# Patient Record
Sex: FEMALE | Race: WHITE | NOT HISPANIC OR LATINO | Employment: FULL TIME | URBAN - METROPOLITAN AREA
[De-identification: names, ages, dates, MRNs, and addresses within clinical notes are randomized per-mention and may not be internally consistent; named-entity substitution may affect disease eponyms.]

---

## 2017-06-07 ENCOUNTER — CONVERSION ENCOUNTER (OUTPATIENT)
Dept: MAMMOGRAPHY | Facility: HOSPITAL | Age: 42
End: 2017-06-07

## 2018-05-15 ENCOUNTER — OFFICE VISIT CONVERTED (OUTPATIENT)
Dept: FAMILY MEDICINE CLINIC | Facility: CLINIC | Age: 43
End: 2018-05-15
Attending: NURSE PRACTITIONER

## 2018-06-04 ENCOUNTER — OFFICE VISIT CONVERTED (OUTPATIENT)
Dept: ORTHOPEDIC SURGERY | Facility: CLINIC | Age: 43
End: 2018-06-04
Attending: PHYSICIAN ASSISTANT

## 2018-07-11 ENCOUNTER — OFFICE VISIT CONVERTED (OUTPATIENT)
Dept: ORTHOPEDIC SURGERY | Facility: CLINIC | Age: 43
End: 2018-07-11
Attending: PHYSICIAN ASSISTANT

## 2018-07-11 ENCOUNTER — CONVERSION ENCOUNTER (OUTPATIENT)
Dept: ORTHOPEDIC SURGERY | Facility: CLINIC | Age: 43
End: 2018-07-11

## 2018-11-12 ENCOUNTER — OFFICE VISIT CONVERTED (OUTPATIENT)
Dept: FAMILY MEDICINE CLINIC | Facility: CLINIC | Age: 43
End: 2018-11-12
Attending: NURSE PRACTITIONER

## 2019-01-10 ENCOUNTER — HOSPITAL ENCOUNTER (OUTPATIENT)
Dept: URGENT CARE | Facility: CLINIC | Age: 44
Discharge: HOME | End: 2019-01-10

## 2019-01-10 LAB — GLUCOSE BLD-MCNC: 112 MG/DL (ref 65–99)

## 2019-01-13 LAB — BACTERIA SPEC AEROBE CULT: NORMAL

## 2019-02-22 ENCOUNTER — HOSPITAL ENCOUNTER (OUTPATIENT)
Dept: FAMILY MEDICINE CLINIC | Facility: CLINIC | Age: 44
Discharge: HOME | End: 2019-02-22
Attending: NURSE PRACTITIONER

## 2019-02-23 LAB — VZV IGG SER IA-ACNC: 1223 INDEX

## 2019-05-29 ENCOUNTER — HOSPITAL ENCOUNTER (OUTPATIENT)
Dept: FAMILY MEDICINE CLINIC | Facility: CLINIC | Age: 44
Discharge: HOME | End: 2019-05-29
Attending: NURSE PRACTITIONER

## 2019-05-29 ENCOUNTER — OFFICE VISIT CONVERTED (OUTPATIENT)
Dept: FAMILY MEDICINE CLINIC | Facility: CLINIC | Age: 44
End: 2019-05-29
Attending: NURSE PRACTITIONER

## 2019-05-29 LAB
25(OH)D3 SERPL-MCNC: 30.6 NG/ML (ref 30–100)
ALBUMIN SERPL-MCNC: 4.2 G/DL (ref 3.5–5)
ALBUMIN/GLOB SERPL: 1.4 {RATIO} (ref 1.4–2.6)
ALP SERPL-CCNC: 91 U/L (ref 42–98)
ALT SERPL-CCNC: 22 U/L (ref 10–40)
ANION GAP SERPL CALC-SCNC: 22 MMOL/L (ref 8–19)
AST SERPL-CCNC: 19 U/L (ref 15–50)
BASOPHILS # BLD AUTO: 0.04 10*3/UL (ref 0–0.2)
BASOPHILS NFR BLD AUTO: 0.5 % (ref 0–3)
BILIRUB SERPL-MCNC: 0.42 MG/DL (ref 0.2–1.3)
BUN SERPL-MCNC: 19 MG/DL (ref 5–25)
BUN/CREAT SERPL: 22 {RATIO} (ref 6–20)
CALCIUM SERPL-MCNC: 9.5 MG/DL (ref 8.7–10.4)
CHLORIDE SERPL-SCNC: 103 MMOL/L (ref 99–111)
CHOLEST SERPL-MCNC: 214 MG/DL (ref 107–200)
CHOLEST/HDLC SERPL: 5.5 {RATIO} (ref 3–6)
CONV ABS IMM GRAN: 0.02 10*3/UL (ref 0–0.2)
CONV CO2: 20 MMOL/L (ref 22–32)
CONV IMMATURE GRAN: 0.3 % (ref 0–1.8)
CONV TOTAL PROTEIN: 7.2 G/DL (ref 6.3–8.2)
CREAT UR-MCNC: 0.85 MG/DL (ref 0.5–0.9)
DEPRECATED RDW RBC AUTO: 41.8 FL (ref 36.4–46.3)
EOSINOPHIL # BLD AUTO: 0.05 10*3/UL (ref 0–0.7)
EOSINOPHIL # BLD AUTO: 0.7 % (ref 0–7)
ERYTHROCYTE [DISTWIDTH] IN BLOOD BY AUTOMATED COUNT: 13.2 % (ref 11.7–14.4)
GFR SERPLBLD BASED ON 1.73 SQ M-ARVRAT: >60 ML/MIN/{1.73_M2}
GLOBULIN UR ELPH-MCNC: 3 G/DL (ref 2–3.5)
GLUCOSE SERPL-MCNC: 117 MG/DL (ref 65–99)
HBA1C MFR BLD: 13.3 G/DL (ref 12–16)
HCT VFR BLD AUTO: 41.7 % (ref 37–47)
HDLC SERPL-MCNC: 39 MG/DL (ref 40–60)
LDLC SERPL CALC-MCNC: 133 MG/DL (ref 70–100)
LYMPHOCYTES # BLD AUTO: 2.69 10*3/UL (ref 1–5)
MCH RBC QN AUTO: 28 PG (ref 27–31)
MCHC RBC AUTO-ENTMCNC: 31.9 G/DL (ref 33–37)
MCV RBC AUTO: 87.8 FL (ref 81–99)
MONOCYTES # BLD AUTO: 0.45 10*3/UL (ref 0.2–1.2)
MONOCYTES NFR BLD AUTO: 6.1 % (ref 3–10)
NEUTROPHILS # BLD AUTO: 4.16 10*3/UL (ref 2–8)
NEUTROPHILS NFR BLD AUTO: 56.1 % (ref 30–85)
NRBC CBCN: 0 % (ref 0–0.7)
OSMOLALITY SERPL CALC.SUM OF ELEC: 295 MOSM/KG (ref 273–304)
PLATELET # BLD AUTO: 158 10*3/UL (ref 130–400)
PMV BLD AUTO: 13.2 FL (ref 9.4–12.3)
POTASSIUM SERPL-SCNC: 4.2 MMOL/L (ref 3.5–5.3)
RBC # BLD AUTO: 4.75 10*6/UL (ref 4.2–5.4)
SODIUM SERPL-SCNC: 141 MMOL/L (ref 135–147)
T4 FREE SERPL-MCNC: 1.1 NG/DL (ref 0.9–1.8)
TRIGL SERPL-MCNC: 210 MG/DL (ref 40–150)
TSH SERPL-ACNC: 4.77 M[IU]/L (ref 0.27–4.2)
VARIANT LYMPHS NFR BLD MANUAL: 36.3 % (ref 20–45)
VLDLC SERPL-MCNC: 42 MG/DL (ref 5–37)
WBC # BLD AUTO: 7.41 10*3/UL (ref 4.8–10.8)

## 2019-05-30 LAB — CONV ANTI MICROSOMAL AB: 164 IU/ML (ref 0–34)

## 2019-12-05 ENCOUNTER — HOSPITAL ENCOUNTER (OUTPATIENT)
Dept: FAMILY MEDICINE CLINIC | Facility: CLINIC | Age: 44
Discharge: HOME | End: 2019-12-05
Attending: NURSE PRACTITIONER

## 2019-12-05 ENCOUNTER — OFFICE VISIT CONVERTED (OUTPATIENT)
Dept: FAMILY MEDICINE CLINIC | Facility: CLINIC | Age: 44
End: 2019-12-05
Attending: NURSE PRACTITIONER

## 2019-12-05 ENCOUNTER — CONVERSION ENCOUNTER (OUTPATIENT)
Dept: FAMILY MEDICINE CLINIC | Facility: CLINIC | Age: 44
End: 2019-12-05

## 2019-12-05 LAB
25(OH)D3 SERPL-MCNC: 24.9 NG/ML (ref 30–100)
ALBUMIN SERPL-MCNC: 4.2 G/DL (ref 3.5–5)
ALBUMIN/GLOB SERPL: 1.4 {RATIO} (ref 1.4–2.6)
ALP SERPL-CCNC: 87 U/L (ref 42–98)
ALT SERPL-CCNC: 52 U/L (ref 10–40)
ANION GAP SERPL CALC-SCNC: 18 MMOL/L (ref 8–19)
AST SERPL-CCNC: 32 U/L (ref 15–50)
BASOPHILS # BLD AUTO: 0.03 10*3/UL (ref 0–0.2)
BASOPHILS NFR BLD AUTO: 0.5 % (ref 0–3)
BILIRUB SERPL-MCNC: 0.62 MG/DL (ref 0.2–1.3)
BUN SERPL-MCNC: 15 MG/DL (ref 5–25)
BUN/CREAT SERPL: 19 {RATIO} (ref 6–20)
CALCIUM SERPL-MCNC: 9.5 MG/DL (ref 8.7–10.4)
CHLORIDE SERPL-SCNC: 103 MMOL/L (ref 99–111)
CHOLEST SERPL-MCNC: 238 MG/DL (ref 107–200)
CHOLEST/HDLC SERPL: 6.1 {RATIO} (ref 3–6)
CONV ABS IMM GRAN: 0.01 10*3/UL (ref 0–0.2)
CONV CO2: 22 MMOL/L (ref 22–32)
CONV IMMATURE GRAN: 0.2 % (ref 0–1.8)
CONV TOTAL PROTEIN: 7.2 G/DL (ref 6.3–8.2)
CREAT UR-MCNC: 0.81 MG/DL (ref 0.5–0.9)
DEPRECATED RDW RBC AUTO: 41 FL (ref 36.4–46.3)
EOSINOPHIL # BLD AUTO: 0.02 10*3/UL (ref 0–0.7)
EOSINOPHIL # BLD AUTO: 0.4 % (ref 0–7)
ERYTHROCYTE [DISTWIDTH] IN BLOOD BY AUTOMATED COUNT: 13.2 % (ref 11.7–14.4)
EST. AVERAGE GLUCOSE BLD GHB EST-MCNC: 120 MG/DL
FOLATE SERPL-MCNC: 4.4 NG/ML (ref 4.8–20)
GFR SERPLBLD BASED ON 1.73 SQ M-ARVRAT: >60 ML/MIN/{1.73_M2}
GLOBULIN UR ELPH-MCNC: 3 G/DL (ref 2–3.5)
GLUCOSE SERPL-MCNC: 103 MG/DL (ref 65–99)
HBA1C MFR BLD: 5.8 % (ref 3.5–5.7)
HCT VFR BLD AUTO: 41.1 % (ref 37–47)
HDLC SERPL-MCNC: 39 MG/DL (ref 40–60)
HGB BLD-MCNC: 13.4 G/DL (ref 12–16)
IRON SATN MFR SERPL: 28 % (ref 20–55)
IRON SERPL-MCNC: 106 UG/DL (ref 60–170)
LDLC SERPL CALC-MCNC: 160 MG/DL (ref 70–100)
LYMPHOCYTES # BLD AUTO: 2.02 10*3/UL (ref 1–5)
LYMPHOCYTES NFR BLD AUTO: 35.4 % (ref 20–45)
MCH RBC QN AUTO: 27.9 PG (ref 27–31)
MCHC RBC AUTO-ENTMCNC: 32.6 G/DL (ref 33–37)
MCV RBC AUTO: 85.4 FL (ref 81–99)
MONOCYTES # BLD AUTO: 0.27 10*3/UL (ref 0.2–1.2)
MONOCYTES NFR BLD AUTO: 4.7 % (ref 3–10)
NEUTROPHILS # BLD AUTO: 3.35 10*3/UL (ref 2–8)
NEUTROPHILS NFR BLD AUTO: 58.8 % (ref 30–85)
NRBC CBCN: 0 % (ref 0–0.7)
OSMOLALITY SERPL CALC.SUM OF ELEC: 291 MOSM/KG (ref 273–304)
PLATELET # BLD AUTO: 177 10*3/UL (ref 130–400)
PMV BLD AUTO: 12.3 FL (ref 9.4–12.3)
POTASSIUM SERPL-SCNC: 3.3 MMOL/L (ref 3.5–5.3)
RBC # BLD AUTO: 4.81 10*6/UL (ref 4.2–5.4)
SODIUM SERPL-SCNC: 140 MMOL/L (ref 135–147)
T4 FREE SERPL-MCNC: 1.2 NG/DL (ref 0.9–1.8)
TIBC SERPL-MCNC: 383 UG/DL (ref 245–450)
TRANSFERRIN SERPL-MCNC: 268 MG/DL (ref 250–380)
TRIGL SERPL-MCNC: 196 MG/DL (ref 40–150)
TSH SERPL-ACNC: 2.29 M[IU]/L (ref 0.27–4.2)
VIT B12 SERPL-MCNC: 326 PG/ML (ref 211–911)
VLDLC SERPL-MCNC: 39 MG/DL (ref 5–37)
WBC # BLD AUTO: 5.7 10*3/UL (ref 4.8–10.8)

## 2019-12-06 LAB — CONV ANTI MICROSOMAL AB: 115 IU/ML (ref 0–34)

## 2019-12-07 LAB — BACTERIA SPEC AEROBE CULT: NORMAL

## 2019-12-13 ENCOUNTER — HOSPITAL ENCOUNTER (OUTPATIENT)
Dept: MAMMOGRAPHY | Facility: HOSPITAL | Age: 44
Discharge: HOME | End: 2019-12-13
Attending: NURSE PRACTITIONER

## 2019-12-17 ENCOUNTER — HOSPITAL ENCOUNTER (OUTPATIENT)
Dept: FAMILY MEDICINE CLINIC | Facility: CLINIC | Age: 44
Discharge: HOME | End: 2019-12-17
Attending: NURSE PRACTITIONER

## 2019-12-17 LAB
ALBUMIN SERPL-MCNC: 4.1 G/DL (ref 3.5–5)
ALBUMIN/GLOB SERPL: 1.6 {RATIO} (ref 1.4–2.6)
ALP SERPL-CCNC: 90 U/L (ref 42–98)
ALT SERPL-CCNC: 29 U/L (ref 10–40)
ANION GAP SERPL CALC-SCNC: 18 MMOL/L (ref 8–19)
AST SERPL-CCNC: 20 U/L (ref 15–50)
BILIRUB SERPL-MCNC: 0.52 MG/DL (ref 0.2–1.3)
BUN SERPL-MCNC: 15 MG/DL (ref 5–25)
BUN/CREAT SERPL: 21 {RATIO} (ref 6–20)
CALCIUM SERPL-MCNC: 9.2 MG/DL (ref 8.7–10.4)
CHLORIDE SERPL-SCNC: 106 MMOL/L (ref 99–111)
CONV CO2: 20 MMOL/L (ref 22–32)
CONV TOTAL PROTEIN: 6.6 G/DL (ref 6.3–8.2)
CREAT UR-MCNC: 0.7 MG/DL (ref 0.5–0.9)
GFR SERPLBLD BASED ON 1.73 SQ M-ARVRAT: >60 ML/MIN/{1.73_M2}
GLOBULIN UR ELPH-MCNC: 2.5 G/DL (ref 2–3.5)
GLUCOSE SERPL-MCNC: 111 MG/DL (ref 65–99)
OSMOLALITY SERPL CALC.SUM OF ELEC: 292 MOSM/KG (ref 273–304)
POTASSIUM SERPL-SCNC: 4 MMOL/L (ref 3.5–5.3)
SODIUM SERPL-SCNC: 140 MMOL/L (ref 135–147)

## 2019-12-30 ENCOUNTER — OFFICE VISIT CONVERTED (OUTPATIENT)
Dept: SURGERY | Facility: CLINIC | Age: 44
End: 2019-12-30
Attending: SURGERY

## 2020-02-28 ENCOUNTER — OFFICE VISIT CONVERTED (OUTPATIENT)
Dept: PLASTIC SURGERY | Facility: CLINIC | Age: 45
End: 2020-02-28
Attending: PLASTIC SURGERY

## 2020-06-10 ENCOUNTER — TELEPHONE CONVERTED (OUTPATIENT)
Dept: FAMILY MEDICINE CLINIC | Facility: CLINIC | Age: 45
End: 2020-06-10
Attending: NURSE PRACTITIONER

## 2020-06-11 ENCOUNTER — HOSPITAL ENCOUNTER (OUTPATIENT)
Dept: FAMILY MEDICINE CLINIC | Facility: CLINIC | Age: 45
Discharge: HOME | End: 2020-06-11
Attending: NURSE PRACTITIONER

## 2020-06-11 LAB
25(OH)D3 SERPL-MCNC: 18 NG/ML (ref 30–100)
ALBUMIN SERPL-MCNC: 3.8 G/DL (ref 3.5–5)
ALBUMIN/GLOB SERPL: 1.3 {RATIO} (ref 1.4–2.6)
ALP SERPL-CCNC: 96 U/L (ref 42–98)
ALT SERPL-CCNC: 20 U/L (ref 10–40)
ANION GAP SERPL CALC-SCNC: 15 MMOL/L (ref 8–19)
AST SERPL-CCNC: 15 U/L (ref 15–50)
BASOPHILS # BLD AUTO: 0.03 10*3/UL (ref 0–0.2)
BASOPHILS NFR BLD AUTO: 0.4 % (ref 0–3)
BILIRUB SERPL-MCNC: 0.3 MG/DL (ref 0.2–1.3)
BUN SERPL-MCNC: 16 MG/DL (ref 5–25)
BUN/CREAT SERPL: 24 {RATIO} (ref 6–20)
CALCIUM SERPL-MCNC: 9.2 MG/DL (ref 8.7–10.4)
CHLORIDE SERPL-SCNC: 107 MMOL/L (ref 99–111)
CHOLEST SERPL-MCNC: 193 MG/DL (ref 107–200)
CHOLEST/HDLC SERPL: 5.1 {RATIO} (ref 3–6)
CONV ABS IMM GRAN: 0.03 10*3/UL (ref 0–0.2)
CONV CO2: 19 MMOL/L (ref 22–32)
CONV IMMATURE GRAN: 0.4 % (ref 0–1.8)
CONV TOTAL PROTEIN: 6.7 G/DL (ref 6.3–8.2)
CREAT UR-MCNC: 0.67 MG/DL (ref 0.5–0.9)
DEPRECATED RDW RBC AUTO: 40.6 FL (ref 36.4–46.3)
EOSINOPHIL # BLD AUTO: 0.05 10*3/UL (ref 0–0.7)
EOSINOPHIL # BLD AUTO: 0.6 % (ref 0–7)
ERYTHROCYTE [DISTWIDTH] IN BLOOD BY AUTOMATED COUNT: 13.1 % (ref 11.7–14.4)
FOLATE SERPL-MCNC: 13.2 NG/ML (ref 4.8–20)
GFR SERPLBLD BASED ON 1.73 SQ M-ARVRAT: >60 ML/MIN/{1.73_M2}
GLOBULIN UR ELPH-MCNC: 2.9 G/DL (ref 2–3.5)
GLUCOSE SERPL-MCNC: 112 MG/DL (ref 65–99)
HCT VFR BLD AUTO: 40 % (ref 37–47)
HDLC SERPL-MCNC: 38 MG/DL (ref 40–60)
HGB BLD-MCNC: 12.6 G/DL (ref 12–16)
LDLC SERPL CALC-MCNC: 115 MG/DL (ref 70–100)
LYMPHOCYTES # BLD AUTO: 3.33 10*3/UL (ref 1–5)
LYMPHOCYTES NFR BLD AUTO: 39.4 % (ref 20–45)
MCH RBC QN AUTO: 26.8 PG (ref 27–31)
MCHC RBC AUTO-ENTMCNC: 31.5 G/DL (ref 33–37)
MCV RBC AUTO: 84.9 FL (ref 81–99)
MONOCYTES # BLD AUTO: 0.5 10*3/UL (ref 0.2–1.2)
MONOCYTES NFR BLD AUTO: 5.9 % (ref 3–10)
NEUTROPHILS # BLD AUTO: 4.51 10*3/UL (ref 2–8)
NEUTROPHILS NFR BLD AUTO: 53.3 % (ref 30–85)
NRBC CBCN: 0 % (ref 0–0.7)
OSMOLALITY SERPL CALC.SUM OF ELEC: 286 MOSM/KG (ref 273–304)
PLATELET # BLD AUTO: 174 10*3/UL (ref 130–400)
PMV BLD AUTO: 12.3 FL (ref 9.4–12.3)
POTASSIUM SERPL-SCNC: 3.9 MMOL/L (ref 3.5–5.3)
RBC # BLD AUTO: 4.71 10*6/UL (ref 4.2–5.4)
SODIUM SERPL-SCNC: 137 MMOL/L (ref 135–147)
T4 FREE SERPL-MCNC: 1.1 NG/DL (ref 0.9–1.8)
TRIGL SERPL-MCNC: 198 MG/DL (ref 40–150)
TSH SERPL-ACNC: 1.83 M[IU]/L (ref 0.27–4.2)
VIT B12 SERPL-MCNC: 281 PG/ML (ref 211–911)
VLDLC SERPL-MCNC: 40 MG/DL (ref 5–37)
WBC # BLD AUTO: 8.45 10*3/UL (ref 4.8–10.8)

## 2020-06-12 LAB
CONV ANTI MICROSOMAL AB: 261 IU/ML (ref 0–34)
EST. AVERAGE GLUCOSE BLD GHB EST-MCNC: 134 MG/DL
HBA1C MFR BLD: 6.3 % (ref 3.5–5.7)

## 2020-08-03 ENCOUNTER — TELEMEDICINE CONVERTED (OUTPATIENT)
Dept: FAMILY MEDICINE CLINIC | Facility: CLINIC | Age: 45
End: 2020-08-03
Attending: NURSE PRACTITIONER

## 2020-11-19 ENCOUNTER — HOSPITAL ENCOUNTER (OUTPATIENT)
Dept: URGENT CARE | Facility: CLINIC | Age: 45
Discharge: HOME | End: 2020-11-19

## 2020-11-25 ENCOUNTER — OFFICE VISIT CONVERTED (OUTPATIENT)
Dept: FAMILY MEDICINE CLINIC | Facility: CLINIC | Age: 45
End: 2020-11-25
Attending: NURSE PRACTITIONER

## 2020-11-25 ENCOUNTER — HOSPITAL ENCOUNTER (OUTPATIENT)
Dept: FAMILY MEDICINE CLINIC | Facility: CLINIC | Age: 45
Discharge: HOME | End: 2020-11-25
Attending: NURSE PRACTITIONER

## 2020-11-25 LAB — VIT B12 SERPL-MCNC: 995 PG/ML (ref 211–911)

## 2020-11-25 PROCEDURE — 82746 ASSAY OF FOLIC ACID SERUM: CPT

## 2020-11-26 LAB
25(OH)D3 SERPL-MCNC: 17.7 NG/ML (ref 30–100)
ALBUMIN SERPL-MCNC: 4 G/DL (ref 3.5–5)
ALBUMIN/GLOB SERPL: 1.4 {RATIO} (ref 1.4–2.6)
ALP SERPL-CCNC: 106 U/L (ref 42–98)
ALT SERPL-CCNC: 27 U/L (ref 10–40)
ANION GAP SERPL CALC-SCNC: 14 MMOL/L (ref 8–19)
AST SERPL-CCNC: 22 U/L (ref 15–50)
BASOPHILS # BLD AUTO: 0.03 10*3/UL (ref 0–0.2)
BASOPHILS NFR BLD AUTO: 0.4 % (ref 0–3)
BILIRUB SERPL-MCNC: 0.55 MG/DL (ref 0.2–1.3)
BUN SERPL-MCNC: 14 MG/DL (ref 5–25)
BUN/CREAT SERPL: 17 {RATIO} (ref 6–20)
CALCIUM SERPL-MCNC: 9.5 MG/DL (ref 8.7–10.4)
CHLORIDE SERPL-SCNC: 102 MMOL/L (ref 99–111)
CHOLEST SERPL-MCNC: 150 MG/DL (ref 107–200)
CHOLEST/HDLC SERPL: 3.2 {RATIO} (ref 3–6)
CONV ABS IMM GRAN: 0.01 10*3/UL (ref 0–0.2)
CONV CO2: 26 MMOL/L (ref 22–32)
CONV IMMATURE GRAN: 0.1 % (ref 0–1.8)
CONV TOTAL PROTEIN: 6.9 G/DL (ref 6.3–8.2)
CREAT UR-MCNC: 0.84 MG/DL (ref 0.5–0.9)
DEPRECATED RDW RBC AUTO: 40 FL (ref 36.4–46.3)
EOSINOPHIL # BLD AUTO: 0.08 10*3/UL (ref 0–0.7)
EOSINOPHIL # BLD AUTO: 1.1 % (ref 0–7)
ERYTHROCYTE [DISTWIDTH] IN BLOOD BY AUTOMATED COUNT: 13 % (ref 11.7–14.4)
GFR SERPLBLD BASED ON 1.73 SQ M-ARVRAT: >60 ML/MIN/{1.73_M2}
GLOBULIN UR ELPH-MCNC: 2.9 G/DL (ref 2–3.5)
GLUCOSE SERPL-MCNC: 120 MG/DL (ref 65–99)
HCT VFR BLD AUTO: 40.5 % (ref 37–47)
HDLC SERPL-MCNC: 47 MG/DL (ref 40–60)
HGB BLD-MCNC: 13.1 G/DL (ref 12–16)
IRON SATN MFR SERPL: 21 % (ref 20–55)
IRON SERPL-MCNC: 82 UG/DL (ref 60–170)
LDLC SERPL CALC-MCNC: 61 MG/DL (ref 70–100)
LYMPHOCYTES # BLD AUTO: 2.65 10*3/UL (ref 1–5)
LYMPHOCYTES NFR BLD AUTO: 36.2 % (ref 20–45)
MCH RBC QN AUTO: 27.6 PG (ref 27–31)
MCHC RBC AUTO-ENTMCNC: 32.3 G/DL (ref 33–37)
MCV RBC AUTO: 85.4 FL (ref 81–99)
MONOCYTES # BLD AUTO: 0.41 10*3/UL (ref 0.2–1.2)
MONOCYTES NFR BLD AUTO: 5.6 % (ref 3–10)
NEUTROPHILS # BLD AUTO: 4.15 10*3/UL (ref 2–8)
NEUTROPHILS NFR BLD AUTO: 56.6 % (ref 30–85)
NRBC CBCN: 0 % (ref 0–0.7)
OSMOLALITY SERPL CALC.SUM OF ELEC: 288 MOSM/KG (ref 273–304)
PLATELET # BLD AUTO: 191 10*3/UL (ref 130–400)
PMV BLD AUTO: 11.9 FL (ref 9.4–12.3)
POTASSIUM SERPL-SCNC: 4.3 MMOL/L (ref 3.5–5.3)
RBC # BLD AUTO: 4.74 10*6/UL (ref 4.2–5.4)
SODIUM SERPL-SCNC: 138 MMOL/L (ref 135–147)
TIBC SERPL-MCNC: 392 UG/DL (ref 245–450)
TRANSFERRIN SERPL-MCNC: 274 MG/DL (ref 250–380)
TRIGL SERPL-MCNC: 208 MG/DL (ref 40–150)
TSH SERPL-ACNC: 4.16 M[IU]/L (ref 0.27–4.2)
VLDLC SERPL-MCNC: 42 MG/DL (ref 5–37)
WBC # BLD AUTO: 7.33 10*3/UL (ref 4.8–10.8)

## 2020-11-27 ENCOUNTER — LAB REQUISITION (OUTPATIENT)
Dept: LAB | Facility: HOSPITAL | Age: 45
End: 2020-11-27

## 2020-11-27 DIAGNOSIS — Z00.00 ROUTINE GENERAL MEDICAL EXAMINATION AT A HEALTH CARE FACILITY: ICD-10-CM

## 2020-11-27 LAB — FOLATE SERPL-MCNC: 19 NG/ML (ref 4.78–24.2)

## 2020-11-28 LAB — CONV ANTI MICROSOMAL AB: 164 IU/ML (ref 0–34)

## 2020-12-02 LAB
CONV ESTROGENS, TOTAL, SERUM: 130 PG/ML
FSH SERPL-ACNC: 7.8 M[IU]/ML
LH SERPL-ACNC: 6.3 M[IU]/ML
PROGEST SERPL-MCNC: <0.1 NG/ML

## 2020-12-23 ENCOUNTER — TELEMEDICINE CONVERTED (OUTPATIENT)
Dept: FAMILY MEDICINE CLINIC | Facility: CLINIC | Age: 45
End: 2020-12-23
Attending: NURSE PRACTITIONER

## 2021-05-13 NOTE — PROGRESS NOTES
"   Progress Note      Patient Name: Estela Deras   Patient ID: [id number]   Sex: Female   YOB: 1975    Primary Care Provider: Nannette HART    Visit Date: Norma 10, 2020    Provider: HAILEY Doan   Location: [facility]   Location Address: [address] [place] [address] ALLEGRA Sarmiento  [id number]   Location Phone: [phone]          History Of Present Illness  Estela Deras is a 45 year old /White female who presents for evaluation and treatment of:   TELEHEALTH TELEPHONE VISIT  Chief Complaint: 6 mth f.u   Estela Deras is a 45 year old /White female who is presenting for evaluation via telehealth telephone visit. Verbal consent obtained before beginning visit.   Provider spent 1:30-1:46 minutes with patient during telehealth visit.   The following staff were present during this visit: pt, smileyw(self)   Past Medical History/Overview of Patient Symptoms     I reviewed her labs from Dec.    LIPIDS:  She is doing good with current regimen.  MVP:  She is doing well with current med.  no issues with the mvp. No echo since 2-3 years.  DEPRESSION/ANXIETY:  She is doing a lot better.  She is still seeing Tran Harris.  She is going to court for divorce court.  She is doing well overall.  NO si/hi.  INSOMNIA:  She is doing good with current meds  GERD:  She is not having any issues with current meds.  No n/v/d.  ALLERGIES\"  She is not any shots right now.      Valtrex for fever blisters as needed but has had 2 in the last month/.  THYROID:  She is doing good with 50 mcg.         Past Medical History  Disease Name Date Onset Notes   Abnormal Electrocardiogram 03/18/2014 --    Abnormal liver function study 04/29/2014 --    Aftercare following right shoulder arthroscopy 08/17/2017 --    Allergic rhinitis --  --    Anemia --  --    Anemia, Deficiency NOS --  --    Anxiety --  --    Arthritis --  --    Asthma --  --    Depression --  --    Dysfunctional uterine bleeding 03/25/2014 --  "   Family history of diabetes mellitus --  --    Family history of hypertension --  --    Family History Of Ischemic Heart Disease --  --    Fatty liver --  --    Foot pain 03/25/2014 --    Gastroesophageal reflux disease without esophagitis 05/29/2019 --    Generalized anxiety disorder 05/29/2019 --    GERD --  --    History of cold sores --  --    Hyperlipemia --  --    Hyperlipidemia 05/29/2019 --    Hypertriglyceridemia --  --    Hypothyroidism 05/29/2019 --    Impaired fasting glucose --  --    Insomnia, unspecified 05/29/2019 --    Low back pain --  --    Macromastia --  --    Migraine Headaches --  --    Migraine Headaches --  --    Mild episode of recurrent major depressive disorder 05/29/2019 --    Mitral valve prolapse --  --    Mitral Valve Prolapse/ Disorder --  --    MVP (mitral valve prolapse) 05/29/2019 --    NIght Sweats --  --    Obesity --  --    Reflux --  --    Sinus Trouble --  --    Sinus trouble --  --    Thyroid Problems --  --    Vitamin D deficiency --  --          Past Surgical History  Procedure Name Date Notes   Adenoidectomy --  --    Breast --  --    breast reduction 2002 --    Hysterectomy --  Laparoscopic Hysterectomy 6/30/14 Dr. Silvia Muller [facility]   Shoulder surgery 2017 --    Tonsillectomy --  --          Medication List  Name Date Started Instructions   Adderall 20 mg oral tablet  take 1 tablet (20 mg) by oral route once daily before breakfast   Calcium 600 600 mg calcium (1,500 mg) oral tablet 06/10/2020 take 1 tablet by oral route daily   Claritin 10 mg oral tablet 12/05/2019 take 1 tablet (10 mg) by oral route once daily for 30 days   Crestor 40 mg oral tablet 06/10/2020 take 1 tablet (40 mg) by oral route once daily at bedtime for 30 days   ergocalciferol (vitamin D2) 1,250 mcg (50,000 unit) oral capsule 06/10/2020 TAKE ONE CAPSULE ONCE EACH WEEK   levothyroxine 50 mcg oral tablet 06/10/2020 one tablet daily   omeprazole 20 mg oral capsule,delayed release(/EC)  06/10/2020 TAKE ONE CAPSULE EACH DAYBEFORE A MEAL   Toprol XL 50 mg oral tablet extended release 24 hr 06/10/2020 TAKE ONE TABLET EACH DAY (FOR BLOOD PRESSURE)   trazodone 100 mg oral tablet 06/10/2020 take 1 tablet by oral route once a for 30 days   Trintellix 20 mg oral tablet 06/10/2020 take 1 tablet (20 mg) by oral route once daily at the same time each day for 30 days   Zyprexa 5 mg oral tablet  take 1 tablet (5 mg) by oral route once daily         Allergy List  Allergen Name Date Reaction Notes   Codeine Sulfate --  --  --    Darvocet-N 100 --  --  --    Demerol --  --  --    Tylenol --  --  --    Zyrtec --  --  --          Family Medical History  Disease Name Relative/Age Notes   Lung Neoplasm, Malignant  --    DM Type II  --    Hyperlipidemia  --    Heart Disease  --    Cancer, Unspecified  --    Diabetes, unspecified type Grandfather (paternal)/   Grandfather (paternal)   Hypertension  --    Heart Attack (MI)  --    Diabetes mellitus, type II  --    Renal Calculus Father/   Father; Uncle (paternal)   Family history of certain chronic disabling diseases; arthritis Mother/   Mother   Osteoporosis Mother/   Mother         Social History  Finding Status Start/Stop Quantity Notes   Alcohol Never --/-- --  --    Alcohol Use Never --/-- --  does not drink   Assessed for Abuse/Neglect --  --/-- --  --    Claustophobic Unknown --/-- --  yes   Homemaker --  --/-- --  --    lives with children --  --/-- --  --    lives with spouse --  --/-- --  --    . --  --/-- --  --    [facility] Goal Statement: --  --/-- --  165   Other Substance Use Never --/-- --  --    Recreational Drug Use Never --/-- --  no   Tobacco Never --/-- --  never smoker   Working --  --/-- --  --          Immunizations  NameDate Admin Mfg Trade Name Lot Number Route Inj VIS Given VIS Publication   Hepatitis A11/05/2018 SKB HAVRIX-ADULT [id number] IM RD 11/05/2018 07/20/2016   Comments:    Hepatitis A05/15/2018 SKB HAVRIX-ADULT [id number] IM RD 05/15/2018  07/20/2016   Comments:    Gymfmzvkr69/22/2019 SKB Fluarix, quadrivalent, preservative free [id number] IM LD 02/22/2019 08/07/2015   Comments: Pt tolerated wel, left office in stable condition.   Gcuggwdtb89/06/2014 SKB Fluarix-PF > 3 Years [id number] IM LD 01/06/2014 07/02/2012   Comments:    MMR02/22/2019 MSD M-M-R II [id number] SC RD 02/22/2019 04/20/2012   Comments: Pt tolerated well, left office in stable condition.   Td11/05/2018 PMC DECAVAC 5N24G IM LD 11/05/2018 05/17/2007   Comments:    Tdap11/06/2018 SKB BOOSTRIX 75N2YG IM LD 11/06/2018 02/20/2015   Comments:          Review of Systems  · Constitutional  o Admits  o : fatigue  o Denies  o : fever  · Cardiovascular  o Denies  o : lower extremity edema, claudication, chest pressure, palpitations  · Respiratory  o Denies  o : shortness of breath, wheezing, cough, hemoptysis, dyspnea on exertion  · Gastrointestinal  o Denies  o : nausea, vomiting, diarrhea, constipation, abdominal pain  · Psychiatric  o Admits  o : anxiety, depression  o Denies  o : suicidal ideation, homicidal ideation              Assessment  · Generalized anxiety disorder     300.02/F41.1  · Gastroesophageal reflux disease without esophagitis     530.81/K21.9  · Hyperlipidemia     272.4/E78.5  · Hypothyroidism     244.9/E03.9  · Insomnia, unspecified     780.52/G47.00  · Mild episode of recurrent major depressive disorder     296.31/F33.0  · MVP (mitral valve prolapse)     424.0/I34.1  · Fever blister     054.9/B00.1      Plan  · Orders  o CBC with Auto Diff [facility] (15025) - - 06/10/2020  o CMP [facility] (77348) - - 06/10/2020  o Hgb A1c [facility] (64600) - - 06/10/2020  o Lipid Panel [facility] (43397) - - 06/10/2020  o Thyroid Profile (THYII) - - 06/10/2020  o Vitamin D (25-Hydroxy) Level (89381) - - 06/10/2020  o ACO-39: Current medications updated and reviewed () - - 06/10/2020  o ACO-14: Influenza immunization was not administered for reasons documented () - - 06/10/2020  o ACO-20: Screening Mammography  documented and reviewed (3014F) - - 06/10/2020  o TPO ab titer ser LA (24344) - - 06/10/2020  o Vitamin B-12 (50634) - - 06/10/2020  o Folate (Folic Acid) (62084) - - 06/10/2020  o Physician Telephone Evaluation, 11-20 minutes (67595) - - 06/10/2020  · Medications  o Valtrex 500 mg oral tablet   SIG: take 1 tablet (500 mg) by oral route once daily for 30 days   DISP: (30) tablets with 5 refills  Prescribed on 06/10/2020     o Calcium 600 600 mg calcium (1,500 mg) oral tablet   SIG: take 1 tablet by oral route daily   DISP: (90) tablets with 1 refills  Refilled on 06/10/2020     o Crestor 40 mg oral tablet   SIG: take 1 tablet (40 mg) by oral route once daily at bedtime for 30 days   DISP: (30) tablets with 5 refills  Refilled on 06/10/2020     o ergocalciferol (vitamin D2) 1,250 mcg (50,000 unit) oral capsule   SIG: TAKE ONE CAPSULE ONCE EACH WEEK   DISP: (4) Capsule with 5 refills  Refilled on 06/10/2020     o levothyroxine 50 mcg oral tablet   SIG: one tablet daily   DISP: (30) tablets with 5 refills  Refilled on 06/10/2020     o omeprazole 20 mg oral capsule,delayed release(DR/EC)   SIG: TAKE ONE CAPSULE EACH DAYBEFORE A MEAL   DISP: (30) Capsule with 5 refills  Refilled on 06/10/2020     o Toprol XL 50 mg oral tablet extended release 24 hr   SIG: TAKE ONE TABLET EACH DAY (FOR BLOOD PRESSURE)   DISP: (30) Tablet with 5 refills  Refilled on 06/10/2020     o trazodone 100 mg oral tablet   SIG: take 1 tablet by oral route once a for 30 days   DISP: (30) tablets with 5 refills  Refilled on 06/10/2020     o Trintellix 20 mg oral tablet   SIG: take 1 tablet (20 mg) by oral route once daily at the same time each day for 30 days   DISP: (30) tablets with 5 refills  Refilled on 06/10/2020     o Abilify 10 mg oral tablet   SIG: take 1 tablet by oral route 3 times a day for 30 days   DISP: (90) tablets with 0 refills  Discontinued on 06/10/2020     o Medications have been Reconciled  o Transition of Care or Provider  Policy  · Instructions  o Advised that cheeses and other sources of dairy fats, animal fats, fast food, and the extras (candy, pasteries, pies, doughnuts and cookies) all contain LDL raising nutrients. Advised to increase fruits, vegetables, whole grains, and to monitor portion sizes.   o Advised that cheeses and other sources of dairy fats, animal fats, fast food, and the extras (candy, pastries, pies, doughnuts and cookies) all contain LDL raising nutrients. Advised to increase fruits, vegetables, whole grains, and to monitor portion sizes.   o Patient was educated/instructed on their diagnosis, treatment and medications prior to discharge from the clinic today.  o Patient counseled to reduce calorie intake.  o Patient was instructed to exercise regularly.  o Call the office with any concerns or questions.  o We will f.u in 6 months unless her labs say different. Call with any concerns or questions. Cont to see psych.   · Disposition  o Call or Return if symptoms worsen or persist.  o Return Visit Request in/on 6 months +/- 2 days (02142).            Electronically Signed by: HAILEY Doan -Author on Norma 10, 2020 01:47:52 PM

## 2021-05-13 NOTE — PROGRESS NOTES
"   Progress Note      Patient Name: Estela Deras   Patient ID: [id number]   Sex: Female   YOB: 1975    Primary Care Provider: Nannette HART   Referring Provider: Sandrine Richards MD    Visit Date: November 25, 2020    Provider: HAILEY Doan   Location: [facility]   Location Address: [address] [place], [place] [address]ie danae  [place], [place]  [id number]   Location Phone: [phone]          Chief Complaint     Follow up and med refills       History Of Present Illness  Estela Deras is a 45 year old /White female who presents for evaluation and treatment of:      She is here for refills and fasting labs    LIPIDS:  She is doing good with med.  MVP:  She is doing well with current med.  no issues with the mvp. No echo since 2-3 years.  She has gain 2 lbs.  She is stressed but is doing good.    DEPRESSION/ANXIETY:  She is doing good overall.  She is talking with a man but doesn't have any sex drive.  She has her ovaries.  She did have some pelvic pain and she went to urgent care and they told her go to the ER but she refused to go to the ER.  The pain did get better. She has not   INSOMNIA:  She is doing so so.  She is having some issues with sleeping.  GERD:  She is not having any issues with current meds.  No n/v/d.  ALLERGIES\"  She is not any shots right now.      Valtrex for fever blisters.  THYROID:  She is doing good with dose but never did her thyroid u/s       Past Medical History  Disease Name Date Onset Notes   Abnormal Electrocardiogram 03/18/2014 --    Abnormal liver function study 04/29/2014 --    Aftercare following right shoulder arthroscopy 08/17/2017 --    Allergic rhinitis --  --    Anemia --  --    Anemia, Deficiency NOS --  --    Anxiety --  --    Arthritis --  --    Asthma --  --    Depression --  --    Dysfunctional uterine bleeding 03/25/2014 --    Family history of diabetes mellitus --  --    Family history of hypertension --  --    Family History " Of Ischemic Heart Disease --  --    Fatty liver --  --    Foot pain 03/25/2014 --    Gastroesophageal reflux disease without esophagitis 05/29/2019 --    Generalized anxiety disorder 05/29/2019 --    GERD --  --    History of cold sores --  --    Hyperlipemia --  --    Hyperlipidemia 05/29/2019 --    Hypertriglyceridemia --  --    Hypothyroidism 05/29/2019 --    Impaired fasting glucose --  --    Insomnia, unspecified 05/29/2019 --    Low back pain --  --    Macromastia --  --    Migraine Headaches --  --    Migraine Headaches --  --    Mild episode of recurrent major depressive disorder 05/29/2019 --    Mitral valve prolapse --  --    Mitral Valve Prolapse/ Disorder --  --    MVP (mitral valve prolapse) 05/29/2019 --    NIght Sweats --  --    Obesity --  --    Reflux --  --    Sinus Trouble --  --    Sinus trouble --  --    Thyroid Problems --  --    Vitamin D deficiency --  --          Past Surgical History  Procedure Name Date Notes   Adenoidectomy --  --    Breast --  --    breast reduction 2002 --    Hysterectomy --  Laparoscopic Hysterectomy 6/30/14 Dr. Silvia Muller [facility]   Shoulder surgery 2017 --    Tonsillectomy --  --          Medication List  Name Date Started Instructions   Adderall XR 25 mg oral capsule,extended release 24hr  take 1 capsule (25 mg) by oral route once daily in the morning upon awakening   buspirone 10 mg oral tablet  take 1 tablet by oral route daily   Claritin 10 mg oral tablet 12/05/2019 take 1 tablet (10 mg) by oral route once daily for 30 days   Crestor 40 mg oral tablet 11/25/2020 take 1 tablet (40 mg) by oral route once daily at bedtime for 30 days   Lamictal 100 mg oral tablet  take 1 tablet (100 mg) by oral route once daily   levothyroxine 50 mcg oral tablet 11/25/2020 one tablet daily   omeprazole 20 mg oral capsule,delayed release(/EC) 11/25/2020 TAKE ONE CAPSULE EACH DAYBEFORE A MEAL   Toprol XL 50 mg oral tablet extended release 24 hr 11/25/2020 TAKE ONE  TABLET EACH DAY (FOR BLOOD PRESSURE)   trazodone 100 mg oral tablet 11/25/2020 take 1 tablet by oral route once a for 30 days   Trintellix 20 mg oral tablet 11/25/2020 take 1 tablet (20 mg) by oral route once daily at the same time each day for 30 days   Valtrex 500 mg oral tablet 11/25/2020 take 1 tablet (500 mg) by oral route once daily for 30 days   Vitamin B-12 1,000 mcg oral tablet  take 1 tablet by oral route daily   Zetia 10 mg oral tablet 11/25/2020 take 1 tablet (10 mg) by oral route once daily   Zyprexa 5 mg oral tablet  take 1 tablet (5 mg) by oral route once daily         Allergy List  Allergen Name Date Reaction Notes   Codeine Sulfate --  --  --    Darvocet-N 100 --  --  --    Demerol --  --  --    Tylenol --  --  --    Zyrtec --  --  --        Allergies Reconciled  Family Medical History  Disease Name Relative/Age Notes   Lung Neoplasm, Malignant  --    DM Type II  --    Hyperlipidemia  --    Heart Disease  --    Cancer, Unspecified  --    Diabetes, unspecified type Grandfather (paternal)/   Grandfather (paternal)   Hypertension  --    Heart Attack (MI)  --    Diabetes Mellitus, Type II  --    Renal Calculus Father/   Father; Uncle (paternal)   Family history of certain chronic disabling diseases; arthritis Mother/   Mother   Osteoporosis Mother/   Mother         Social History  Finding Status Start/Stop Quantity Notes   Alcohol Never --/-- --  --    Alcohol Use Never --/-- --  does not drink   Assessed for Abuse/Neglect --  --/-- --  --    Claustophobic Unknown --/-- --  yes   Homemaker --  --/-- --  --    lives with children --  --/-- --  --    lives with spouse --  --/-- --  --    . --  --/-- --  --    [facility] Goal Statement: --  --/-- --  165   Other Substance Use Never --/-- --  --    Recreational Drug Use Never --/-- --  no   Tobacco Never --/-- --  never smoker   Working --  --/-- --  --          Immunizations  NameDate Admin Mfg Trade Name Lot Number Route Inj VIS Given VIS Publication  "  Hepatitis A11/05/2018 SKB HAVRIX-ADULT [id number] IM RD 11/05/2018 07/20/2016   Comments:    Hepatitis A05/15/2018 SKB HAVRIX-ADULT [id number] IM RD 05/15/2018 07/20/2016   Comments:    Yypzwjsoa11/22/2019 SKB Fluarix, quadrivalent, preservative free [id number] IM LD 02/22/2019 08/07/2015   Comments: Pt tolerated wel, left office in stable condition.   MMR02/22/2019 MSD M-M-R II [id number] SC RD 02/22/2019 04/20/2012   Comments: Pt tolerated well, left office in stable condition.   Td11/05/2018 PMC DECAVAC [id number] IM LD 11/05/2018 05/17/2007   Comments:    Tdap11/06/2018 SKB BOOSTRIX 75N2YG  LD 11/06/2018 02/20/2015   Comments:          Review of Systems  · Constitutional  o Admits  o : fatigue, weight loss  o Denies  o : fever  · Cardiovascular  o Denies  o : lower extremity edema, claudication, chest pressure, palpitations  · Respiratory  o Denies  o : shortness of breath, wheezing, cough, hemoptysis, dyspnea on exertion  · Gastrointestinal  o Denies  o : nausea, vomiting, diarrhea, constipation, abdominal pain  · Psychiatric  o Admits  o : anxiety, depression  o Denies  o : suicidal ideation, homicidal ideation      Vitals  Date Time BP Position Site L\R Cuff Size HR RR TEMP (F) WT  HT  BMI kg/m2 BSA m2 O2 Sat FR L/min FiO2        11/25/2020 10:55 /61 Sitting    67 - R 12 97.7 202lbs 7oz 5'  5\" 33.69 2.05 97 %            Physical Examination  · Constitutional  o Appearance  o : well-nourished, well developed, alert, in no acute distress  · Respiratory  o Respiratory Effort  o : breathing unlabored  o Auscultation of Lungs  o : normal breath sounds throughout  · Cardiovascular  o Heart  o :   § Auscultation of Heart  § : regular rate and rhythm, + murmurs,- gallops or rubs  § Palpation of Heart  § : normal apical impulse, no cardiac thrill present  o Peripheral Vascular System  o :   § Carotid Arteries  § : normal pulses bilaterally, no bruits present  § Pedal Pulses  § : pulses 2 bilaterally  § Extremities  § : no " cyanosis, clubbing or edema; less than 2 second refill noted  · Neurologic  o Mental Status Examination  o :   § Orientation  § : grossly oriented to person, place and time  · Psychiatric  o Mood and Affect  o : mood normal, affect appropriate, denies any SI/HI          Assessment  · Generalized anxiety disorder     300.02/F41.1  · Gastroesophageal reflux disease without esophagitis     530.81/K21.9  · Hyperlipidemia     272.4/E78.5  · Hypothyroidism     244.9/E03.9  · Insomnia, unspecified     780.52/G47.00  · Mild episode of recurrent major depressive disorder     296.31/F33.0  · MVP (mitral valve prolapse)     424.0/I34.1  · Visit for screening mammogram     V76.12/Z12.31  · Elevated glucose     790.29/R73.09  · Decreased libido     799.81/R68.82  · Abnormal thyroid function test     794.5/R94.6      Plan  · Orders  o Vitamin D (25-Hydroxy) Level (82330) - - 11/25/2020  o ACO-39: Current medications updated and reviewed () - - 11/25/2020  o ACO-14: Influenza immunization was not administered for reasons documented () - - 11/25/2020  o ACO-20: Screening Mammography documented and reviewed (3014F) - - 11/25/2020  o TPO ab titer ser LA (74323) - - 11/25/2020  o Screening Mammogram 3D Bilateral (42742, , , , , ) - - 11/25/2020  o Iron Profile (Iron 06805 TIBC 39328 and Transferrin 25653) (IRONP) - - 11/25/2020  o Vitamin B-12 (68345) - - 11/25/2020  o Folate (Folic Acid) (72028) - - 11/25/2020  o Physical, Primary Care Panel (CBC, CMP, Lipid, TSH) [facility] (92621, 46117, 02985, 66485) - - 11/25/2020  o Hormone Panel (Estrogen, FSH, LH, Progesterone) [facility] (88377, 27537, 46705, 23629) - - 11/25/2020   had hysterectomy but has ovaries   o Thyroid Ultrasound. (20970) - - 11/25/2020  · Medications  o Crestor 40 mg oral tablet   SIG: take 1 tablet (40 mg) by oral route once daily at bedtime for 30 days   DISP: (30) Tablet with 5 refills  Refilled on 11/25/2020     o levothyroxine 50 mcg oral  tablet   SIG: one tablet daily   DISP: (30) Tablet with 5 refills  Refilled on 11/25/2020     o omeprazole 20 mg oral capsule,delayed release(DR/EC)   SIG: TAKE ONE CAPSULE EACH DAYBEFORE A MEAL   DISP: (30) Capsule with 5 refills  Refilled on 11/25/2020     o Toprol XL 50 mg oral tablet extended release 24 hr   SIG: TAKE ONE TABLET EACH DAY (FOR BLOOD PRESSURE)   DISP: (30) Tablet with 5 refills  Refilled on 11/25/2020     o trazodone 100 mg oral tablet   SIG: take 1 tablet by oral route once a for 30 days   DISP: (30) Tablet with 5 refills  Refilled on 11/25/2020     o Trintellix 20 mg oral tablet   SIG: take 1 tablet (20 mg) by oral route once daily at the same time each day for 30 days   DISP: (30) Tablet with 5 refills  Refilled on 11/25/2020     o Valtrex 500 mg oral tablet   SIG: take 1 tablet (500 mg) by oral route once daily for 30 days   DISP: (30) Tablet with 5 refills  Refilled on 11/25/2020     o Zetia 10 mg oral tablet   SIG: take 1 tablet (10 mg) by oral route once daily   DISP: (90) Tablet with 1 refills  Refilled on 11/25/2020     · Instructions  o Advised that cheeses and other sources of dairy fats, animal fats, fast food, and the extras (candy, pasteries, pies, doughnuts and cookies) all contain LDL raising nutrients. Advised to increase fruits, vegetables, whole grains, and to monitor portion sizes.   o Patient was educated/instructed on their diagnosis, treatment and medications prior to discharge from the clinic today.  o Patient counseled to reduce calorie intake.  o Patient was instructed to exercise regularly.  o Call the office with any concerns or questions.  o We will do mammo. We will do labs. F.U in 3-6 months depending on the labs. Thyroid u/s. Call with any concerns or questions.  · Disposition  o Call or Return if symptoms worsen or persist.  o Follow-up in office in 3-6 months            Electronically Signed by: HAILEY Doan -Author on November 25, 2020 12:23:55 PM

## 2021-05-13 NOTE — PROGRESS NOTES
Quick Note      Patient Name: Estela Deras   Patient ID: [id number]   Sex: Female   YOB: 1975    Primary Care Provider: Nannette HART   Referring Provider: Sandrine Richards MD    Visit Date: August 3, 2020    Provider: HAILEY Mtz   Location: [facility]   Location Address: [address] [place] [address] ALLEGRA Sarmiento  [id number]   Location Phone: [phone]          History Of Present Illness  Video Conferencing Visit  Estela Deras is a 45 year old /White female who is presenting for evaluation via video conferencing via web care [employer]. Verbal consent obtained before beginning visit.   The following staff were present during this visit: AT/[facility]   Estela Deras is a 45 year old /White female who presents for evaluation and treatment of:      Rash on right forearm, gets red and blotchy buster when she is hot. Has been going on for 2 months. It feels rough to touch and dry. She hasn't used anything on it topically.       Physical Examination     the right forearm skin looks dry and flaky, some areas of redness, some scabbed areas where she has scratched to the point of bleeding. Left looks normal    well developed well nourished    breathing wihtout difficulty    gross neuro intact    affect is pleasant           Assessment  · Dermatitis     692.9/L30.9      Plan  · Orders  o ACO-39: Current medications updated and reviewed () - - 08/03/2020  o ACO-14: Influenza immunization was not administered for reasons documented () - - 08/03/2020  · Medications  o Medrol (Valdo) 4 mg oral tablets,dose pack   SIG: take by oral route as directed per package instructions   DISP: (1) 21 ct dose-pack with 0 refills  Prescribed on 08/03/2020     o Medications have been Reconciled  o Transition of Care or Provider Policy  · Instructions  o Patient was educated/instructed on their diagnosis, treatment and medications prior to discharge from the clinic today.  o she is going to  avoid harsh soaps, use aquaphor topically, avoid drying agents, try some steroids, if not imprvoing she may call for a derm consult in a fw weeks  · Disposition  o Call or Return if symptoms worsen or persist.            Electronically Signed by: HAILEY Mtz -Author on August 3, 2020 01:57:39 PM

## 2021-05-14 VITALS
RESPIRATION RATE: 12 BRPM | BODY MASS INDEX: 33.73 KG/M2 | OXYGEN SATURATION: 97 % | TEMPERATURE: 97.7 F | HEIGHT: 65 IN | SYSTOLIC BLOOD PRESSURE: 129 MMHG | HEART RATE: 67 BPM | DIASTOLIC BLOOD PRESSURE: 61 MMHG | WEIGHT: 202.44 LBS

## 2021-05-14 NOTE — PROGRESS NOTES
"   Progress Note      Patient Name: Estela Deras   Patient ID: [id number]   Sex: Female   YOB: 1975    Primary Care Provider: Nannette HART    Visit Date: December 23, 2020    Provider: HAILEY Doan   Location: [facility]   Location Address: [address]  [place]  [id number]   Location Phone: [phone]          Chief Complaint     Fever blisters       History Of Present Illness  Video Conferencing Visit  Estela Deras is a 45 year old /White female who is presenting for evaluation via video conferencing via [employer]. Verbal consent obtained before beginning visit.   The following staff were present during this visit: CMartin lpn, pt, kcw   Estela Deras is a 45 year old /White female who presents for evaluation and treatment of:      She has fever blisters all around her mouth.  She hasn't been more stressed that she is aware of.  She as been taking the Valtrex 500mg but only 2 a day for the last several days.  She does not take daily just when she gets a break out.  She is requesting a shot of \"something to help them\".  They are irritating but not oozing or bleeding.       Past Medical History  Disease Name Date Onset Notes   Abnormal Electrocardiogram 03/18/2014 --    Abnormal liver function study 04/29/2014 --    Aftercare following right shoulder arthroscopy 08/17/2017 --    Allergic rhinitis --  --    Anemia --  --    Anemia, Deficiency NOS --  --    Anxiety --  --    Arthritis --  --    Asthma --  --    Depression --  --    Dysfunctional uterine bleeding 03/25/2014 --    Family history of diabetes mellitus --  --    Family history of hypertension --  --    Family History Of Ischemic Heart Disease --  --    Fatty liver --  --    Foot pain 03/25/2014 --    Gastroesophageal reflux disease without esophagitis 05/29/2019 --    Generalized anxiety disorder 05/29/2019 --    GERD --  --    History of cold sores --  --  "   Hyperlipemia --  --    Hyperlipidemia 05/29/2019 --    Hypertriglyceridemia --  --    Hypothyroidism 05/29/2019 --    Impaired fasting glucose --  --    Insomnia, unspecified 05/29/2019 --    Low back pain --  --    Macromastia --  --    Migraine Headaches --  --    Migraine Headaches --  --    Mild episode of recurrent major depressive disorder 05/29/2019 --    Mitral valve prolapse --  --    Mitral Valve Prolapse/ Disorder --  --    MVP (mitral valve prolapse) 05/29/2019 --    NIght Sweats --  --    Obesity --  --    Reflux --  --    Sinus Trouble --  --    Sinus trouble --  --    Thyroid Problems --  --    Vitamin D deficiency --  --          Past Surgical History  Procedure Name Date Notes   Adenoidectomy --  --    Breast --  --    breast reduction 2002 --    Hysterectomy --  Laparoscopic Hysterectomy 6/30/14 Dr. Silvia Muller [facility]   Shoulder surgery 2017 --    Tonsillectomy --  --          Medication List  Name Date Started Instructions   Adderall XR 25 mg oral capsule,extended release 24hr  take 1 capsule (25 mg) by oral route once daily in the morning upon awakening   buspirone 10 mg oral tablet  take 1 tablet by oral route daily   Claritin 10 mg oral tablet 12/05/2019 take 1 tablet (10 mg) by oral route once daily for 30 days   Crestor 40 mg oral tablet 11/25/2020 take 1 tablet (40 mg) by oral route once daily at bedtime for 30 days   Lamictal 100 mg oral tablet  take 1 tablet (100 mg) by oral route once daily   levothyroxine 50 mcg oral tablet 11/25/2020 one tablet daily   omeprazole 20 mg oral capsule,delayed release(DR/EC) 11/25/2020 TAKE ONE CAPSULE EACH DAYBEFORE A MEAL   Toprol XL 50 mg oral tablet extended release 24 hr 11/25/2020 TAKE ONE TABLET EACH DAY (FOR BLOOD PRESSURE)   trazodone 100 mg oral tablet 11/25/2020 take 1 tablet by oral route once a for 30 days   Trintellix 20 mg oral tablet 11/25/2020 take 1 tablet (20 mg) by oral route once daily at the same time each day for 30  days   Valtrex 500 mg oral tablet 11/25/2020 take 1 tablet (500 mg) by oral route once daily for 30 days   Vitamin B-12 1,000 mcg oral tablet  take 1 tablet by oral route daily   Vitamin D2 1,250 mcg (50,000 unit) oral capsule 11/30/2020 take 1 capsule by oral route once weekly   Zetia 10 mg oral tablet 11/25/2020 take 1 tablet (10 mg) by oral route once daily   Zyprexa 5 mg oral tablet  take 1 tablet (5 mg) by oral route once daily         Allergy List  Allergen Name Date Reaction Notes   Codeine Sulfate --  --  --    Darvocet-N 100 --  --  --    Demerol --  --  --    Tylenol --  --  --    Zyrtec --  --  --        Allergies Reconciled  Family Medical History  Disease Name Relative/Age Notes   Lung Neoplasm, Malignant  --    DM Type II  --    Hyperlipidemia  --    Heart Disease  --    Cancer, Unspecified  --    Diabetes, unspecified type Grandfather (paternal)/   Grandfather (paternal)   Hypertension  --    Heart Attack (MI)  --    Diabetes Mellitus, Type II  --    Renal Calculus Father/   Father; Uncle (paternal)   Family history of certain chronic disabling diseases; arthritis Mother/   Mother   Osteoporosis Mother/   Mother         Social History  Finding Status Start/Stop Quantity Notes   Alcohol Never --/-- --  --    Alcohol Use Never --/-- --  does not drink   Assessed for Abuse/Neglect --  --/-- --  --    Claustophobic Unknown --/-- --  yes   Homemaker --  --/-- --  --    lives with children --  --/-- --  --    lives with spouse --  --/-- --  --    . --  --/-- --  --    [facility] Goal Statement: --  --/-- --  165   Other Substance Use Never --/-- --  --    Recreational Drug Use Never --/-- --  no   Tobacco Never --/-- --  never smoker   Working --  --/-- --  --          Immunizations  NameDate Admin Mfg Trade Name Lot Number Route Inj VIS Given VIS Publication   Hepatitis A11/05/2018 JOAQUIN HAVRIX-ADULT [id number] IM RD 11/05/2018 07/20/2016   Comments:    Hepatitis A05/15/2018 SKB HAVRIX-ADULT [id number] IM RD  05/15/2018 07/20/2016   Comments:    Uxsyxmczs67/22/2019 SKB Fluarix, quadrivalent, preservative free [id number] IM LD 02/22/2019 08/07/2015   Comments: Pt tolerated wel, left office in stable condition.   MMR02/22/2019 MSD M-M-R II [id number] SC RD 02/22/2019 04/20/2012   Comments: Pt tolerated well, left office in stable condition.   Td11/05/2018 PMC DECAVAC [id number] IM LD 11/05/2018 05/17/2007   Comments:    Tdap11/06/2018 SKB BOOSTRIX [id number] IM LD 11/06/2018 02/20/2015   Comments:          Review of Systems  · Constitutional  o Denies  o : fever, fatigue, weight loss, weight gain  · Cardiovascular  o Denies  o : lower extremity edema, claudication, chest pressure, palpitations  · Respiratory  o Denies  o : shortness of breath, wheezing, cough, hemoptysis, dyspnea on exertion  · Gastrointestinal  o Denies  o : nausea, vomiting, diarrhea, constipation, abdominal pain  · Integument  o Denies  o : rash, itching, skin dryness      Physical Examination  · Constitutional  o Appearance  o : well-nourished, well developed, alert, in no acute distress  · Neck  o Inspection/Palpation  o : normal appearance  · Respiratory  o Respiratory Effort  o : breathing unlabored  · Skin and Subcutaneous Tissue  o General Inspection  o : she has blister/scabbing like areas on her sybil lips noted. no oozing or bleeding noted  · Neurologic  o Mental Status Examination  o :   § Orientation  § : grossly oriented to person, place and time  · Psychiatric  o Mood and Affect  o : mood normal, affect appropriate, denies any SI/HI          Assessment  · Fever blister     054.9/B00.1      Plan  · Orders  o ACO-39: Current medications updated and reviewed (1159F, ) - - 12/23/2020  o ACO-14: Influenza immunization was not administered for reasons documented [facility] () - - 12/23/2020  · Medications  o Valtrex 1 gram oral tablet   SIG: take 1 tablet (1,000 mg) by oral route every 8 hours for 7 days   DISP: (21) Tablet with 0 refills  Prescribed on  12/23/2020     · Instructions  o Patient was educated/instructed on their diagnosis, treatment and medications prior to discharge from the clinic today.  o Call the office with any concerns or questions.  o We will Valtrex 1gm 3xs a day for 7 days then do the 500mg daily for 30 days then take as needed.  · Disposition  o Call or Return if symptoms worsen or persist.            Electronically Signed by: HAILEY Doan -Author on December 23, 2020 02:31:37 PM

## 2021-05-15 VITALS
SYSTOLIC BLOOD PRESSURE: 115 MMHG | WEIGHT: 217.44 LBS | BODY MASS INDEX: 36.23 KG/M2 | HEART RATE: 61 BPM | TEMPERATURE: 97 F | HEIGHT: 65 IN | DIASTOLIC BLOOD PRESSURE: 41 MMHG | OXYGEN SATURATION: 97 % | RESPIRATION RATE: 12 BRPM

## 2021-05-15 VITALS
SYSTOLIC BLOOD PRESSURE: 122 MMHG | DIASTOLIC BLOOD PRESSURE: 78 MMHG | HEART RATE: 74 BPM | BODY MASS INDEX: 35.7 KG/M2 | RESPIRATION RATE: 12 BRPM | TEMPERATURE: 97.2 F | HEIGHT: 65 IN | WEIGHT: 214.25 LBS | OXYGEN SATURATION: 98 %

## 2021-05-15 VITALS
HEART RATE: 62 BPM | OXYGEN SATURATION: 96 % | HEIGHT: 65 IN | BODY MASS INDEX: 35.67 KG/M2 | DIASTOLIC BLOOD PRESSURE: 80 MMHG | WEIGHT: 214.12 LBS | SYSTOLIC BLOOD PRESSURE: 132 MMHG

## 2021-05-15 VITALS — WEIGHT: 214 LBS | RESPIRATION RATE: 14 BRPM | HEIGHT: 65 IN | BODY MASS INDEX: 35.65 KG/M2

## 2021-05-16 VITALS — HEIGHT: 65 IN | WEIGHT: 214 LBS | BODY MASS INDEX: 35.65 KG/M2 | OXYGEN SATURATION: 98 % | HEART RATE: 71 BPM

## 2021-05-16 VITALS
DIASTOLIC BLOOD PRESSURE: 60 MMHG | OXYGEN SATURATION: 97 % | HEART RATE: 66 BPM | BODY MASS INDEX: 34.89 KG/M2 | TEMPERATURE: 97.6 F | WEIGHT: 209.44 LBS | HEIGHT: 65 IN | RESPIRATION RATE: 12 BRPM | SYSTOLIC BLOOD PRESSURE: 117 MMHG

## 2021-05-16 VITALS — HEIGHT: 65 IN | WEIGHT: 214.25 LBS | BODY MASS INDEX: 35.7 KG/M2 | OXYGEN SATURATION: 98 % | HEART RATE: 53 BPM

## 2021-05-16 VITALS
HEART RATE: 71 BPM | RESPIRATION RATE: 14 BRPM | OXYGEN SATURATION: 98 % | SYSTOLIC BLOOD PRESSURE: 109 MMHG | WEIGHT: 216.31 LBS | HEIGHT: 65 IN | TEMPERATURE: 98.1 F | BODY MASS INDEX: 36.04 KG/M2 | DIASTOLIC BLOOD PRESSURE: 39 MMHG

## 2021-09-08 PROCEDURE — U0004 COV-19 TEST NON-CDC HGH THRU: HCPCS | Performed by: NURSE PRACTITIONER

## 2021-09-13 ENCOUNTER — TELEMEDICINE (OUTPATIENT)
Dept: FAMILY MEDICINE CLINIC | Facility: CLINIC | Age: 46
End: 2021-09-13

## 2021-09-13 DIAGNOSIS — E55.9 VITAMIN D DEFICIENCY: ICD-10-CM

## 2021-09-13 DIAGNOSIS — E53.8 VITAMIN B 12 DEFICIENCY: ICD-10-CM

## 2021-09-13 DIAGNOSIS — L74.9 SWEATING ABNORMALITY: ICD-10-CM

## 2021-09-13 DIAGNOSIS — K21.9 GASTROESOPHAGEAL REFLUX DISEASE WITHOUT ESOPHAGITIS: ICD-10-CM

## 2021-09-13 DIAGNOSIS — J30.9 ALLERGIC RHINITIS, UNSPECIFIED SEASONALITY, UNSPECIFIED TRIGGER: ICD-10-CM

## 2021-09-13 DIAGNOSIS — E03.9 ACQUIRED HYPOTHYROIDISM: ICD-10-CM

## 2021-09-13 DIAGNOSIS — F33.0 MILD EPISODE OF RECURRENT MAJOR DEPRESSIVE DISORDER (HCC): ICD-10-CM

## 2021-09-13 DIAGNOSIS — E78.2 MIXED HYPERLIPIDEMIA: ICD-10-CM

## 2021-09-13 DIAGNOSIS — F51.01 PRIMARY INSOMNIA: ICD-10-CM

## 2021-09-13 DIAGNOSIS — I34.1 MITRAL VALVE PROLAPSE: Primary | ICD-10-CM

## 2021-09-13 DIAGNOSIS — F41.1 GENERALIZED ANXIETY DISORDER: ICD-10-CM

## 2021-09-13 DIAGNOSIS — R05.9 COUGH: ICD-10-CM

## 2021-09-13 DIAGNOSIS — B00.9 HSV (HERPES SIMPLEX VIRUS) INFECTION: ICD-10-CM

## 2021-09-13 PROBLEM — E78.5 HYPERLIPIDEMIA: Status: ACTIVE | Noted: 2019-05-29

## 2021-09-13 PROBLEM — K76.0 FATTY LIVER: Status: ACTIVE | Noted: 2021-09-13

## 2021-09-13 PROBLEM — G47.00 INSOMNIA, UNSPECIFIED: Status: ACTIVE | Noted: 2019-05-29

## 2021-09-13 PROBLEM — D53.9 DEFICIENCY ANEMIA: Status: ACTIVE | Noted: 2021-09-13

## 2021-09-13 PROBLEM — J45.909 ASTHMA: Status: ACTIVE | Noted: 2021-09-13

## 2021-09-13 PROBLEM — F32.A DEPRESSION: Status: ACTIVE | Noted: 2021-09-13

## 2021-09-13 PROBLEM — R61 NIGHT SWEATS: Status: ACTIVE | Noted: 2021-09-13

## 2021-09-13 PROBLEM — R73.01 IMPAIRED FASTING GLUCOSE: Status: ACTIVE | Noted: 2021-09-13

## 2021-09-13 PROBLEM — M19.90 ARTHRITIS: Status: ACTIVE | Noted: 2021-09-13

## 2021-09-13 PROBLEM — G43.909 MIGRAINE: Status: ACTIVE | Noted: 2021-09-13

## 2021-09-13 PROCEDURE — 99214 OFFICE O/P EST MOD 30 MIN: CPT | Performed by: NURSE PRACTITIONER

## 2021-09-13 RX ORDER — METOPROLOL SUCCINATE 50 MG/1
50 TABLET, EXTENDED RELEASE ORAL DAILY
Qty: 90 TABLET | Refills: 1 | Status: SHIPPED | OUTPATIENT
Start: 2021-09-13 | End: 2021-11-10

## 2021-09-13 RX ORDER — EZETIMIBE 10 MG/1
10 TABLET ORAL DAILY
Qty: 90 TABLET | Refills: 1 | Status: SHIPPED | OUTPATIENT
Start: 2021-09-13 | End: 2022-03-03 | Stop reason: SDUPTHER

## 2021-09-13 RX ORDER — CYPROHEPTADINE HYDROCHLORIDE 4 MG/1
8 TABLET ORAL NIGHTLY
Qty: 180 TABLET | Refills: 1 | Status: SHIPPED | OUTPATIENT
Start: 2021-09-13 | End: 2022-02-22

## 2021-09-13 RX ORDER — VALACYCLOVIR HYDROCHLORIDE 500 MG/1
500 TABLET, FILM COATED ORAL DAILY
Qty: 90 TABLET | Refills: 1 | Status: SHIPPED | OUTPATIENT
Start: 2021-09-13 | End: 2022-03-03 | Stop reason: SDUPTHER

## 2021-09-13 RX ORDER — OMEPRAZOLE 20 MG/1
20 CAPSULE, DELAYED RELEASE ORAL DAILY
Qty: 90 CAPSULE | Refills: 1 | Status: SHIPPED | OUTPATIENT
Start: 2021-09-13 | End: 2022-03-03 | Stop reason: SDUPTHER

## 2021-09-13 RX ORDER — TRAZODONE HYDROCHLORIDE 100 MG/1
100 TABLET ORAL NIGHTLY
COMMUNITY
End: 2021-09-13 | Stop reason: SDUPTHER

## 2021-09-13 RX ORDER — LEVOTHYROXINE SODIUM 0.03 MG/1
25 TABLET ORAL DAILY
Qty: 90 TABLET | Refills: 1 | Status: SHIPPED | OUTPATIENT
Start: 2021-09-13 | End: 2022-03-03 | Stop reason: SDUPTHER

## 2021-09-13 RX ORDER — ROSUVASTATIN CALCIUM 40 MG/1
40 TABLET, COATED ORAL NIGHTLY
Qty: 90 TABLET | Refills: 1 | Status: SHIPPED | OUTPATIENT
Start: 2021-09-13 | End: 2021-10-25

## 2021-09-13 RX ORDER — DEXTROMETHORPHAN HYDROBROMIDE AND PROMETHAZINE HYDROCHLORIDE 15; 6.25 MG/5ML; MG/5ML
5 SYRUP ORAL 4 TIMES DAILY PRN
Qty: 240 ML | Refills: 0 | Status: ON HOLD | OUTPATIENT
Start: 2021-09-13 | End: 2021-09-19

## 2021-09-13 RX ORDER — TRAZODONE HYDROCHLORIDE 100 MG/1
100 TABLET ORAL NIGHTLY
Qty: 90 TABLET | Refills: 1 | Status: SHIPPED | OUTPATIENT
Start: 2021-09-13 | End: 2022-03-03 | Stop reason: SDUPTHER

## 2021-09-13 RX ORDER — EZETIMIBE 10 MG/1
10 TABLET ORAL DAILY
COMMUNITY
End: 2021-09-13 | Stop reason: SDUPTHER

## 2021-09-13 NOTE — PROGRESS NOTES
Chief Complaint  Med Refill (patient states she feels better and wants refill meds), Hyperlipidemia, Hypothyroidism, and Hypertension    Subjective          Estela Deras presents to [facility]  History of Present Illness  You have chosen to receive care through a telehealth visit.  Do you consent to use a video/audio connection for your medical care today? Yes  Doximity done.  Pt is feeling better with coivd s/s but does need a refill of cough med.  She was given that with the telehealth doc virtually.  She needs her sweating to medicine per patient that Tran Harris prescribed her but she cannot get a hold of Tran to prescribe it.  It is an allergy medicine but she is using it for excessive sweating.  She is taking her vitamin D and vitamin B12 as prescribed and we are doing lab work soon since she has been around Covid positive she is in quarantine.  She is doing well with her Cymbalta.  No suicidal thoughts or hallucinations.  Cholesterol is well controlled with her Zetia and her Crestor.  No leg pain noted no chest pain shortness of breath noted.  She is taking her Synthroid daily and we will check labs to see what her levels look like but she feels okay with the current dose.  Mitral valve prolapse: She is on her metoprolol daily with no palpitation issues.  She does request cough syrup refill.  She is doing well with her omeprazole daily for her acid reflux.  She is taking her trazodone daily at bedtime and is sleeping well.  She has a history of HSV and is on Valtrex daily with no current episodes.    Past Medical History:   • Abnormal electrocardiogram   • Allergic rhinitis   • Anemia   • Arthritis   • Asthma   • Depression   • Dysfunctional uterine bleeding   • Fatty liver   • Foot pain   • Gastroesophageal reflux disease without esophagitis   • Generalized anxiety disorder   • H/O cold sores   • Hyperlipidemia   • Hypertriglyceridemia   • Hypothyroidism   • Impaired fasting  glucose   • Insomnia, unspecified   • Liver function study, abnormal   • Low back pain   • Macromastia   • Migraine headache   • Mild episode of recurrent major depressive disorder (CMS/HCC)   • Mitral valve prolapse   • Night sweats   • Obesity   • Sinus trouble   • Thyroid disorder   • Vitamin D deficiency       Allergies  Chlorhexidine, Adhesive tape, Cetirizine, Codeine, Meperidine, Propoxyphene, Sesame oil, and Acetaminophen    Past Surgical History:   • ADENOIDECTOMY   • BREAST SURGERY   • LAPAROSCOPIC HYSTERECTOMY    DR JULIANE PARHAM[facility]   • REDUCTION MAMMAPLASTY   • SHOULDER SURGERY   • TONSILLECTOMY       Social History     Tobacco Use   • Smoking status: Never Smoker   • Smokeless tobacco: Never Used   Substance Use Topics   • Alcohol use: Never   • Drug use: Never       Family History   Problem Relation Age of Onset   • Arthritis Mother    • Osteoporosis Mother    • Other Father         RENAL CALCULUS   • Diabetes Paternal Grandfather    • Lung cancer Other    • Diabetes type II Other    • Hyperlipidemia Other    • Heart disease Other         ISCHEMIC   • Cancer Other    • Hypertension Other    • Arthritis Paternal Uncle         Health Maintenance Due   Topic Date Due   • ANNUAL PHYSICAL  Never done   • Pneumococcal Vaccine 0-64 (1 of 2 - PPSV23) Never done   • COVID-19 Vaccine (1) Never done   • HEPATITIS C SCREENING  Never done   • LIPID PANEL  09/13/2021          Current Outpatient Medications:   •  amphetamine-dextroamphetamine XR (ADDERALL XR) 20 MG 24 hr capsule, , Disp: , Rfl:   •  atomoxetine (STRATTERA) 40 MG capsule, Take 40 mg by mouth Daily., Disp: , Rfl:   •  diphenhydrAMINE (BENADRYL) 50 MG capsule, Take 50 mg by mouth Daily., Disp: , Rfl:   •  DULoxetine (CYMBALTA) 60 MG capsule, Take 60 mg by mouth Daily., Disp: , Rfl:   •  ezetimibe (ZETIA) 10 MG tablet, Take 1 tablet by mouth Daily., Disp: 90 tablet, Rfl: 1  •  lamoTRIgine (LaMICtal) 100 MG tablet, Take 100 mg by mouth  Daily., Disp: , Rfl:   •  naproxen (NAPROSYN) 500 MG tablet, Take 1 tablet by mouth 2 (Two) Times a Day As Needed for Moderate Pain  or Headache. Take with food, Disp: 30 tablet, Rfl: 0  •  traZODone (DESYREL) 100 MG tablet, Take 1 tablet by mouth Every Night., Disp: 90 tablet, Rfl: 1  •  cyanocobalamin (VITAMIN B-12) 1000 MCG tablet, Take 1 tablet by mouth Daily., Disp: 180 tablet, Rfl: 1  •  cyproheptadine (PERIACTIN) 4 MG tablet, Take 2 tablets by mouth Every Night., Disp: 180 tablet, Rfl: 1  •  levothyroxine (Synthroid) 25 MCG tablet, Take 1 tablet by mouth Daily., Disp: 90 tablet, Rfl: 1  •  metoprolol succinate XL (TOPROL-XL) 50 MG 24 hr tablet, Take 1 tablet by mouth Daily., Disp: 90 tablet, Rfl: 1  •  omeprazole (priLOSEC) 20 MG capsule, Take 1 capsule by mouth Daily., Disp: 90 capsule, Rfl: 1  •  promethazine-dextromethorphan (PROMETHAZINE-DM) 6.25-15 MG/5ML syrup, Take 5 mL by mouth 4 (Four) Times a Day As Needed for Cough., Disp: 240 mL, Rfl: 0  •  rosuvastatin (CRESTOR) 40 MG tablet, Take 1 tablet by mouth Every Night., Disp: 90 tablet, Rfl: 1  •  valACYclovir (VALTREX) 500 MG tablet, Take 1 tablet by mouth Daily., Disp: 90 tablet, Rfl: 1    Medications Discontinued During This Encounter   Medication Reason   • BIOTIN PO *Therapy completed   • valACYclovir (VALTREX) 500 MG tablet Reorder   • rosuvastatin (CRESTOR) 40 MG tablet Reorder   • metoprolol succinate XL (TOPROL-XL) 50 MG 24 hr tablet Reorder   • levothyroxine (Synthroid) 25 MCG tablet Reorder   • cyanocobalamin (VITAMIN B-12) 1000 MCG tablet Reorder   • omeprazole (priLOSEC) 20 MG capsule Reorder   • traZODone (DESYREL) 100 MG tablet Reorder   • ezetimibe (ZETIA) 10 MG tablet Reorder   • CYPROHEPTADINE HCL PO Reorder       Immunization History   Administered Date(s) Administered   • Hepatitis A 05/15/2018, 11/05/2018   • Influenza, Unspecified 02/22/2019   • MMR 02/22/2019   • TD Preservative Free 11/05/2018   • Tdap 11/06/2018       Review of  Systems   Constitutional: Positive for fatigue.   Respiratory: Positive for cough.         Objective       There were no vitals filed for this visit.  There is no height or weight on file to calculate BMI.         Physical Exam  Constitutional:       Appearance: Normal appearance.   HENT:      Head: Normocephalic.   Pulmonary:      Effort: Pulmonary effort is normal.   Skin:     Findings: No bruising.   Neurological:      General: No focal deficit present.      Mental Status: She is alert and oriented to person, place, and time.   Psychiatric:         Mood and Affect: Mood normal.         Behavior: Behavior normal.         Thought Content: Thought content normal.         Judgment: Judgment normal.             Result Review :     The following data was reviewed by: HAILEY Doan on 09/13/2021:    Common labs    Common Labsle 11/25/20   Glucose 120 (A)   BUN 14   Creatinine 0.84   Sodium 138   Potassium 4.3   Chloride 102   Calcium 9.5   Albumin 4.0   Total Bilirubin 0.55   Alkaline Phosphatase 106 (A)   AST (SGOT) 22   ALT (SGPT) 27   WBC 7.33   Hemoglobin 13.1   Hematocrit 40.5   Platelets 191   Total Cholesterol 150   Triglycerides 208 (A)   HDL Cholesterol 47   LDL Cholesterol  61 (A)   (A) Abnormal value       Comments are available for some flowsheets but are not being displayed.                          Assessment and Plan      Diagnoses and all orders for this visit:    1. Mitral valve prolapse (Primary)  -     metoprolol succinate XL (TOPROL-XL) 50 MG 24 hr tablet; Take 1 tablet by mouth Daily.  Dispense: 90 tablet; Refill: 1  -     Comprehensive Metabolic Panel  -     CBC & Differential    2. Sweating abnormality  -     cyproheptadine (PERIACTIN) 4 MG tablet; Take 2 tablets by mouth Every Night.  Dispense: 180 tablet; Refill: 1    3. Allergic rhinitis, unspecified seasonality, unspecified trigger    4. Acquired hypothyroidism  -     levothyroxine (Synthroid) 25 MCG tablet; Take 1 tablet by mouth  Daily.  Dispense: 90 tablet; Refill: 1  -     TSH    5. Mild episode of recurrent major depressive disorder (CMS/HCC)    6. Vitamin D deficiency  -     Vitamin D 25 Hydroxy  -     Comprehensive Metabolic Panel    7. Primary insomnia  -     traZODone (DESYREL) 100 MG tablet; Take 1 tablet by mouth Every Night.  Dispense: 90 tablet; Refill: 1    8. Mixed hyperlipidemia  -     rosuvastatin (CRESTOR) 40 MG tablet; Take 1 tablet by mouth Every Night.  Dispense: 90 tablet; Refill: 1  -     ezetimibe (ZETIA) 10 MG tablet; Take 1 tablet by mouth Daily.  Dispense: 90 tablet; Refill: 1  -     CBC & Differential  -     Lipid Panel    9. Generalized anxiety disorder    10. Gastroesophageal reflux disease without esophagitis  -     omeprazole (priLOSEC) 20 MG capsule; Take 1 capsule by mouth Daily.  Dispense: 90 capsule; Refill: 1    11. HSV (herpes simplex virus) infection  -     valACYclovir (VALTREX) 500 MG tablet; Take 1 tablet by mouth Daily.  Dispense: 90 tablet; Refill: 1    12. Vitamin B 12 deficiency  -     cyanocobalamin (VITAMIN B-12) 1000 MCG tablet; Take 1 tablet by mouth Daily.  Dispense: 180 tablet; Refill: 1  -     Vitamin B12 & Folate    13. Cough  -     promethazine-dextromethorphan (PROMETHAZINE-DM) 6.25-15 MG/5ML syrup; Take 5 mL by mouth 4 (Four) Times a Day As Needed for Cough.  Dispense: 240 mL; Refill: 0            Follow Up     Return in about 6 months (around 3/13/2022).  Follow-up in 6 months for labs and appt. Call with any concerns or questions that you may have regarding your medications or history.    I have reviewed all medications and at this time no medications changes need to be adjusted for all chronic conditions.  Caution sedation with cough med.  Patient was given instructions and counseling regarding her condition or for health maintenance advice. Please see specific information pulled into the AVS if appropriate.   HAILEY Doan

## 2021-09-17 ENCOUNTER — APPOINTMENT (OUTPATIENT)
Dept: GENERAL RADIOLOGY | Facility: HOSPITAL | Age: 46
End: 2021-09-17

## 2021-09-17 ENCOUNTER — HOSPITAL ENCOUNTER (EMERGENCY)
Facility: HOSPITAL | Age: 46
Discharge: HOME | End: 2021-09-17
Attending: EMERGENCY MEDICINE | Admitting: EMERGENCY MEDICINE

## 2021-09-17 VITALS
RESPIRATION RATE: 24 BRPM | HEART RATE: 81 BPM | TEMPERATURE: 98.2 F | DIASTOLIC BLOOD PRESSURE: 67 MMHG | OXYGEN SATURATION: 95 % | HEIGHT: 65 IN | BODY MASS INDEX: 35.59 KG/M2 | WEIGHT: 213.63 LBS | SYSTOLIC BLOOD PRESSURE: 114 MMHG

## 2021-09-17 DIAGNOSIS — J18.9 MULTIFOCAL PNEUMONIA: Primary | ICD-10-CM

## 2021-09-17 LAB
ALBUMIN SERPL-MCNC: 3.6 G/DL (ref 3.5–5.2)
ALBUMIN/GLOB SERPL: 1.4 G/DL
ALP SERPL-CCNC: 118 U/L (ref 39–117)
ALT SERPL W P-5'-P-CCNC: 60 U/L (ref 1–33)
ANION GAP SERPL CALCULATED.3IONS-SCNC: 11.2 MMOL/L (ref 5–15)
AST SERPL-CCNC: 43 U/L (ref 1–32)
BASOPHILS # BLD AUTO: 0.02 10*3/MM3 (ref 0–0.2)
BASOPHILS NFR BLD AUTO: 0.4 % (ref 0–1.5)
BILIRUB SERPL-MCNC: 0.3 MG/DL (ref 0–1.2)
BUN SERPL-MCNC: 13 MG/DL (ref 6–20)
BUN/CREAT SERPL: 16.3 (ref 7–25)
CALCIUM SPEC-SCNC: 8.4 MG/DL (ref 8.6–10.5)
CHLORIDE SERPL-SCNC: 100 MMOL/L (ref 98–107)
CO2 SERPL-SCNC: 23.8 MMOL/L (ref 22–29)
CREAT SERPL-MCNC: 0.8 MG/DL (ref 0.57–1)
DEPRECATED RDW RBC AUTO: 44.2 FL (ref 37–54)
EOSINOPHIL # BLD AUTO: 0 10*3/MM3 (ref 0–0.4)
EOSINOPHIL NFR BLD AUTO: 0 % (ref 0.3–6.2)
ERYTHROCYTE [DISTWIDTH] IN BLOOD BY AUTOMATED COUNT: 14 % (ref 12.3–15.4)
GFR SERPL CREATININE-BSD FRML MDRD: 78 ML/MIN/1.73
GLOBULIN UR ELPH-MCNC: 2.6 GM/DL
GLUCOSE SERPL-MCNC: 189 MG/DL (ref 65–99)
HCT VFR BLD AUTO: 39.6 % (ref 34–46.6)
HGB BLD-MCNC: 12.8 G/DL (ref 12–15.9)
HOLD SPECIMEN: NORMAL
HOLD SPECIMEN: NORMAL
IMM GRANULOCYTES # BLD AUTO: 0.08 10*3/MM3 (ref 0–0.05)
IMM GRANULOCYTES NFR BLD AUTO: 1.5 % (ref 0–0.5)
LYMPHOCYTES # BLD AUTO: 1.42 10*3/MM3 (ref 0.7–3.1)
LYMPHOCYTES NFR BLD AUTO: 26.4 % (ref 19.6–45.3)
MCH RBC QN AUTO: 28 PG (ref 26.6–33)
MCHC RBC AUTO-ENTMCNC: 32.3 G/DL (ref 31.5–35.7)
MCV RBC AUTO: 86.7 FL (ref 79–97)
MONOCYTES # BLD AUTO: 0.22 10*3/MM3 (ref 0.1–0.9)
MONOCYTES NFR BLD AUTO: 4.1 % (ref 5–12)
NEUTROPHILS NFR BLD AUTO: 3.64 10*3/MM3 (ref 1.7–7)
NEUTROPHILS NFR BLD AUTO: 67.6 % (ref 42.7–76)
NRBC BLD AUTO-RTO: 0 /100 WBC (ref 0–0.2)
NT-PROBNP SERPL-MCNC: 5.6 PG/ML (ref 0–450)
PLATELET # BLD AUTO: 140 10*3/MM3 (ref 140–450)
PMV BLD AUTO: 12.1 FL (ref 6–12)
POTASSIUM SERPL-SCNC: 3.4 MMOL/L (ref 3.5–5.2)
PROT SERPL-MCNC: 6.2 G/DL (ref 6–8.5)
RBC # BLD AUTO: 4.57 10*6/MM3 (ref 3.77–5.28)
SODIUM SERPL-SCNC: 135 MMOL/L (ref 136–145)
TROPONIN T SERPL-MCNC: <0.01 NG/ML (ref 0–0.03)
WBC # BLD AUTO: 5.38 10*3/MM3 (ref 3.4–10.8)
WHOLE BLOOD HOLD SPECIMEN: NORMAL
WHOLE BLOOD HOLD SPECIMEN: NORMAL

## 2021-09-17 PROCEDURE — 99283 EMERGENCY DEPT VISIT LOW MDM: CPT

## 2021-09-17 PROCEDURE — 93005 ELECTROCARDIOGRAM TRACING: CPT

## 2021-09-17 PROCEDURE — 84484 ASSAY OF TROPONIN QUANT: CPT | Performed by: EMERGENCY MEDICINE

## 2021-09-17 PROCEDURE — 93005 ELECTROCARDIOGRAM TRACING: CPT | Performed by: EMERGENCY MEDICINE

## 2021-09-17 PROCEDURE — 93010 ELECTROCARDIOGRAM REPORT: CPT | Performed by: INTERNAL MEDICINE

## 2021-09-17 PROCEDURE — 83880 ASSAY OF NATRIURETIC PEPTIDE: CPT | Performed by: EMERGENCY MEDICINE

## 2021-09-17 PROCEDURE — 71045 X-RAY EXAM CHEST 1 VIEW: CPT

## 2021-09-17 PROCEDURE — 85025 COMPLETE CBC W/AUTO DIFF WBC: CPT | Performed by: EMERGENCY MEDICINE

## 2021-09-17 PROCEDURE — 80053 COMPREHEN METABOLIC PANEL: CPT | Performed by: EMERGENCY MEDICINE

## 2021-09-17 RX ORDER — AZITHROMYCIN 250 MG/1
TABLET, FILM COATED ORAL
Qty: 6 TABLET | Refills: 0 | Status: SHIPPED | OUTPATIENT
Start: 2021-09-17 | End: 2021-09-28 | Stop reason: HOSPADM

## 2021-09-17 RX ORDER — SODIUM CHLORIDE 0.9 % (FLUSH) 0.9 %
10 SYRINGE (ML) INJECTION AS NEEDED
Status: DISCONTINUED | OUTPATIENT
Start: 2021-09-17 | End: 2021-09-17 | Stop reason: HOSPADM

## 2021-09-17 RX ORDER — DEXAMETHASONE 4 MG/1
4 TABLET ORAL 2 TIMES DAILY WITH MEALS
Qty: 14 TABLET | Refills: 0 | Status: SHIPPED | OUTPATIENT
Start: 2021-09-17 | End: 2021-09-28 | Stop reason: HOSPADM

## 2021-09-17 RX ORDER — POTASSIUM CHLORIDE 750 MG/1
40 CAPSULE, EXTENDED RELEASE ORAL ONCE
Status: COMPLETED | OUTPATIENT
Start: 2021-09-17 | End: 2021-09-17

## 2021-09-17 RX ADMIN — SODIUM CHLORIDE 1000 ML: 9 INJECTION, SOLUTION INTRAVENOUS at 16:09

## 2021-09-17 RX ADMIN — POTASSIUM CHLORIDE 40 MEQ: 10 CAPSULE, COATED, EXTENDED RELEASE ORAL at 19:40

## 2021-09-17 NOTE — ED PROVIDER NOTES
Patient: Estela Deras      YOB: 1975              Date: 09/17/21  _______________________________________________________________________      Time: 19:30 EDT, 9/17/2021   Arrived by: Private vehicle  History provided by: patient  History is limited by: N/A    Chief Complaint: SOB    History of Present Illness:  Patient is a 46 y.o. year old female that presents to the emergency department with SOB  Symptoms started yesterday and are present during today's ED Visit. The timing of the symptoms are constant and started gradually. The severity of the symptoms are moderate when described by the patient.     Pt states that her O2 levels have been low and that she feels like she cant breath. Pt states that yesterday she started to he diaphoretic, nauseas, and fatigued. These symptoms have continued onto today.    Pt notes that her dad is COVID positive. Pt states she has had 2 - test but these were about 2 weeks ago..      Additional information about the patient description of these symptoms are listed below.           Shortness of Breath  Severity:  Moderate  Onset quality:  Gradual  Timing:  Constant  Progression:  Worsening  Relieved by:  Nothing  Worsened by:  Nothing  Associated symptoms: cough    Associated symptoms: no abdominal pain, no chest pain, no ear pain, no fever, no headaches, no sore throat and no vomiting            Similar Symptoms Previously: no  Recently seen: no      Patient Care Team  Primary Care Provider: Nannette Braun APRN    Past Medical History:     Allergies   Allergen Reactions   • Chlorhexidine Rash   • Adhesive Tape Other (See Comments)     BURNS SKIN   • Cetirizine Itching   • Codeine Headache and Other (See Comments)     MIGRANES   • Meperidine Headache and Other (See Comments)     MIGRANES   • Propoxyphene Other (See Comments)     MIGRANES   • Sesame Oil Nausea And Vomiting   • Acetaminophen Palpitations     Past Medical History:   Diagnosis Date   • Abnormal  electrocardiogram    • Allergic rhinitis    • Anemia    • Arthritis    • Asthma    • Depression    • Dysfunctional uterine bleeding    • Fatty liver    • Foot pain    • Gastroesophageal reflux disease without esophagitis    • Generalized anxiety disorder    • H/O cold sores    • Hyperlipidemia    • Hypertriglyceridemia    • Hypothyroidism    • Impaired fasting glucose    • Insomnia, unspecified    • Liver function study, abnormal    • Low back pain    • Macromastia    • Migraine headache    • Mild episode of recurrent major depressive disorder (CMS/HCC)    • Mitral valve prolapse    • Night sweats    • Obesity    • Sinus trouble    • Thyroid disorder    • Vitamin D deficiency      Past Surgical History:   Procedure Laterality Date   • ADENOIDECTOMY     • BREAST SURGERY     • LAPAROSCOPIC HYSTERECTOMY  06/30/2014    DR JULIANE PARHAM[facility]   • REDUCTION MAMMAPLASTY  2002   • SHOULDER SURGERY  2017   • TONSILLECTOMY       Family History   Problem Relation Age of Onset   • Arthritis Mother    • Osteoporosis Mother    • Other Father         RENAL CALCULUS   • Diabetes Paternal Grandfather    • Lung cancer Other    • Diabetes type II Other    • Hyperlipidemia Other    • Heart disease Other         ISCHEMIC   • Cancer Other    • Hypertension Other    • Arthritis Paternal Uncle        Home Medications:  Prior to Admission medications    Medication Sig Start Date End Date Taking? Authorizing Provider   amphetamine-dextroamphetamine XR (ADDERALL XR) 20 MG 24 hr capsule  8/3/21   Emergency, Nurse Pito RN   atomoxetine (STRATTERA) 40 MG capsule Take 40 mg by mouth Daily. 7/11/21   Emergency, Nurse Pito RN   cyanocobalamin (VITAMIN B-12) 1000 MCG tablet Take 1 tablet by mouth Daily. 9/13/21   Nannette Braun APRN   cyproheptadine (PERIACTIN) 4 MG tablet Take 2 tablets by mouth Every Night. 9/13/21   Nannette Braun APRN   diphenhydrAMINE (BENADRYL) 50 MG capsule Take 50 mg by mouth Daily.     Emergency, Nurse [facility], RN   DULoxetine (CYMBALTA) 60 MG capsule Take 60 mg by mouth Daily. 7/11/21   Emergency, Nurse Pito, RN   ezetimibe (ZETIA) 10 MG tablet Take 1 tablet by mouth Daily. 9/13/21   Nannette Braun APRN   lamoTRIgine (LaMICtal) 100 MG tablet Take 100 mg by mouth Daily. 7/11/21   Emergency, Nurse Pito, RN   levothyroxine (Synthroid) 25 MCG tablet Take 1 tablet by mouth Daily. 9/13/21   Nannette Braun APRN   metoprolol succinate XL (TOPROL-XL) 50 MG 24 hr tablet Take 1 tablet by mouth Daily. 9/13/21   Nannette Braun APRN   naproxen (NAPROSYN) 500 MG tablet Take 1 tablet by mouth 2 (Two) Times a Day As Needed for Moderate Pain  or Headache. Take with food 9/8/21   Ana Rosa Norris APRN   omeprazole (priLOSEC) 20 MG capsule Take 1 capsule by mouth Daily. 9/13/21   Nannette Braun APRN   promethazine-dextromethorphan (PROMETHAZINE-DM) 6.25-15 MG/5ML syrup Take 5 mL by mouth 4 (Four) Times a Day As Needed for Cough. 9/13/21   Nannette Braun APRN   rosuvastatin (CRESTOR) 40 MG tablet Take 1 tablet by mouth Every Night. 9/13/21   Nannette Braun APRN   traZODone (DESYREL) 100 MG tablet Take 1 tablet by mouth Every Night. 9/13/21   Nannette Braun APRN   valACYclovir (VALTREX) 500 MG tablet Take 1 tablet by mouth Daily. 9/13/21   Nannette Braun APRN        Social History:   PT  reports that she has never smoked. She has never used smokeless tobacco. She reports that she does not drink alcohol and does not use drugs.    Record Review:  I have reviewed the patient's records in [facility].     Review of Systems  Review of Systems   Constitutional: Negative for chills and fever.   HENT: Negative for congestion, ear pain and sore throat.    Eyes: Negative for pain.   Respiratory: Positive for cough and shortness of breath. Negative for chest tightness.    Cardiovascular: Negative for chest pain.   Gastrointestinal: Positive for nausea. Negative for abdominal  "pain, diarrhea and vomiting.   Genitourinary: Negative for flank pain and hematuria.   Musculoskeletal: Negative for joint swelling.   Skin: Negative for pallor.   Neurological: Negative for seizures and headaches.   All other systems reviewed and are negative.       Physical Exam  /62   Pulse 68   Temp 98.2 °F (36.8 °C) (Oral)   Resp 24   Ht 165.1 cm (65\")   Wt 96.9 kg (213 lb 10 oz)   SpO2 91%   BMI 35.55 kg/m²     Physical Exam  Vitals and nursing note reviewed.   Constitutional:       General: She is not in acute distress.  HENT:      Head: Normocephalic and atraumatic.      Nose: Nose normal.      Mouth/Throat:      Mouth: Mucous membranes are moist.      Pharynx: Oropharynx is clear. No posterior oropharyngeal erythema.   Eyes:      Extraocular Movements: Extraocular movements intact.      Pupils: Pupils are equal, round, and reactive to light.   Cardiovascular:      Rate and Rhythm: Normal rate and regular rhythm.      Pulses: Normal pulses.      Heart sounds: Normal heart sounds. No murmur heard.     Pulmonary:      Effort: No respiratory distress.      Breath sounds: No stridor. Examination of the right-lower field reveals rales. Examination of the left-lower field reveals rales. Rales present. No wheezing or rhonchi.   Abdominal:      General: Bowel sounds are normal. There is no distension.      Palpations: Abdomen is soft. There is no mass.      Tenderness: There is no abdominal tenderness.   Musculoskeletal:         General: No swelling or tenderness. Normal range of motion.      Cervical back: Normal range of motion and neck supple. No tenderness.      Right lower leg: No edema.      Left lower leg: No edema.   Skin:     General: Skin is warm.      Coloration: Skin is not pale.      Findings: No erythema or rash.   Neurological:      General: No focal deficit present.      Mental Status: She is alert and oriented to person, place, and time.      Sensory: No sensory deficit.      Motor: No " "weakness.   Psychiatric:         Mood and Affect: Mood normal.         Behavior: Behavior normal.         Thought Content: Thought content normal.         Judgment: Judgment normal.                  ED Course  /62   Pulse 68   Temp 98.2 °F (36.8 °C) (Oral)   Resp 24   Ht 165.1 cm (65\")   Wt 96.9 kg (213 lb 10 oz)   SpO2 91%   BMI 35.55 kg/m²   Results for orders placed or performed during the hospital encounter of 09/17/21   Comprehensive Metabolic Panel    Specimen: Arm, Right; Blood   Result Value Ref Range    Glucose 189 (H) 65 - 99 mg/dL    BUN 13 6 - 20 mg/dL    Creatinine 0.80 0.57 - 1.00 mg/dL    Sodium 135 (L) 136 - 145 mmol/L    Potassium 3.4 (L) 3.5 - 5.2 mmol/L    Chloride 100 98 - 107 mmol/L    CO2 23.8 22.0 - 29.0 mmol/L    Calcium 8.4 (L) 8.6 - 10.5 mg/dL    Total Protein 6.2 6.0 - 8.5 g/dL    Albumin 3.60 3.50 - 5.20 g/dL    ALT (SGPT) 60 (H) 1 - 33 U/L    AST (SGOT) 43 (H) 1 - 32 U/L    Alkaline Phosphatase 118 (H) 39 - 117 U/L    Total Bilirubin 0.3 0.0 - 1.2 mg/dL    eGFR Non African Amer 78 >60 mL/min/1.73    Globulin 2.6 gm/dL    A/G Ratio 1.4 g/dL    BUN/Creatinine Ratio 16.3 7.0 - 25.0    Anion Gap 11.2 5.0 - 15.0 mmol/L   BNP    Specimen: Arm, Right; Blood   Result Value Ref Range    proBNP 5.6 0.0 - 450.0 pg/mL   Troponin    Specimen: Arm, Right; Blood   Result Value Ref Range    Troponin T <0.010 0.000 - 0.030 ng/mL   CBC Auto Differential    Specimen: Arm, Right; Blood   Result Value Ref Range    WBC 5.38 3.40 - 10.80 10*3/mm3    RBC 4.57 3.77 - 5.28 10*6/mm3    Hemoglobin 12.8 12.0 - 15.9 g/dL    Hematocrit 39.6 34.0 - 46.6 %    MCV 86.7 79.0 - 97.0 fL    MCH 28.0 26.6 - 33.0 pg    MCHC 32.3 31.5 - 35.7 g/dL    RDW 14.0 12.3 - 15.4 %    RDW-SD 44.2 37.0 - 54.0 fl    MPV 12.1 (H) 6.0 - 12.0 fL    Platelets 140 140 - 450 10*3/mm3    Neutrophil % 67.6 42.7 - 76.0 %    Lymphocyte % 26.4 19.6 - 45.3 %    Monocyte % 4.1 (L) 5.0 - 12.0 %    Eosinophil % 0.0 (L) 0.3 - 6.2 %    " Basophil % 0.4 0.0 - 1.5 %    Immature Grans % 1.5 (H) 0.0 - 0.5 %    Neutrophils, Absolute 3.64 1.70 - 7.00 10*3/mm3    Lymphocytes, Absolute 1.42 0.70 - 3.10 10*3/mm3    Monocytes, Absolute 0.22 0.10 - 0.90 10*3/mm3    Eosinophils, Absolute 0.00 0.00 - 0.40 10*3/mm3    Basophils, Absolute 0.02 0.00 - 0.20 10*3/mm3    Immature Grans, Absolute 0.08 (H) 0.00 - 0.05 10*3/mm3    nRBC 0.0 0.0 - 0.2 /100 WBC   ECG 12 Lead   Result Value Ref Range    QT Interval 360 ms   Green Top (Gel)   Result Value Ref Range    Extra Tube Hold for add-ons.    Lavender Top   Result Value Ref Range    Extra Tube hold for add-on    Gold Top - SST   Result Value Ref Range    Extra Tube Hold for add-ons.    Light Blue Top   Result Value Ref Range    Extra Tube hold for add-on      Medications   sodium chloride 0.9 % flush 10 mL (has no administration in time range)   sodium chloride 0.9 % bolus 1,000 mL (0 mL Intravenous Stopped 9/17/21 1756)     XR Chest 1 View    Result Date: 9/17/2021  Narrative: PROCEDURE: XR CHEST 1 VW  COMPARISON: [facility], CR, CHEST PA/AP & LAT 2V, 4/18/2016, 15:14.  INDICATIONS: SOA Triage Protocol  FINDINGS:  The heart size and pulmonary vessels are normal.  There are patchy areas of airspace disease in the bilateral perihilar and lower lung zones consistent with pneumonia.  CONCLUSION: Perihilar and bilateral lower lobe infiltrates consistent with pneumonia.       VINCE VILA MD       Electronically Signed and Approved By: VINCE VILA MD on 9/17/2021 at 14:39               Procedures/EKGs:  Procedures          Medical Decision Making:  MDM  Number of Diagnoses or Management Options     Amount and/or Complexity of Data Reviewed  Clinical lab tests: reviewed  Tests in the radiology section of CPT®: reviewed    The patient was seen evaluated ED by me.  The above history and physical examination was performed as stated above.  Diagnostic data was obtained.  Results reviewed.  Patient's was tested for  COVID-19.  Test is pending at time of discharge.  Patient's chest x-ray however did come back positive for pneumonia possibly even represent COVID-19 pneumonia.  Patient is not hypoxic here in the ED.  Subsequent will discharge her home with outpatient management and close follow-up.  Patient is agreeable to this treatment plan.  I will cover with antibiotics since it is unclear if she has COVID-19 versus bacterial pneumonia.  Verbal instructions on signs and symptoms return were discussed with the patient.    Final diagnoses:   Multifocal pneumonia        Disposition:  ED Disposition     ED Disposition Condition Comment    Discharge Stable           Documentation assistance provided by Trent Nation acting as scribe for Christopher Johnson DO. Information recorded by the scribe was done at my direction and has been verified and validated by me.        rTent Nation  09/17/21 1527       Trent Nation  09/17/21 1527       Christopher Johnson DO  09/17/21 1930

## 2021-09-17 NOTE — DISCHARGE INSTRUCTIONS
Rest at home.  Drink plenty of fluids.  Home meds as previously prescribed.  Take the medications as I prescribed for you today as directed.  Continue to check your home oxygen levels.  If your oxygen levels drop below 90% and are persistent or you are having worsening symptoms return to the ER immediately.

## 2021-09-18 PROCEDURE — 99284 EMERGENCY DEPT VISIT MOD MDM: CPT

## 2021-09-19 ENCOUNTER — HOSPITAL ENCOUNTER (INPATIENT)
Facility: HOSPITAL | Age: 46
LOS: 9 days | Discharge: HOME | End: 2021-09-28
Attending: EMERGENCY MEDICINE | Admitting: INTERNAL MEDICINE

## 2021-09-19 ENCOUNTER — APPOINTMENT (OUTPATIENT)
Dept: GENERAL RADIOLOGY | Facility: HOSPITAL | Age: 46
End: 2021-09-19

## 2021-09-19 DIAGNOSIS — J18.9 MULTIFOCAL PNEUMONIA: Primary | ICD-10-CM

## 2021-09-19 DIAGNOSIS — Z78.9 DECREASED ACTIVITIES OF DAILY LIVING (ADL): ICD-10-CM

## 2021-09-19 DIAGNOSIS — R26.2 DIFFICULTY WALKING: ICD-10-CM

## 2021-09-19 DIAGNOSIS — U07.1 PNEUMONIA DUE TO COVID-19 VIRUS: ICD-10-CM

## 2021-09-19 DIAGNOSIS — J96.01 ACUTE RESPIRATORY FAILURE WITH HYPOXEMIA (HCC): ICD-10-CM

## 2021-09-19 DIAGNOSIS — J12.82 PNEUMONIA DUE TO COVID-19 VIRUS: ICD-10-CM

## 2021-09-19 LAB
ALBUMIN SERPL-MCNC: 3.7 G/DL (ref 3.5–5.2)
ALBUMIN/GLOB SERPL: 1.4 G/DL
ALP SERPL-CCNC: 122 U/L (ref 39–117)
ALT SERPL W P-5'-P-CCNC: 64 U/L (ref 1–33)
ANION GAP SERPL CALCULATED.3IONS-SCNC: 8.8 MMOL/L (ref 5–15)
AST SERPL-CCNC: 49 U/L (ref 1–32)
BASOPHILS # BLD AUTO: 0 10*3/MM3 (ref 0–0.2)
BASOPHILS NFR BLD AUTO: 0 % (ref 0–1.5)
BILIRUB SERPL-MCNC: 0.3 MG/DL (ref 0–1.2)
BUN SERPL-MCNC: 9 MG/DL (ref 6–20)
BUN/CREAT SERPL: 12.3 (ref 7–25)
CALCIUM SPEC-SCNC: 9.9 MG/DL (ref 8.6–10.5)
CHLORIDE SERPL-SCNC: 104 MMOL/L (ref 98–107)
CO2 SERPL-SCNC: 25.2 MMOL/L (ref 22–29)
CREAT SERPL-MCNC: 0.73 MG/DL (ref 0.57–1)
CRP SERPL-MCNC: 4.44 MG/DL (ref 0–0.5)
D DIMER PPP FEU-MCNC: 0.33 MG/L (FEU) (ref 0–0.59)
D-LACTATE SERPL-SCNC: 1.5 MMOL/L (ref 0.5–2)
DEPRECATED RDW RBC AUTO: 43.6 FL (ref 37–54)
EOSINOPHIL # BLD AUTO: 0 10*3/MM3 (ref 0–0.4)
EOSINOPHIL NFR BLD AUTO: 0 % (ref 0.3–6.2)
ERYTHROCYTE [DISTWIDTH] IN BLOOD BY AUTOMATED COUNT: 14 % (ref 12.3–15.4)
FERRITIN SERPL-MCNC: 267.9 NG/ML (ref 13–150)
GFR SERPL CREATININE-BSD FRML MDRD: 87 ML/MIN/1.73
GLOBULIN UR ELPH-MCNC: 2.7 GM/DL
GLUCOSE SERPL-MCNC: 244 MG/DL (ref 65–99)
HBA1C MFR BLD: 6.7 % (ref 4.8–5.6)
HCT VFR BLD AUTO: 42.4 % (ref 34–46.6)
HGB BLD-MCNC: 14.1 G/DL (ref 12–15.9)
IMM GRANULOCYTES # BLD AUTO: 0.04 10*3/MM3 (ref 0–0.05)
IMM GRANULOCYTES NFR BLD AUTO: 0.9 % (ref 0–0.5)
LDH SERPL-CCNC: 357 U/L (ref 135–214)
LYMPHOCYTES # BLD AUTO: 0.57 10*3/MM3 (ref 0.7–3.1)
LYMPHOCYTES NFR BLD AUTO: 12.2 % (ref 19.6–45.3)
MCH RBC QN AUTO: 28.5 PG (ref 26.6–33)
MCHC RBC AUTO-ENTMCNC: 33.3 G/DL (ref 31.5–35.7)
MCV RBC AUTO: 85.7 FL (ref 79–97)
MONOCYTES # BLD AUTO: 0.1 10*3/MM3 (ref 0.1–0.9)
MONOCYTES NFR BLD AUTO: 2.1 % (ref 5–12)
NEUTROPHILS NFR BLD AUTO: 3.95 10*3/MM3 (ref 1.7–7)
NEUTROPHILS NFR BLD AUTO: 84.8 % (ref 42.7–76)
NRBC BLD AUTO-RTO: 0 /100 WBC (ref 0–0.2)
PLATELET # BLD AUTO: 125 10*3/MM3 (ref 140–450)
PMV BLD AUTO: 11.8 FL (ref 6–12)
POTASSIUM SERPL-SCNC: 3.9 MMOL/L (ref 3.5–5.2)
PROCALCITONIN SERPL-MCNC: 0.06 NG/ML (ref 0–0.25)
PROT SERPL-MCNC: 6.4 G/DL (ref 6–8.5)
QT INTERVAL: 360 MS
RBC # BLD AUTO: 4.95 10*6/MM3 (ref 3.77–5.28)
SODIUM SERPL-SCNC: 138 MMOL/L (ref 136–145)
WBC # BLD AUTO: 4.66 10*3/MM3 (ref 3.4–10.8)

## 2021-09-19 PROCEDURE — 83615 LACTATE (LD) (LDH) ENZYME: CPT | Performed by: EMERGENCY MEDICINE

## 2021-09-19 PROCEDURE — 80053 COMPREHEN METABOLIC PANEL: CPT | Performed by: EMERGENCY MEDICINE

## 2021-09-19 PROCEDURE — 94799 UNLISTED PULMONARY SVC/PX: CPT

## 2021-09-19 PROCEDURE — 83605 ASSAY OF LACTIC ACID: CPT | Performed by: EMERGENCY MEDICINE

## 2021-09-19 PROCEDURE — 25010000002 AZITHROMYCIN PER 500 MG: Performed by: EMERGENCY MEDICINE

## 2021-09-19 PROCEDURE — 25010000002 DEXAMETHASONE PER 1 MG: Performed by: EMERGENCY MEDICINE

## 2021-09-19 PROCEDURE — 83036 HEMOGLOBIN GLYCOSYLATED A1C: CPT | Performed by: INTERNAL MEDICINE

## 2021-09-19 PROCEDURE — 86140 C-REACTIVE PROTEIN: CPT | Performed by: EMERGENCY MEDICINE

## 2021-09-19 PROCEDURE — 84145 PROCALCITONIN (PCT): CPT | Performed by: EMERGENCY MEDICINE

## 2021-09-19 PROCEDURE — 25010000002 CEFTRIAXONE PER 250 MG: Performed by: EMERGENCY MEDICINE

## 2021-09-19 PROCEDURE — 85379 FIBRIN DEGRADATION QUANT: CPT | Performed by: EMERGENCY MEDICINE

## 2021-09-19 PROCEDURE — 25010000002 ENOXAPARIN PER 10 MG: Performed by: INTERNAL MEDICINE

## 2021-09-19 PROCEDURE — 99223 1ST HOSP IP/OBS HIGH 75: CPT | Performed by: INTERNAL MEDICINE

## 2021-09-19 PROCEDURE — 71045 X-RAY EXAM CHEST 1 VIEW: CPT

## 2021-09-19 PROCEDURE — XW033E5 INTRODUCTION OF REMDESIVIR ANTI-INFECTIVE INTO PERIPHERAL VEIN, PERCUTANEOUS APPROACH, NEW TECHNOLOGY GROUP 5: ICD-10-PCS | Performed by: INTERNAL MEDICINE

## 2021-09-19 PROCEDURE — 82728 ASSAY OF FERRITIN: CPT | Performed by: EMERGENCY MEDICINE

## 2021-09-19 PROCEDURE — 94640 AIRWAY INHALATION TREATMENT: CPT

## 2021-09-19 PROCEDURE — 85025 COMPLETE CBC W/AUTO DIFF WBC: CPT | Performed by: EMERGENCY MEDICINE

## 2021-09-19 RX ORDER — TRAZODONE HYDROCHLORIDE 100 MG/1
100 TABLET ORAL NIGHTLY
Status: DISCONTINUED | OUTPATIENT
Start: 2021-09-19 | End: 2021-09-22

## 2021-09-19 RX ORDER — LAMOTRIGINE 100 MG/1
100 TABLET ORAL NIGHTLY
Status: DISCONTINUED | OUTPATIENT
Start: 2021-09-19 | End: 2021-09-28 | Stop reason: HOSPADM

## 2021-09-19 RX ORDER — IBUPROFEN 400 MG/1
400 TABLET ORAL EVERY 4 HOURS PRN
Status: DISCONTINUED | OUTPATIENT
Start: 2021-09-19 | End: 2021-09-28 | Stop reason: HOSPADM

## 2021-09-19 RX ORDER — ALBUTEROL SULFATE 2.5 MG/3ML
2.5 SOLUTION RESPIRATORY (INHALATION) EVERY 4 HOURS PRN
Status: DISCONTINUED | OUTPATIENT
Start: 2021-09-19 | End: 2021-09-20 | Stop reason: SDUPTHER

## 2021-09-19 RX ORDER — DEXAMETHASONE SODIUM PHOSPHATE 10 MG/ML
10 INJECTION INTRAMUSCULAR; INTRAVENOUS ONCE
Status: COMPLETED | OUTPATIENT
Start: 2021-09-19 | End: 2021-09-19

## 2021-09-19 RX ORDER — ROSUVASTATIN CALCIUM 20 MG/1
40 TABLET, COATED ORAL NIGHTLY
Status: DISCONTINUED | OUTPATIENT
Start: 2021-09-19 | End: 2021-09-28 | Stop reason: HOSPADM

## 2021-09-19 RX ORDER — IPRATROPIUM BROMIDE AND ALBUTEROL SULFATE 2.5; .5 MG/3ML; MG/3ML
3 SOLUTION RESPIRATORY (INHALATION)
Status: DISCONTINUED | OUTPATIENT
Start: 2021-09-19 | End: 2021-09-20

## 2021-09-19 RX ORDER — CHOLECALCIFEROL (VITAMIN D3) 125 MCG
1000 CAPSULE ORAL DAILY
Status: DISCONTINUED | OUTPATIENT
Start: 2021-09-19 | End: 2021-09-28 | Stop reason: HOSPADM

## 2021-09-19 RX ORDER — VALACYCLOVIR HYDROCHLORIDE 500 MG/1
500 TABLET, FILM COATED ORAL DAILY
Status: DISCONTINUED | OUTPATIENT
Start: 2021-09-19 | End: 2021-09-19

## 2021-09-19 RX ORDER — PREDNISONE 20 MG/1
40 TABLET ORAL
Status: DISCONTINUED | OUTPATIENT
Start: 2021-09-20 | End: 2021-09-22

## 2021-09-19 RX ORDER — DEXAMETHASONE SODIUM PHOSPHATE 10 MG/ML
6 INJECTION INTRAMUSCULAR; INTRAVENOUS DAILY
Status: DISCONTINUED | OUTPATIENT
Start: 2021-09-20 | End: 2021-09-19

## 2021-09-19 RX ORDER — CYPROHEPTADINE HYDROCHLORIDE 4 MG/1
2 TABLET ORAL NIGHTLY
Status: DISCONTINUED | OUTPATIENT
Start: 2021-09-19 | End: 2021-09-19

## 2021-09-19 RX ORDER — BUDESONIDE 0.5 MG/2ML
0.5 INHALANT ORAL
Status: DISCONTINUED | OUTPATIENT
Start: 2021-09-19 | End: 2021-09-28 | Stop reason: HOSPADM

## 2021-09-19 RX ORDER — LAMOTRIGINE 100 MG/1
100 TABLET ORAL DAILY
Status: DISCONTINUED | OUTPATIENT
Start: 2021-09-19 | End: 2021-09-19

## 2021-09-19 RX ORDER — ALBUTEROL SULFATE 90 UG/1
1-2 AEROSOL, METERED RESPIRATORY (INHALATION) EVERY 4 HOURS PRN
COMMUNITY
Start: 2021-09-10 | End: 2021-10-13 | Stop reason: SDUPTHER

## 2021-09-19 RX ORDER — IBUPROFEN 400 MG/1
800 TABLET ORAL ONCE
Status: COMPLETED | OUTPATIENT
Start: 2021-09-19 | End: 2021-09-19

## 2021-09-19 RX ORDER — SODIUM CHLORIDE 0.9 % (FLUSH) 0.9 %
10 SYRINGE (ML) INJECTION AS NEEDED
Status: DISCONTINUED | OUTPATIENT
Start: 2021-09-19 | End: 2021-09-28 | Stop reason: HOSPADM

## 2021-09-19 RX ORDER — NAPROXEN 250 MG/1
500 TABLET ORAL 2 TIMES DAILY PRN
Status: DISCONTINUED | OUTPATIENT
Start: 2021-09-19 | End: 2021-09-19

## 2021-09-19 RX ORDER — LEVOTHYROXINE SODIUM 0.03 MG/1
25 TABLET ORAL DAILY
Status: DISCONTINUED | OUTPATIENT
Start: 2021-09-19 | End: 2021-09-28 | Stop reason: HOSPADM

## 2021-09-19 RX ORDER — METOPROLOL SUCCINATE 50 MG/1
50 TABLET, EXTENDED RELEASE ORAL DAILY
Status: DISCONTINUED | OUTPATIENT
Start: 2021-09-19 | End: 2021-09-19

## 2021-09-19 RX ORDER — LORATADINE 10 MG/1
10 TABLET ORAL DAILY
COMMUNITY

## 2021-09-19 RX ORDER — ARFORMOTEROL TARTRATE 15 UG/2ML
15 SOLUTION RESPIRATORY (INHALATION)
Status: DISCONTINUED | OUTPATIENT
Start: 2021-09-19 | End: 2021-09-28 | Stop reason: HOSPADM

## 2021-09-19 RX ORDER — METOPROLOL SUCCINATE 50 MG/1
50 TABLET, EXTENDED RELEASE ORAL NIGHTLY
Status: DISCONTINUED | OUTPATIENT
Start: 2021-09-19 | End: 2021-09-22

## 2021-09-19 RX ORDER — PANTOPRAZOLE SODIUM 40 MG/1
40 TABLET, DELAYED RELEASE ORAL EVERY MORNING
Status: DISCONTINUED | OUTPATIENT
Start: 2021-09-19 | End: 2021-09-28 | Stop reason: HOSPADM

## 2021-09-19 RX ORDER — DIPHENHYDRAMINE HCL 25 MG
50 CAPSULE ORAL DAILY
Status: DISCONTINUED | OUTPATIENT
Start: 2021-09-19 | End: 2021-09-19

## 2021-09-19 RX ORDER — DULOXETIN HYDROCHLORIDE 30 MG/1
60 CAPSULE, DELAYED RELEASE ORAL DAILY
Status: DISCONTINUED | OUTPATIENT
Start: 2021-09-19 | End: 2021-09-28 | Stop reason: HOSPADM

## 2021-09-19 RX ADMIN — DEXAMETHASONE SODIUM PHOSPHATE 10 MG: 10 INJECTION INTRAMUSCULAR; INTRAVENOUS at 05:58

## 2021-09-19 RX ADMIN — METOPROLOL SUCCINATE 50 MG: 50 TABLET, EXTENDED RELEASE ORAL at 21:28

## 2021-09-19 RX ADMIN — CYANOCOBALAMIN TAB 500 MCG 1000 MCG: 500 TAB at 14:00

## 2021-09-19 RX ADMIN — TRAZODONE HYDROCHLORIDE 100 MG: 100 TABLET ORAL at 22:03

## 2021-09-19 RX ADMIN — BUDESONIDE 0.5 MG: 0.5 INHALANT ORAL at 10:18

## 2021-09-19 RX ADMIN — BUDESONIDE 0.5 MG: 0.5 INHALANT ORAL at 19:20

## 2021-09-19 RX ADMIN — AZITHROMYCIN MONOHYDRATE 500 MG: 500 INJECTION, POWDER, LYOPHILIZED, FOR SOLUTION INTRAVENOUS at 06:45

## 2021-09-19 RX ADMIN — REMDESIVIR 200 MG: 100 INJECTION, POWDER, LYOPHILIZED, FOR SOLUTION INTRAVENOUS at 15:51

## 2021-09-19 RX ADMIN — IBUPROFEN 800 MG: 400 TABLET, FILM COATED ORAL at 06:12

## 2021-09-19 RX ADMIN — ENOXAPARIN SODIUM 40 MG: 40 INJECTION SUBCUTANEOUS at 15:51

## 2021-09-19 RX ADMIN — ARFORMOTEROL TARTRATE 15 MCG: 15 SOLUTION RESPIRATORY (INHALATION) at 10:19

## 2021-09-19 RX ADMIN — DULOXETINE HYDROCHLORIDE 60 MG: 30 CAPSULE, DELAYED RELEASE ORAL at 14:00

## 2021-09-19 RX ADMIN — ARFORMOTEROL TARTRATE 15 MCG: 15 SOLUTION RESPIRATORY (INHALATION) at 19:20

## 2021-09-19 RX ADMIN — LEVOTHYROXINE SODIUM 25 MCG: 0.03 TABLET ORAL at 14:00

## 2021-09-19 RX ADMIN — ROSUVASTATIN 40 MG: 20 TABLET, FILM COATED ORAL at 21:28

## 2021-09-19 RX ADMIN — IPRATROPIUM BROMIDE AND ALBUTEROL SULFATE 3 ML: .5; 2.5 SOLUTION RESPIRATORY (INHALATION) at 10:19

## 2021-09-19 RX ADMIN — IPRATROPIUM BROMIDE AND ALBUTEROL SULFATE 3 ML: .5; 2.5 SOLUTION RESPIRATORY (INHALATION) at 19:20

## 2021-09-19 RX ADMIN — LAMOTRIGINE 100 MG: 100 TABLET ORAL at 21:28

## 2021-09-19 RX ADMIN — SODIUM CHLORIDE 1 G: 900 INJECTION INTRAVENOUS at 05:58

## 2021-09-20 LAB
ALBUMIN SERPL-MCNC: 3.3 G/DL (ref 3.5–5.2)
ALBUMIN/GLOB SERPL: 1.3 G/DL
ALP SERPL-CCNC: 97 U/L (ref 39–117)
ALT SERPL W P-5'-P-CCNC: 44 U/L (ref 1–33)
ANION GAP SERPL CALCULATED.3IONS-SCNC: 13.7 MMOL/L (ref 5–15)
APTT PPP: 21.7 SECONDS (ref 22.2–34.2)
AST SERPL-CCNC: 27 U/L (ref 1–32)
BASOPHILS # BLD AUTO: 0.01 10*3/MM3 (ref 0–0.2)
BASOPHILS NFR BLD AUTO: 0.1 % (ref 0–1.5)
BILIRUB CONJ SERPL-MCNC: <0.2 MG/DL (ref 0–0.3)
BILIRUB SERPL-MCNC: 0.3 MG/DL (ref 0–1.2)
BUN SERPL-MCNC: 15 MG/DL (ref 6–20)
BUN/CREAT SERPL: 21.7 (ref 7–25)
CALCIUM SPEC-SCNC: 9.1 MG/DL (ref 8.6–10.5)
CHLORIDE SERPL-SCNC: 104 MMOL/L (ref 98–107)
CO2 SERPL-SCNC: 22.3 MMOL/L (ref 22–29)
CREAT SERPL-MCNC: 0.69 MG/DL (ref 0.57–1)
DEPRECATED RDW RBC AUTO: 43.7 FL (ref 37–54)
EOSINOPHIL # BLD AUTO: 0 10*3/MM3 (ref 0–0.4)
EOSINOPHIL NFR BLD AUTO: 0 % (ref 0.3–6.2)
ERYTHROCYTE [DISTWIDTH] IN BLOOD BY AUTOMATED COUNT: 13.9 % (ref 12.3–15.4)
GFR SERPL CREATININE-BSD FRML MDRD: 92 ML/MIN/1.73
GLOBULIN UR ELPH-MCNC: 2.6 GM/DL
GLUCOSE SERPL-MCNC: 205 MG/DL (ref 65–99)
HCT VFR BLD AUTO: 38.5 % (ref 34–46.6)
HGB BLD-MCNC: 12.5 G/DL (ref 12–15.9)
IMM GRANULOCYTES # BLD AUTO: 0.07 10*3/MM3 (ref 0–0.05)
IMM GRANULOCYTES NFR BLD AUTO: 0.5 % (ref 0–0.5)
LYMPHOCYTES # BLD AUTO: 1.38 10*3/MM3 (ref 0.7–3.1)
LYMPHOCYTES NFR BLD AUTO: 10.8 % (ref 19.6–45.3)
MAGNESIUM SERPL-MCNC: 1.8 MG/DL (ref 1.6–2.6)
MCH RBC QN AUTO: 27.8 PG (ref 26.6–33)
MCHC RBC AUTO-ENTMCNC: 32.5 G/DL (ref 31.5–35.7)
MCV RBC AUTO: 85.6 FL (ref 79–97)
MONOCYTES # BLD AUTO: 0.47 10*3/MM3 (ref 0.1–0.9)
MONOCYTES NFR BLD AUTO: 3.7 % (ref 5–12)
NEUTROPHILS NFR BLD AUTO: 10.82 10*3/MM3 (ref 1.7–7)
NEUTROPHILS NFR BLD AUTO: 84.9 % (ref 42.7–76)
NRBC BLD AUTO-RTO: 0 /100 WBC (ref 0–0.2)
PLATELET # BLD AUTO: 172 10*3/MM3 (ref 140–450)
PMV BLD AUTO: 11.9 FL (ref 6–12)
POTASSIUM SERPL-SCNC: 3.8 MMOL/L (ref 3.5–5.2)
PROT SERPL-MCNC: 5.9 G/DL (ref 6–8.5)
RBC # BLD AUTO: 4.5 10*6/MM3 (ref 3.77–5.28)
SARS-COV-2 N GENE RESP QL NAA+PROBE: DETECTED
SODIUM SERPL-SCNC: 140 MMOL/L (ref 136–145)
WBC # BLD AUTO: 12.75 10*3/MM3 (ref 3.4–10.8)

## 2021-09-20 PROCEDURE — 36415 COLL VENOUS BLD VENIPUNCTURE: CPT | Performed by: INTERNAL MEDICINE

## 2021-09-20 PROCEDURE — 94760 N-INVAS EAR/PLS OXIMETRY 1: CPT

## 2021-09-20 PROCEDURE — 83735 ASSAY OF MAGNESIUM: CPT | Performed by: INTERNAL MEDICINE

## 2021-09-20 PROCEDURE — 87635 SARS-COV-2 COVID-19 AMP PRB: CPT | Performed by: INTERNAL MEDICINE

## 2021-09-20 PROCEDURE — 94799 UNLISTED PULMONARY SVC/PX: CPT

## 2021-09-20 PROCEDURE — 82248 BILIRUBIN DIRECT: CPT | Performed by: INTERNAL MEDICINE

## 2021-09-20 PROCEDURE — 80053 COMPREHEN METABOLIC PANEL: CPT | Performed by: INTERNAL MEDICINE

## 2021-09-20 PROCEDURE — 63710000001 PREDNISONE PER 1 MG: Performed by: INTERNAL MEDICINE

## 2021-09-20 PROCEDURE — 25010000002 ENOXAPARIN PER 10 MG: Performed by: INTERNAL MEDICINE

## 2021-09-20 PROCEDURE — 97161 PT EVAL LOW COMPLEX 20 MIN: CPT

## 2021-09-20 PROCEDURE — 85025 COMPLETE CBC W/AUTO DIFF WBC: CPT | Performed by: INTERNAL MEDICINE

## 2021-09-20 PROCEDURE — 97165 OT EVAL LOW COMPLEX 30 MIN: CPT

## 2021-09-20 PROCEDURE — 99233 SBSQ HOSP IP/OBS HIGH 50: CPT | Performed by: INTERNAL MEDICINE

## 2021-09-20 PROCEDURE — 85730 THROMBOPLASTIN TIME PARTIAL: CPT | Performed by: INTERNAL MEDICINE

## 2021-09-20 RX ORDER — IPRATROPIUM BROMIDE AND ALBUTEROL SULFATE 2.5; .5 MG/3ML; MG/3ML
3 SOLUTION RESPIRATORY (INHALATION) EVERY 4 HOURS PRN
Status: DISCONTINUED | OUTPATIENT
Start: 2021-09-20 | End: 2021-09-28 | Stop reason: HOSPADM

## 2021-09-20 RX ORDER — BROMPHENIRAMINE MALEATE, PSEUDOEPHEDRINE HYDROCHLORIDE, AND DEXTROMETHORPHAN HYDROBROMIDE 2; 30; 10 MG/5ML; MG/5ML; MG/5ML
5 SYRUP ORAL EVERY 6 HOURS PRN
Status: DISCONTINUED | OUTPATIENT
Start: 2021-09-20 | End: 2021-09-28 | Stop reason: HOSPADM

## 2021-09-20 RX ORDER — BENZONATATE 100 MG/1
100 CAPSULE ORAL 3 TIMES DAILY PRN
Status: DISCONTINUED | OUTPATIENT
Start: 2021-09-20 | End: 2021-09-28 | Stop reason: HOSPADM

## 2021-09-20 RX ADMIN — PREDNISONE 40 MG: 20 TABLET ORAL at 09:52

## 2021-09-20 RX ADMIN — ARFORMOTEROL TARTRATE 15 MCG: 15 SOLUTION RESPIRATORY (INHALATION) at 18:58

## 2021-09-20 RX ADMIN — ROSUVASTATIN 40 MG: 20 TABLET, FILM COATED ORAL at 22:14

## 2021-09-20 RX ADMIN — BUDESONIDE 0.5 MG: 0.5 INHALANT ORAL at 18:58

## 2021-09-20 RX ADMIN — CYANOCOBALAMIN TAB 500 MCG 1000 MCG: 500 TAB at 09:52

## 2021-09-20 RX ADMIN — BENZONATATE 100 MG: 100 CAPSULE ORAL at 14:56

## 2021-09-20 RX ADMIN — IBUPROFEN 400 MG: 400 TABLET ORAL at 23:20

## 2021-09-20 RX ADMIN — METOPROLOL SUCCINATE 50 MG: 50 TABLET, EXTENDED RELEASE ORAL at 22:14

## 2021-09-20 RX ADMIN — TRAZODONE HYDROCHLORIDE 100 MG: 100 TABLET ORAL at 22:13

## 2021-09-20 RX ADMIN — LAMOTRIGINE 100 MG: 100 TABLET ORAL at 22:14

## 2021-09-20 RX ADMIN — DULOXETINE HYDROCHLORIDE 60 MG: 30 CAPSULE, DELAYED RELEASE ORAL at 09:52

## 2021-09-20 RX ADMIN — REMDESIVIR 100 MG: 100 INJECTION, POWDER, LYOPHILIZED, FOR SOLUTION INTRAVENOUS at 14:56

## 2021-09-20 RX ADMIN — PANTOPRAZOLE SODIUM 40 MG: 40 TABLET, DELAYED RELEASE ORAL at 06:16

## 2021-09-20 RX ADMIN — IPRATROPIUM BROMIDE AND ALBUTEROL SULFATE 3 ML: .5; 2.5 SOLUTION RESPIRATORY (INHALATION) at 01:21

## 2021-09-20 RX ADMIN — BROMPHENIRAMINE MALEATE, PSEUDOEPHEDRINE HYDROCHLORIDE, AND DEXTROMETHORPHAN HYDROBROMIDE 5 ML: 2; 30; 10 SYRUP ORAL at 12:38

## 2021-09-20 RX ADMIN — IPRATROPIUM BROMIDE AND ALBUTEROL SULFATE 3 ML: .5; 2.5 SOLUTION RESPIRATORY (INHALATION) at 07:26

## 2021-09-20 RX ADMIN — ARFORMOTEROL TARTRATE 15 MCG: 15 SOLUTION RESPIRATORY (INHALATION) at 07:26

## 2021-09-20 RX ADMIN — BUDESONIDE 0.5 MG: 0.5 INHALANT ORAL at 07:26

## 2021-09-20 RX ADMIN — LEVOTHYROXINE SODIUM 25 MCG: 0.03 TABLET ORAL at 09:50

## 2021-09-20 RX ADMIN — ENOXAPARIN SODIUM 40 MG: 40 INJECTION SUBCUTANEOUS at 09:52

## 2021-09-21 ENCOUNTER — APPOINTMENT (OUTPATIENT)
Dept: GENERAL RADIOLOGY | Facility: HOSPITAL | Age: 46
End: 2021-09-21

## 2021-09-21 LAB
ALBUMIN SERPL-MCNC: 3.2 G/DL (ref 3.5–5.2)
ALBUMIN/GLOB SERPL: 1.3 G/DL
ALP SERPL-CCNC: 90 U/L (ref 39–117)
ALT SERPL W P-5'-P-CCNC: 33 U/L (ref 1–33)
ANION GAP SERPL CALCULATED.3IONS-SCNC: 10 MMOL/L (ref 5–15)
AST SERPL-CCNC: 21 U/L (ref 1–32)
BASOPHILS # BLD AUTO: 0 10*3/MM3 (ref 0–0.2)
BASOPHILS NFR BLD AUTO: 0 % (ref 0–1.5)
BILIRUB CONJ SERPL-MCNC: <0.2 MG/DL (ref 0–0.3)
BILIRUB SERPL-MCNC: 0.4 MG/DL (ref 0–1.2)
BUN SERPL-MCNC: 19 MG/DL (ref 6–20)
BUN/CREAT SERPL: 25.3 (ref 7–25)
CALCIUM SPEC-SCNC: 8.8 MG/DL (ref 8.6–10.5)
CHLORIDE SERPL-SCNC: 103 MMOL/L (ref 98–107)
CO2 SERPL-SCNC: 26 MMOL/L (ref 22–29)
CREAT SERPL-MCNC: 0.75 MG/DL (ref 0.57–1)
D DIMER PPP FEU-MCNC: 0.29 MG/L (FEU) (ref 0–0.59)
DEPRECATED RDW RBC AUTO: 45.1 FL (ref 37–54)
EOSINOPHIL # BLD AUTO: 0 10*3/MM3 (ref 0–0.4)
EOSINOPHIL NFR BLD AUTO: 0 % (ref 0.3–6.2)
ERYTHROCYTE [DISTWIDTH] IN BLOOD BY AUTOMATED COUNT: 14.2 % (ref 12.3–15.4)
GFR SERPL CREATININE-BSD FRML MDRD: 84 ML/MIN/1.73
GLOBULIN UR ELPH-MCNC: 2.5 GM/DL
GLUCOSE SERPL-MCNC: 137 MG/DL (ref 65–99)
HCT VFR BLD AUTO: 37 % (ref 34–46.6)
HGB BLD-MCNC: 11.8 G/DL (ref 12–15.9)
IMM GRANULOCYTES # BLD AUTO: 0.07 10*3/MM3 (ref 0–0.05)
IMM GRANULOCYTES NFR BLD AUTO: 0.7 % (ref 0–0.5)
LYMPHOCYTES # BLD AUTO: 1.6 10*3/MM3 (ref 0.7–3.1)
LYMPHOCYTES NFR BLD AUTO: 16.6 % (ref 19.6–45.3)
MAGNESIUM SERPL-MCNC: 2.1 MG/DL (ref 1.6–2.6)
MCH RBC QN AUTO: 27.8 PG (ref 26.6–33)
MCHC RBC AUTO-ENTMCNC: 31.9 G/DL (ref 31.5–35.7)
MCV RBC AUTO: 87.1 FL (ref 79–97)
MONOCYTES # BLD AUTO: 0.57 10*3/MM3 (ref 0.1–0.9)
MONOCYTES NFR BLD AUTO: 5.9 % (ref 5–12)
NEUTROPHILS NFR BLD AUTO: 7.37 10*3/MM3 (ref 1.7–7)
NEUTROPHILS NFR BLD AUTO: 76.8 % (ref 42.7–76)
NRBC BLD AUTO-RTO: 0 /100 WBC (ref 0–0.2)
PLATELET # BLD AUTO: 170 10*3/MM3 (ref 140–450)
PMV BLD AUTO: 11.4 FL (ref 6–12)
POTASSIUM SERPL-SCNC: 4.1 MMOL/L (ref 3.5–5.2)
PROCALCITONIN SERPL-MCNC: 0.05 NG/ML (ref 0–0.25)
PROT SERPL-MCNC: 5.7 G/DL (ref 6–8.5)
RBC # BLD AUTO: 4.25 10*6/MM3 (ref 3.77–5.28)
SODIUM SERPL-SCNC: 139 MMOL/L (ref 136–145)
WBC # BLD AUTO: 9.61 10*3/MM3 (ref 3.4–10.8)

## 2021-09-21 PROCEDURE — 84145 PROCALCITONIN (PCT): CPT | Performed by: INTERNAL MEDICINE

## 2021-09-21 PROCEDURE — 63710000001 PREDNISONE PER 1 MG: Performed by: INTERNAL MEDICINE

## 2021-09-21 PROCEDURE — 25010000002 ENOXAPARIN PER 10 MG: Performed by: INTERNAL MEDICINE

## 2021-09-21 PROCEDURE — 80053 COMPREHEN METABOLIC PANEL: CPT | Performed by: INTERNAL MEDICINE

## 2021-09-21 PROCEDURE — 94760 N-INVAS EAR/PLS OXIMETRY 1: CPT

## 2021-09-21 PROCEDURE — 71045 X-RAY EXAM CHEST 1 VIEW: CPT

## 2021-09-21 PROCEDURE — 83735 ASSAY OF MAGNESIUM: CPT | Performed by: INTERNAL MEDICINE

## 2021-09-21 PROCEDURE — 99233 SBSQ HOSP IP/OBS HIGH 50: CPT | Performed by: INTERNAL MEDICINE

## 2021-09-21 PROCEDURE — 94799 UNLISTED PULMONARY SVC/PX: CPT

## 2021-09-21 PROCEDURE — 85379 FIBRIN DEGRADATION QUANT: CPT | Performed by: INTERNAL MEDICINE

## 2021-09-21 PROCEDURE — 82248 BILIRUBIN DIRECT: CPT | Performed by: INTERNAL MEDICINE

## 2021-09-21 PROCEDURE — 97530 THERAPEUTIC ACTIVITIES: CPT

## 2021-09-21 PROCEDURE — 25010000002 FUROSEMIDE PER 20 MG: Performed by: INTERNAL MEDICINE

## 2021-09-21 PROCEDURE — 85025 COMPLETE CBC W/AUTO DIFF WBC: CPT | Performed by: INTERNAL MEDICINE

## 2021-09-21 RX ORDER — GUAIFENESIN 600 MG/1
600 TABLET, EXTENDED RELEASE ORAL EVERY 12 HOURS SCHEDULED
Status: DISCONTINUED | OUTPATIENT
Start: 2021-09-21 | End: 2021-09-28 | Stop reason: HOSPADM

## 2021-09-21 RX ORDER — FUROSEMIDE 10 MG/ML
40 INJECTION INTRAMUSCULAR; INTRAVENOUS EVERY 12 HOURS
Status: DISCONTINUED | OUTPATIENT
Start: 2021-09-21 | End: 2021-09-22

## 2021-09-21 RX ORDER — ALPRAZOLAM 1 MG/1
1 TABLET ORAL NIGHTLY PRN
Status: DISCONTINUED | OUTPATIENT
Start: 2021-09-21 | End: 2021-09-28 | Stop reason: HOSPADM

## 2021-09-21 RX ADMIN — ARFORMOTEROL TARTRATE 15 MCG: 15 SOLUTION RESPIRATORY (INHALATION) at 06:43

## 2021-09-21 RX ADMIN — GUAIFENESIN 600 MG: 600 TABLET, EXTENDED RELEASE ORAL at 20:53

## 2021-09-21 RX ADMIN — BUDESONIDE 0.5 MG: 0.5 INHALANT ORAL at 18:46

## 2021-09-21 RX ADMIN — ARFORMOTEROL TARTRATE 15 MCG: 15 SOLUTION RESPIRATORY (INHALATION) at 18:46

## 2021-09-21 RX ADMIN — ROSUVASTATIN 40 MG: 20 TABLET, FILM COATED ORAL at 20:53

## 2021-09-21 RX ADMIN — CYANOCOBALAMIN TAB 500 MCG 1000 MCG: 500 TAB at 09:08

## 2021-09-21 RX ADMIN — ALPRAZOLAM 1 MG: 1 TABLET ORAL at 21:08

## 2021-09-21 RX ADMIN — BENZONATATE 100 MG: 100 CAPSULE ORAL at 18:27

## 2021-09-21 RX ADMIN — ENOXAPARIN SODIUM 40 MG: 40 INJECTION SUBCUTANEOUS at 09:09

## 2021-09-21 RX ADMIN — METOPROLOL SUCCINATE 50 MG: 50 TABLET, EXTENDED RELEASE ORAL at 20:53

## 2021-09-21 RX ADMIN — LAMOTRIGINE 100 MG: 100 TABLET ORAL at 20:53

## 2021-09-21 RX ADMIN — REMDESIVIR 100 MG: 100 INJECTION, POWDER, LYOPHILIZED, FOR SOLUTION INTRAVENOUS at 14:21

## 2021-09-21 RX ADMIN — FUROSEMIDE 40 MG: 10 INJECTION, SOLUTION INTRAMUSCULAR; INTRAVENOUS at 20:52

## 2021-09-21 RX ADMIN — DULOXETINE HYDROCHLORIDE 60 MG: 30 CAPSULE, DELAYED RELEASE ORAL at 09:08

## 2021-09-21 RX ADMIN — PANTOPRAZOLE SODIUM 40 MG: 40 TABLET, DELAYED RELEASE ORAL at 07:20

## 2021-09-21 RX ADMIN — PREDNISONE 40 MG: 20 TABLET ORAL at 09:08

## 2021-09-21 RX ADMIN — TRAZODONE HYDROCHLORIDE 100 MG: 100 TABLET ORAL at 20:53

## 2021-09-21 RX ADMIN — BUDESONIDE 0.5 MG: 0.5 INHALANT ORAL at 06:43

## 2021-09-21 RX ADMIN — LEVOTHYROXINE SODIUM 25 MCG: 0.03 TABLET ORAL at 09:09

## 2021-09-22 LAB
ALBUMIN SERPL-MCNC: 3.1 G/DL (ref 3.5–5.2)
ALBUMIN/GLOB SERPL: 1.2 G/DL
ALP SERPL-CCNC: 95 U/L (ref 39–117)
ALT SERPL W P-5'-P-CCNC: 28 U/L (ref 1–33)
ANION GAP SERPL CALCULATED.3IONS-SCNC: 11.7 MMOL/L (ref 5–15)
AST SERPL-CCNC: 28 U/L (ref 1–32)
BASOPHILS # BLD AUTO: 0.01 10*3/MM3 (ref 0–0.2)
BASOPHILS NFR BLD AUTO: 0.1 % (ref 0–1.5)
BILIRUB CONJ SERPL-MCNC: <0.2 MG/DL (ref 0–0.3)
BILIRUB SERPL-MCNC: 0.5 MG/DL (ref 0–1.2)
BUN SERPL-MCNC: 21 MG/DL (ref 6–20)
BUN/CREAT SERPL: 28 (ref 7–25)
CALCIUM SPEC-SCNC: 8.5 MG/DL (ref 8.6–10.5)
CHLORIDE SERPL-SCNC: 104 MMOL/L (ref 98–107)
CO2 SERPL-SCNC: 26.3 MMOL/L (ref 22–29)
CREAT SERPL-MCNC: 0.75 MG/DL (ref 0.57–1)
D-LACTATE SERPL-SCNC: 1.3 MMOL/L (ref 0.5–2)
DEPRECATED RDW RBC AUTO: 43.8 FL (ref 37–54)
EOSINOPHIL # BLD AUTO: 0 10*3/MM3 (ref 0–0.4)
EOSINOPHIL NFR BLD AUTO: 0 % (ref 0.3–6.2)
ERYTHROCYTE [DISTWIDTH] IN BLOOD BY AUTOMATED COUNT: 13.7 % (ref 12.3–15.4)
GFR SERPL CREATININE-BSD FRML MDRD: 84 ML/MIN/1.73
GLOBULIN UR ELPH-MCNC: 2.5 GM/DL
GLUCOSE SERPL-MCNC: 112 MG/DL (ref 65–99)
HCT VFR BLD AUTO: 37.6 % (ref 34–46.6)
HGB BLD-MCNC: 12.1 G/DL (ref 12–15.9)
IMM GRANULOCYTES # BLD AUTO: 0.07 10*3/MM3 (ref 0–0.05)
IMM GRANULOCYTES NFR BLD AUTO: 0.8 % (ref 0–0.5)
LYMPHOCYTES # BLD AUTO: 1.63 10*3/MM3 (ref 0.7–3.1)
LYMPHOCYTES NFR BLD AUTO: 18.7 % (ref 19.6–45.3)
MAGNESIUM SERPL-MCNC: 2.1 MG/DL (ref 1.6–2.6)
MCH RBC QN AUTO: 28.3 PG (ref 26.6–33)
MCHC RBC AUTO-ENTMCNC: 32.2 G/DL (ref 31.5–35.7)
MCV RBC AUTO: 88.1 FL (ref 79–97)
MONOCYTES # BLD AUTO: 0.41 10*3/MM3 (ref 0.1–0.9)
MONOCYTES NFR BLD AUTO: 4.7 % (ref 5–12)
NEUTROPHILS NFR BLD AUTO: 6.61 10*3/MM3 (ref 1.7–7)
NEUTROPHILS NFR BLD AUTO: 75.7 % (ref 42.7–76)
NRBC BLD AUTO-RTO: 0 /100 WBC (ref 0–0.2)
PLATELET # BLD AUTO: 179 10*3/MM3 (ref 140–450)
PMV BLD AUTO: 11.3 FL (ref 6–12)
POTASSIUM SERPL-SCNC: 3.5 MMOL/L (ref 3.5–5.2)
PROT SERPL-MCNC: 5.6 G/DL (ref 6–8.5)
RBC # BLD AUTO: 4.27 10*6/MM3 (ref 3.77–5.28)
SODIUM SERPL-SCNC: 142 MMOL/L (ref 136–145)
WBC # BLD AUTO: 8.73 10*3/MM3 (ref 3.4–10.8)

## 2021-09-22 PROCEDURE — 94799 UNLISTED PULMONARY SVC/PX: CPT

## 2021-09-22 PROCEDURE — 87040 BLOOD CULTURE FOR BACTERIA: CPT | Performed by: INTERNAL MEDICINE

## 2021-09-22 PROCEDURE — 80053 COMPREHEN METABOLIC PANEL: CPT | Performed by: INTERNAL MEDICINE

## 2021-09-22 PROCEDURE — 99291 CRITICAL CARE FIRST HOUR: CPT | Performed by: INTERNAL MEDICINE

## 2021-09-22 PROCEDURE — 82248 BILIRUBIN DIRECT: CPT | Performed by: INTERNAL MEDICINE

## 2021-09-22 PROCEDURE — XW033H5 INTRODUCTION OF TOCILIZUMAB INTO PERIPHERAL VEIN, PERCUTANEOUS APPROACH, NEW TECHNOLOGY GROUP 5: ICD-10-PCS | Performed by: INTERNAL MEDICINE

## 2021-09-22 PROCEDURE — 99233 SBSQ HOSP IP/OBS HIGH 50: CPT | Performed by: INTERNAL MEDICINE

## 2021-09-22 PROCEDURE — 25010000002 ALBUMIN HUMAN 25% PER 50 ML: Performed by: INTERNAL MEDICINE

## 2021-09-22 PROCEDURE — 83735 ASSAY OF MAGNESIUM: CPT | Performed by: INTERNAL MEDICINE

## 2021-09-22 PROCEDURE — P9047 ALBUMIN (HUMAN), 25%, 50ML: HCPCS | Performed by: INTERNAL MEDICINE

## 2021-09-22 PROCEDURE — 63710000001 PREDNISONE PER 1 MG: Performed by: INTERNAL MEDICINE

## 2021-09-22 PROCEDURE — 85025 COMPLETE CBC W/AUTO DIFF WBC: CPT | Performed by: INTERNAL MEDICINE

## 2021-09-22 PROCEDURE — 83605 ASSAY OF LACTIC ACID: CPT | Performed by: INTERNAL MEDICINE

## 2021-09-22 PROCEDURE — 25010000002 ENOXAPARIN PER 10 MG: Performed by: INTERNAL MEDICINE

## 2021-09-22 RX ORDER — ALBUMIN (HUMAN) 12.5 G/50ML
25 SOLUTION INTRAVENOUS ONCE
Status: COMPLETED | OUTPATIENT
Start: 2021-09-22 | End: 2021-09-22

## 2021-09-22 RX ORDER — OXYMETAZOLINE HYDROCHLORIDE 0.05 G/100ML
2 SPRAY NASAL 2 TIMES DAILY
Status: DISPENSED | OUTPATIENT
Start: 2021-09-22 | End: 2021-09-25

## 2021-09-22 RX ORDER — PREDNISONE 20 MG/1
60 TABLET ORAL
Status: DISCONTINUED | OUTPATIENT
Start: 2021-09-23 | End: 2021-09-28 | Stop reason: HOSPADM

## 2021-09-22 RX ORDER — NOREPINEPHRINE BIT/0.9 % NACL 8 MG/250ML
.02-.3 INFUSION BOTTLE (ML) INTRAVENOUS ONCE
Status: DISCONTINUED | OUTPATIENT
Start: 2021-09-23 | End: 2021-09-24

## 2021-09-22 RX ORDER — ECHINACEA PURPUREA EXTRACT 125 MG
2 TABLET ORAL 4 TIMES DAILY
Status: DISCONTINUED | OUTPATIENT
Start: 2021-09-22 | End: 2021-09-28 | Stop reason: HOSPADM

## 2021-09-22 RX ORDER — FLUTICASONE PROPIONATE 50 MCG
2 SPRAY, SUSPENSION (ML) NASAL DAILY
Status: DISCONTINUED | OUTPATIENT
Start: 2021-09-22 | End: 2021-09-28 | Stop reason: HOSPADM

## 2021-09-22 RX ADMIN — REMDESIVIR 100 MG: 100 INJECTION, POWDER, LYOPHILIZED, FOR SOLUTION INTRAVENOUS at 14:16

## 2021-09-22 RX ADMIN — SODIUM CHLORIDE 500 ML: 9 INJECTION, SOLUTION INTRAVENOUS at 11:30

## 2021-09-22 RX ADMIN — ALBUMIN HUMAN 25 G: 0.25 SOLUTION INTRAVENOUS at 12:12

## 2021-09-22 RX ADMIN — ARFORMOTEROL TARTRATE 15 MCG: 15 SOLUTION RESPIRATORY (INHALATION) at 07:18

## 2021-09-22 RX ADMIN — BUDESONIDE 0.5 MG: 0.5 INHALANT ORAL at 19:45

## 2021-09-22 RX ADMIN — SODIUM CHLORIDE 250 ML: 9 INJECTION, SOLUTION INTRAVENOUS at 23:51

## 2021-09-22 RX ADMIN — GUAIFENESIN 600 MG: 600 TABLET, EXTENDED RELEASE ORAL at 21:59

## 2021-09-22 RX ADMIN — ENOXAPARIN SODIUM 40 MG: 40 INJECTION SUBCUTANEOUS at 09:12

## 2021-09-22 RX ADMIN — CYANOCOBALAMIN TAB 500 MCG 1000 MCG: 500 TAB at 09:12

## 2021-09-22 RX ADMIN — ROSUVASTATIN 40 MG: 20 TABLET, FILM COATED ORAL at 21:59

## 2021-09-22 RX ADMIN — PREDNISONE 40 MG: 20 TABLET ORAL at 09:12

## 2021-09-22 RX ADMIN — Medication 2 SPRAY: at 12:14

## 2021-09-22 RX ADMIN — SODIUM CHLORIDE 1000 ML: 9 INJECTION, SOLUTION INTRAVENOUS at 09:34

## 2021-09-22 RX ADMIN — TOCILIZUMAB 810 MG: 180 INJECTION, SOLUTION SUBCUTANEOUS at 14:17

## 2021-09-22 RX ADMIN — GUAIFENESIN 600 MG: 600 TABLET, EXTENDED RELEASE ORAL at 09:12

## 2021-09-22 RX ADMIN — LEVOTHYROXINE SODIUM 25 MCG: 0.03 TABLET ORAL at 09:12

## 2021-09-22 RX ADMIN — PANTOPRAZOLE SODIUM 40 MG: 40 TABLET, DELAYED RELEASE ORAL at 06:06

## 2021-09-22 RX ADMIN — SALINE NASAL SPRAY 2 SPRAY: 1.5 SOLUTION NASAL at 12:13

## 2021-09-22 RX ADMIN — DULOXETINE HYDROCHLORIDE 60 MG: 30 CAPSULE, DELAYED RELEASE ORAL at 09:12

## 2021-09-22 RX ADMIN — FLUTICASONE PROPIONATE 2 SPRAY: 50 SPRAY, METERED NASAL at 12:14

## 2021-09-22 RX ADMIN — BUDESONIDE 0.5 MG: 0.5 INHALANT ORAL at 07:18

## 2021-09-22 RX ADMIN — LAMOTRIGINE 100 MG: 100 TABLET ORAL at 21:59

## 2021-09-22 RX ADMIN — SODIUM CHLORIDE, PRESERVATIVE FREE 10 ML: 5 INJECTION INTRAVENOUS at 22:00

## 2021-09-22 RX ADMIN — ARFORMOTEROL TARTRATE 15 MCG: 15 SOLUTION RESPIRATORY (INHALATION) at 19:45

## 2021-09-23 LAB
ALBUMIN SERPL-MCNC: 3.2 G/DL (ref 3.5–5.2)
ALBUMIN/GLOB SERPL: 1.5 G/DL
ALP SERPL-CCNC: 82 U/L (ref 39–117)
ALT SERPL W P-5'-P-CCNC: 35 U/L (ref 1–33)
ANION GAP SERPL CALCULATED.3IONS-SCNC: 10.5 MMOL/L (ref 5–15)
ARTERIAL PATENCY WRIST A: ABNORMAL
AST SERPL-CCNC: 47 U/L (ref 1–32)
BASE EXCESS BLDA CALC-SCNC: 1.8 MMOL/L (ref -2–2)
BASOPHILS # BLD AUTO: 0 10*3/MM3 (ref 0–0.2)
BASOPHILS NFR BLD AUTO: 0 % (ref 0–1.5)
BDY SITE: ABNORMAL
BILIRUB SERPL-MCNC: 0.6 MG/DL (ref 0–1.2)
BUN SERPL-MCNC: 21 MG/DL (ref 6–20)
BUN/CREAT SERPL: 32.3 (ref 7–25)
CA-I BLDA-SCNC: 1.12 MMOL/L (ref 1.13–1.32)
CALCIUM SPEC-SCNC: 8.4 MG/DL (ref 8.6–10.5)
CHLORIDE BLDA-SCNC: 107 MMOL/L (ref 98–106)
CHLORIDE SERPL-SCNC: 107 MMOL/L (ref 98–107)
CO2 SERPL-SCNC: 25.5 MMOL/L (ref 22–29)
COHGB MFR BLD: 0.3 % (ref 0–1.5)
CREAT SERPL-MCNC: 0.65 MG/DL (ref 0.57–1)
DEPRECATED RDW RBC AUTO: 43.6 FL (ref 37–54)
EOSINOPHIL # BLD AUTO: 0.01 10*3/MM3 (ref 0–0.4)
EOSINOPHIL NFR BLD AUTO: 0.3 % (ref 0.3–6.2)
ERYTHROCYTE [DISTWIDTH] IN BLOOD BY AUTOMATED COUNT: 13.6 % (ref 12.3–15.4)
FHHB: 14.5 % (ref 0–5)
GAS FLOW AIRWAY: 50 LPM
GFR SERPL CREATININE-BSD FRML MDRD: 99 ML/MIN/1.73
GLOBULIN UR ELPH-MCNC: 2.1 GM/DL
GLUCOSE BLDA-MCNC: 114 MMOL/L (ref 65–99)
GLUCOSE SERPL-MCNC: 107 MG/DL (ref 65–99)
HCO3 BLDA-SCNC: 26.1 MMOL/L (ref 22–26)
HCT VFR BLD AUTO: 34.9 % (ref 34–46.6)
HGB BLD-MCNC: 11.3 G/DL (ref 12–15.9)
HGB BLDA-MCNC: 12.5 G/DL (ref 11.7–14.6)
IMM GRANULOCYTES # BLD AUTO: 0.04 10*3/MM3 (ref 0–0.05)
IMM GRANULOCYTES NFR BLD AUTO: 1 % (ref 0–0.5)
INHALED O2 CONCENTRATION: 80 %
LACTATE BLDA-SCNC: 1.34 MMOL/L (ref 0.5–2)
LYMPHOCYTES # BLD AUTO: 1.13 10*3/MM3 (ref 0.7–3.1)
LYMPHOCYTES NFR BLD AUTO: 28.5 % (ref 19.6–45.3)
MAGNESIUM SERPL-MCNC: 2.1 MG/DL (ref 1.6–2.6)
MCH RBC QN AUTO: 28.3 PG (ref 26.6–33)
MCHC RBC AUTO-ENTMCNC: 32.4 G/DL (ref 31.5–35.7)
MCV RBC AUTO: 87.5 FL (ref 79–97)
METHGB BLD QL: 0.4 % (ref 0–1.5)
MODALITY: ABNORMAL
MONOCYTES # BLD AUTO: 0.2 10*3/MM3 (ref 0.1–0.9)
MONOCYTES NFR BLD AUTO: 5 % (ref 5–12)
NEUTROPHILS NFR BLD AUTO: 2.59 10*3/MM3 (ref 1.7–7)
NEUTROPHILS NFR BLD AUTO: 65.2 % (ref 42.7–76)
NOTE: ABNORMAL
NRBC BLD AUTO-RTO: 0 /100 WBC (ref 0–0.2)
OXYHGB MFR BLDV: 84.8 % (ref 94–99)
PCO2 BLDA: 39.8 MM HG (ref 35–45)
PH BLDA: 7.43 PH UNITS (ref 7.35–7.45)
PHOSPHATE SERPL-MCNC: 3.5 MG/DL (ref 2.5–4.5)
PLATELET # BLD AUTO: 157 10*3/MM3 (ref 140–450)
PMV BLD AUTO: 11 FL (ref 6–12)
PO2 BLD: 64 MM[HG] (ref 0–500)
PO2 BLDA: 51.2 MM HG (ref 80–100)
POTASSIUM BLDA-SCNC: 3.55 MMOL/L (ref 3.5–5)
POTASSIUM SERPL-SCNC: 3.4 MMOL/L (ref 3.5–5.2)
PROT SERPL-MCNC: 5.3 G/DL (ref 6–8.5)
RBC # BLD AUTO: 3.99 10*6/MM3 (ref 3.77–5.28)
SAO2 % BLDCOA: 85.4 % (ref 95–99)
SODIUM BLDA-SCNC: 139.1 MMOL/L (ref 136–146)
SODIUM SERPL-SCNC: 143 MMOL/L (ref 136–145)
WBC # BLD AUTO: 3.97 10*3/MM3 (ref 3.4–10.8)

## 2021-09-23 PROCEDURE — 25010000002 ENOXAPARIN PER 10 MG: Performed by: INTERNAL MEDICINE

## 2021-09-23 PROCEDURE — 82805 BLOOD GASES W/O2 SATURATION: CPT

## 2021-09-23 PROCEDURE — 83050 HGB METHEMOGLOBIN QUAN: CPT

## 2021-09-23 PROCEDURE — 84100 ASSAY OF PHOSPHORUS: CPT | Performed by: INTERNAL MEDICINE

## 2021-09-23 PROCEDURE — 36600 WITHDRAWAL OF ARTERIAL BLOOD: CPT

## 2021-09-23 PROCEDURE — 94799 UNLISTED PULMONARY SVC/PX: CPT

## 2021-09-23 PROCEDURE — 63710000001 PREDNISONE PER 5 MG: Performed by: INTERNAL MEDICINE

## 2021-09-23 PROCEDURE — 83735 ASSAY OF MAGNESIUM: CPT | Performed by: INTERNAL MEDICINE

## 2021-09-23 PROCEDURE — 80053 COMPREHEN METABOLIC PANEL: CPT | Performed by: INTERNAL MEDICINE

## 2021-09-23 PROCEDURE — 82375 ASSAY CARBOXYHB QUANT: CPT

## 2021-09-23 PROCEDURE — 99233 SBSQ HOSP IP/OBS HIGH 50: CPT | Performed by: INTERNAL MEDICINE

## 2021-09-23 PROCEDURE — 85025 COMPLETE CBC W/AUTO DIFF WBC: CPT | Performed by: INTERNAL MEDICINE

## 2021-09-23 PROCEDURE — 36415 COLL VENOUS BLD VENIPUNCTURE: CPT | Performed by: INTERNAL MEDICINE

## 2021-09-23 RX ORDER — POTASSIUM CHLORIDE 1.5 G/1.77G
40 POWDER, FOR SOLUTION ORAL ONCE
Status: COMPLETED | OUTPATIENT
Start: 2021-09-23 | End: 2021-09-23

## 2021-09-23 RX ORDER — MIDODRINE HYDROCHLORIDE 2.5 MG/1
5 TABLET ORAL
Status: DISCONTINUED | OUTPATIENT
Start: 2021-09-23 | End: 2021-09-28 | Stop reason: HOSPADM

## 2021-09-23 RX ADMIN — SALINE NASAL SPRAY 2 SPRAY: 1.5 SOLUTION NASAL at 08:17

## 2021-09-23 RX ADMIN — BUDESONIDE 0.5 MG: 0.5 INHALANT ORAL at 06:51

## 2021-09-23 RX ADMIN — DULOXETINE HYDROCHLORIDE 60 MG: 30 CAPSULE, DELAYED RELEASE ORAL at 08:17

## 2021-09-23 RX ADMIN — PANTOPRAZOLE SODIUM 40 MG: 40 TABLET, DELAYED RELEASE ORAL at 06:14

## 2021-09-23 RX ADMIN — FLUTICASONE PROPIONATE 2 SPRAY: 50 SPRAY, METERED NASAL at 08:17

## 2021-09-23 RX ADMIN — ALPRAZOLAM 1 MG: 1 TABLET ORAL at 18:46

## 2021-09-23 RX ADMIN — ARFORMOTEROL TARTRATE 15 MCG: 15 SOLUTION RESPIRATORY (INHALATION) at 06:51

## 2021-09-23 RX ADMIN — Medication 2 SPRAY: at 08:17

## 2021-09-23 RX ADMIN — LAMOTRIGINE 100 MG: 100 TABLET ORAL at 21:10

## 2021-09-23 RX ADMIN — MIDODRINE HYDROCHLORIDE 5 MG: 2.5 TABLET ORAL at 17:20

## 2021-09-23 RX ADMIN — CYANOCOBALAMIN TAB 500 MCG 1000 MCG: 500 TAB at 08:17

## 2021-09-23 RX ADMIN — PREDNISONE 60 MG: 20 TABLET ORAL at 08:13

## 2021-09-23 RX ADMIN — POTASSIUM CHLORIDE 40 MEQ: 1.5 POWDER, FOR SOLUTION ORAL at 06:14

## 2021-09-23 RX ADMIN — LEVOTHYROXINE SODIUM 25 MCG: 0.03 TABLET ORAL at 08:13

## 2021-09-23 RX ADMIN — ROSUVASTATIN 40 MG: 20 TABLET, FILM COATED ORAL at 21:10

## 2021-09-23 RX ADMIN — MIDODRINE HYDROCHLORIDE 5 MG: 2.5 TABLET ORAL at 11:58

## 2021-09-23 RX ADMIN — MIDODRINE HYDROCHLORIDE 5 MG: 2.5 TABLET ORAL at 04:04

## 2021-09-23 RX ADMIN — BUDESONIDE 0.5 MG: 0.5 INHALANT ORAL at 18:47

## 2021-09-23 RX ADMIN — REMDESIVIR 100 MG: 100 INJECTION, POWDER, LYOPHILIZED, FOR SOLUTION INTRAVENOUS at 13:34

## 2021-09-23 RX ADMIN — ARFORMOTEROL TARTRATE 15 MCG: 15 SOLUTION RESPIRATORY (INHALATION) at 18:47

## 2021-09-23 RX ADMIN — ENOXAPARIN SODIUM 40 MG: 40 INJECTION SUBCUTANEOUS at 10:01

## 2021-09-23 RX ADMIN — GUAIFENESIN 600 MG: 600 TABLET, EXTENDED RELEASE ORAL at 08:13

## 2021-09-23 RX ADMIN — GUAIFENESIN 600 MG: 600 TABLET, EXTENDED RELEASE ORAL at 21:10

## 2021-09-24 LAB
ALBUMIN SERPL-MCNC: 3.1 G/DL (ref 3.5–5.2)
ALBUMIN/GLOB SERPL: 1.1 G/DL
ALP SERPL-CCNC: 85 U/L (ref 39–117)
ALT SERPL W P-5'-P-CCNC: 36 U/L (ref 1–33)
ANION GAP SERPL CALCULATED.3IONS-SCNC: 13.2 MMOL/L (ref 5–15)
AST SERPL-CCNC: 41 U/L (ref 1–32)
BASOPHILS # BLD AUTO: 0.01 10*3/MM3 (ref 0–0.2)
BASOPHILS NFR BLD AUTO: 0.2 % (ref 0–1.5)
BILIRUB SERPL-MCNC: 0.6 MG/DL (ref 0–1.2)
BUN SERPL-MCNC: 18 MG/DL (ref 6–20)
BUN/CREAT SERPL: 29.5 (ref 7–25)
CALCIUM SPEC-SCNC: 9.1 MG/DL (ref 8.6–10.5)
CHLORIDE SERPL-SCNC: 102 MMOL/L (ref 98–107)
CO2 SERPL-SCNC: 19.8 MMOL/L (ref 22–29)
CREAT SERPL-MCNC: 0.61 MG/DL (ref 0.57–1)
DEPRECATED RDW RBC AUTO: 41.4 FL (ref 37–54)
EOSINOPHIL # BLD AUTO: 0.05 10*3/MM3 (ref 0–0.4)
EOSINOPHIL NFR BLD AUTO: 0.8 % (ref 0.3–6.2)
ERYTHROCYTE [DISTWIDTH] IN BLOOD BY AUTOMATED COUNT: 13.4 % (ref 12.3–15.4)
GFR SERPL CREATININE-BSD FRML MDRD: 107 ML/MIN/1.73
GLOBULIN UR ELPH-MCNC: 2.8 GM/DL
GLUCOSE SERPL-MCNC: 110 MG/DL (ref 65–99)
HCT VFR BLD AUTO: 38.6 % (ref 34–46.6)
HGB BLD-MCNC: 12.8 G/DL (ref 12–15.9)
IMM GRANULOCYTES # BLD AUTO: 0.03 10*3/MM3 (ref 0–0.05)
IMM GRANULOCYTES NFR BLD AUTO: 0.5 % (ref 0–0.5)
LYMPHOCYTES # BLD AUTO: 0.66 10*3/MM3 (ref 0.7–3.1)
LYMPHOCYTES NFR BLD AUTO: 10.8 % (ref 19.6–45.3)
MAGNESIUM SERPL-MCNC: 2.3 MG/DL (ref 1.6–2.6)
MCH RBC QN AUTO: 28.3 PG (ref 26.6–33)
MCHC RBC AUTO-ENTMCNC: 33.2 G/DL (ref 31.5–35.7)
MCV RBC AUTO: 85.4 FL (ref 79–97)
MONOCYTES # BLD AUTO: 0.15 10*3/MM3 (ref 0.1–0.9)
MONOCYTES NFR BLD AUTO: 2.5 % (ref 5–12)
NEUTROPHILS NFR BLD AUTO: 5.21 10*3/MM3 (ref 1.7–7)
NEUTROPHILS NFR BLD AUTO: 85.2 % (ref 42.7–76)
NRBC BLD AUTO-RTO: 0 /100 WBC (ref 0–0.2)
PHOSPHATE SERPL-MCNC: 2.9 MG/DL (ref 2.5–4.5)
PLATELET # BLD AUTO: 230 10*3/MM3 (ref 140–450)
PMV BLD AUTO: 10.6 FL (ref 6–12)
POTASSIUM SERPL-SCNC: 4.1 MMOL/L (ref 3.5–5.2)
PROT SERPL-MCNC: 5.9 G/DL (ref 6–8.5)
RBC # BLD AUTO: 4.52 10*6/MM3 (ref 3.77–5.28)
SODIUM SERPL-SCNC: 135 MMOL/L (ref 136–145)
WBC # BLD AUTO: 6.11 10*3/MM3 (ref 3.4–10.8)

## 2021-09-24 PROCEDURE — 84100 ASSAY OF PHOSPHORUS: CPT

## 2021-09-24 PROCEDURE — 94760 N-INVAS EAR/PLS OXIMETRY 1: CPT

## 2021-09-24 PROCEDURE — 94799 UNLISTED PULMONARY SVC/PX: CPT

## 2021-09-24 PROCEDURE — 25010000002 ENOXAPARIN PER 10 MG: Performed by: INTERNAL MEDICINE

## 2021-09-24 PROCEDURE — 99291 CRITICAL CARE FIRST HOUR: CPT | Performed by: INTERNAL MEDICINE

## 2021-09-24 PROCEDURE — 83735 ASSAY OF MAGNESIUM: CPT

## 2021-09-24 PROCEDURE — 80053 COMPREHEN METABOLIC PANEL: CPT

## 2021-09-24 PROCEDURE — 85025 COMPLETE CBC W/AUTO DIFF WBC: CPT

## 2021-09-24 PROCEDURE — 63710000001 PREDNISONE PER 1 MG: Performed by: INTERNAL MEDICINE

## 2021-09-24 PROCEDURE — 25010000002 FUROSEMIDE PER 20 MG: Performed by: NURSE PRACTITIONER

## 2021-09-24 RX ORDER — FUROSEMIDE 10 MG/ML
40 INJECTION INTRAMUSCULAR; INTRAVENOUS ONCE
Status: COMPLETED | OUTPATIENT
Start: 2021-09-24 | End: 2021-09-24

## 2021-09-24 RX ADMIN — SODIUM CHLORIDE, PRESERVATIVE FREE 10 ML: 5 INJECTION INTRAVENOUS at 08:36

## 2021-09-24 RX ADMIN — FUROSEMIDE 40 MG: 10 INJECTION, SOLUTION INTRAMUSCULAR; INTRAVENOUS at 15:39

## 2021-09-24 RX ADMIN — MIDODRINE HYDROCHLORIDE 5 MG: 2.5 TABLET ORAL at 12:27

## 2021-09-24 RX ADMIN — GUAIFENESIN 600 MG: 600 TABLET, EXTENDED RELEASE ORAL at 20:33

## 2021-09-24 RX ADMIN — BUDESONIDE 0.5 MG: 0.5 INHALANT ORAL at 07:06

## 2021-09-24 RX ADMIN — ARFORMOTEROL TARTRATE 15 MCG: 15 SOLUTION RESPIRATORY (INHALATION) at 19:35

## 2021-09-24 RX ADMIN — CYANOCOBALAMIN TAB 500 MCG 1000 MCG: 500 TAB at 08:34

## 2021-09-24 RX ADMIN — ARFORMOTEROL TARTRATE 15 MCG: 15 SOLUTION RESPIRATORY (INHALATION) at 07:06

## 2021-09-24 RX ADMIN — Medication 2 SPRAY: at 08:34

## 2021-09-24 RX ADMIN — PREDNISONE 60 MG: 20 TABLET ORAL at 08:34

## 2021-09-24 RX ADMIN — LEVOTHYROXINE SODIUM 25 MCG: 0.03 TABLET ORAL at 08:33

## 2021-09-24 RX ADMIN — ROSUVASTATIN 40 MG: 20 TABLET, FILM COATED ORAL at 20:33

## 2021-09-24 RX ADMIN — BUDESONIDE 0.5 MG: 0.5 INHALANT ORAL at 19:35

## 2021-09-24 RX ADMIN — SALINE NASAL SPRAY 2 SPRAY: 1.5 SOLUTION NASAL at 12:28

## 2021-09-24 RX ADMIN — MIDODRINE HYDROCHLORIDE 5 MG: 2.5 TABLET ORAL at 17:35

## 2021-09-24 RX ADMIN — SODIUM CHLORIDE, PRESERVATIVE FREE 10 ML: 5 INJECTION INTRAVENOUS at 20:33

## 2021-09-24 RX ADMIN — LAMOTRIGINE 100 MG: 100 TABLET ORAL at 20:33

## 2021-09-24 RX ADMIN — SALINE NASAL SPRAY 2 SPRAY: 1.5 SOLUTION NASAL at 17:36

## 2021-09-24 RX ADMIN — SALINE NASAL SPRAY 2 SPRAY: 1.5 SOLUTION NASAL at 08:35

## 2021-09-24 RX ADMIN — ENOXAPARIN SODIUM 40 MG: 40 INJECTION SUBCUTANEOUS at 11:16

## 2021-09-24 RX ADMIN — ALPRAZOLAM 1 MG: 1 TABLET ORAL at 20:33

## 2021-09-24 RX ADMIN — DULOXETINE HYDROCHLORIDE 60 MG: 30 CAPSULE, DELAYED RELEASE ORAL at 08:33

## 2021-09-24 RX ADMIN — GUAIFENESIN 600 MG: 600 TABLET, EXTENDED RELEASE ORAL at 08:33

## 2021-09-24 RX ADMIN — PANTOPRAZOLE SODIUM 40 MG: 40 TABLET, DELAYED RELEASE ORAL at 06:06

## 2021-09-24 RX ADMIN — MIDODRINE HYDROCHLORIDE 5 MG: 2.5 TABLET ORAL at 08:33

## 2021-09-24 RX ADMIN — FLUTICASONE PROPIONATE 2 SPRAY: 50 SPRAY, METERED NASAL at 08:34

## 2021-09-25 LAB
ALBUMIN SERPL-MCNC: 3.7 G/DL (ref 3.5–5.2)
ALBUMIN/GLOB SERPL: 1.4 G/DL
ALP SERPL-CCNC: 97 U/L (ref 39–117)
ALT SERPL W P-5'-P-CCNC: 47 U/L (ref 1–33)
ANION GAP SERPL CALCULATED.3IONS-SCNC: 11.6 MMOL/L (ref 5–15)
AST SERPL-CCNC: 40 U/L (ref 1–32)
BASOPHILS # BLD AUTO: 0.01 10*3/MM3 (ref 0–0.2)
BASOPHILS NFR BLD AUTO: 0.1 % (ref 0–1.5)
BILIRUB SERPL-MCNC: 0.6 MG/DL (ref 0–1.2)
BUN SERPL-MCNC: 20 MG/DL (ref 6–20)
BUN/CREAT SERPL: 32.3 (ref 7–25)
CALCIUM SPEC-SCNC: 9 MG/DL (ref 8.6–10.5)
CHLORIDE SERPL-SCNC: 98 MMOL/L (ref 98–107)
CO2 SERPL-SCNC: 29.4 MMOL/L (ref 22–29)
CREAT SERPL-MCNC: 0.62 MG/DL (ref 0.57–1)
CRP SERPL-MCNC: <0.3 MG/DL (ref 0–0.5)
D DIMER PPP FEU-MCNC: 0.37 MG/L (FEU) (ref 0–0.59)
DEPRECATED RDW RBC AUTO: 40.8 FL (ref 37–54)
EOSINOPHIL # BLD AUTO: 0.13 10*3/MM3 (ref 0–0.4)
EOSINOPHIL NFR BLD AUTO: 1.7 % (ref 0.3–6.2)
ERYTHROCYTE [DISTWIDTH] IN BLOOD BY AUTOMATED COUNT: 13.3 % (ref 12.3–15.4)
FERRITIN SERPL-MCNC: 191.2 NG/ML (ref 13–150)
GFR SERPL CREATININE-BSD FRML MDRD: 105 ML/MIN/1.73
GLOBULIN UR ELPH-MCNC: 2.7 GM/DL
GLUCOSE SERPL-MCNC: 118 MG/DL (ref 65–99)
HCT VFR BLD AUTO: 42.1 % (ref 34–46.6)
HGB BLD-MCNC: 13.8 G/DL (ref 12–15.9)
IMM GRANULOCYTES # BLD AUTO: 0.04 10*3/MM3 (ref 0–0.05)
IMM GRANULOCYTES NFR BLD AUTO: 0.5 % (ref 0–0.5)
LYMPHOCYTES # BLD AUTO: 1.19 10*3/MM3 (ref 0.7–3.1)
LYMPHOCYTES NFR BLD AUTO: 15.5 % (ref 19.6–45.3)
MAGNESIUM SERPL-MCNC: 2.3 MG/DL (ref 1.6–2.6)
MCH RBC QN AUTO: 27.8 PG (ref 26.6–33)
MCHC RBC AUTO-ENTMCNC: 32.8 G/DL (ref 31.5–35.7)
MCV RBC AUTO: 84.7 FL (ref 79–97)
MONOCYTES # BLD AUTO: 0.45 10*3/MM3 (ref 0.1–0.9)
MONOCYTES NFR BLD AUTO: 5.9 % (ref 5–12)
NEUTROPHILS NFR BLD AUTO: 5.84 10*3/MM3 (ref 1.7–7)
NEUTROPHILS NFR BLD AUTO: 76.3 % (ref 42.7–76)
NRBC BLD AUTO-RTO: 0 /100 WBC (ref 0–0.2)
PHOSPHATE SERPL-MCNC: 3.4 MG/DL (ref 2.5–4.5)
PLATELET # BLD AUTO: 272 10*3/MM3 (ref 140–450)
PMV BLD AUTO: 10.6 FL (ref 6–12)
POTASSIUM SERPL-SCNC: 3.2 MMOL/L (ref 3.5–5.2)
PROT SERPL-MCNC: 6.4 G/DL (ref 6–8.5)
RBC # BLD AUTO: 4.97 10*6/MM3 (ref 3.77–5.28)
SODIUM SERPL-SCNC: 139 MMOL/L (ref 136–145)
WBC # BLD AUTO: 7.66 10*3/MM3 (ref 3.4–10.8)

## 2021-09-25 PROCEDURE — 99291 CRITICAL CARE FIRST HOUR: CPT | Performed by: INTERNAL MEDICINE

## 2021-09-25 PROCEDURE — 82728 ASSAY OF FERRITIN: CPT | Performed by: NURSE PRACTITIONER

## 2021-09-25 PROCEDURE — 86140 C-REACTIVE PROTEIN: CPT | Performed by: NURSE PRACTITIONER

## 2021-09-25 PROCEDURE — 25010000002 FUROSEMIDE PER 20 MG: Performed by: NURSE PRACTITIONER

## 2021-09-25 PROCEDURE — 85025 COMPLETE CBC W/AUTO DIFF WBC: CPT | Performed by: INTERNAL MEDICINE

## 2021-09-25 PROCEDURE — 94799 UNLISTED PULMONARY SVC/PX: CPT

## 2021-09-25 PROCEDURE — 63710000001 PREDNISONE PER 1 MG: Performed by: INTERNAL MEDICINE

## 2021-09-25 PROCEDURE — 83735 ASSAY OF MAGNESIUM: CPT | Performed by: INTERNAL MEDICINE

## 2021-09-25 PROCEDURE — 25010000003 POTASSIUM CHLORIDE 10 MEQ/100ML SOLUTION: Performed by: NURSE PRACTITIONER

## 2021-09-25 PROCEDURE — 84100 ASSAY OF PHOSPHORUS: CPT | Performed by: INTERNAL MEDICINE

## 2021-09-25 PROCEDURE — 85379 FIBRIN DEGRADATION QUANT: CPT | Performed by: NURSE PRACTITIONER

## 2021-09-25 PROCEDURE — 36415 COLL VENOUS BLD VENIPUNCTURE: CPT | Performed by: NURSE PRACTITIONER

## 2021-09-25 PROCEDURE — 80053 COMPREHEN METABOLIC PANEL: CPT | Performed by: INTERNAL MEDICINE

## 2021-09-25 PROCEDURE — 25010000002 ENOXAPARIN PER 10 MG: Performed by: INTERNAL MEDICINE

## 2021-09-25 RX ORDER — POTASSIUM CHLORIDE 7.45 MG/ML
10 INJECTION INTRAVENOUS
Status: COMPLETED | OUTPATIENT
Start: 2021-09-25 | End: 2021-09-25

## 2021-09-25 RX ORDER — FUROSEMIDE 10 MG/ML
40 INJECTION INTRAMUSCULAR; INTRAVENOUS ONCE
Status: COMPLETED | OUTPATIENT
Start: 2021-09-25 | End: 2021-09-25

## 2021-09-25 RX ADMIN — PANTOPRAZOLE SODIUM 40 MG: 40 TABLET, DELAYED RELEASE ORAL at 06:13

## 2021-09-25 RX ADMIN — FUROSEMIDE 40 MG: 10 INJECTION, SOLUTION INTRAMUSCULAR; INTRAVENOUS at 11:23

## 2021-09-25 RX ADMIN — POTASSIUM CHLORIDE 10 MEQ: 7.46 INJECTION, SOLUTION INTRAVENOUS at 11:49

## 2021-09-25 RX ADMIN — BUDESONIDE 0.5 MG: 0.5 INHALANT ORAL at 07:29

## 2021-09-25 RX ADMIN — GUAIFENESIN 600 MG: 600 TABLET, EXTENDED RELEASE ORAL at 21:10

## 2021-09-25 RX ADMIN — MIDODRINE HYDROCHLORIDE 5 MG: 2.5 TABLET ORAL at 11:23

## 2021-09-25 RX ADMIN — ROSUVASTATIN 40 MG: 20 TABLET, FILM COATED ORAL at 21:10

## 2021-09-25 RX ADMIN — PREDNISONE 60 MG: 20 TABLET ORAL at 08:44

## 2021-09-25 RX ADMIN — POTASSIUM CHLORIDE 10 MEQ: 7.46 INJECTION, SOLUTION INTRAVENOUS at 13:14

## 2021-09-25 RX ADMIN — ARFORMOTEROL TARTRATE 15 MCG: 15 SOLUTION RESPIRATORY (INHALATION) at 07:29

## 2021-09-25 RX ADMIN — ARFORMOTEROL TARTRATE 15 MCG: 15 SOLUTION RESPIRATORY (INHALATION) at 19:12

## 2021-09-25 RX ADMIN — POTASSIUM CHLORIDE 10 MEQ: 7.46 INJECTION, SOLUTION INTRAVENOUS at 14:03

## 2021-09-25 RX ADMIN — MIDODRINE HYDROCHLORIDE 5 MG: 2.5 TABLET ORAL at 08:44

## 2021-09-25 RX ADMIN — FLUTICASONE PROPIONATE 2 SPRAY: 50 SPRAY, METERED NASAL at 08:47

## 2021-09-25 RX ADMIN — GUAIFENESIN 600 MG: 600 TABLET, EXTENDED RELEASE ORAL at 08:44

## 2021-09-25 RX ADMIN — MIDODRINE HYDROCHLORIDE 5 MG: 2.5 TABLET ORAL at 17:29

## 2021-09-25 RX ADMIN — SALINE NASAL SPRAY 2 SPRAY: 1.5 SOLUTION NASAL at 08:47

## 2021-09-25 RX ADMIN — SALINE NASAL SPRAY 2 SPRAY: 1.5 SOLUTION NASAL at 21:11

## 2021-09-25 RX ADMIN — LEVOTHYROXINE SODIUM 25 MCG: 0.03 TABLET ORAL at 08:44

## 2021-09-25 RX ADMIN — DULOXETINE HYDROCHLORIDE 60 MG: 30 CAPSULE, DELAYED RELEASE ORAL at 08:44

## 2021-09-25 RX ADMIN — IBUPROFEN 400 MG: 400 TABLET ORAL at 21:22

## 2021-09-25 RX ADMIN — BUDESONIDE 0.5 MG: 0.5 INHALANT ORAL at 19:12

## 2021-09-25 RX ADMIN — CYANOCOBALAMIN TAB 500 MCG 1000 MCG: 500 TAB at 08:44

## 2021-09-25 RX ADMIN — ENOXAPARIN SODIUM 40 MG: 40 INJECTION SUBCUTANEOUS at 11:24

## 2021-09-25 RX ADMIN — LAMOTRIGINE 100 MG: 100 TABLET ORAL at 21:10

## 2021-09-25 RX ADMIN — ALPRAZOLAM 1 MG: 1 TABLET ORAL at 21:22

## 2021-09-26 LAB
ALBUMIN SERPL-MCNC: 3.9 G/DL (ref 3.5–5.2)
ALBUMIN/GLOB SERPL: 1.6 G/DL
ALP SERPL-CCNC: 95 U/L (ref 39–117)
ALT SERPL W P-5'-P-CCNC: 48 U/L (ref 1–33)
ANION GAP SERPL CALCULATED.3IONS-SCNC: 12.3 MMOL/L (ref 5–15)
AST SERPL-CCNC: 34 U/L (ref 1–32)
BASOPHILS # BLD AUTO: 0.02 10*3/MM3 (ref 0–0.2)
BASOPHILS NFR BLD AUTO: 0.2 % (ref 0–1.5)
BILIRUB SERPL-MCNC: 0.7 MG/DL (ref 0–1.2)
BUN SERPL-MCNC: 16 MG/DL (ref 6–20)
BUN/CREAT SERPL: 21.6 (ref 7–25)
CALCIUM SPEC-SCNC: 9.3 MG/DL (ref 8.6–10.5)
CHLORIDE SERPL-SCNC: 97 MMOL/L (ref 98–107)
CO2 SERPL-SCNC: 27.7 MMOL/L (ref 22–29)
CREAT SERPL-MCNC: 0.74 MG/DL (ref 0.57–1)
DEPRECATED RDW RBC AUTO: 39 FL (ref 37–54)
EOSINOPHIL # BLD AUTO: 0.11 10*3/MM3 (ref 0–0.4)
EOSINOPHIL NFR BLD AUTO: 1.1 % (ref 0.3–6.2)
ERYTHROCYTE [DISTWIDTH] IN BLOOD BY AUTOMATED COUNT: 13.2 % (ref 12.3–15.4)
GFR SERPL CREATININE-BSD FRML MDRD: 85 ML/MIN/1.73
GLOBULIN UR ELPH-MCNC: 2.5 GM/DL
GLUCOSE SERPL-MCNC: 164 MG/DL (ref 65–99)
HCT VFR BLD AUTO: 41.7 % (ref 34–46.6)
HGB BLD-MCNC: 13.8 G/DL (ref 12–15.9)
IMM GRANULOCYTES # BLD AUTO: 0.07 10*3/MM3 (ref 0–0.05)
IMM GRANULOCYTES NFR BLD AUTO: 0.7 % (ref 0–0.5)
LYMPHOCYTES # BLD AUTO: 1.37 10*3/MM3 (ref 0.7–3.1)
LYMPHOCYTES NFR BLD AUTO: 14.1 % (ref 19.6–45.3)
MAGNESIUM SERPL-MCNC: 2.3 MG/DL (ref 1.6–2.6)
MCH RBC QN AUTO: 27.7 PG (ref 26.6–33)
MCHC RBC AUTO-ENTMCNC: 33.1 G/DL (ref 31.5–35.7)
MCV RBC AUTO: 83.6 FL (ref 79–97)
MONOCYTES # BLD AUTO: 0.6 10*3/MM3 (ref 0.1–0.9)
MONOCYTES NFR BLD AUTO: 6.2 % (ref 5–12)
NEUTROPHILS NFR BLD AUTO: 7.55 10*3/MM3 (ref 1.7–7)
NEUTROPHILS NFR BLD AUTO: 77.7 % (ref 42.7–76)
NRBC BLD AUTO-RTO: 0 /100 WBC (ref 0–0.2)
PHOSPHATE SERPL-MCNC: 3.5 MG/DL (ref 2.5–4.5)
PLATELET # BLD AUTO: 289 10*3/MM3 (ref 140–450)
PMV BLD AUTO: 10.8 FL (ref 6–12)
POTASSIUM SERPL-SCNC: 3.1 MMOL/L (ref 3.5–5.2)
PROT SERPL-MCNC: 6.4 G/DL (ref 6–8.5)
RBC # BLD AUTO: 4.99 10*6/MM3 (ref 3.77–5.28)
SODIUM SERPL-SCNC: 137 MMOL/L (ref 136–145)
WBC # BLD AUTO: 9.72 10*3/MM3 (ref 3.4–10.8)

## 2021-09-26 PROCEDURE — 99291 CRITICAL CARE FIRST HOUR: CPT | Performed by: INTERNAL MEDICINE

## 2021-09-26 PROCEDURE — 94799 UNLISTED PULMONARY SVC/PX: CPT

## 2021-09-26 PROCEDURE — 63710000001 PREDNISONE PER 1 MG: Performed by: INTERNAL MEDICINE

## 2021-09-26 PROCEDURE — 80053 COMPREHEN METABOLIC PANEL: CPT | Performed by: INTERNAL MEDICINE

## 2021-09-26 PROCEDURE — 85025 COMPLETE CBC W/AUTO DIFF WBC: CPT | Performed by: INTERNAL MEDICINE

## 2021-09-26 PROCEDURE — 25010000002 ENOXAPARIN PER 10 MG: Performed by: INTERNAL MEDICINE

## 2021-09-26 PROCEDURE — 84100 ASSAY OF PHOSPHORUS: CPT | Performed by: INTERNAL MEDICINE

## 2021-09-26 PROCEDURE — 83735 ASSAY OF MAGNESIUM: CPT | Performed by: INTERNAL MEDICINE

## 2021-09-26 RX ORDER — POTASSIUM CHLORIDE 750 MG/1
40 CAPSULE, EXTENDED RELEASE ORAL EVERY 4 HOURS
Status: COMPLETED | OUTPATIENT
Start: 2021-09-26 | End: 2021-09-26

## 2021-09-26 RX ADMIN — SALINE NASAL SPRAY 2 SPRAY: 1.5 SOLUTION NASAL at 21:24

## 2021-09-26 RX ADMIN — SALINE NASAL SPRAY 2 SPRAY: 1.5 SOLUTION NASAL at 08:30

## 2021-09-26 RX ADMIN — ARFORMOTEROL TARTRATE 15 MCG: 15 SOLUTION RESPIRATORY (INHALATION) at 19:23

## 2021-09-26 RX ADMIN — LAMOTRIGINE 100 MG: 100 TABLET ORAL at 21:22

## 2021-09-26 RX ADMIN — POTASSIUM CHLORIDE 40 MEQ: 10 CAPSULE, COATED, EXTENDED RELEASE ORAL at 14:30

## 2021-09-26 RX ADMIN — ARFORMOTEROL TARTRATE 15 MCG: 15 SOLUTION RESPIRATORY (INHALATION) at 07:17

## 2021-09-26 RX ADMIN — BUDESONIDE 0.5 MG: 0.5 INHALANT ORAL at 07:21

## 2021-09-26 RX ADMIN — GUAIFENESIN 600 MG: 600 TABLET, EXTENDED RELEASE ORAL at 08:28

## 2021-09-26 RX ADMIN — SALINE NASAL SPRAY 2 SPRAY: 1.5 SOLUTION NASAL at 17:51

## 2021-09-26 RX ADMIN — MIDODRINE HYDROCHLORIDE 5 MG: 2.5 TABLET ORAL at 10:35

## 2021-09-26 RX ADMIN — MIDODRINE HYDROCHLORIDE 5 MG: 2.5 TABLET ORAL at 08:28

## 2021-09-26 RX ADMIN — FLUTICASONE PROPIONATE 2 SPRAY: 50 SPRAY, METERED NASAL at 08:30

## 2021-09-26 RX ADMIN — SODIUM CHLORIDE, PRESERVATIVE FREE 10 ML: 5 INJECTION INTRAVENOUS at 21:22

## 2021-09-26 RX ADMIN — ROSUVASTATIN 40 MG: 20 TABLET, FILM COATED ORAL at 21:22

## 2021-09-26 RX ADMIN — LEVOTHYROXINE SODIUM 25 MCG: 0.03 TABLET ORAL at 08:28

## 2021-09-26 RX ADMIN — CYANOCOBALAMIN TAB 500 MCG 1000 MCG: 500 TAB at 08:28

## 2021-09-26 RX ADMIN — POTASSIUM CHLORIDE 40 MEQ: 10 CAPSULE, COATED, EXTENDED RELEASE ORAL at 18:30

## 2021-09-26 RX ADMIN — ALPRAZOLAM 1 MG: 1 TABLET ORAL at 21:27

## 2021-09-26 RX ADMIN — POTASSIUM CHLORIDE 40 MEQ: 10 CAPSULE, COATED, EXTENDED RELEASE ORAL at 11:28

## 2021-09-26 RX ADMIN — DULOXETINE HYDROCHLORIDE 60 MG: 30 CAPSULE, DELAYED RELEASE ORAL at 08:28

## 2021-09-26 RX ADMIN — ENOXAPARIN SODIUM 40 MG: 40 INJECTION SUBCUTANEOUS at 10:35

## 2021-09-26 RX ADMIN — IBUPROFEN 400 MG: 400 TABLET ORAL at 21:27

## 2021-09-26 RX ADMIN — PREDNISONE 60 MG: 20 TABLET ORAL at 08:28

## 2021-09-26 RX ADMIN — MIDODRINE HYDROCHLORIDE 5 MG: 2.5 TABLET ORAL at 17:50

## 2021-09-26 RX ADMIN — IPRATROPIUM BROMIDE AND ALBUTEROL SULFATE 3 ML: .5; 2.5 SOLUTION RESPIRATORY (INHALATION) at 07:17

## 2021-09-26 RX ADMIN — PANTOPRAZOLE SODIUM 40 MG: 40 TABLET, DELAYED RELEASE ORAL at 08:28

## 2021-09-26 RX ADMIN — BUDESONIDE 0.5 MG: 0.5 INHALANT ORAL at 19:23

## 2021-09-26 RX ADMIN — GUAIFENESIN 600 MG: 600 TABLET, EXTENDED RELEASE ORAL at 21:22

## 2021-09-27 LAB
ALBUMIN SERPL-MCNC: 3.6 G/DL (ref 3.5–5.2)
ALBUMIN/GLOB SERPL: 1.6 G/DL
ALP SERPL-CCNC: 90 U/L (ref 39–117)
ALT SERPL W P-5'-P-CCNC: 46 U/L (ref 1–33)
ANION GAP SERPL CALCULATED.3IONS-SCNC: 10 MMOL/L (ref 5–15)
AST SERPL-CCNC: 25 U/L (ref 1–32)
BACTERIA SPEC AEROBE CULT: NORMAL
BACTERIA SPEC AEROBE CULT: NORMAL
BACTERIA UR QL AUTO: ABNORMAL /HPF
BASOPHILS # BLD AUTO: 0.02 10*3/MM3 (ref 0–0.2)
BASOPHILS NFR BLD AUTO: 0.2 % (ref 0–1.5)
BILIRUB SERPL-MCNC: 0.5 MG/DL (ref 0–1.2)
BILIRUB UR QL STRIP: NEGATIVE
BUN SERPL-MCNC: 17 MG/DL (ref 6–20)
BUN/CREAT SERPL: 24.6 (ref 7–25)
CALCIUM SPEC-SCNC: 9 MG/DL (ref 8.6–10.5)
CHLORIDE SERPL-SCNC: 102 MMOL/L (ref 98–107)
CLARITY UR: CLEAR
CO2 SERPL-SCNC: 25 MMOL/L (ref 22–29)
COLOR UR: YELLOW
CREAT SERPL-MCNC: 0.69 MG/DL (ref 0.57–1)
CRP SERPL-MCNC: <0.3 MG/DL (ref 0–0.5)
D DIMER PPP FEU-MCNC: 0.27 MG/L (FEU) (ref 0–0.59)
DEPRECATED RDW RBC AUTO: 39.4 FL (ref 37–54)
EOSINOPHIL # BLD AUTO: 0.07 10*3/MM3 (ref 0–0.4)
EOSINOPHIL NFR BLD AUTO: 0.6 % (ref 0.3–6.2)
ERYTHROCYTE [DISTWIDTH] IN BLOOD BY AUTOMATED COUNT: 13.1 % (ref 12.3–15.4)
FERRITIN SERPL-MCNC: 166.4 NG/ML (ref 13–150)
GFR SERPL CREATININE-BSD FRML MDRD: 92 ML/MIN/1.73
GLOBULIN UR ELPH-MCNC: 2.3 GM/DL
GLUCOSE SERPL-MCNC: 195 MG/DL (ref 65–99)
GLUCOSE UR STRIP-MCNC: ABNORMAL MG/DL
HCT VFR BLD AUTO: 40.7 % (ref 34–46.6)
HGB BLD-MCNC: 13.7 G/DL (ref 12–15.9)
HGB UR QL STRIP.AUTO: ABNORMAL
HYALINE CASTS UR QL AUTO: ABNORMAL /LPF
IMM GRANULOCYTES # BLD AUTO: 0.11 10*3/MM3 (ref 0–0.05)
IMM GRANULOCYTES NFR BLD AUTO: 0.9 % (ref 0–0.5)
KETONES UR QL STRIP: ABNORMAL
LEUKOCYTE ESTERASE UR QL STRIP.AUTO: ABNORMAL
LYMPHOCYTES # BLD AUTO: 1.52 10*3/MM3 (ref 0.7–3.1)
LYMPHOCYTES NFR BLD AUTO: 12.2 % (ref 19.6–45.3)
MAGNESIUM SERPL-MCNC: 2.3 MG/DL (ref 1.6–2.6)
MCH RBC QN AUTO: 28.4 PG (ref 26.6–33)
MCHC RBC AUTO-ENTMCNC: 33.7 G/DL (ref 31.5–35.7)
MCV RBC AUTO: 84.4 FL (ref 79–97)
MONOCYTES # BLD AUTO: 0.78 10*3/MM3 (ref 0.1–0.9)
MONOCYTES NFR BLD AUTO: 6.3 % (ref 5–12)
NEUTROPHILS NFR BLD AUTO: 79.8 % (ref 42.7–76)
NEUTROPHILS NFR BLD AUTO: 9.93 10*3/MM3 (ref 1.7–7)
NITRITE UR QL STRIP: NEGATIVE
NRBC BLD AUTO-RTO: 0 /100 WBC (ref 0–0.2)
PH UR STRIP.AUTO: 6.5 [PH] (ref 5–8)
PHOSPHATE SERPL-MCNC: 2.5 MG/DL (ref 2.5–4.5)
PLATELET # BLD AUTO: 279 10*3/MM3 (ref 140–450)
PMV BLD AUTO: 11 FL (ref 6–12)
POTASSIUM SERPL-SCNC: 4.1 MMOL/L (ref 3.5–5.2)
PROT SERPL-MCNC: 5.9 G/DL (ref 6–8.5)
PROT UR QL STRIP: ABNORMAL
RBC # BLD AUTO: 4.82 10*6/MM3 (ref 3.77–5.28)
RBC # UR: ABNORMAL /HPF
REF LAB TEST METHOD: ABNORMAL
SODIUM SERPL-SCNC: 137 MMOL/L (ref 136–145)
SP GR UR STRIP: >1.03 (ref 1–1.03)
SQUAMOUS #/AREA URNS HPF: ABNORMAL /HPF
UROBILINOGEN UR QL STRIP: ABNORMAL
WBC # BLD AUTO: 12.43 10*3/MM3 (ref 3.4–10.8)
WBC UR QL AUTO: ABNORMAL /HPF

## 2021-09-27 PROCEDURE — 85025 COMPLETE CBC W/AUTO DIFF WBC: CPT | Performed by: INTERNAL MEDICINE

## 2021-09-27 PROCEDURE — 85379 FIBRIN DEGRADATION QUANT: CPT | Performed by: NURSE PRACTITIONER

## 2021-09-27 PROCEDURE — 99232 SBSQ HOSP IP/OBS MODERATE 35: CPT | Performed by: INTERNAL MEDICINE

## 2021-09-27 PROCEDURE — 86140 C-REACTIVE PROTEIN: CPT | Performed by: NURSE PRACTITIONER

## 2021-09-27 PROCEDURE — 94799 UNLISTED PULMONARY SVC/PX: CPT

## 2021-09-27 PROCEDURE — 87077 CULTURE AEROBIC IDENTIFY: CPT | Performed by: INTERNAL MEDICINE

## 2021-09-27 PROCEDURE — 63710000001 PREDNISONE PER 1 MG: Performed by: INTERNAL MEDICINE

## 2021-09-27 PROCEDURE — 81001 URINALYSIS AUTO W/SCOPE: CPT | Performed by: INTERNAL MEDICINE

## 2021-09-27 PROCEDURE — 36415 COLL VENOUS BLD VENIPUNCTURE: CPT | Performed by: NURSE PRACTITIONER

## 2021-09-27 PROCEDURE — 82728 ASSAY OF FERRITIN: CPT | Performed by: NURSE PRACTITIONER

## 2021-09-27 PROCEDURE — 80053 COMPREHEN METABOLIC PANEL: CPT | Performed by: INTERNAL MEDICINE

## 2021-09-27 PROCEDURE — 84100 ASSAY OF PHOSPHORUS: CPT | Performed by: INTERNAL MEDICINE

## 2021-09-27 PROCEDURE — 25010000002 ENOXAPARIN PER 10 MG: Performed by: INTERNAL MEDICINE

## 2021-09-27 PROCEDURE — 94760 N-INVAS EAR/PLS OXIMETRY 1: CPT

## 2021-09-27 PROCEDURE — 87086 URINE CULTURE/COLONY COUNT: CPT | Performed by: INTERNAL MEDICINE

## 2021-09-27 PROCEDURE — 83735 ASSAY OF MAGNESIUM: CPT | Performed by: INTERNAL MEDICINE

## 2021-09-27 PROCEDURE — 99291 CRITICAL CARE FIRST HOUR: CPT | Performed by: INTERNAL MEDICINE

## 2021-09-27 RX ORDER — PHENAZOPYRIDINE HYDROCHLORIDE 100 MG/1
100 TABLET, FILM COATED ORAL ONCE
Status: COMPLETED | OUTPATIENT
Start: 2021-09-28 | End: 2021-09-27

## 2021-09-27 RX ADMIN — FLUTICASONE PROPIONATE 2 SPRAY: 50 SPRAY, METERED NASAL at 08:31

## 2021-09-27 RX ADMIN — ENOXAPARIN SODIUM 40 MG: 40 INJECTION SUBCUTANEOUS at 12:01

## 2021-09-27 RX ADMIN — BUDESONIDE 0.5 MG: 0.5 INHALANT ORAL at 08:24

## 2021-09-27 RX ADMIN — ARFORMOTEROL TARTRATE 15 MCG: 15 SOLUTION RESPIRATORY (INHALATION) at 19:13

## 2021-09-27 RX ADMIN — IBUPROFEN 400 MG: 400 TABLET ORAL at 23:45

## 2021-09-27 RX ADMIN — ALPRAZOLAM 1 MG: 1 TABLET ORAL at 23:45

## 2021-09-27 RX ADMIN — SALINE NASAL SPRAY 2 SPRAY: 1.5 SOLUTION NASAL at 20:03

## 2021-09-27 RX ADMIN — LAMOTRIGINE 100 MG: 100 TABLET ORAL at 20:02

## 2021-09-27 RX ADMIN — BUDESONIDE 0.5 MG: 0.5 INHALANT ORAL at 19:13

## 2021-09-27 RX ADMIN — PREDNISONE 60 MG: 20 TABLET ORAL at 08:31

## 2021-09-27 RX ADMIN — ARFORMOTEROL TARTRATE 15 MCG: 15 SOLUTION RESPIRATORY (INHALATION) at 08:24

## 2021-09-27 RX ADMIN — GUAIFENESIN 600 MG: 600 TABLET, EXTENDED RELEASE ORAL at 08:32

## 2021-09-27 RX ADMIN — MIDODRINE HYDROCHLORIDE 5 MG: 2.5 TABLET ORAL at 12:01

## 2021-09-27 RX ADMIN — CYANOCOBALAMIN TAB 500 MCG 1000 MCG: 500 TAB at 08:32

## 2021-09-27 RX ADMIN — MIDODRINE HYDROCHLORIDE 5 MG: 2.5 TABLET ORAL at 08:32

## 2021-09-27 RX ADMIN — PHENAZOPYRIDINE HYDROCHLORIDE 100 MG: 100 TABLET ORAL at 23:44

## 2021-09-27 RX ADMIN — DULOXETINE HYDROCHLORIDE 60 MG: 30 CAPSULE, DELAYED RELEASE ORAL at 08:32

## 2021-09-27 RX ADMIN — GUAIFENESIN 600 MG: 600 TABLET, EXTENDED RELEASE ORAL at 20:02

## 2021-09-27 RX ADMIN — PANTOPRAZOLE SODIUM 40 MG: 40 TABLET, DELAYED RELEASE ORAL at 05:39

## 2021-09-27 RX ADMIN — SALINE NASAL SPRAY 2 SPRAY: 1.5 SOLUTION NASAL at 08:31

## 2021-09-27 RX ADMIN — MIDODRINE HYDROCHLORIDE 5 MG: 2.5 TABLET ORAL at 17:10

## 2021-09-27 RX ADMIN — ROSUVASTATIN 40 MG: 20 TABLET, FILM COATED ORAL at 20:02

## 2021-09-27 RX ADMIN — IBUPROFEN 400 MG: 400 TABLET ORAL at 14:18

## 2021-09-27 RX ADMIN — LEVOTHYROXINE SODIUM 25 MCG: 0.03 TABLET ORAL at 08:32

## 2021-09-27 RX ADMIN — SODIUM CHLORIDE, PRESERVATIVE FREE 10 ML: 5 INJECTION INTRAVENOUS at 08:32

## 2021-09-28 ENCOUNTER — READMISSION MANAGEMENT (OUTPATIENT)
Dept: CALL CENTER | Facility: HOSPITAL | Age: 46
End: 2021-09-28

## 2021-09-28 ENCOUNTER — APPOINTMENT (OUTPATIENT)
Dept: GENERAL RADIOLOGY | Facility: HOSPITAL | Age: 46
End: 2021-09-28

## 2021-09-28 VITALS
SYSTOLIC BLOOD PRESSURE: 102 MMHG | RESPIRATION RATE: 24 BRPM | HEIGHT: 65 IN | WEIGHT: 212.3 LBS | TEMPERATURE: 97.8 F | OXYGEN SATURATION: 92 % | DIASTOLIC BLOOD PRESSURE: 60 MMHG | HEART RATE: 68 BPM | BODY MASS INDEX: 35.37 KG/M2

## 2021-09-28 LAB
ALBUMIN SERPL-MCNC: 3.5 G/DL (ref 3.5–5.2)
ALBUMIN/GLOB SERPL: 1.5 G/DL
ALP SERPL-CCNC: 90 U/L (ref 39–117)
ALT SERPL W P-5'-P-CCNC: 50 U/L (ref 1–33)
ANION GAP SERPL CALCULATED.3IONS-SCNC: 11.8 MMOL/L (ref 5–15)
AST SERPL-CCNC: 26 U/L (ref 1–32)
BASOPHILS # BLD AUTO: 0.03 10*3/MM3 (ref 0–0.2)
BASOPHILS NFR BLD AUTO: 0.2 % (ref 0–1.5)
BILIRUB SERPL-MCNC: 0.4 MG/DL (ref 0–1.2)
BUN SERPL-MCNC: 19 MG/DL (ref 6–20)
BUN/CREAT SERPL: 30.2 (ref 7–25)
CALCIUM SPEC-SCNC: 8.8 MG/DL (ref 8.6–10.5)
CHLORIDE SERPL-SCNC: 100 MMOL/L (ref 98–107)
CO2 SERPL-SCNC: 23.2 MMOL/L (ref 22–29)
CREAT SERPL-MCNC: 0.63 MG/DL (ref 0.57–1)
DEPRECATED RDW RBC AUTO: 41 FL (ref 37–54)
EOSINOPHIL # BLD AUTO: 0.06 10*3/MM3 (ref 0–0.4)
EOSINOPHIL NFR BLD AUTO: 0.5 % (ref 0.3–6.2)
ERYTHROCYTE [DISTWIDTH] IN BLOOD BY AUTOMATED COUNT: 13.2 % (ref 12.3–15.4)
GFR SERPL CREATININE-BSD FRML MDRD: 103 ML/MIN/1.73
GLOBULIN UR ELPH-MCNC: 2.3 GM/DL
GLUCOSE SERPL-MCNC: 242 MG/DL (ref 65–99)
HCT VFR BLD AUTO: 41.9 % (ref 34–46.6)
HGB BLD-MCNC: 13.6 G/DL (ref 12–15.9)
IMM GRANULOCYTES # BLD AUTO: 0.1 10*3/MM3 (ref 0–0.05)
IMM GRANULOCYTES NFR BLD AUTO: 0.8 % (ref 0–0.5)
LYMPHOCYTES # BLD AUTO: 2.11 10*3/MM3 (ref 0.7–3.1)
LYMPHOCYTES NFR BLD AUTO: 16.4 % (ref 19.6–45.3)
MAGNESIUM SERPL-MCNC: 2.2 MG/DL (ref 1.6–2.6)
MCH RBC QN AUTO: 27.9 PG (ref 26.6–33)
MCHC RBC AUTO-ENTMCNC: 32.5 G/DL (ref 31.5–35.7)
MCV RBC AUTO: 86 FL (ref 79–97)
MONOCYTES # BLD AUTO: 0.79 10*3/MM3 (ref 0.1–0.9)
MONOCYTES NFR BLD AUTO: 6.2 % (ref 5–12)
NEUTROPHILS NFR BLD AUTO: 75.9 % (ref 42.7–76)
NEUTROPHILS NFR BLD AUTO: 9.75 10*3/MM3 (ref 1.7–7)
NRBC BLD AUTO-RTO: 0 /100 WBC (ref 0–0.2)
PHOSPHATE SERPL-MCNC: 3.1 MG/DL (ref 2.5–4.5)
PLATELET # BLD AUTO: 269 10*3/MM3 (ref 140–450)
PMV BLD AUTO: 11 FL (ref 6–12)
POTASSIUM SERPL-SCNC: 3.7 MMOL/L (ref 3.5–5.2)
PROT SERPL-MCNC: 5.8 G/DL (ref 6–8.5)
RBC # BLD AUTO: 4.87 10*6/MM3 (ref 3.77–5.28)
SODIUM SERPL-SCNC: 135 MMOL/L (ref 136–145)
WBC # BLD AUTO: 12.84 10*3/MM3 (ref 3.4–10.8)

## 2021-09-28 PROCEDURE — 83735 ASSAY OF MAGNESIUM: CPT | Performed by: INTERNAL MEDICINE

## 2021-09-28 PROCEDURE — 99233 SBSQ HOSP IP/OBS HIGH 50: CPT | Performed by: INTERNAL MEDICINE

## 2021-09-28 PROCEDURE — 99232 SBSQ HOSP IP/OBS MODERATE 35: CPT | Performed by: INTERNAL MEDICINE

## 2021-09-28 PROCEDURE — 71045 X-RAY EXAM CHEST 1 VIEW: CPT

## 2021-09-28 PROCEDURE — 94799 UNLISTED PULMONARY SVC/PX: CPT

## 2021-09-28 PROCEDURE — 85025 COMPLETE CBC W/AUTO DIFF WBC: CPT | Performed by: INTERNAL MEDICINE

## 2021-09-28 PROCEDURE — 25010000002 ENOXAPARIN PER 10 MG: Performed by: INTERNAL MEDICINE

## 2021-09-28 PROCEDURE — 63710000001 PREDNISONE PER 1 MG: Performed by: INTERNAL MEDICINE

## 2021-09-28 PROCEDURE — 84100 ASSAY OF PHOSPHORUS: CPT | Performed by: INTERNAL MEDICINE

## 2021-09-28 PROCEDURE — 80053 COMPREHEN METABOLIC PANEL: CPT | Performed by: INTERNAL MEDICINE

## 2021-09-28 RX ORDER — PREDNISONE 20 MG/1
TABLET ORAL
Qty: 36 TABLET | Refills: 0 | Status: SHIPPED | OUTPATIENT
Start: 2021-09-28 | End: 2021-10-14

## 2021-09-28 RX ORDER — FLUCONAZOLE 150 MG/1
150 TABLET ORAL ONCE AS NEEDED
Qty: 1 TABLET | Refills: 0 | Status: SHIPPED | OUTPATIENT
Start: 2021-09-28 | End: 2021-11-10

## 2021-09-28 RX ORDER — FLUTICASONE PROPIONATE 220 UG/1
1 AEROSOL, METERED RESPIRATORY (INHALATION)
Qty: 1 EACH | Refills: 0 | Status: SHIPPED | OUTPATIENT
Start: 2021-09-28 | End: 2021-10-13 | Stop reason: SDUPTHER

## 2021-09-28 RX ORDER — MIDODRINE HYDROCHLORIDE 5 MG/1
5 TABLET ORAL
Qty: 90 TABLET | Refills: 0 | Status: SHIPPED | OUTPATIENT
Start: 2021-09-28 | End: 2022-02-22

## 2021-09-28 RX ORDER — CEFDINIR 300 MG/1
300 CAPSULE ORAL 2 TIMES DAILY
Qty: 10 CAPSULE | Refills: 0 | Status: SHIPPED | OUTPATIENT
Start: 2021-09-28 | End: 2021-10-03

## 2021-09-28 RX ORDER — BENZONATATE 100 MG/1
100 CAPSULE ORAL 3 TIMES DAILY PRN
Qty: 30 CAPSULE | Refills: 0 | Status: SHIPPED | OUTPATIENT
Start: 2021-09-28 | End: 2021-11-10

## 2021-09-28 RX ADMIN — GUAIFENESIN 600 MG: 600 TABLET, EXTENDED RELEASE ORAL at 09:41

## 2021-09-28 RX ADMIN — SODIUM CHLORIDE, PRESERVATIVE FREE 10 ML: 5 INJECTION INTRAVENOUS at 09:41

## 2021-09-28 RX ADMIN — LEVOTHYROXINE SODIUM 25 MCG: 0.03 TABLET ORAL at 09:41

## 2021-09-28 RX ADMIN — IBUPROFEN 400 MG: 400 TABLET ORAL at 06:05

## 2021-09-28 RX ADMIN — ENOXAPARIN SODIUM 40 MG: 40 INJECTION SUBCUTANEOUS at 09:41

## 2021-09-28 RX ADMIN — MIDODRINE HYDROCHLORIDE 5 MG: 2.5 TABLET ORAL at 06:05

## 2021-09-28 RX ADMIN — ARFORMOTEROL TARTRATE 15 MCG: 15 SOLUTION RESPIRATORY (INHALATION) at 07:01

## 2021-09-28 RX ADMIN — BUDESONIDE 0.5 MG: 0.5 INHALANT ORAL at 07:01

## 2021-09-28 RX ADMIN — PANTOPRAZOLE SODIUM 40 MG: 40 TABLET, DELAYED RELEASE ORAL at 06:05

## 2021-09-28 RX ADMIN — CYANOCOBALAMIN TAB 500 MCG 1000 MCG: 500 TAB at 09:41

## 2021-09-28 RX ADMIN — PREDNISONE 60 MG: 20 TABLET ORAL at 09:41

## 2021-09-28 RX ADMIN — DULOXETINE HYDROCHLORIDE 60 MG: 30 CAPSULE, DELAYED RELEASE ORAL at 09:41

## 2021-09-28 RX ADMIN — MIDODRINE HYDROCHLORIDE 5 MG: 2.5 TABLET ORAL at 12:16

## 2021-09-28 NOTE — OUTREACH NOTE
Prep Survey      Responses   [facility], [place] patient discharged from?  [facility]   Is LACE score < 7 ?  No   Emergency Room discharge w/ pulse ox?  No   Eligibility  [facility]   Date of Admission  09/19/21   Date of Discharge  09/28/21   Discharge Disposition  Home or Self Care   Discharge diagnosis  COVID-19 pneumonia,    ARDS secondary to COVID,   Septic shock   Does the patient have one of the following disease processes/diagnoses(primary or secondary)?  COVID-19   Does the patient have Home health ordered?  No   Is there a DME ordered?  Yes   What DME was ordered?  home oxygen and BSC -  [facility]    Prep survey completed?  Yes          Maribel Sanz RN

## 2021-09-29 ENCOUNTER — TRANSITIONAL CARE MANAGEMENT TELEPHONE ENCOUNTER (OUTPATIENT)
Dept: CALL CENTER | Facility: HOSPITAL | Age: 46
End: 2021-09-29

## 2021-09-29 LAB — BACTERIA SPEC AEROBE CULT: ABNORMAL

## 2021-09-29 NOTE — OUTREACH NOTE
Call Center TCM Note      Responses   [facility], [place] patient discharged from?  [facility]   Does the patient have one of the following disease processes/diagnoses(primary or secondary)?  COVID-19   COVID-19 underlying condition?  Sepsis   TCM attempt successful?  Yes   Call start time  1105   Call end time  1109   Discharge diagnosis  COVID-19 pneumonia,    ARDS secondary to COVID,   Septic shock   Meds reviewed with patient/caregiver?  Yes   Is the patient having any side effects they believe may be caused by any medication additions or changes?  No   Does the patient have all medications ordered at discharge?  Yes   Is the patient taking all medications as directed (includes completed medication regime)?  Yes   Does the patient have a primary care provider?   Yes   Comments regarding PCP  patient will call back later to make f/u appt, she is planning her fathers    Does the patient have an appointment with their PCP or specialist within 7 days of discharge?  No   Nursing Interventions  Advised patient to make appointment   Has the patient kept scheduled appointments due by today?  N/A   Has home health visited the patient within 72 hours of discharge?  N/A   What DME was ordered?  home oxygen and BSC -  [facility]    Has all DME been delivered?  Yes   DME comments  2L of O2 at all times   Psychosocial issues?  No   Did the patient receive a copy of their discharge instructions?  Yes   Did the patient receive a copy of COVID-19 specific instructions?  Yes   Nursing interventions  Reviewed instructions with patient   What is the patient's perception of their health status since discharge?  Improving   Does the patient have any of the following symptoms?  None   Nursing Interventions  Nurse provided patient education   Is the patient/caregiver able to teach back steps to recovery at home?  Set small, achievable goals for return to baseline health, Rest and rebuild strength, gradually increase activity   Is the  patient/caregiver able to teach back the hierarchy of who to call/visit for symptoms/problems? PCP, Specialist, Home health nurse, Urgent Care, ED, 911  Yes   Is the patient/caregiver able to teach back Sepsis?  S - Shivering,fever or very cold, E - Extreme pain or generalized discomfort (worst ever,especially abdomen), P - Pale or discolored skin, S - Sleepy, difficult to arouse,confused, I -   I feel like I might die-a feeling of hopelessness, S - Short of breath   Nursing interventions  Nurse provided patient education   Is patient/caregiver able to teach back steps to recovery at home?  Set small, achievable goals for return to baseline health, Rest and regain strength   Is the patient/caregiver able to teach back signs and symptoms of worsening condition:  Fever   TCM call completed?  Yes   Wrap up additional comments  Says she is doing well, following her discharge instructions closely, using her O2, states she is planning her fathers , she will make f/u appts later on.          Yady Tijerina RN    2021, 11:09 EDT

## 2021-09-30 ENCOUNTER — READMISSION MANAGEMENT (OUTPATIENT)
Dept: CALL CENTER | Facility: HOSPITAL | Age: 46
End: 2021-09-30

## 2021-09-30 NOTE — OUTREACH NOTE
COVID-19 Week 1 Survey      Responses   [facility] patient discharged from?  [facility]   Does the patient have one of the following disease processes/diagnoses(primary or secondary)?  COVID-19   COVID-19 underlying condition?  Sepsis   Call Number  Call 2   Week 1 Call successful?  No   Discharge diagnosis  COVID-19 pneumonia,    ARDS secondary to COVID,   Septic shock          Tomasa Moreno RN

## 2021-10-01 ENCOUNTER — READMISSION MANAGEMENT (OUTPATIENT)
Dept: CALL CENTER | Facility: HOSPITAL | Age: 46
End: 2021-10-01

## 2021-10-01 NOTE — OUTREACH NOTE
COVID-19 Week 1 Survey      Responses   [facility] patient discharged from?  [facility]   Does the patient have one of the following disease processes/diagnoses(primary or secondary)?  COVID-19   COVID-19 underlying condition?  Sepsis   Call Number  Call 3   Week 1 Call successful?  Yes   Call start time  1302   Call end time  1305   Discharge diagnosis  COVID-19 pneumonia,    ARDS secondary to COVID,   Septic shock   Meds reviewed with patient/caregiver?  Yes   Is the patient having any side effects they believe may be caused by any medication additions or changes?  No   Does the patient have all medications ordered at discharge?  Yes   Is the patient taking all medications as directed (includes completed medication regime)?  Yes   Does the patient have a primary care provider?   Yes   Comments regarding PCP  Patient has a followup with PCP on 10/5/2021   Does the patient have an appointment with their PCP or specialist within 7 days of discharge?  Yes   Has the patient kept scheduled appointments due by today?  N/A   Has home health visited the patient within 72 hours of discharge?  N/A   What DME was ordered?  home oxygen and BSC -  [facility]    Has all DME been delivered?  Yes   DME comments  2L of O2 at all times   Psychosocial issues?  No   Did the patient receive a copy of their discharge instructions?  Yes   Did the patient receive a copy of COVID-19 specific instructions?  Yes   Nursing interventions  Reviewed instructions with patient   What is the patient's perception of their health status since discharge?  Improving   Does the patient have any of the following symptoms?  Shortness of breath   Nursing Interventions  Nurse provided patient education   Pulse Ox monitoring  Intermittent   Pulse Ox device source  Hospital   O2 Sat comments  Sats are in 90's with 02 on    O2 Sat: education provided  Sat levels, Monitoring frequency, When to seek care   Is the patient/caregiver able to teach back steps to  recovery at home?  Set small, achievable goals for return to baseline health, Rest and rebuild strength, gradually increase activity, Make a list of questions for provider's appointment   If the patient is a current smoker, are they able to teach back resources for cessation?  Not a smoker   Is the patient/caregiver able to teach back the hierarchy of who to call/visit for symptoms/problems? PCP, Specialist, Home health nurse, Urgent Care, ED, 911  Yes   Is the patient/caregiver able to teach back Sepsis?  S - Shivering,fever or very cold, E - Extreme pain or generalized discomfort (worst ever,especially abdomen), P - Pale or discolored skin, S - Sleepy, difficult to arouse,confused, I -   I feel like I might die-a feeling of hopelessness, S - Short of breath   Nursing interventions  Nurse provided patient education   Is patient/caregiver able to teach back steps to recovery at home?  Set small, achievable goals for return to baseline health, Rest and regain strength   Is the patient/caregiver able to teach back signs and symptoms of worsening condition:  Fever   COVID-19 call completed?  Yes   Wrap up additional comments  Says she is doing well, following her discharge instructions closely, using her O2, states she is planning her fathers , she will make f/u appts later on.          Craig Carrasco RN

## 2021-10-04 ENCOUNTER — READMISSION MANAGEMENT (OUTPATIENT)
Dept: CALL CENTER | Facility: HOSPITAL | Age: 46
End: 2021-10-04

## 2021-10-04 NOTE — OUTREACH NOTE
COVID-19 Week 2 Survey      Responses   [facility] patient discharged from?  [facility]   Does the patient have one of the following disease processes/diagnoses(primary or secondary)?  COVID-19   COVID-19 underlying condition?  None   Call Number  Call 1   COVID-19 Week 2: Call 1 attempt successful?  Yes   Call start time  1129   Call end time  1140   Discharge diagnosis  COVID-19 pneumonia,    ARDS secondary to COVID,   Septic shock   Is patient permission given to speak with other caregiver?  Yes   List who call center can speak with  dtr   Meds reviewed with patient/caregiver?  Yes   Is the patient taking all medications as directed (includes completed medication regime)?  Yes   Does the patient have a primary care provider?   Yes   Comments regarding PCP  Patient has a followup with PCP on 10/5/2021   Has the patient kept scheduled appointments due by today?  N/A   What DME was ordered?  home oxygen and BSC -  [facility]    DME comments  2L of O2 at all times   Psychosocial issues?  No   Comments  Using IS q2hrs. She panicks at night when lying in bed, r/t smothering/tightness in chest. She will check her sats when she is lying down to see if she is having panic or lower sats. IF lower sats she will increase O2 to 3L and plans to speak with MD at f/u appt tomorrow. She will also not lay flat but prop up on pillows or sleep in recliner. Encouraged prone or side sleeping.   What is the patient's perception of their health status since discharge?  Worsening   Does the patient have any of the following symptoms?  Shortness of breath   Nursing Interventions  Nurse provided patient education   Pulse Ox monitoring  Intermittent   Pulse Ox device source  Hospital   O2 Sat comments  88% with O2 while walking this morning. Sitting w/ O2 @2L 93%.    Is the patient/caregiver able to teach back steps to recovery at home?  Set small, achievable goals for return to baseline health, Rest and rebuild strength, gradually increase  activity   If the patient is a current smoker, are they able to teach back resources for cessation?  Not a smoker   Is the patient/caregiver able to teach back the hierarchy of who to call/visit for symptoms/problems? PCP, Specialist, Home health nurse, Urgent Care, ED, 911  Yes   COVID-19 call completed?  Yes          Alice Au RN

## 2021-10-05 ENCOUNTER — OFFICE VISIT (OUTPATIENT)
Dept: FAMILY MEDICINE CLINIC | Facility: CLINIC | Age: 46
End: 2021-10-05

## 2021-10-05 VITALS
TEMPERATURE: 96.9 F | BODY MASS INDEX: 35.92 KG/M2 | HEIGHT: 65 IN | WEIGHT: 215.6 LBS | HEART RATE: 97 BPM | OXYGEN SATURATION: 96 % | SYSTOLIC BLOOD PRESSURE: 90 MMHG | DIASTOLIC BLOOD PRESSURE: 54 MMHG

## 2021-10-05 DIAGNOSIS — F33.0 MILD EPISODE OF RECURRENT MAJOR DEPRESSIVE DISORDER (HCC): ICD-10-CM

## 2021-10-05 DIAGNOSIS — J12.82 PNEUMONIA DUE TO COVID-19 VIRUS: ICD-10-CM

## 2021-10-05 DIAGNOSIS — R06.02 SHORTNESS OF BREATH: Primary | ICD-10-CM

## 2021-10-05 DIAGNOSIS — U07.1 PNEUMONIA DUE TO COVID-19 VIRUS: ICD-10-CM

## 2021-10-05 DIAGNOSIS — J18.9 MULTIFOCAL PNEUMONIA: ICD-10-CM

## 2021-10-05 PROBLEM — K21.9 ESOPHAGEAL REFLUX: Status: ACTIVE | Noted: 2021-10-05

## 2021-10-05 PROCEDURE — 99496 TRANSJ CARE MGMT HIGH F2F 7D: CPT | Performed by: NURSE PRACTITIONER

## 2021-10-05 RX ORDER — TRAZODONE HYDROCHLORIDE 100 MG/1
100 TABLET ORAL NIGHTLY
COMMUNITY
End: 2021-10-13 | Stop reason: SDUPTHER

## 2021-10-05 RX ORDER — ROSUVASTATIN CALCIUM 20 MG/1
40 TABLET, COATED ORAL DAILY
COMMUNITY
End: 2021-10-13 | Stop reason: SDUPTHER

## 2021-10-05 RX ORDER — OMEPRAZOLE 20 MG/1
20 CAPSULE, DELAYED RELEASE ORAL DAILY
COMMUNITY
End: 2021-10-13 | Stop reason: SDUPTHER

## 2021-10-05 NOTE — PROGRESS NOTES
Transitional Care Follow Up Visit  Subjective     Estela Deras is a 46 y.o. female who presents for a transitional care management visit.    Within 48 business hours after discharge our office contacted her via telephone to coordinate her care and needs.      I reviewed and discussed the details of that call along with the discharge summary, hospital problems, inpatient lab results, inpatient diagnostic studies, and consultation reports with Estela.     Current outpatient and discharge medications have been reconciled for the patient.  Reviewed by: HAILEY Doan      Date of TCM Phone Call 9/28/2021   [facility]   Date of Admission 9/19/2021   Date of Discharge 9/28/2021   Discharge Disposition Home or Self Care     Risk for Readmission (LACE) Score: 12 (9/28/2021  6:01 AM)      History of Present Illness   Course During Hospital Stay:  She started about 4-5 days prior to 9/13.  She was tested for covid and flu with urgent care and she was neg.  She felt fine for several days.  Then it hit her she couldn't breath.  She went to the ER on 9/17 and they d/c and then she went back on 9/19 and stayed until 9/28.  She is currently on 2 l per NC.  She has not been increasing the O2 but she can tell that she get's SOA.  She has not started back on toprol and her heart rate is elevated.  She doesn't know if her O2 is low because of her heart beat is elevated.  She is midodrine but her BP is staying low.  She does have the O2 monitor.  She did check it laying down 93% 2L. She is staying with her mom right now.  They did lose her dad to covid at the same time she was in the hospital.  They didn't send her home with neb.  She was sent home with albuterol.  She feels that her back is hurting for 2 days.  She doesn't know if it is her lungs or what.  She has filed for a leave of absence.  She can't work due to O2.  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       The following portions of the patient's  history were reviewed and updated as appropriate: allergies, current medications, past family history, past medical history, past social history, past surgical history and problem list.    Review of Systems    Objective   Physical Exam  Vitals reviewed.   Constitutional:       Appearance: Normal appearance. She is well-developed.   Cardiovascular:      Rate and Rhythm: Normal rate and regular rhythm.      Heart sounds: Normal heart sounds. No murmur heard.     Pulmonary:      Effort: Pulmonary effort is normal. No respiratory distress.      Breath sounds: Normal breath sounds. No rhonchi.   Neurological:      Mental Status: She is alert and oriented to person, place, and time.      Cranial Nerves: No cranial nerve deficit.      Motor: No weakness.   Psychiatric:         Mood and Affect: Mood and affect normal.     She is on 2 L of NC in office.    Assessment/Plan   Problems Addressed this Visit        Mental Health    Mild episode of recurrent major depressive disorder (HCC)    Relevant Medications    traZODone (DESYREL) 100 MG tablet       Pulmonary and Pneumonias    Multifocal pneumonia       Other    Pneumonia due to COVID-19 virus      Other Visit Diagnoses     Shortness of breath    -  Primary      Diagnoses       Codes Comments    Shortness of breath    -  Primary ICD-10-CM: R06.02  ICD-9-CM: 786.05     Multifocal pneumonia     ICD-10-CM: J18.9  ICD-9-CM: 486     Pneumonia due to COVID-19 virus     ICD-10-CM: U07.1, J12.82  ICD-9-CM: 480.8, 079.89     Mild episode of recurrent major depressive disorder (HCC)     ICD-10-CM: F33.0  ICD-9-CM: 296.31         We will cont to stay on 2L while she is sitting but if she is up walking and becomes increase SOA then increase to no more than 5-6 L.  As the weeks improve she will wean as she is sitting down to 1 L vs no O2.  Expect to be on O2 for at least 8 weeks.  We will f.u in 2 weeks to see how she is doing and we will CT scan in 8-12 weeks.  Discussed about  depression/mood and how covid can affect it.  She is also being followed by Pulm and will see them in 2-4 weeks.  We iwll check in 2 weeks cmp, cbc and a1c  Discussed about blood clots and she will keep me posted.  Call with any concerns or questions.  She will watch diet.  She is aware of her sugar.  I reviewed her labs, medications and cxr.        Nannette Braun, APRN

## 2021-10-09 NOTE — PROGRESS NOTES
Primary Care Provider  Nannette Braun APRN     Referring Provider  No ref. provider found     Chief Complaint  Asthma, Wheezing, and Shortness of Breath    Subjective          History of Presenting Illness  Patient is a 46-year-old female who was recently hospitalized at [facility] from 9/19/2021 to 9/28/2021 for COVID-19 pneumonia.  Patient is here for follow-up visit today.  Patient's mother is also present with the patient in the office today.  Patient started experiencing symptoms 4 to 5 days prior to admission to the hospital.  Patient was tested for Covid and flu and urgent care and she was negative.  Patient has felt better for several days.  Then all of a sudden she became more short of breath.  Patient went to the ER on September 19, 2021 with worsening shortness of breath.  Patient was found to be hypoxic requiring 3 L of oxygen per minute via nasal cannula and she was admitted for further care.  Patient was started on remdesivir, steroids, breathing treatments, and supportive care.  Initially patient required up to 6 L of oxygen per minute via nasal cannula.  Patient had some worsening hypoxia on the evening of September 21, 2021 requiring air Vo next out.  Pulmonary was consulted and patient was given Actemra on September 22, 2021.  Patient's oxygen requirement improved.  Patient was eventually able to wean down to 2 L of oxygen per minute via nasal cannula.  Patient was discharged home in stable condition.  Patient states that since hospital stay she is still short of breath.  Patient states that she does get short of breath that is worse with exertion, moderate severity, and improved with rest.  Patient states at times she has a slight wheeze.  Patient states she also feels fatigued.  Patient states that she works at [employer] and does not feel that she is ready to go back to work due to her fatigue and shortness of breath.  Patient states that she is on oxygen at 2 L/min via nasal  cannula. Patient like to have a portable oxygen concentrator as her current oxygen tanks are too heavy to carry and this will help her to be more mobile and attend doctor's appointments and run errands easier. Patient states that she is taking Flovent every day as prescribed.  Patient would like to have a nebulizer machine at home to be able to do breathing treatments. Patient denies fever, chills, night sweats, swollen glands in the head and neck, unintentional weight loss, hemoptysis, purulent sputum production, dysphagia, chest pain, palpitations, abdominal pain, nausea, vomiting, and diarrhea.  Patient also denies any myalgias, changes in sense of taste and/or smell, sore throat, any other coronavirus or flu-like symptoms.  Patient denies any leg swelling, orthopnea, paroxysmal nocturnal dyspnea.  Patient is able to perform activities of daily living with oxygen in place.    Review of Systems   Constitutional: Positive for fatigue. Negative for activity change, appetite change, chills, diaphoresis, fever, unexpected weight gain and unexpected weight loss.        Negative for Insomnia   HENT: Negative for congestion (Nasal), mouth sores, nosebleeds, postnasal drip, sore throat, swollen glands and trouble swallowing.         Negative for Thrush  Negative for Hoarseness  Negative for Allergies/Hay Fever  Negative for Recent Head injury  Negative for Ear Fullness  Negative for Nasal or Sinus pain  Negative for Dry lips  Negative for Nasal discharge   Respiratory: Positive for shortness of breath and wheezing. Negative for apnea, cough and chest tightness.         Negative for Hemoptysis  Negative for Pleuritic pain   Cardiovascular: Negative for chest pain, palpitations and leg swelling.        Negative for Claudication  Negative for Cyanosis  Negative for Dyspnea on exertion   Gastrointestinal: Negative for abdominal pain, diarrhea, nausea, vomiting and GERD.   Musculoskeletal: Negative for joint swelling and  myalgias.        Negative for Joint pain  Negative for Joint stiffness   Skin: Negative for color change, dry skin, pallor and rash.   Neurological: Negative for syncope, weakness and headache.   Hematological: Negative for adenopathy. Does not bruise/bleed easily.        Family History   Problem Relation Age of Onset   • Arthritis Mother    • Osteoporosis Mother    • Other Father         RENAL CALCULUS   • Diabetes Paternal Grandfather    • Lung cancer Other    • Diabetes type II Other    • Hyperlipidemia Other    • Heart disease Other         ISCHEMIC   • Cancer Other    • Hypertension Other    • Arthritis Paternal Uncle         Social History     Socioeconomic History   • Marital status:    Tobacco Use   • Smoking status: Never Smoker   • Smokeless tobacco: Never Used   Vaping Use   • Vaping Use: Never used   Substance and Sexual Activity   • Alcohol use: Never   • Drug use: Never   • Sexual activity: Defer        Past Medical History:   Diagnosis Date   • Abnormal electrocardiogram    • Allergic rhinitis    • Anemia    • Arthritis    • Asthma    • COVID-19 09/2021   • Depression    • Dysfunctional uterine bleeding    • Fatty liver    • Foot pain    • Gastroesophageal reflux disease without esophagitis    • Generalized anxiety disorder    • H/O cold sores    • Hyperlipidemia    • Hypertriglyceridemia    • Hypothyroidism    • Impaired fasting glucose    • Insomnia, unspecified    • Liver function study, abnormal    • Low back pain    • Macromastia    • Migraine headache    • Mild episode of recurrent major depressive disorder (HCC)    • Mitral valve prolapse    • Night sweats    • Obesity    • Sinus trouble    • Thyroid disorder    • Vitamin D deficiency         Immunization History   Administered Date(s) Administered   • FluLaval/Fluarix/Fluzone >6 10/13/2021   • Hepatitis A 05/15/2018, 11/05/2018   • Influenza, Unspecified 02/22/2019   • MMR 02/22/2019   • TD Preservative Free 11/05/2018   • Tdap  11/06/2018       Allergies   Allergen Reactions   • Norco [Hydrocodone-Acetaminophen] Headache   • Chlorhexidine Rash   • Adhesive Tape Other (See Comments)     BURNS SKIN   • Cetirizine Itching   • Codeine Headache and Other (See Comments)     MIGRANES   • Meperidine Headache and Other (See Comments)     MIGRANES   • Propoxyphene Other (See Comments)     MIGRANES   • Sesame Oil Nausea And Vomiting   • Acetaminophen Palpitations          Current Outpatient Medications:   •  ALPRAZolam (XANAX) 0.5 MG tablet, , Disp: , Rfl:   •  amphetamine-dextroamphetamine XR (ADDERALL XR) 20 MG 24 hr capsule, Take 20 mg by mouth Daily  , Disp: , Rfl:   •  atomoxetine (STRATTERA) 40 MG capsule, Take 40 mg by mouth Every Morning., Disp: , Rfl:   •  cyanocobalamin (VITAMIN B-12) 1000 MCG tablet, Take 1 tablet by mouth Daily., Disp: 180 tablet, Rfl: 1  •  cyproheptadine (PERIACTIN) 4 MG tablet, Take 2 tablets by mouth Every Night., Disp: 180 tablet, Rfl: 1  •  DULoxetine (CYMBALTA) 60 MG capsule, Take 60 mg by mouth Every Morning., Disp: , Rfl:   •  ezetimibe (ZETIA) 10 MG tablet, Take 1 tablet by mouth Daily. (Patient taking differently: Take 10 mg by mouth Every Night.), Disp: 90 tablet, Rfl: 1  •  fluticasone (Flovent HFA) 220 MCG/ACT inhaler, Inhale 1 puff 2 (Two) Times a Day for 30 days. Rinse mouth out after each use, Disp: 1 each, Rfl: 11  •  lamoTRIgine (LaMICtal) 100 MG tablet, Take 100 mg by mouth Every Night., Disp: , Rfl:   •  levothyroxine (Synthroid) 25 MCG tablet, Take 1 tablet by mouth Daily., Disp: 90 tablet, Rfl: 1  •  loratadine (Claritin) 10 MG tablet, Take 10 mg by mouth Daily., Disp: , Rfl:   •  midodrine (PROAMATINE) 5 MG tablet, Take 1 tablet by mouth 3 (Three) Times a Day Before Meals., Disp: 90 tablet, Rfl: 0  •  omeprazole (priLOSEC) 20 MG capsule, Take 1 capsule by mouth Daily. (Patient taking differently: Take 20 mg by mouth Every Morning.), Disp: 90 capsule, Rfl: 1  •  predniSONE (DELTASONE) 20 MG tablet,  Take 3 tablets by mouth Daily for 4 days, THEN 2 tablets Daily for 4 days, THEN 1 tablet Daily for 4 days, THEN 0.5 tablets Daily for 4 days., Disp: 36 tablet, Rfl: 0  •  rosuvastatin (CRESTOR) 40 MG tablet, Take 1 tablet by mouth Every Night., Disp: 90 tablet, Rfl: 1  •  valACYclovir (VALTREX) 500 MG tablet, Take 1 tablet by mouth Daily. (Patient taking differently: Take 500 mg by mouth Every Morning.), Disp: 90 tablet, Rfl: 1  •  albuterol (PROVENTIL) (2.5 MG/3ML) 0.083% nebulizer solution, Take 2.5 mg by nebulization 4 (Four) Times a Day As Needed for Wheezing for up to 30 days., Disp: 120 each, Rfl: 5  •  albuterol sulfate  (90 Base) MCG/ACT inhaler, Inhale 1-2 puffs Every 4 (Four) Hours As Needed for Wheezing or Shortness of Air for up to 30 days., Disp: 18 g, Rfl: 11  •  benzonatate (TESSALON) 100 MG capsule, Take 1 capsule by mouth 3 (Three) Times a Day As Needed for Cough., Disp: 30 capsule, Rfl: 0  •  fluconazole (Diflucan) 150 MG tablet, Take 1 tablet by mouth 1 (One) Time As Needed (yeast infection) for up to 1 dose., Disp: 1 tablet, Rfl: 0  •  metoprolol succinate XL (TOPROL-XL) 50 MG 24 hr tablet, Take 1 tablet by mouth Daily. (Patient taking differently: Take 50 mg by mouth Every Night.), Disp: 90 tablet, Rfl: 1  •  traZODone (DESYREL) 100 MG tablet, Take 1 tablet by mouth Every Night., Disp: 90 tablet, Rfl: 1     Objective     Physical Exam  Vital Signs Reviewed  WDWN, Alert, NAD.    HEENT:  PERRL, EOMI.  OP, nares clear, no sinus tenderness  Neck:  Supple, no JVD, no thyromegaly.  Lymph: no axillary, cervical, supraclavicular lymphadenopathy noted bilaterally  Chest:  good aeration, clear to auscultation bilaterally, tympanic to percussion bilaterally, no work of breathing noted  CV: RRR, no MGR, pulses 2+, equal.  Abd:  Soft, NT, ND, + BS, no HSM  EXT:  no clubbing, no cyanosis, no edema, no joint tenderness  Neuro:  A&Ox3, CN grossly intact, no focal deficits.  Skin: No rashes or lesions  "noted.    Vital Signs:   BP 99/78 (BP Location: Left arm, Patient Position: Sitting)   Pulse 75   Temp 97.9 °F (36.6 °C) (Infrared)   Resp 17   Ht 165.1 cm (65\")   Wt 101 kg (222 lb)   SpO2 100% Comment: nasal canula 2 liters  BMI 36.94 kg/m²         Result Review :   I have personally reviewed imaging study reports from recent hospital stay. See scanned reports.         Assessment and Plan      Assessment:  1. Acute hypoxic respiratory failure requiring Airvo  2. COVID-19 infection  3. Viral pneumonia  4. ARDS secondary to COVID  5. Obesity  6.  Sepsis due to viral pneumonia.  7. Altered mental status, toxic metabolic encephalopathy.  8. GERD.  9. Asthma.  10. Never smoker.    Plan:  1. Patient continue oxygen to keep SPO2 89% above.  Patient like to have a portable oxygen concentrator as her current oxygen tanks are too heavy to carry and this will help her to be more mobile and attend doctor's appointments and run errands easier.  I will notify our clinical coordinator to see if we can set patient up with a portable oxygen concentrator.  2.  Patient to continue albuterol inhaler as needed.  Albuterol nebulizer treatments sent to the pharmacy for patient to use at home as needed.  Nebulizer machine given to patient in the office today.  3.  We will order chest CT scan to ensure resolution of pneumonia.  4. Will order PFT and six-minute walk test.  Will order echocardiogram.  5.  Flu vaccine given to patient in the office today.  Patient is advised receive the COVID-19 vaccine 90 days after diagnosis.  Patient is advised to continue to follow CDC recommendations such as social distancing, wearing a mask, and washing hands for least 20 seconds.  6.  Patient to call the office, 911, or go to the ER with new or worsening symptoms.  7.  Work note given to patient in the office today to have off work until follow-up appointment.  8.  Follow-up with Dr. Fleming in 4 weeks, sooner if needed.          Follow Up "   Return for 4 weeks with Dr. Fleming or MD.  Patient was given instructions and counseling regarding her condition or for health maintenance advice. Please see specific information pulled into the AVS if appropriate.

## 2021-10-09 NOTE — PATIENT INSTRUCTIONS
Things to Know about the COVID-19 Pandemic  Things to Know about the COVID-19 Pandemic  Important Ways to Slow the Spread  · Wear a mask that covers your nose and mouth to help protect yourself and others.  · Stay 6 feet apart from others who don't live with you.  · Get a COVID-19 vaccine when it is available to you.  · Avoid crowds and poorly ventilated indoor spaces.  · Wash your hands often with soap and water. Use hand  if soap and water aren't available.  If you are at risk of getting very sick  · People of any age, even healthy young adults and children, can get COVID-19.  · People who are older or have certain underlying medical conditions are at higher risk of getting very sick from COVID-19.  · Other groups may be at higher risk for getting COVID-19 or having more severe illness.  Getting a COVID-19 Vaccine  · Authorized COVID-19 vaccines can help protect you from COVID-19.  · You should get a COVID-19 vaccine when it is available to you.  · Once you are fully vaccinated, you may be able to start doing some things that you had stopped doing because of the pandemic.  What to do if you're sick  · Stay home except to get medical care. If you have symptoms of COVID-19, contact your healthcare provider and get tested.  · Isolate yourself from others, including those living in your household, to prevent spread to them and the people that they may have contact with, like grandparents.  · Call 911 if you are having emergency warning signs, like trouble breathing, pain or pressure in chest.  How to get a test for current infection  · Visit your state, [place], local, and territorial health department'swebsite to look for the latest local information on testing.  · Talk to your healthcare provider about getting tested. You and your healthcare provider might consider either in-person testing, an at-home collection kit, or an at-home test.  · If you have symptoms of COVID-19, or if you have not been vaccinated  and have been in close contact with someone with COVID-19, it is still important to stay home even if you are not tested.  What symptoms to watch for  The most common symptoms of COVID-19 are  · Fever  · Cough  · Headaches  · Fatigue  · Muscle or body aches  · Loss of taste or smell  · Sore throat  · Nausea  · Diarrhea  Other symptoms are signs of serious illness. If someone has trouble breathing, chest pain or pressure, or difficulty staying awake, get medical care immediately.  I wear a mask because...  CDC staff give their reasons for wearing a mask.  Wear a mask because  Content source: National Center for Immunization and Respiratory Diseases (NCIRD), Division of Viral Diseases  03/17/2021  This information is not intended to replace advice given to you by your health care provider. Make sure you discuss any questions you have with your health care provider.  Document Revised: 04/19/2021 Document Reviewed: 04/19/2021  Elsevier Patient Education © 2021 [employer]

## 2021-10-11 ENCOUNTER — READMISSION MANAGEMENT (OUTPATIENT)
Dept: CALL CENTER | Facility: HOSPITAL | Age: 46
End: 2021-10-11

## 2021-10-11 NOTE — OUTREACH NOTE
COVID-19 Week 3 Survey      Responses   [facility] patient discharged from? [facility]   Does the patient have one of the following disease processes/diagnoses(primary or secondary)? COVID-19   COVID-19 underlying condition? None   Call Number Call 1   COVID-19 Week 3: Call 1 attempt successful? No          Sanaz Wayne RN

## 2021-10-13 ENCOUNTER — OFFICE VISIT (OUTPATIENT)
Dept: PULMONOLOGY | Facility: CLINIC | Age: 46
End: 2021-10-13

## 2021-10-13 VITALS
HEIGHT: 65 IN | BODY MASS INDEX: 36.99 KG/M2 | DIASTOLIC BLOOD PRESSURE: 78 MMHG | SYSTOLIC BLOOD PRESSURE: 99 MMHG | TEMPERATURE: 97.9 F | HEART RATE: 75 BPM | RESPIRATION RATE: 17 BRPM | WEIGHT: 222 LBS | OXYGEN SATURATION: 100 %

## 2021-10-13 DIAGNOSIS — R06.00 DYSPNEA, UNSPECIFIED TYPE: ICD-10-CM

## 2021-10-13 DIAGNOSIS — U07.1 PNEUMONIA DUE TO COVID-19 VIRUS: ICD-10-CM

## 2021-10-13 DIAGNOSIS — J96.01 ACUTE RESPIRATORY FAILURE WITH HYPOXIA (HCC): ICD-10-CM

## 2021-10-13 DIAGNOSIS — J18.9 MULTIFOCAL PNEUMONIA: Primary | ICD-10-CM

## 2021-10-13 DIAGNOSIS — J12.82 PNEUMONIA DUE TO COVID-19 VIRUS: ICD-10-CM

## 2021-10-13 DIAGNOSIS — J45.909 ASTHMA, UNSPECIFIED ASTHMA SEVERITY, UNSPECIFIED WHETHER COMPLICATED, UNSPECIFIED WHETHER PERSISTENT: ICD-10-CM

## 2021-10-13 DIAGNOSIS — K21.9 GASTROESOPHAGEAL REFLUX DISEASE, UNSPECIFIED WHETHER ESOPHAGITIS PRESENT: ICD-10-CM

## 2021-10-13 PROCEDURE — 99214 OFFICE O/P EST MOD 30 MIN: CPT | Performed by: NURSE PRACTITIONER

## 2021-10-13 PROCEDURE — 90471 IMMUNIZATION ADMIN: CPT | Performed by: NURSE PRACTITIONER

## 2021-10-13 PROCEDURE — 90686 IIV4 VACC NO PRSV 0.5 ML IM: CPT | Performed by: NURSE PRACTITIONER

## 2021-10-13 RX ORDER — ALBUTEROL SULFATE 90 UG/1
1-2 AEROSOL, METERED RESPIRATORY (INHALATION) EVERY 4 HOURS PRN
Qty: 18 G | Refills: 11 | Status: SHIPPED | OUTPATIENT
Start: 2021-10-13 | End: 2022-11-07

## 2021-10-13 RX ORDER — FLUTICASONE PROPIONATE 220 UG/1
1 AEROSOL, METERED RESPIRATORY (INHALATION)
Qty: 1 EACH | Refills: 11 | Status: SHIPPED | OUTPATIENT
Start: 2021-10-13 | End: 2021-11-11

## 2021-10-13 RX ORDER — ALBUTEROL SULFATE 2.5 MG/3ML
2.5 SOLUTION RESPIRATORY (INHALATION) 4 TIMES DAILY PRN
Qty: 120 EACH | Refills: 5 | Status: SHIPPED | OUTPATIENT
Start: 2021-10-13 | End: 2022-11-07

## 2021-10-13 RX ORDER — DOXYCYCLINE HYCLATE 100 MG
TABLET ORAL
COMMUNITY
Start: 2021-10-02 | End: 2021-10-13

## 2021-10-13 RX ORDER — ALPRAZOLAM 0.5 MG/1
0.5 TABLET ORAL AS NEEDED
COMMUNITY
Start: 2021-09-29

## 2021-10-15 NOTE — DISCHARGE SUMMARY
[facility]         HOSPITALIST  DISCHARGE SUMMARY    Patient Name: Estela Deras  : 1975  MRN: [id number]    Date of Admission: 2021  Date of Discharge:  2021  Primary Care Physician: Nannette Braun APRN    Consults     Date and Time Order Name Status Description    2021  8:12 AM Inpatient Pulmonology Consult Completed     2021  5:14 AM Hospitalist (on-call MD unless specified) Completed           Active and Resolved Hospital Problems:  Active Hospital Problems    Diagnosis POA   • Pneumonia due to COVID-19 virus [U07.1, J12.82] Yes   • Multifocal pneumonia [J18.9] Yes   • Allergic rhinitis [J30.9] Yes   • Deficiency anemia [D53.9] Yes   • Arthritis [M19.90] Yes   • Gastroesophageal reflux disease without esophagitis [K21.9] Yes   • Hypothyroidism [E03.9] Yes   • Generalized anxiety disorder [F41.1] Yes   • Abnormal results of liver function studies [R94.5] Yes      Resolved Hospital Problems   No resolved problems to display.       Hospital Course     Hospital Course:  46-year-old unvaccinated female who presented to the ED on  due to complaints of shortness of breath.  She tested positive for Covid prior to admission.  Found to be hypoxic requiring 3 L nasal cannula and was admitted for further care.  She was started on remdesivir, steroids, breathing treatments and supportive care.  Initially required up to 6 L nasal cannula.  Had worsening hypoxia on the evening of , required air Vo maxed out.  Pulmonology consulted given Actemra on .  O2 requirement improving.  She improved and was able to go home.    DISCHARGE Follow Up Recommendations for labs and diagnostics: f/u with pcp      Day of Discharge     Vital Signs:     Physical Exam: see PN from 21      Discharge Details        Discharge Medications      New Medications      Instructions Start Date   benzonatate 100 MG capsule  Commonly known as: TESSALON   100 mg, Oral, 3 Times Daily PRN       fluconazole 150 MG tablet  Commonly known as: Diflucan   150 mg, Oral, Once As Needed      midodrine 5 MG tablet  Commonly known as: PROAMATINE   5 mg, Oral, 3 Times Daily Before Meals         Changes to Medications      Instructions Start Date   ezetimibe 10 MG tablet  Commonly known as: ZETIA  What changed: when to take this   10 mg, Oral, Daily      metoprolol succinate XL 50 MG 24 hr tablet  Commonly known as: TOPROL-XL  What changed: when to take this   50 mg, Oral, Daily      omeprazole 20 MG capsule  Commonly known as: priLOSEC  What changed: when to take this   20 mg, Oral, Daily      valACYclovir 500 MG tablet  Commonly known as: VALTREX  What changed: when to take this   500 mg, Oral, Daily         Continue These Medications      Instructions Start Date   amphetamine-dextroamphetamine XR 20 MG 24 hr capsule  Commonly known as: ADDERALL XR   20 mg, Oral, Daily      atomoxetine 40 MG capsule  Commonly known as: STRATTERA   40 mg, Oral, Every Morning      Claritin 10 MG tablet  Generic drug: loratadine   10 mg, Oral, Daily      cyanocobalamin 1000 MCG tablet  Commonly known as: VITAMIN B-12   1,000 mcg, Oral, Daily      cyproheptadine 4 MG tablet  Commonly known as: PERIACTIN   8 mg, Oral, Nightly      DULoxetine 60 MG capsule  Commonly known as: CYMBALTA   60 mg, Oral, Every Morning      lamoTRIgine 100 MG tablet  Commonly known as: LaMICtal   100 mg, Oral, Nightly      levothyroxine 25 MCG tablet  Commonly known as: Synthroid   25 mcg, Oral, Daily      rosuvastatin 40 MG tablet  Commonly known as: CRESTOR   40 mg, Oral, Nightly      traZODone 100 MG tablet  Commonly known as: DESYREL   100 mg, Oral, Nightly         Stop These Medications    azithromycin 250 MG tablet  Commonly known as: Zithromax Z-Valdo     dexamethasone 4 MG tablet  Commonly known as: DECADRON        ASK your doctor about these medications      Instructions Start Date   cefdinir 300 MG capsule  Commonly known as: OMNICEF  Ask about:  Should I take this medication?   300 mg, Oral, 2 Times Daily      predniSONE 20 MG tablet  Commonly known as: DELTASONE  Ask about: Should I take this medication?   Take 3 tablets by mouth Daily for 4 days, THEN 2 tablets Daily for 4 days, THEN 1 tablet Daily for 4 days, THEN 0.5 tablets Daily for 4 days.   Start Date: September 28, 2021            Allergies   Allergen Reactions   • Norco [Hydrocodone-Acetaminophen] Headache   • Chlorhexidine Rash   • Adhesive Tape Other (See Comments)     BURNS SKIN   • Cetirizine Itching   • Codeine Headache and Other (See Comments)     MIGRANES   • Meperidine Headache and Other (See Comments)     MIGRANES   • Propoxyphene Other (See Comments)     MIGRANES   • Sesame Oil Nausea And Vomiting   • Acetaminophen Palpitations       Discharge Disposition:  Home or Self Care    Diet:  Hospital:No active diet order      Discharge Activity:   Activity Instructions    As tolerated            CODE STATUS:  Code Status and Medical Interventions:   Ordered at: 09/19/21 0634     Level Of Support Discussed With:    Patient     Code Status:    CPR     Medical Interventions (Level of Support Prior to Arrest):    Full         Future Appointments   Date Time Provider Department Center   10/20/2021  3:45 PM Nannette Braun APRN [facility]   11/11/2021  1:30 PM Yobani Haley MD [facility]       Additional Instructions for the Follow-ups that You Need to Schedule     Discharge Follow-up with Specified Provider: Dr. Guzman or colleague; 2 Weeks   As directed      To: Dr. Guzman or colleague    Follow Up: 2 Weeks               Pertinent  and/or Most Recent Results     PROCEDURES:   none    LAB RESULTS:                              Brief Urine Lab Results  (Last result in the past 365 days)      Color   Clarity   Blood   Leuk Est   Nitrite   Protein   CREAT   Urine HCG        09/27/21 1754 Yellow   Clear   Moderate (2+)   Trace   Negative   30 mg/dL (1+)                Microbiology Results (last 10 days)     ** No results found for the last 240 hours. **                           Condition at dc: stable for dc        Time spent on Discharge including face to face service:  40 minutes    Electronically signed by Rui London MD, 10/15/21, 3:08 PM EDT.

## 2021-10-18 ENCOUNTER — READMISSION MANAGEMENT (OUTPATIENT)
Dept: CALL CENTER | Facility: HOSPITAL | Age: 46
End: 2021-10-18

## 2021-10-18 NOTE — OUTREACH NOTE
COVID-19 Week 4 Survey      Responses   [facility] patient discharged from? [facility]   Does the patient have one of the following disease processes/diagnoses(primary or secondary)? COVID-19   COVID-19 underlying condition? None   Call Number Call 1   COVID-19 Week 4: Call 1 attempt successful? Yes   Call start time 0901   Call end time 0904   Discharge diagnosis COVID-19 pneumonia,    ARDS secondary to COVID,   Septic shock   What is the patient's perception of their health status since discharge? Improving   Does the patient have any of the following symptoms? Shortness of breath   Pulse Ox monitoring Intermittent   Pulse Ox device source Hospital   O2 Sat comments 2L/NC at all times 97% this am.   Is the patient interested in additional calls from an ambulatory ?  NOTE:  applies to high risk patients requiring additional follow-up. No   Did the patient feel the follow up calls were helpful during their recovery period? Yes   Was the number of calls appropriate? Yes   Wrap up additional comments Improving.  Denies problems or questions at this time.          Carito Bravo RN

## 2021-10-20 ENCOUNTER — OFFICE VISIT (OUTPATIENT)
Dept: FAMILY MEDICINE CLINIC | Facility: CLINIC | Age: 46
End: 2021-10-20

## 2021-10-20 VITALS
TEMPERATURE: 97.5 F | WEIGHT: 222.1 LBS | SYSTOLIC BLOOD PRESSURE: 126 MMHG | BODY MASS INDEX: 37 KG/M2 | OXYGEN SATURATION: 96 % | HEIGHT: 65 IN | HEART RATE: 105 BPM | DIASTOLIC BLOOD PRESSURE: 90 MMHG

## 2021-10-20 DIAGNOSIS — M54.50 ACUTE BILATERAL LOW BACK PAIN WITHOUT SCIATICA: ICD-10-CM

## 2021-10-20 DIAGNOSIS — R33.8 ACUTE URINARY RETENTION: ICD-10-CM

## 2021-10-20 DIAGNOSIS — F32.A DEPRESSION, UNSPECIFIED DEPRESSION TYPE: ICD-10-CM

## 2021-10-20 DIAGNOSIS — R41.89 BRAIN FOG: ICD-10-CM

## 2021-10-20 DIAGNOSIS — R73.09 ELEVATED GLUCOSE: ICD-10-CM

## 2021-10-20 DIAGNOSIS — R25.1 TREMORS OF NERVOUS SYSTEM: ICD-10-CM

## 2021-10-20 DIAGNOSIS — U07.1 COVID-19: ICD-10-CM

## 2021-10-20 DIAGNOSIS — R00.0 TACHYCARDIA: ICD-10-CM

## 2021-10-20 DIAGNOSIS — R07.89 CHEST TIGHTNESS: Primary | ICD-10-CM

## 2021-10-20 LAB
BILIRUB BLD-MCNC: NEGATIVE MG/DL
CLARITY, POC: CLEAR
COLOR UR: YELLOW
EXPIRATION DATE: ABNORMAL
GLUCOSE UR STRIP-MCNC: ABNORMAL MG/DL
KETONES UR QL: ABNORMAL
LEUKOCYTE EST, POC: NEGATIVE
Lab: ABNORMAL
NITRITE UR-MCNC: NEGATIVE MG/ML
PH UR: 6 [PH] (ref 5–8)
PROT UR STRIP-MCNC: NEGATIVE MG/DL
RBC # UR STRIP: NEGATIVE /UL
SP GR UR: 1.02 (ref 1–1.03)
UROBILINOGEN UR QL: NORMAL

## 2021-10-20 PROCEDURE — 81003 URINALYSIS AUTO W/O SCOPE: CPT | Performed by: NURSE PRACTITIONER

## 2021-10-20 PROCEDURE — 87086 URINE CULTURE/COLONY COUNT: CPT | Performed by: NURSE PRACTITIONER

## 2021-10-20 PROCEDURE — 99214 OFFICE O/P EST MOD 30 MIN: CPT | Performed by: NURSE PRACTITIONER

## 2021-10-20 NOTE — PROGRESS NOTES
Chief Complaint  Follow-up    Pawel Deras presents to [facility]  History of Present Illness  She is here with her mom and daughter today.  She is still on 2 L of oxygen.  She started walking at [employer] and can only get down tomorrow and then has to stop and get short of breath and break.  She will then start up again and have to do the same thing of getting short of breath stop and then she will leave.   She has been having chest pressure tightness.  Her oxygen level has been staying in the 90s at 2 L.  She saw pulmonology and they requested me to do a referral to cardiology but they are doing an echocardiogram. She does not feel that she has chest pain at all.  She feels that if you have more chest tightness not being able to catch her breath but  feeling some, nausea likely with the cardiology. In the process she is doing a pulmonary function test, a 6-minute walk, a CT scan to look at the pneumonia.  Patient is requesting information for her work regarding date and return date--pulmonology have patient off for an additional 4 weeks and was given a note.  Discussed with patient that she should call the pulmonology in urgent care in the fall that they should need at this current time.  She is still having heart palpitations tachycardia she is taking the medication that was prescribed for her at the hospital but she is still feels that her heart is racing.  She also needs to see cardiology for this reason.  She has a have a little brain fog and headaches since having Covid.  She also has been having tremors of her hands.  She does not recall that occurring until she had Covid.  She has been trying to feed her granddaughter but she cannot get the spoon to the mouth without the food going off the spoon.  She has not been to neurology.  She has been getting the headaches daily.  She will take medication as needed.  She has been having low back pain.  She has no  what is going on but it started around the time Covid started.  She has not had any x-rays of her back.    She is worried about her bladder.  She has not had any falls recently.  She did have a cath but she feels that she will pee and then she feeling like her bladder is full all the time.  She will sit and go and then have to go again.  We discussed about depression anxiety and the possibility of posttraumatic stress due to the Covid.  She feels that she is not worth anything right now due to her having Covid.  She wants to get back to work but she is unable to do because she is on 2 L of oxygen currently.  She continues to see Tran martines but has not recently since being diagnosed with Covid and since her dad passed away of Covid.  She denies suicidal thoughts        Past Medical History:   • Abnormal electrocardiogram   • Allergic rhinitis   • Anemia   • Arthritis   • Asthma   • COVID-19   • Depression   • Dysfunctional uterine bleeding   • Fatty liver   • Foot pain   • Gastroesophageal reflux disease without esophagitis   • Generalized anxiety disorder   • H/O cold sores   • Hyperlipidemia   • Hypertriglyceridemia   • Hypothyroidism   • Impaired fasting glucose   • Insomnia, unspecified   • Liver function study, abnormal   • Low back pain   • Macromastia   • Migraine headache   • Mild episode of recurrent major depressive disorder (HCC)   • Mitral valve prolapse   • Night sweats   • Obesity   • Sinus trouble   • Thyroid disorder   • Vitamin D deficiency       Allergies  Norco [hydrocodone-acetaminophen], Chlorhexidine, Adhesive tape, Cetirizine, Codeine, Meperidine, Propoxyphene, Sesame oil, and Acetaminophen    Past Surgical History:   • ADENOIDECTOMY   • BILATERAL BREAST REDUCTION   • BREAST SURGERY   • LAPAROSCOPIC HYSTERECTOMY    DR JULIANE PARHAM;[facility]   • REDUCTION MAMMAPLASTY   • SHOULDER SURGERY   • TONSILLECTOMY   • TONSILLECTOMY       Social History     Tobacco Use   • Smoking status: Never  Smoker   • Smokeless tobacco: Never Used   Vaping Use   • Vaping Use: Never used   Substance Use Topics   • Alcohol use: Never   • Drug use: Never       Family History   Problem Relation Age of Onset   • Arthritis Mother    • Osteoporosis Mother    • Other Father         RENAL CALCULUS   • Diabetes Paternal Grandfather    • Lung cancer Other    • Diabetes type II Other    • Hyperlipidemia Other    • Heart disease Other         ISCHEMIC   • Cancer Other    • Hypertension Other    • Arthritis Paternal Uncle         Health Maintenance Due   Topic Date Due   • ANNUAL PHYSICAL  Never done   • Pneumococcal Vaccine 0-64 (1 of 2 - PPSV23) Never done   • COVID-19 Vaccine (1) Never done   • HEPATITIS C SCREENING  Never done   • LIPID PANEL  09/13/2021          Current Outpatient Medications:   •  albuterol (PROVENTIL) (2.5 MG/3ML) 0.083% nebulizer solution, Take 2.5 mg by nebulization 4 (Four) Times a Day As Needed for Wheezing for up to 30 days., Disp: 120 each, Rfl: 5  •  albuterol sulfate  (90 Base) MCG/ACT inhaler, Inhale 1-2 puffs Every 4 (Four) Hours As Needed for Wheezing or Shortness of Air for up to 30 days., Disp: 18 g, Rfl: 11  •  ALPRAZolam (XANAX) 0.5 MG tablet, , Disp: , Rfl:   •  amphetamine-dextroamphetamine XR (ADDERALL XR) 20 MG 24 hr capsule, Take 20 mg by mouth Daily  , Disp: , Rfl:   •  atomoxetine (STRATTERA) 40 MG capsule, Take 40 mg by mouth Every Morning., Disp: , Rfl:   •  benzonatate (TESSALON) 100 MG capsule, Take 1 capsule by mouth 3 (Three) Times a Day As Needed for Cough., Disp: 30 capsule, Rfl: 0  •  cyanocobalamin (VITAMIN B-12) 1000 MCG tablet, Take 1 tablet by mouth Daily., Disp: 180 tablet, Rfl: 1  •  cyproheptadine (PERIACTIN) 4 MG tablet, Take 2 tablets by mouth Every Night., Disp: 180 tablet, Rfl: 1  •  DULoxetine (CYMBALTA) 60 MG capsule, Take 60 mg by mouth Every Morning., Disp: , Rfl:   •  ezetimibe (ZETIA) 10 MG tablet, Take 1 tablet by mouth Daily. (Patient taking  differently: Take 10 mg by mouth Every Night.), Disp: 90 tablet, Rfl: 1  •  fluconazole (Diflucan) 150 MG tablet, Take 1 tablet by mouth 1 (One) Time As Needed (yeast infection) for up to 1 dose., Disp: 1 tablet, Rfl: 0  •  fluticasone (Flovent HFA) 220 MCG/ACT inhaler, Inhale 1 puff 2 (Two) Times a Day for 30 days. Rinse mouth out after each use, Disp: 1 each, Rfl: 11  •  lamoTRIgine (LaMICtal) 100 MG tablet, Take 100 mg by mouth Every Night., Disp: , Rfl:   •  levothyroxine (Synthroid) 25 MCG tablet, Take 1 tablet by mouth Daily., Disp: 90 tablet, Rfl: 1  •  loratadine (Claritin) 10 MG tablet, Take 10 mg by mouth Daily., Disp: , Rfl:   •  metoprolol succinate XL (TOPROL-XL) 50 MG 24 hr tablet, Take 1 tablet by mouth Daily. (Patient taking differently: Take 50 mg by mouth Every Night.), Disp: 90 tablet, Rfl: 1  •  midodrine (PROAMATINE) 5 MG tablet, Take 1 tablet by mouth 3 (Three) Times a Day Before Meals., Disp: 90 tablet, Rfl: 0  •  omeprazole (priLOSEC) 20 MG capsule, Take 1 capsule by mouth Daily. (Patient taking differently: Take 20 mg by mouth Every Morning.), Disp: 90 capsule, Rfl: 1  •  rosuvastatin (CRESTOR) 40 MG tablet, Take 1 tablet by mouth Every Night., Disp: 90 tablet, Rfl: 1  •  traZODone (DESYREL) 100 MG tablet, Take 1 tablet by mouth Every Night., Disp: 90 tablet, Rfl: 1  •  valACYclovir (VALTREX) 500 MG tablet, Take 1 tablet by mouth Daily. (Patient taking differently: Take 500 mg by mouth Every Morning.), Disp: 90 tablet, Rfl: 1    There are no discontinued medications.    Immunization History   Administered Date(s) Administered   • FluLaval/Fluarix/Fluzone >6 01/06/2014, 10/13/2021   • Hepatitis A 05/15/2018, 11/05/2018   • Influenza, Unspecified 02/22/2019   • MMR 02/22/2019   • TD Preservative Free 11/05/2018   • Tdap 11/06/2018       Review of Systems   Respiratory: Positive for chest tightness and shortness of breath.    Cardiovascular: Positive for palpitations.   Psychiatric/Behavioral:  Positive for depressed mood and stress. The patient is nervous/anxious.         Objective       Vitals:    10/20/21 1604   BP: 126/90   Pulse: 105   Temp: 97.5 °F (36.4 °C)   SpO2: 96%     Body mass index is 36.96 kg/m².         Physical Exam  Vitals reviewed.   Constitutional:       Appearance: Normal appearance. She is well-developed.   HENT:      Mouth/Throat:      Pharynx: No oropharyngeal exudate.   Cardiovascular:      Rate and Rhythm: Regular rhythm. Tachycardia present.      Heart sounds: Normal heart sounds. No murmur heard.      Pulmonary:      Effort: Pulmonary effort is normal.      Breath sounds: Normal breath sounds.   Neurological:      Mental Status: She is alert and oriented to person, place, and time.      Cranial Nerves: No cranial nerve deficit.      Motor: No weakness.   Psychiatric:         Mood and Affect: Mood and affect normal.             Result Review :     The following data was reviewed by: HAILEY Doan on 10/20/2021:                     Assessment and Plan      Diagnoses and all orders for this visit:    1. Chest tightness (Primary)  -     Ambulatory Referral to Cardiology    2. Tachycardia  -     Ambulatory Referral to Cardiology    3. COVID-19    4. Acute bilateral low back pain without sciatica  -     XR Spine Lumbar Complete With Flex & Ext; Future    5. Tremors of nervous system  -     Ambulatory Referral to Neurology    6. Acute urinary retention  -     POCT urinalysis dipstick, automated  -     Urine Culture - Urine, Urine, Clean Catch    7. Brain fog    8. Depression, unspecified depression type    9. Elevated glucose  -     Hemoglobin A1c; Future            Follow Up     Return in about 3 weeks (around 11/10/2021).  She will contact Tran Harris's office regarding the depression anxiety and the possibility of posttraumatic stress due to the Covid for further medication management.  We will do a referral to cardiology for the palpitations, tightness in the chest.  We  will refer to neurology for the brain fog and for the tremors.  Discussed that the brain fog is quite a bit of Covid related but I am uncertain of what is causing the tremors and I would like for her to get evaluated.  She has will use the inhaler or the nebulizer to help with the tightness of the chest.  We discussed in length with her mom and daughter present about the side effects of Covid and the possibilities of it taking 6 months to take effect.  Patient will get her blood work done fasting within the next few days there are lab work already in from September that she needs to get and I have added an A1c due to elevated sugars.  We also discussed because she has a note from pulmonology symptoms when they off for additional week to see what the CT, pulmonary function test, and the 6-minute walk.  Discussed that she cannot go back to work with 2 L of oxygen.  She will try to see how she does with 1 L while sitting on the couch but I have up moving around she will do 2 L.  All questions and concerns answered at the end of the appointment time.  HAILEY Doan    Patient was given instructions and counseling regarding her condition or for health maintenance advice. Please see specific information pulled into the AVS if appropriate.

## 2021-10-22 ENCOUNTER — HOSPITAL ENCOUNTER (OUTPATIENT)
Dept: GENERAL RADIOLOGY | Facility: HOSPITAL | Age: 46
Discharge: HOME | End: 2021-10-22

## 2021-10-22 ENCOUNTER — LAB (OUTPATIENT)
Dept: LAB | Facility: HOSPITAL | Age: 46
End: 2021-10-22

## 2021-10-22 DIAGNOSIS — M54.50 ACUTE BILATERAL LOW BACK PAIN WITHOUT SCIATICA: ICD-10-CM

## 2021-10-22 DIAGNOSIS — R73.09 ELEVATED GLUCOSE: ICD-10-CM

## 2021-10-22 LAB
25(OH)D3 SERPL-MCNC: 17.1 NG/ML (ref 30–100)
ALBUMIN SERPL-MCNC: 4.4 G/DL (ref 3.5–5.2)
ALBUMIN/GLOB SERPL: 2 G/DL
ALP SERPL-CCNC: 77 U/L (ref 39–117)
ALT SERPL W P-5'-P-CCNC: 86 U/L (ref 1–33)
ANION GAP SERPL CALCULATED.3IONS-SCNC: 8 MMOL/L (ref 5–15)
AST SERPL-CCNC: 41 U/L (ref 1–32)
BACTERIA SPEC AEROBE CULT: NO GROWTH
BASOPHILS # BLD AUTO: 0.03 10*3/MM3 (ref 0–0.2)
BASOPHILS NFR BLD AUTO: 0.2 % (ref 0–1.5)
BILIRUB SERPL-MCNC: 0.7 MG/DL (ref 0–1.2)
BUN SERPL-MCNC: 16 MG/DL (ref 6–20)
BUN/CREAT SERPL: 23.9 (ref 7–25)
CALCIUM SPEC-SCNC: 9.5 MG/DL (ref 8.6–10.5)
CHLORIDE SERPL-SCNC: 100 MMOL/L (ref 98–107)
CHOLEST SERPL-MCNC: 226 MG/DL (ref 0–200)
CO2 SERPL-SCNC: 28 MMOL/L (ref 22–29)
CREAT SERPL-MCNC: 0.67 MG/DL (ref 0.57–1)
DEPRECATED RDW RBC AUTO: 47.2 FL (ref 37–54)
EOSINOPHIL # BLD AUTO: 0.06 10*3/MM3 (ref 0–0.4)
EOSINOPHIL NFR BLD AUTO: 0.5 % (ref 0.3–6.2)
ERYTHROCYTE [DISTWIDTH] IN BLOOD BY AUTOMATED COUNT: 14.6 % (ref 12.3–15.4)
FOLATE SERPL-MCNC: 10.2 NG/ML (ref 4.78–24.2)
GFR SERPL CREATININE-BSD FRML MDRD: 96 ML/MIN/1.73
GLOBULIN UR ELPH-MCNC: 2.2 GM/DL
GLUCOSE SERPL-MCNC: 123 MG/DL (ref 65–99)
HBA1C MFR BLD: 7.45 % (ref 4.8–5.6)
HCT VFR BLD AUTO: 43.1 % (ref 34–46.6)
HDLC SERPL-MCNC: 76 MG/DL (ref 40–60)
HGB BLD-MCNC: 13.6 G/DL (ref 12–15.9)
IMM GRANULOCYTES # BLD AUTO: 0.09 10*3/MM3 (ref 0–0.05)
IMM GRANULOCYTES NFR BLD AUTO: 0.7 % (ref 0–0.5)
LDLC SERPL CALC-MCNC: 114 MG/DL (ref 0–100)
LDLC/HDLC SERPL: 1.43 {RATIO}
LYMPHOCYTES # BLD AUTO: 4.57 10*3/MM3 (ref 0.7–3.1)
LYMPHOCYTES NFR BLD AUTO: 35.9 % (ref 19.6–45.3)
MCH RBC QN AUTO: 28 PG (ref 26.6–33)
MCHC RBC AUTO-ENTMCNC: 31.6 G/DL (ref 31.5–35.7)
MCV RBC AUTO: 88.7 FL (ref 79–97)
MONOCYTES # BLD AUTO: 0.72 10*3/MM3 (ref 0.1–0.9)
MONOCYTES NFR BLD AUTO: 5.7 % (ref 5–12)
NEUTROPHILS NFR BLD AUTO: 57 % (ref 42.7–76)
NEUTROPHILS NFR BLD AUTO: 7.25 10*3/MM3 (ref 1.7–7)
NRBC BLD AUTO-RTO: 0 /100 WBC (ref 0–0.2)
PLATELET # BLD AUTO: 165 10*3/MM3 (ref 140–450)
PMV BLD AUTO: 11.9 FL (ref 6–12)
POTASSIUM SERPL-SCNC: 3.5 MMOL/L (ref 3.5–5.2)
PROT SERPL-MCNC: 6.6 G/DL (ref 6–8.5)
RBC # BLD AUTO: 4.86 10*6/MM3 (ref 3.77–5.28)
SODIUM SERPL-SCNC: 136 MMOL/L (ref 136–145)
TRIGL SERPL-MCNC: 208 MG/DL (ref 0–150)
TSH SERPL DL<=0.05 MIU/L-ACNC: 5.57 UIU/ML (ref 0.27–4.2)
VIT B12 BLD-MCNC: 776 PG/ML (ref 211–946)
VLDLC SERPL-MCNC: 36 MG/DL (ref 5–40)
WBC # BLD AUTO: 12.72 10*3/MM3 (ref 3.4–10.8)

## 2021-10-22 PROCEDURE — 83036 HEMOGLOBIN GLYCOSYLATED A1C: CPT

## 2021-10-22 PROCEDURE — 80053 COMPREHEN METABOLIC PANEL: CPT | Performed by: NURSE PRACTITIONER

## 2021-10-22 PROCEDURE — 82306 VITAMIN D 25 HYDROXY: CPT | Performed by: NURSE PRACTITIONER

## 2021-10-22 PROCEDURE — 80061 LIPID PANEL: CPT | Performed by: NURSE PRACTITIONER

## 2021-10-22 PROCEDURE — 84443 ASSAY THYROID STIM HORMONE: CPT | Performed by: NURSE PRACTITIONER

## 2021-10-22 PROCEDURE — 82607 VITAMIN B-12: CPT | Performed by: NURSE PRACTITIONER

## 2021-10-22 PROCEDURE — 82746 ASSAY OF FOLIC ACID SERUM: CPT | Performed by: NURSE PRACTITIONER

## 2021-10-22 PROCEDURE — 36415 COLL VENOUS BLD VENIPUNCTURE: CPT

## 2021-10-22 PROCEDURE — 85025 COMPLETE CBC W/AUTO DIFF WBC: CPT | Performed by: NURSE PRACTITIONER

## 2021-10-22 PROCEDURE — 72114 X-RAY EXAM L-S SPINE BENDING: CPT

## 2021-10-25 DIAGNOSIS — E11.65 TYPE 2 DIABETES MELLITUS WITH HYPERGLYCEMIA, WITHOUT LONG-TERM CURRENT USE OF INSULIN (HCC): ICD-10-CM

## 2021-10-25 DIAGNOSIS — E55.9 VITAMIN D DEFICIENCY: ICD-10-CM

## 2021-10-25 DIAGNOSIS — R79.89 ELEVATED TSH: ICD-10-CM

## 2021-10-25 DIAGNOSIS — E78.2 MIXED HYPERLIPIDEMIA: Primary | ICD-10-CM

## 2021-10-25 RX ORDER — METFORMIN HYDROCHLORIDE 500 MG/1
500 TABLET, EXTENDED RELEASE ORAL
Qty: 90 TABLET | Refills: 1 | OUTPATIENT
Start: 2021-10-25 | End: 2022-01-25

## 2021-10-25 RX ORDER — ERGOCALCIFEROL 1.25 MG/1
50000 CAPSULE ORAL WEEKLY
Qty: 5 CAPSULE | Refills: 5 | Status: SHIPPED | OUTPATIENT
Start: 2021-10-25 | End: 2022-03-03 | Stop reason: SDUPTHER

## 2021-10-25 RX ORDER — ATORVASTATIN CALCIUM 80 MG/1
80 TABLET, FILM COATED ORAL DAILY
Qty: 90 TABLET | Refills: 1 | Status: SHIPPED | OUTPATIENT
Start: 2021-10-25 | End: 2022-03-03 | Stop reason: SDUPTHER

## 2021-11-01 ENCOUNTER — TELEPHONE (OUTPATIENT)
Dept: FAMILY MEDICINE CLINIC | Facility: CLINIC | Age: 46
End: 2021-11-01

## 2021-11-01 NOTE — TELEPHONE ENCOUNTER
Caller: Estela Deras    Relationship: Self    Best call back number: [phone]    What is the best time to reach you: ANYTIME     Who are you requesting to speak with (clinical staff, provider,  specific staff member):  CLINICAL         What was the call regarding: PATIENT HAD CLINICAL QUESTIONS REGARDING , IF NEEDING TO TAKE BLOOD SUGARS     Do you require a callback: YES PLEASE ADVISE

## 2021-11-09 ENCOUNTER — HOSPITAL ENCOUNTER (OUTPATIENT)
Dept: CT IMAGING | Facility: HOSPITAL | Age: 46
Discharge: HOME | End: 2021-11-09

## 2021-11-09 ENCOUNTER — HOSPITAL ENCOUNTER (OUTPATIENT)
Dept: ULTRASOUND IMAGING | Facility: HOSPITAL | Age: 46
Discharge: HOME | End: 2021-11-09

## 2021-11-09 DIAGNOSIS — J96.01 ACUTE RESPIRATORY FAILURE WITH HYPOXIA (HCC): ICD-10-CM

## 2021-11-09 DIAGNOSIS — R79.89 ELEVATED TSH: ICD-10-CM

## 2021-11-09 DIAGNOSIS — J12.82 PNEUMONIA DUE TO COVID-19 VIRUS: ICD-10-CM

## 2021-11-09 DIAGNOSIS — U07.1 PNEUMONIA DUE TO COVID-19 VIRUS: ICD-10-CM

## 2021-11-09 DIAGNOSIS — J18.9 MULTIFOCAL PNEUMONIA: ICD-10-CM

## 2021-11-09 DIAGNOSIS — J45.909 ASTHMA, UNSPECIFIED ASTHMA SEVERITY, UNSPECIFIED WHETHER COMPLICATED, UNSPECIFIED WHETHER PERSISTENT: ICD-10-CM

## 2021-11-09 PROCEDURE — 71250 CT THORAX DX C-: CPT

## 2021-11-09 PROCEDURE — 76536 US EXAM OF HEAD AND NECK: CPT

## 2021-11-10 ENCOUNTER — OFFICE VISIT (OUTPATIENT)
Dept: FAMILY MEDICINE CLINIC | Facility: CLINIC | Age: 46
End: 2021-11-10

## 2021-11-10 VITALS
SYSTOLIC BLOOD PRESSURE: 145 MMHG | HEART RATE: 114 BPM | DIASTOLIC BLOOD PRESSURE: 86 MMHG | HEIGHT: 65 IN | WEIGHT: 229 LBS | BODY MASS INDEX: 38.15 KG/M2 | RESPIRATION RATE: 20 BRPM | TEMPERATURE: 97.5 F | OXYGEN SATURATION: 95 %

## 2021-11-10 DIAGNOSIS — E06.9 THYROIDITIS: ICD-10-CM

## 2021-11-10 DIAGNOSIS — U07.1 PNEUMONIA DUE TO COVID-19 VIRUS: Primary | ICD-10-CM

## 2021-11-10 DIAGNOSIS — J12.82 PNEUMONIA DUE TO COVID-19 VIRUS: Primary | ICD-10-CM

## 2021-11-10 DIAGNOSIS — R45.4 ANGER: ICD-10-CM

## 2021-11-10 PROCEDURE — 99214 OFFICE O/P EST MOD 30 MIN: CPT | Performed by: NURSE PRACTITIONER

## 2021-11-10 NOTE — PROGRESS NOTES
Chief Complaint  Follow-up (three week ), Shortness of Breath, and Back Pain    Subjective          Estela Deras presents to [facility]  History of Present Illness  She is breathing better.  She can't do anything without her O2--like picking up her granddaughter.  She can't do stairs without getting SOA air.  She goes to both cardio and pulm.  Her headaches are worse and tired.  She is going through the brain fog, memory and tremors.    She see's Tran on Monday.  She said her emotions are not like they should be--example--the only emotion she has is anger--it escalates up and she doesn't cry.  She doesn't care about nothing.  She gets mad and it is on for hours.  She obsesses over 1 thing...then it gets worse.  She has been off the midodrine for 4 days and her BP is elevated today.  She did some shopping and she would sit and go.  She is exhausted.  She was out of her O2 when she get her CT scan yesterday.  She didn't have enough tanks to get her through it.      Past Medical History:   • Abnormal electrocardiogram   • Allergic   • Allergic rhinitis   • Anemia   • Arthritis   • Asthma   • COVID-19   • Depression   • Dysfunctional uterine bleeding   • Fatty liver   • Foot pain   • Gastroesophageal reflux disease without esophagitis   • Generalized anxiety disorder   • H/O cold sores   • Hyperlipidemia   • Hypertriglyceridemia   • Hypothyroidism   • Impaired fasting glucose   • Insomnia, unspecified   • Liver function study, abnormal   • Low back pain   • Macromastia   • Migraine headache   • Mild episode of recurrent major depressive disorder (HCC)   • Mitral valve prolapse   • Night sweats   • Obesity   • Sinus trouble   • Thyroid disorder   • Tremor   • Vitamin D deficiency       Allergies  Norco [hydrocodone-acetaminophen], Chlorhexidine, Adhesive tape, Cetirizine, Codeine, Meperidine, Propoxyphene, Sesame oil, and Acetaminophen    Past Surgical History:   • ADENOIDECTOMY   •  BILATERAL BREAST REDUCTION   • BREAST SURGERY   • LAPAROSCOPIC HYSTERECTOMY    DR JULIANE PARHAM;[facility]   • REDUCTION MAMMAPLASTY   • SHOULDER SURGERY   • TONSILLECTOMY   • TONSILLECTOMY       Social History     Tobacco Use   • Smoking status: Never Smoker   • Smokeless tobacco: Never Used   Vaping Use   • Vaping Use: Never used   Substance Use Topics   • Alcohol use: Never   • Drug use: Never       Family History   Problem Relation Age of Onset   • Arthritis Mother    • Osteoporosis Mother    • Depression Mother    • Hyperlipidemia Mother    • Other Father         RENAL CALCULUS   • Depression Father    • Hyperlipidemia Father    • Diabetes Paternal Grandfather    • Heart disease Paternal Grandfather    • Lung cancer Other    • Diabetes type II Other    • Hyperlipidemia Other    • Heart disease Other         ISCHEMIC   • Cancer Other    • Hypertension Other    • Arthritis Paternal Uncle    • Diabetes Paternal Uncle    • Asthma Daughter    • Depression Daughter    • Cancer Maternal Grandfather    • Thyroid disease Paternal Grandmother         Health Maintenance Due   Topic Date Due   • URINE MICROALBUMIN  Never done   • ANNUAL PHYSICAL  Never done   • DIABETIC FOOT EXAM  Never done   • DIABETIC EYE EXAM  Never done          Current Outpatient Medications:   •  albuterol (PROVENTIL) (2.5 MG/3ML) 0.083% nebulizer solution, Take 2.5 mg by nebulization 4 (Four) Times a Day As Needed for Wheezing for up to 30 days., Disp: 120 each, Rfl: 5  •  albuterol sulfate  (90 Base) MCG/ACT inhaler, Inhale 1-2 puffs Every 4 (Four) Hours As Needed for Wheezing or Shortness of Air for up to 30 days., Disp: 18 g, Rfl: 11  •  ALPRAZolam (XANAX) 0.5 MG tablet, , Disp: , Rfl:   •  atomoxetine (STRATTERA) 40 MG capsule, Take 40 mg by mouth Every Morning., Disp: , Rfl:   •  atorvastatin (Lipitor) 80 MG tablet, Take 1 tablet by mouth Daily., Disp: 90 tablet, Rfl: 1  •  cyproheptadine (PERIACTIN) 4 MG tablet, Take 2 tablets  by mouth Every Night., Disp: 180 tablet, Rfl: 1  •  DULoxetine (CYMBALTA) 60 MG capsule, Take 60 mg by mouth Every Morning., Disp: , Rfl:   •  ezetimibe (ZETIA) 10 MG tablet, Take 1 tablet by mouth Daily. (Patient taking differently: Take 10 mg by mouth Every Night.), Disp: 90 tablet, Rfl: 1  •  fluticasone (Flovent HFA) 220 MCG/ACT inhaler, Inhale 1 puff 2 (Two) Times a Day for 30 days. Rinse mouth out after each use, Disp: 1 each, Rfl: 11  •  lamoTRIgine (LaMICtal) 100 MG tablet, Take 100 mg by mouth Every Night., Disp: , Rfl:   •  levothyroxine (Synthroid) 25 MCG tablet, Take 1 tablet by mouth Daily., Disp: 90 tablet, Rfl: 1  •  loratadine (Claritin) 10 MG tablet, Take 10 mg by mouth Daily., Disp: , Rfl:   •  metFORMIN ER (GLUCOPHAGE-XR) 500 MG 24 hr tablet, Take 1 tablet by mouth Daily With Breakfast., Disp: 90 tablet, Rfl: 1  •  omeprazole (priLOSEC) 20 MG capsule, Take 1 capsule by mouth Daily. (Patient taking differently: Take 20 mg by mouth Every Morning.), Disp: 90 capsule, Rfl: 1  •  traZODone (DESYREL) 100 MG tablet, Take 1 tablet by mouth Every Night., Disp: 90 tablet, Rfl: 1  •  valACYclovir (VALTREX) 500 MG tablet, Take 1 tablet by mouth Daily. (Patient taking differently: Take 500 mg by mouth Every Morning.), Disp: 90 tablet, Rfl: 1  •  vitamin D (ERGOCALCIFEROL) 1.25 MG (05416 UT) capsule capsule, Take 1 capsule by mouth 1 (One) Time Per Week., Disp: 5 capsule, Rfl: 5  •  midodrine (PROAMATINE) 5 MG tablet, Take 1 tablet by mouth 3 (Three) Times a Day Before Meals., Disp: 90 tablet, Rfl: 0    Medications Discontinued During This Encounter   Medication Reason   • amphetamine-dextroamphetamine XR (ADDERALL XR) 20 MG 24 hr capsule *Therapy completed   • benzonatate (TESSALON) 100 MG capsule *Therapy completed   • cyanocobalamin (VITAMIN B-12) 1000 MCG tablet *Therapy completed   • fluconazole (Diflucan) 150 MG tablet *Therapy completed   • metoprolol succinate XL (TOPROL-XL) 50 MG 24 hr tablet *Therapy  completed       Immunization History   Administered Date(s) Administered   • FluLaval/Fluarix/Fluzone >6 01/06/2014, 10/13/2021   • Hepatitis A 05/15/2018, 11/05/2018   • Influenza, Unspecified 02/22/2019   • MMR 02/22/2019   • TD Preservative Free 11/05/2018   • Tdap 11/06/2018       Review of Systems     Objective       Vitals:    11/10/21 1546   BP: 145/86   Pulse: 114   Resp: 20   Temp: 97.5 °F (36.4 °C)   SpO2: 95%     Body mass index is 38.11 kg/m².           Physical Exam  Vitals reviewed.   Constitutional:       Appearance: Normal appearance. She is well-developed.   Cardiovascular:      Rate and Rhythm: Normal rate and regular rhythm.      Heart sounds: Normal heart sounds. No murmur heard.      Pulmonary:      Effort: Pulmonary effort is normal.      Breath sounds: Normal breath sounds.   Neurological:      Mental Status: She is alert and oriented to person, place, and time.      Cranial Nerves: No cranial nerve deficit.      Motor: No weakness.   Psychiatric:         Mood and Affect: Mood and affect normal.             Result Review :     The following data was reviewed by: HAILEY Doan on 11/10/2021:        Radiologic studies u/s thyroid             Assessment and Plan      Diagnoses and all orders for this visit:    1. Pneumonia due to COVID-19 virus (Primary)    2. Thyroiditis  -     Ambulatory Referral to Endocrinology    3. Anger            Follow Up     Return in about 4 weeks (around 12/8/2021).  We will follow up in 4 weeks. She will bring in paperwork for work. Discussed that we do not do disability paperwork. We will refer to endocrinology regarding her thyroid ultrasound that we discussed due to thyroiditis. She will continue to keep her follow-ups with pulmonology, cardiology, and neurology. She has an appointment with Tran martines on Monday and I told her to discuss the anger issues and possible medication changes.  Patient was given instructions and counseling regarding her  condition or for health maintenance advice. Please see specific information pulled into the AVS if appropriate.   Nannette Braun, APRN

## 2021-11-11 ENCOUNTER — OFFICE VISIT (OUTPATIENT)
Dept: PULMONOLOGY | Facility: CLINIC | Age: 46
End: 2021-11-11

## 2021-11-11 ENCOUNTER — OFFICE VISIT (OUTPATIENT)
Dept: CARDIOLOGY | Facility: CLINIC | Age: 46
End: 2021-11-11

## 2021-11-11 VITALS
HEART RATE: 108 BPM | DIASTOLIC BLOOD PRESSURE: 72 MMHG | SYSTOLIC BLOOD PRESSURE: 146 MMHG | HEIGHT: 65 IN | WEIGHT: 229 LBS | BODY MASS INDEX: 38.15 KG/M2

## 2021-11-11 VITALS
BODY MASS INDEX: 38.15 KG/M2 | SYSTOLIC BLOOD PRESSURE: 126 MMHG | TEMPERATURE: 97.8 F | RESPIRATION RATE: 14 BRPM | OXYGEN SATURATION: 98 % | HEIGHT: 65 IN | HEART RATE: 113 BPM | WEIGHT: 229 LBS | DIASTOLIC BLOOD PRESSURE: 62 MMHG

## 2021-11-11 DIAGNOSIS — U09.9 POST-ACUTE COVID-19 SYNDROME: Primary | ICD-10-CM

## 2021-11-11 DIAGNOSIS — E78.2 MIXED DYSLIPIDEMIA: ICD-10-CM

## 2021-11-11 DIAGNOSIS — R05.9 COUGH: ICD-10-CM

## 2021-11-11 DIAGNOSIS — E07.9 THYROID DISORDER: ICD-10-CM

## 2021-11-11 DIAGNOSIS — R00.2 PALPITATIONS: Primary | ICD-10-CM

## 2021-11-11 DIAGNOSIS — T78.40XA ALLERGY, INITIAL ENCOUNTER: ICD-10-CM

## 2021-11-11 DIAGNOSIS — E66.09 CLASS 2 OBESITY DUE TO EXCESS CALORIES WITH BODY MASS INDEX (BMI) OF 38.0 TO 38.9 IN ADULT, UNSPECIFIED WHETHER SERIOUS COMORBIDITY PRESENT: ICD-10-CM

## 2021-11-11 DIAGNOSIS — G47.33 OSA (OBSTRUCTIVE SLEEP APNEA): ICD-10-CM

## 2021-11-11 DIAGNOSIS — E11.9 TYPE 2 DIABETES MELLITUS WITHOUT COMPLICATION, WITHOUT LONG-TERM CURRENT USE OF INSULIN (HCC): ICD-10-CM

## 2021-11-11 DIAGNOSIS — R07.89 CHEST PAIN, ATYPICAL: ICD-10-CM

## 2021-11-11 PROBLEM — E66.812 CLASS 2 OBESITY DUE TO EXCESS CALORIES WITH BODY MASS INDEX (BMI) OF 38.0 TO 38.9 IN ADULT: Status: ACTIVE | Noted: 2021-11-11

## 2021-11-11 PROCEDURE — 99204 OFFICE O/P NEW MOD 45 MIN: CPT | Performed by: INTERNAL MEDICINE

## 2021-11-11 PROCEDURE — 99213 OFFICE O/P EST LOW 20 MIN: CPT | Performed by: SPECIALIST

## 2021-11-11 RX ORDER — PREDNISONE 20 MG/1
40 TABLET ORAL DAILY
Qty: 28 TABLET | Refills: 0 | Status: SHIPPED | OUTPATIENT
Start: 2021-11-11 | End: 2021-11-25

## 2021-11-11 RX ORDER — METOPROLOL SUCCINATE 25 MG/1
25 TABLET, EXTENDED RELEASE ORAL DAILY
Qty: 90 TABLET | Refills: 3 | Status: SHIPPED | OUTPATIENT
Start: 2021-11-11 | End: 2022-06-28

## 2021-11-11 NOTE — PROGRESS NOTES
CONSULT NOTE     CHIEF COMPLAINT  Chief Complaint   Patient presents with   • Follow-up     4 week f/u   • Asthma   • Shortness of Breath   • Wheezing        Primary Care Provider  Nannette Braun APRN     Referring Provider  No ref. provider found    Patient Complaint    ICD-10-CM ICD-9-CM   1. 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 Cough  R05.9 786.2   3. Class 2 obesity due to excess calories with body mass index (BMI) of 38.0 to 38.9 in adult, unspecified whether serious comorbidity present  E66.09 278.00    Z68.38 V85.38   4. RODERICK (obstructive sleep apnea)  G47.33 327.23   5. Allergy, initial encounter  T78.40XA 995.3            Subjective          History of Presenting Illness  Estela Deras is a 46 y.o. female Was hospitalized sometime in September 19 2021 to September 28, 2021 for the Covid pneumonia.  Patient was seen by our nurse practitioner in October 13, 2021 and patient came here for the further follow-up.  Unfortunately patient continues to have the problem of chest discomfort and feeling like squeezing of the chest and shortness of breath.  Patient is on oxygen 2 L at this time.  Admits for cough and also patient has the pets in the house with the dogs and cats and sleep on the bed.  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  Patient shortness of breath on moderate exertion even though better still patient is not feeling much improved.  Intermittent episodes of the cough and difficulty bringing up the phlegm.  Denied any fever, chills, sweating, or any exposure to anybody who is sick around her.  Denied any recent travel history.  Patient admits for feeling extremely tired, snoring and waking up multiple times and also feel like taking naps almost every day.  Denied any chest pain, palpitation or any other related diaphoretic symptoms.  Other review the systems are noncontributory.   Patient is a never smoker and at present taking the albuterol and Flovent and those are not much helpful at this point.    I have personally reviewed the review of systems, past family, social, medical and surgical histories; and agree with their findings.  HISTORY    Family History   Problem Relation Age of Onset   • Arthritis Mother    • Osteoporosis Mother    • Depression Mother    • Hyperlipidemia Mother    • Other Father         RENAL CALCULUS   • Depression Father    • Hyperlipidemia Father    • Diabetes Paternal Grandfather    • Heart disease Paternal Grandfather    • Lung cancer Other    • Diabetes type II Other    • Hyperlipidemia Other    • Heart disease Other         ISCHEMIC   • Cancer Other    • Hypertension Other    • Arthritis Paternal Uncle    • Diabetes Paternal Uncle    • Asthma Daughter    • Depression Daughter    • Cancer Maternal Grandfather    • Thyroid disease Paternal Grandmother         Social History     Socioeconomic History   • Marital status:    Tobacco Use   • Smoking status: Never Smoker   • Smokeless tobacco: Never Used   Vaping Use   • Vaping Use: Never used   Substance and Sexual Activity   • Alcohol use: Never   • Drug use: Never   • Sexual activity: Not Currently     Partners: Male     Birth control/protection: Surgical        Past Medical History:   Diagnosis Date   • Abnormal electrocardiogram    • Allergic    • Allergic rhinitis    • Anemia    • Arthritis    • Asthma    • COVID-19 09/2021   • Depression    • Dysfunctional uterine bleeding    • Fatty liver    • Foot pain    • Gastroesophageal reflux disease without esophagitis    • Generalized anxiety disorder    • H/O cold sores    • Hyperlipidemia    • Hypertriglyceridemia    • Hypothyroidism    • Impaired fasting glucose    • Insomnia, unspecified    • Liver function study, abnormal    • Low back pain    • Macromastia    • Migraine headache    • Mild episode of recurrent major depressive disorder (HCC)    • Mitral  valve prolapse    • Night sweats    • Obesity    • Sinus trouble    • Thyroid disorder    • Tremor    • Vitamin D deficiency         Immunization History   Administered Date(s) Administered   • FluLaval/Fluarix/Fluzone >6 01/06/2014, 10/13/2021   • Hepatitis A 05/15/2018, 11/05/2018   • Influenza, Unspecified 02/22/2019   • MMR 02/22/2019   • TD Preservative Free 11/05/2018   • Tdap 11/06/2018       Allergies   Allergen Reactions   • Norco [Hydrocodone-Acetaminophen] Headache   • Chlorhexidine Rash   • Adhesive Tape Other (See Comments)     BURNS SKIN   • Cetirizine Itching   • Codeine Headache and Other (See Comments)     MIGRANES   • Meperidine Headache and Other (See Comments)     MIGRANES   • Propoxyphene Other (See Comments)     MIGRANES   • Sesame Oil Nausea And Vomiting   • Acetaminophen Palpitations          Current Outpatient Medications:   •  albuterol (PROVENTIL) (2.5 MG/3ML) 0.083% nebulizer solution, Take 2.5 mg by nebulization 4 (Four) Times a Day As Needed for Wheezing for up to 30 days., Disp: 120 each, Rfl: 5  •  albuterol sulfate  (90 Base) MCG/ACT inhaler, Inhale 1-2 puffs Every 4 (Four) Hours As Needed for Wheezing or Shortness of Air for up to 30 days., Disp: 18 g, Rfl: 11  •  ALPRAZolam (XANAX) 0.5 MG tablet, , Disp: , Rfl:   •  atomoxetine (STRATTERA) 40 MG capsule, Take 40 mg by mouth Every Morning., Disp: , Rfl:   •  atorvastatin (Lipitor) 80 MG tablet, Take 1 tablet by mouth Daily., Disp: 90 tablet, Rfl: 1  •  cyproheptadine (PERIACTIN) 4 MG tablet, Take 2 tablets by mouth Every Night., Disp: 180 tablet, Rfl: 1  •  DULoxetine (CYMBALTA) 60 MG capsule, Take 60 mg by mouth Every Morning., Disp: , Rfl:   •  ezetimibe (ZETIA) 10 MG tablet, Take 1 tablet by mouth Daily. (Patient taking differently: Take 10 mg by mouth Every Night.), Disp: 90 tablet, Rfl: 1  •  lamoTRIgine (LaMICtal) 100 MG tablet, Take 100 mg by mouth Every Night., Disp: , Rfl:   •  levothyroxine (Synthroid) 25 MCG  tablet, Take 1 tablet by mouth Daily., Disp: 90 tablet, Rfl: 1  •  loratadine (Claritin) 10 MG tablet, Take 10 mg by mouth Daily., Disp: , Rfl:   •  metFORMIN ER (GLUCOPHAGE-XR) 500 MG 24 hr tablet, Take 1 tablet by mouth Daily With Breakfast., Disp: 90 tablet, Rfl: 1  •  midodrine (PROAMATINE) 5 MG tablet, Take 1 tablet by mouth 3 (Three) Times a Day Before Meals., Disp: 90 tablet, Rfl: 0  •  omeprazole (priLOSEC) 20 MG capsule, Take 1 capsule by mouth Daily. (Patient taking differently: Take 20 mg by mouth Every Morning.), Disp: 90 capsule, Rfl: 1  •  traZODone (DESYREL) 100 MG tablet, Take 1 tablet by mouth Every Night., Disp: 90 tablet, Rfl: 1  •  valACYclovir (VALTREX) 500 MG tablet, Take 1 tablet by mouth Daily. (Patient taking differently: Take 500 mg by mouth Every Morning.), Disp: 90 tablet, Rfl: 1  •  vitamin D (ERGOCALCIFEROL) 1.25 MG (41446 UT) capsule capsule, Take 1 capsule by mouth 1 (One) Time Per Week., Disp: 5 capsule, Rfl: 5  •  Fluticasone Furoate-Vilanterol (Breo Ellipta) 200-25 MCG/INH inhaler, Inhale 1 puff Daily. Patient need to gargle mouth before and after taking the Breo Ellipta., Disp: 1 each, Rfl: 5  •  metoprolol succinate XL (TOPROL-XL) 25 MG 24 hr tablet, Take 1 tablet by mouth Daily., Disp: 90 tablet, Rfl: 3  •  predniSONE (DELTASONE) 20 MG tablet, Take 2 tablets by mouth Daily for 14 days., Disp: 28 tablet, Rfl: 0     Smoking status: Never      Objective     Vital Signs:   Vitals:    11/11/21 1344   BP: 126/62   Pulse: 113   Resp: 14   Temp: 97.8 °F (36.6 °C)   SpO2: 98%        Physical Exam:  Patient is on oxygen with the 2 L via nasal cannula.  Pleasant female.  Answering questions reasonably well.  No apparent respiratory distress at rest.  HEENT: Atraumatic, anicteric.  Using the face mask for COVID-19 precautions.  Asymmetric nasal septum  Narrowed oropharynx.  High arched palate.  Mallampati class III airway.  Overbite.  Neck: Supple, no JVD, no masses palpable.  Chest and  lungs: Decreased breath sounds, scattered rhonchi heard best posteriorly.  Cardiovascular system: S1 and S2 and no murmurs appreciated.  Abdomen: Obese, nontender, bowel sounds present.  Extremities: No clubbing, no cyanosis, no edema.  Central nervous system is grossly intact.     Result Review :   I have personally reviewed the patient had the x-rays done and the CAT scan of the chest was done which was done and number 9/2021 showed mildly scattered bilateral groundglass densities that appear to be improved from before.       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  Signs and symptoms of obstructive sleep apnea.  Hypoxemia  Allergies  Plan:  Reviewed and discussed with the patient about the present therapy.  Change the Flovent to Breo Ellipta 200, to take 1 puff daily and instructions and the samples were given.  Acapella.  We will do the alpha-1 antitrypsin.  IgE and mini Rast test.  Repeat the high-resolution CT scan of the chest in 3 months.  Brief prednisone to take 40 mg for the next 7 days initially  Home sleep study and follow-up in 3 months unless need to be seen sooner.  The home sleep study come back as positive for sleep apnea, may need to consider AutoPap titration as well.      Follow Up   Return in about 3 months (around 2/11/2022).  Patient was given instructions and counseling regarding her condition or for health maintenance advice. Please see specific information pulled into the AVS if appropriate.     Yobani Haley MD

## 2021-11-11 NOTE — PROGRESS NOTES
Chief Complaint  Rapid Heart Rate (post Covid)      History of Present Illness  Estela Deras presents to [facility] CARDIOLOGY    This is a very pleasant 46-year-old lady was recently in the hospital with Covid-19 pneumonia.  She was in the hospital for 11 days.  She was discharged about 4 weeks ago.  She is currently on supplemental oxygen on 2 L via nasal cannula.  She has tachycardia with heart rate around 110 at rest.  Her heart rate increases to 150 with mild activities.  She feels chest tightness when that happens.  She used to be on metoprolol XL 50 mg daily in the past which was stopped during recent hospitalization due to labile blood pressure.  In fact, she was discharged on midodrine 5 mg 3 times daily which she ran out of about a week ago.  Her blood pressure has been stable.  Patient has brain fog and memory issues.  She has no other complaints today.  She denies presyncope or syncope.  She reports previous history of mitral valve prolapse    Past Medical History:   Diagnosis Date   • Abnormal electrocardiogram    • Allergic    • Allergic rhinitis    • Anemia    • Arthritis    • Asthma    • COVID-19 09/2021   • Depression    • Dysfunctional uterine bleeding    • Fatty liver    • Foot pain    • Gastroesophageal reflux disease without esophagitis    • Generalized anxiety disorder    • H/O cold sores    • Hyperlipidemia    • Hypertriglyceridemia    • Hypothyroidism    • Impaired fasting glucose    • Insomnia, unspecified    • Liver function study, abnormal    • Low back pain    • Macromastia    • Migraine headache    • Mild episode of recurrent major depressive disorder (HCC)    • Mitral valve prolapse    • Night sweats    • Obesity    • Sinus trouble    • Thyroid disorder    • Tremor    • Vitamin D deficiency          Current Outpatient Medications:   •  albuterol (PROVENTIL) (2.5 MG/3ML) 0.083% nebulizer solution, Take 2.5 mg by nebulization 4 (Four) Times a Day As Needed for Wheezing  for up to 30 days., Disp: 120 each, Rfl: 5  •  albuterol sulfate  (90 Base) MCG/ACT inhaler, Inhale 1-2 puffs Every 4 (Four) Hours As Needed for Wheezing or Shortness of Air for up to 30 days., Disp: 18 g, Rfl: 11  •  ALPRAZolam (XANAX) 0.5 MG tablet, , Disp: , Rfl:   •  atomoxetine (STRATTERA) 40 MG capsule, Take 40 mg by mouth Every Morning., Disp: , Rfl:   •  atorvastatin (Lipitor) 80 MG tablet, Take 1 tablet by mouth Daily., Disp: 90 tablet, Rfl: 1  •  cyproheptadine (PERIACTIN) 4 MG tablet, Take 2 tablets by mouth Every Night., Disp: 180 tablet, Rfl: 1  •  DULoxetine (CYMBALTA) 60 MG capsule, Take 60 mg by mouth Every Morning., Disp: , Rfl:   •  ezetimibe (ZETIA) 10 MG tablet, Take 1 tablet by mouth Daily. (Patient taking differently: Take 10 mg by mouth Every Night.), Disp: 90 tablet, Rfl: 1  •  Fluticasone Furoate-Vilanterol (Breo Ellipta) 200-25 MCG/INH inhaler, Inhale 1 puff Daily. Patient need to gargle mouth before and after taking the Breo Ellipta., Disp: 1 each, Rfl: 5  •  lamoTRIgine (LaMICtal) 100 MG tablet, Take 100 mg by mouth Every Night., Disp: , Rfl:   •  levothyroxine (Synthroid) 25 MCG tablet, Take 1 tablet by mouth Daily., Disp: 90 tablet, Rfl: 1  •  loratadine (Claritin) 10 MG tablet, Take 10 mg by mouth Daily., Disp: , Rfl:   •  metFORMIN ER (GLUCOPHAGE-XR) 500 MG 24 hr tablet, Take 1 tablet by mouth Daily With Breakfast., Disp: 90 tablet, Rfl: 1  •  omeprazole (priLOSEC) 20 MG capsule, Take 1 capsule by mouth Daily. (Patient taking differently: Take 20 mg by mouth Every Morning.), Disp: 90 capsule, Rfl: 1  •  predniSONE (DELTASONE) 20 MG tablet, Take 2 tablets by mouth Daily for 14 days., Disp: 28 tablet, Rfl: 0  •  traZODone (DESYREL) 100 MG tablet, Take 1 tablet by mouth Every Night., Disp: 90 tablet, Rfl: 1  •  valACYclovir (VALTREX) 500 MG tablet, Take 1 tablet by mouth Daily. (Patient taking differently: Take 500 mg by mouth Every Morning.), Disp: 90 tablet, Rfl: 1  •  vitamin D  "(ERGOCALCIFEROL) 1.25 MG (16370 UT) capsule capsule, Take 1 capsule by mouth 1 (One) Time Per Week., Disp: 5 capsule, Rfl: 5  •  metoprolol succinate XL (TOPROL-XL) 25 MG 24 hr tablet, Take 1 tablet by mouth Daily., Disp: 90 tablet, Rfl: 3  •  midodrine (PROAMATINE) 5 MG tablet, Take 1 tablet by mouth 3 (Three) Times a Day Before Meals., Disp: 90 tablet, Rfl: 0    There are no discontinued medications.  Allergies   Allergen Reactions   • Norco [Hydrocodone-Acetaminophen] Headache   • Chlorhexidine Rash   • Adhesive Tape Other (See Comments)     BURNS SKIN   • Cetirizine Itching   • Codeine Headache and Other (See Comments)     MIGRANES   • Meperidine Headache and Other (See Comments)     MIGRANES   • Propoxyphene Other (See Comments)     MIGRANES   • Sesame Oil Nausea And Vomiting   • Acetaminophen Palpitations        Social History     Tobacco Use   • Smoking status: Never Smoker   • Smokeless tobacco: Never Used   Vaping Use   • Vaping Use: Never used   Substance Use Topics   • Alcohol use: Never   • Drug use: Never       Family History   Problem Relation Age of Onset   • Arthritis Mother    • Osteoporosis Mother    • Depression Mother    • Hyperlipidemia Mother    • Other Father         RENAL CALCULUS   • Depression Father    • Hyperlipidemia Father    • Diabetes Paternal Grandfather    • Heart disease Paternal Grandfather    • Lung cancer Other    • Diabetes type II Other    • Hyperlipidemia Other    • Heart disease Other         ISCHEMIC   • Cancer Other    • Hypertension Other    • Arthritis Paternal Uncle    • Diabetes Paternal Uncle    • Asthma Daughter    • Depression Daughter    • Cancer Maternal Grandfather    • Thyroid disease Paternal Grandmother         Objective     /72   Pulse 108   Ht 165.1 cm (65\")   Wt 104 kg (229 lb)   BMI 38.11 kg/m²       Physical Exam  Constitutional:       General: Awake. Not in acute distress.     Appearance: Normal appearance.   Neck:      Vascular: No carotid " bruit, hepatojugular reflux or JVD.   Cardiovascular:      Rate and Rhythm: Normal rate and regular rhythm.      Chest Wall: PMI is not displaced.      Heart sounds: Normal heart sounds, S1 normal and S2 normal. No murmur heard.   No friction rub. No gallop. No S3 or S4 sounds.    Pulmonary:      Effort: Pulmonary effort is normal.      Breath sounds: Normal breath sounds. No wheezing, rhonchi or rales.   Ext.      Right lower leg: No edema.      Left lower leg: No edema.   Skin:     General: Skin is warm and dry.      Coloration: Skin is not cyanotic.      Findings: No petechiae or rash.   Neurological:      Mental Status: Alert and oriented x 3  Psychiatric:         Behavior: Behavior is cooperative.       Result Review :     proBNP   Date Value Ref Range Status   09/17/2021 5.6 0.0 - 450.0 pg/mL Final     CMP    CMP 9/27/21 9/28/21 10/22/21   Glucose 195 (A) 242 (A) 123 (A)   BUN 17 19 16   Creatinine 0.69 0.63 0.67   eGFR Non African Am 92 103 96   Sodium 137 135 (A) 136   Potassium 4.1 3.7 3.5   Chloride 102 100 100   Calcium 9.0 8.8 9.5   Albumin 3.60 3.50 4.40   Total Bilirubin 0.5 0.4 0.7   Alkaline Phosphatase 90 90 77   AST (SGOT) 25 26 41 (A)   ALT (SGPT) 46 (A) 50 (A) 86 (A)   (A) Abnormal value       Comments are available for some flowsheets but are not being displayed.           CBC w/diff    CBC w/Diff 9/27/21 9/28/21 10/22/21   WBC 12.43 (A) 12.84 (A) 12.72 (A)   RBC 4.82 4.87 4.86   Hemoglobin 13.7 13.6 13.6   Hematocrit 40.7 41.9 43.1   MCV 84.4 86.0 88.7   MCH 28.4 27.9 28.0   MCHC 33.7 32.5 31.6   RDW 13.1 13.2 14.6   Platelets 279 269 165   Neutrophil Rel % 79.8 (A) 75.9 57.0   Immature Granulocyte Rel % 0.9 (A) 0.8 (A) 0.7 (A)   Lymphocyte Rel % 12.2 (A) 16.4 (A) 35.9   Monocyte Rel % 6.3 6.2 5.7   Eosinophil Rel % 0.6 0.5 0.5   Basophil Rel % 0.2 0.2 0.2   (A) Abnormal value             Lab Results   Component Value Date    TSH 5.570 (H) 10/22/2021      Lab Results   Component Value Date     FREET4 1.1 06/11/2020      No results found for: DDIMERQUANT  Magnesium   Date Value Ref Range Status   09/28/2021 2.2 1.6 - 2.6 mg/dL Final      No results found for: DIGOXIN   Lab Results   Component Value Date    TROPONINT <0.010 09/17/2021      No results found for: POCTROP(       Lipid Panel    Lipid Panel 11/25/20 10/22/21   Total Cholesterol  226 (A)   Total Cholesterol 150    Triglycerides 208 (A) 208 (A)   HDL Cholesterol 47 76 (A)   VLDL Cholesterol 42 (A) 36   LDL Cholesterol  61 (A) 114 (A)   LDL/HDL Ratio  1.43   (A) Abnormal value       Comments are available for some flowsheets but are not being displayed.                     No results found for this or any previous visit.                Diagnoses and all orders for this visit:    1. Palpitations (Primary)  -     Holter Monitor - 24 Hour; Future  -     metoprolol succinate XL (TOPROL-XL) 25 MG 24 hr tablet; Take 1 tablet by mouth Daily.  Dispense: 90 tablet; Refill: 3    2. Mixed dyslipidemia    3. Type 2 diabetes mellitus without complication, without long-term current use of insulin ([facility])    4. Thyroid disorder    5. Chest pain, atypical      Assessment:    -Palpitations: She has been having recurrent palpitations as described in HPI.  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  She will be started on metoprolol XL 25 mg daily.  24-hour Holter monitor will be done to assess average heart rate and to rule out dysrhythmias.  Echocardiogram will be done to assess LV function and to rule out underlying structural or valvular pathology.  Further recommendations to follow.    -Chest discomfort: She has constant chest tightness which is exacerbated by tachycardia.  It is unlikely to be cardiac in origin.  Will start metoprolol as discussed above.  Echocardiogram will be checked.  If she continues to have chest discomfort will consider cardiac perfusion study.    -Dyslipidemia: Continue statin therapy.    -Thyroid disorder: Continue Synthroid.   Recent TSH check showed mildly elevated TSH level.  Monitor.      Patient will return to clinic in 2 to 3 months or sooner if needed.    Follow Up       Return in about 3 months (around 2/11/2022).    Patient was given instructions and counseling regarding her condition or for health maintenance advice. Please see specific information pulled into the AVS if appropriate.

## 2021-11-22 ENCOUNTER — PATIENT MESSAGE (OUTPATIENT)
Dept: FAMILY MEDICINE CLINIC | Facility: CLINIC | Age: 46
End: 2021-11-22

## 2021-11-23 ENCOUNTER — PROCEDURE VISIT (OUTPATIENT)
Dept: CARDIAC REHAB | Facility: HOSPITAL | Age: 46
End: 2021-11-23

## 2021-11-23 ENCOUNTER — HOSPITAL ENCOUNTER (OUTPATIENT)
Dept: RESPIRATORY THERAPY | Facility: HOSPITAL | Age: 46
Discharge: HOME | End: 2021-11-23

## 2021-11-23 DIAGNOSIS — U09.9 POST-ACUTE COVID-19 SYNDROME: ICD-10-CM

## 2021-11-23 DIAGNOSIS — R05.9 COUGH: ICD-10-CM

## 2021-11-23 LAB
ARTERIAL PATENCY WRIST A: POSITIVE
BASE EXCESS BLDA CALC-SCNC: -2.8 MMOL/L (ref -2–2)
BDY SITE: ABNORMAL
COHGB MFR BLD: 0.3 % (ref 0–1.5)
FHHB: 2.8 % (ref 0–5)
HCO3 BLDA-SCNC: 20.6 MMOL/L (ref 22–26)
HGB BLDA-MCNC: 14.2 G/DL (ref 11.7–14.6)
INHALED O2 CONCENTRATION: 21 %
METHGB BLD QL: 0.1 % (ref 0–1.5)
MODALITY: ABNORMAL
OXYHGB MFR BLDV: 96.8 % (ref 94–99)
PCO2 BLDA: 32 MM HG (ref 35–45)
PH BLDA: 7.43 PH UNITS (ref 7.35–7.45)
PO2 BLD: 501 MM[HG] (ref 0–500)
PO2 BLDA: 105.3 MM HG (ref 80–100)
SAO2 % BLDCOA: 97.2 % (ref 95–99)

## 2021-11-23 PROCEDURE — 94618 PULMONARY STRESS TESTING: CPT

## 2021-11-23 PROCEDURE — 82805 BLOOD GASES W/O2 SATURATION: CPT | Performed by: SPECIALIST

## 2021-11-23 PROCEDURE — 94060 EVALUATION OF WHEEZING: CPT | Performed by: INTERNAL MEDICINE

## 2021-11-23 PROCEDURE — 94726 PLETHYSMOGRAPHY LUNG VOLUMES: CPT

## 2021-11-23 PROCEDURE — 82375 ASSAY CARBOXYHB QUANT: CPT | Performed by: SPECIALIST

## 2021-11-23 PROCEDURE — 36600 WITHDRAWAL OF ARTERIAL BLOOD: CPT | Performed by: SPECIALIST

## 2021-11-23 PROCEDURE — 94726 PLETHYSMOGRAPHY LUNG VOLUMES: CPT | Performed by: INTERNAL MEDICINE

## 2021-11-23 PROCEDURE — 83050 HGB METHEMOGLOBIN QUAN: CPT | Performed by: SPECIALIST

## 2021-11-23 PROCEDURE — 94729 DIFFUSING CAPACITY: CPT

## 2021-11-23 PROCEDURE — 94060 EVALUATION OF WHEEZING: CPT

## 2021-11-23 PROCEDURE — 94729 DIFFUSING CAPACITY: CPT | Performed by: INTERNAL MEDICINE

## 2021-11-23 RX ORDER — ALBUTEROL SULFATE 2.5 MG/3ML
2.5 SOLUTION RESPIRATORY (INHALATION) ONCE
Status: COMPLETED | OUTPATIENT
Start: 2021-11-23 | End: 2021-11-23

## 2021-11-23 RX ADMIN — ALBUTEROL SULFATE 2.5 MG: 2.5 SOLUTION RESPIRATORY (INHALATION) at 17:25

## 2021-11-24 ENCOUNTER — OFFICE VISIT (OUTPATIENT)
Dept: NEUROLOGY | Facility: CLINIC | Age: 46
End: 2021-11-24

## 2021-11-24 VITALS
SYSTOLIC BLOOD PRESSURE: 153 MMHG | HEIGHT: 65 IN | HEART RATE: 111 BPM | BODY MASS INDEX: 37.99 KG/M2 | WEIGHT: 228 LBS | DIASTOLIC BLOOD PRESSURE: 79 MMHG

## 2021-11-24 DIAGNOSIS — F32.89 OTHER DEPRESSION: ICD-10-CM

## 2021-11-24 DIAGNOSIS — R41.3 MEMORY LOSS: Primary | ICD-10-CM

## 2021-11-24 PROCEDURE — 99204 OFFICE O/P NEW MOD 45 MIN: CPT | Performed by: PSYCHIATRY & NEUROLOGY

## 2021-11-24 RX ORDER — ARIPIPRAZOLE 2 MG/1
2 TABLET ORAL DAILY
COMMUNITY
End: 2022-01-25

## 2021-11-24 NOTE — ASSESSMENT & PLAN NOTE
I discussed with her and her mother that her memory loss, feeling like she is in a fog, lack of sleep, racing thoughts, depressive symptoms is secondary to depression.  She is to follow-up with her mental health provider to address this issue.  I discussed with her that she does not have neurologic disease to cause her symptoms.  I will do an MRI of the brain and she is to call us to find out the results.

## 2021-11-24 NOTE — PROGRESS NOTES
"Chief Complaint  Tremors    Subjective          Estela Deras is a 46 y.o. female who presents to [facility] NEUROLOGY & NEUROSURGERY  History of Present Illness  46-year-old woman valuated for memory loss and tremors.  She states that the tremors comes and goes.  It interferes with activities at times such as feeding her grandbaby.  She states that she developed Covid in September and she feels guilty that she did not die and her father .  She is been depressed since that time.  She has feelings of hopelessness and worthlessness.  She states that she has racing thoughts and cannot sleep at night as well.  She wakes up several times a night.  She is being followed by mental health provider and she tells me that she told the same thing to the provider and they are trying to adjust her medication for depression and anxiety.  She states that she has had anxiety all her life.  She has had depression and thoughts of worthlessness and hopelessness in the past.  She has no plans of self-harm at this time.  She is independent with activities of daily living.  She lives with her mother who is 66 years old.  She states that she underwent a divorce 2 years ago and at that time she was also depressed.  They moved from [place] in .  Her daughter was born in  she had depressive symptoms back then.    She states that she did not have any sleep problems prior to developing Covid.  She states that she has a brain fog and she blames it on Covid.    Her mother is with her in this interview.  Her mother states that she is significantly anxious and depressed.  She gets very irritable.    Objective   Vital Signs:   /79   Pulse 111   Ht 165.1 cm (65\")   Wt 103 kg (228 lb)   BMI 37.94 kg/m²     Physical Exam   She is alert, fluent, phasic, follows commands well.  Initial Mini-Mental Status score was 27 out of 30 missing 2 out of 3 repeat is 30 out of 30.  She can tell me the history as noted " above.  She can tell me the president [place] but then could not tell me the previous president other than Vadim then when I corrected her she was able to tell me.  She can tell me what she ate for dinner and what she did yesterday in detail including her pulmonary function testing.  Optic disc are normal bilaterally visual fields full confrontation, EOMs are full all directions gaze, facial strength is full, soft palate elevation and tongue are normal.  There is no weakness of the upper or lower extremities and with muscle testing.  Reflexes are normoactive and symmetrical.  Station gait she is able to tiptoe, heel walk, raegan and tandem without difficulty.  Heart is regular with a normal in rate        Assessment and Plan  Diagnoses and all orders for this visit:    1. Memory loss (Primary)  -     MRI Brain Without Contrast; Future    2. Other depression  Assessment & Plan:  I discussed with her and her mother that her memory loss, feeling like she is in a fog, lack of sleep, racing thoughts, depressive symptoms is secondary to depression.  She is to follow-up with her mental health provider to address this issue.  I discussed with her that she does not have neurologic disease to cause her symptoms.  I will do an MRI of the brain and she is to call us to find out the results.         Total time spent with the patient and coordinating patient care was 45 minutes.    Follow Up  No follow-ups on file.  Patient was given instructions and counseling regarding her condition or for health maintenance advice. Please see specific information pulled into the AVS if appropriate.

## 2021-12-05 ENCOUNTER — APPOINTMENT (OUTPATIENT)
Dept: GENERAL RADIOLOGY | Facility: HOSPITAL | Age: 46
End: 2021-12-05

## 2021-12-05 ENCOUNTER — HOSPITAL ENCOUNTER (EMERGENCY)
Facility: HOSPITAL | Age: 46
Discharge: HOME | End: 2021-12-05
Attending: EMERGENCY MEDICINE | Admitting: EMERGENCY MEDICINE

## 2021-12-05 VITALS
WEIGHT: 229.28 LBS | HEIGHT: 65 IN | SYSTOLIC BLOOD PRESSURE: 129 MMHG | BODY MASS INDEX: 38.2 KG/M2 | DIASTOLIC BLOOD PRESSURE: 57 MMHG | TEMPERATURE: 97.8 F | RESPIRATION RATE: 20 BRPM | HEART RATE: 105 BPM | OXYGEN SATURATION: 93 %

## 2021-12-05 DIAGNOSIS — R42 LIGHTHEADEDNESS: Primary | ICD-10-CM

## 2021-12-05 LAB
ALBUMIN SERPL-MCNC: 4.3 G/DL (ref 3.5–5.2)
ALBUMIN/GLOB SERPL: 1.9 G/DL
ALP SERPL-CCNC: 112 U/L (ref 39–117)
ALT SERPL W P-5'-P-CCNC: 54 U/L (ref 1–33)
ANION GAP SERPL CALCULATED.3IONS-SCNC: 13 MMOL/L (ref 5–15)
AST SERPL-CCNC: 25 U/L (ref 1–32)
BASOPHILS # BLD AUTO: 0.03 10*3/MM3 (ref 0–0.2)
BASOPHILS NFR BLD AUTO: 0.2 % (ref 0–1.5)
BILIRUB SERPL-MCNC: 0.9 MG/DL (ref 0–1.2)
BUN SERPL-MCNC: 16 MG/DL (ref 6–20)
BUN/CREAT SERPL: 20.3 (ref 7–25)
CALCIUM SPEC-SCNC: 9.2 MG/DL (ref 8.6–10.5)
CHLORIDE SERPL-SCNC: 101 MMOL/L (ref 98–107)
CO2 SERPL-SCNC: 26 MMOL/L (ref 22–29)
CREAT SERPL-MCNC: 0.79 MG/DL (ref 0.57–1)
DEPRECATED RDW RBC AUTO: 45.3 FL (ref 37–54)
EOSINOPHIL # BLD AUTO: 0 10*3/MM3 (ref 0–0.4)
EOSINOPHIL NFR BLD AUTO: 0 % (ref 0.3–6.2)
ERYTHROCYTE [DISTWIDTH] IN BLOOD BY AUTOMATED COUNT: 14.4 % (ref 12.3–15.4)
GFR SERPL CREATININE-BSD FRML MDRD: 79 ML/MIN/1.73
GLOBULIN UR ELPH-MCNC: 2.3 GM/DL
GLUCOSE SERPL-MCNC: 188 MG/DL (ref 65–99)
HCT VFR BLD AUTO: 43.3 % (ref 34–46.6)
HGB BLD-MCNC: 14.2 G/DL (ref 12–15.9)
HOLD SPECIMEN: NORMAL
IMM GRANULOCYTES # BLD AUTO: 0.11 10*3/MM3 (ref 0–0.05)
IMM GRANULOCYTES NFR BLD AUTO: 0.8 % (ref 0–0.5)
LYMPHOCYTES # BLD AUTO: 0.94 10*3/MM3 (ref 0.7–3.1)
LYMPHOCYTES NFR BLD AUTO: 7 % (ref 19.6–45.3)
MAGNESIUM SERPL-MCNC: 1.8 MG/DL (ref 1.6–2.6)
MCH RBC QN AUTO: 28.4 PG (ref 26.6–33)
MCHC RBC AUTO-ENTMCNC: 32.8 G/DL (ref 31.5–35.7)
MCV RBC AUTO: 86.6 FL (ref 79–97)
MONOCYTES # BLD AUTO: 0.58 10*3/MM3 (ref 0.1–0.9)
MONOCYTES NFR BLD AUTO: 4.3 % (ref 5–12)
NEUTROPHILS NFR BLD AUTO: 11.76 10*3/MM3 (ref 1.7–7)
NEUTROPHILS NFR BLD AUTO: 87.7 % (ref 42.7–76)
NRBC BLD AUTO-RTO: 0 /100 WBC (ref 0–0.2)
PLATELET # BLD AUTO: 232 10*3/MM3 (ref 140–450)
PMV BLD AUTO: 10.9 FL (ref 6–12)
POTASSIUM SERPL-SCNC: 4.4 MMOL/L (ref 3.5–5.2)
PROT SERPL-MCNC: 6.6 G/DL (ref 6–8.5)
QT INTERVAL: 338 MS
RBC # BLD AUTO: 5 10*6/MM3 (ref 3.77–5.28)
SODIUM SERPL-SCNC: 140 MMOL/L (ref 136–145)
TROPONIN T SERPL-MCNC: <0.01 NG/ML (ref 0–0.03)
WBC NRBC COR # BLD: 13.42 10*3/MM3 (ref 3.4–10.8)
WHOLE BLOOD HOLD SPECIMEN: NORMAL
WHOLE BLOOD HOLD SPECIMEN: NORMAL

## 2021-12-05 PROCEDURE — 83735 ASSAY OF MAGNESIUM: CPT

## 2021-12-05 PROCEDURE — 93005 ELECTROCARDIOGRAM TRACING: CPT | Performed by: EMERGENCY MEDICINE

## 2021-12-05 PROCEDURE — 82962 GLUCOSE BLOOD TEST: CPT

## 2021-12-05 PROCEDURE — 80053 COMPREHEN METABOLIC PANEL: CPT

## 2021-12-05 PROCEDURE — 85025 COMPLETE CBC W/AUTO DIFF WBC: CPT

## 2021-12-05 PROCEDURE — 96374 THER/PROPH/DIAG INJ IV PUSH: CPT

## 2021-12-05 PROCEDURE — 93010 ELECTROCARDIOGRAM REPORT: CPT | Performed by: INTERNAL MEDICINE

## 2021-12-05 PROCEDURE — 93005 ELECTROCARDIOGRAM TRACING: CPT

## 2021-12-05 PROCEDURE — 71045 X-RAY EXAM CHEST 1 VIEW: CPT

## 2021-12-05 PROCEDURE — 84484 ASSAY OF TROPONIN QUANT: CPT

## 2021-12-05 PROCEDURE — 25010000002 KETOROLAC TROMETHAMINE PER 15 MG: Performed by: EMERGENCY MEDICINE

## 2021-12-05 PROCEDURE — 99283 EMERGENCY DEPT VISIT LOW MDM: CPT

## 2021-12-05 RX ORDER — SODIUM CHLORIDE 0.9 % (FLUSH) 0.9 %
10 SYRINGE (ML) INJECTION AS NEEDED
Status: DISCONTINUED | OUTPATIENT
Start: 2021-12-05 | End: 2021-12-05 | Stop reason: HOSPADM

## 2021-12-05 RX ORDER — KETOROLAC TROMETHAMINE 30 MG/ML
30 INJECTION, SOLUTION INTRAMUSCULAR; INTRAVENOUS ONCE
Status: COMPLETED | OUTPATIENT
Start: 2021-12-05 | End: 2021-12-05

## 2021-12-05 RX ADMIN — KETOROLAC TROMETHAMINE 30 MG: 30 INJECTION, SOLUTION INTRAMUSCULAR; INTRAVENOUS at 02:59

## 2021-12-05 RX ADMIN — SODIUM CHLORIDE 1000 ML: 9 INJECTION, SOLUTION INTRAVENOUS at 02:59

## 2021-12-05 NOTE — ED PROVIDER NOTES
"Time: 2:31 AM EST  Arrived by: private car  Chief Complaint: dizziness    History of Present Illness:  Patient is a 46 y.o. year old female that presents to the emergency department with dizziness. Pt states that she was sitting with her daughter and she felt like her blood sugar \"bottomed out\". Pt states she was \"lethargic\" and she couldn't get herself to follow commands, but remembers what they were saying. Pt c/o diffuse myalgias and weakness. Pt has allergies, but denies any other complaints.     Pt has a history of anemia and type 2 diabetes.       Dizziness  Quality:  Lightheadedness  Onset quality:  Sudden  Duration:  2 hours  Timing:  Rare  Progression:  Resolved  Chronicity:  New  Relieved by:  Nothing  Worsened by:  Nothing  Associated symptoms: weakness    Associated symptoms: no chest pain, no diarrhea, no shortness of breath and no vomiting        Similar Symptoms Previously: no  Recently seen: yes      Patient Care Team  Primary Care Provider: Nannette Braun APRN    Past Medical History:     Allergies   Allergen Reactions   • Norco [Hydrocodone-Acetaminophen] Headache   • Chlorhexidine Rash   • Adhesive Tape Other (See Comments)     BURNS SKIN   • Cetirizine Itching   • Codeine Headache and Other (See Comments)     MIGRANES   • Meperidine Headache and Other (See Comments)     MIGRANES   • Propoxyphene Other (See Comments)     MIGRANES   • Sesame Oil Nausea And Vomiting   • Acetaminophen Palpitations     Past Medical History:   Diagnosis Date   • Abnormal electrocardiogram    • Allergic    • Allergic rhinitis    • Anemia    • Arthritis    • Asthma    • COVID-19 09/2021   • Depression    • Dysfunctional uterine bleeding    • Fatty liver    • Foot pain    • Gastroesophageal reflux disease without esophagitis    • Generalized anxiety disorder    • H/O cold sores    • Hyperlipidemia    • Hypertriglyceridemia    • Hypothyroidism    • Impaired fasting glucose    • Insomnia, unspecified    • Liver " function study, abnormal    • Low back pain    • Macromastia    • Migraine headache    • Mild episode of recurrent major depressive disorder (HCC)    • Mitral valve prolapse    • Night sweats    • Obesity    • Sinus trouble    • Thyroid disorder    • Tremor    • Vitamin D deficiency      Past Surgical History:   Procedure Laterality Date   • ADENOIDECTOMY     • BILATERAL BREAST REDUCTION Bilateral    • BREAST SURGERY     • LAPAROSCOPIC HYSTERECTOMY  06/30/2014    DR JULIANE PARHAM;[facility]   • REDUCTION MAMMAPLASTY  2002   • SHOULDER SURGERY  2017   • TONSILLECTOMY     • TONSILLECTOMY       Family History   Problem Relation Age of Onset   • Arthritis Mother    • Osteoporosis Mother    • Depression Mother    • Hyperlipidemia Mother    • Other Father         RENAL CALCULUS   • Depression Father    • Hyperlipidemia Father    • Diabetes Paternal Grandfather    • Heart disease Paternal Grandfather    • Lung cancer Other    • Diabetes type II Other    • Hyperlipidemia Other    • Heart disease Other         ISCHEMIC   • Cancer Other    • Hypertension Other    • Arthritis Paternal Uncle    • Diabetes Paternal Uncle    • Asthma Daughter    • Depression Daughter    • Cancer Maternal Grandfather    • Thyroid disease Paternal Grandmother        Home Medications:  Prior to Admission medications    Medication Sig Start Date End Date Taking? Authorizing Provider   albuterol (PROVENTIL) (2.5 MG/3ML) 0.083% nebulizer solution Take 2.5 mg by nebulization 4 (Four) Times a Day As Needed for Wheezing for up to 30 days. 10/13/21 11/12/21  Isadora Katz APRN   albuterol sulfate  (90 Base) MCG/ACT inhaler Inhale 1-2 puffs Every 4 (Four) Hours As Needed for Wheezing or Shortness of Air for up to 30 days. 10/13/21 11/12/21  Isadora Katz APRN   ALPRAZolam (XANAX) 0.5 MG tablet  9/29/21   ProviderLois MD   ARIPiprazole (ABILIFY) 2 MG tablet Take 2 mg by mouth Daily.    Lois Hastings MD   atomoxetine  (STRATTERA) 40 MG capsule Take 40 mg by mouth Every Morning. 7/11/21   Emergency, Nurse FELIBERTO Sheldon   atorvastatin (Lipitor) 80 MG tablet Take 1 tablet by mouth Daily. 10/25/21   Nannette Braun APRN   cyproheptadine (PERIACTIN) 4 MG tablet Take 2 tablets by mouth Every Night. 9/13/21   Nannette Braun APRN   diclofenac (VOLTAREN) 50 MG EC tablet Take 1 tablet by mouth 2 (Two) Times a Day As Needed (pain). 11/22/21   Nannette Braun APRN   DULoxetine (CYMBALTA) 60 MG capsule Take 60 mg by mouth Every Morning. 7/11/21   Emergency, Nurse Pito RN   ezetimibe (ZETIA) 10 MG tablet Take 1 tablet by mouth Daily.  Patient taking differently: Take 10 mg by mouth Every Night. 9/13/21   Nannette Braun APRN   Fluticasone Furoate-Vilanterol (Breo Ellipta) 200-25 MCG/INH inhaler Inhale 1 puff Daily. Patient need to gargle mouth before and after taking the Breo Ellipta. 11/11/21   Yobani Haley MD   lamoTRIgine (LaMICtal) 100 MG tablet Take 100 mg by mouth Every Night. 7/11/21   Emergency, Nurse Pito, RN   levothyroxine (Synthroid) 25 MCG tablet Take 1 tablet by mouth Daily. 9/13/21   Nannette Braun APRN   loratadine (Claritin) 10 MG tablet Take 10 mg by mouth Daily.    Provider, MD Lois   metFORMIN ER (GLUCOPHAGE-XR) 500 MG 24 hr tablet Take 1 tablet by mouth Daily With Breakfast. 10/25/21   Nannette Braun APRN   metoprolol succinate XL (TOPROL-XL) 25 MG 24 hr tablet Take 1 tablet by mouth Daily. 11/11/21   Adithya Loya MD   midodrine (PROAMATINE) 5 MG tablet Take 1 tablet by mouth 3 (Three) Times a Day Before Meals. 9/28/21   Rui London MD   omeprazole (priLOSEC) 20 MG capsule Take 1 capsule by mouth Daily.  Patient taking differently: Take 20 mg by mouth Every Morning. 9/13/21   Nannette Braun APRN   traZODone (DESYREL) 100 MG tablet Take 1 tablet by mouth Every Night. 9/13/21   Nannette Braun APRN   valACYclovir (VALTREX) 500 MG tablet Take 1  "tablet by mouth Daily.  Patient taking differently: Take 500 mg by mouth Every Morning. 9/13/21   Nannette Braun APRN   vitamin D (ERGOCALCIFEROL) 1.25 MG (86840 UT) capsule capsule Take 1 capsule by mouth 1 (One) Time Per Week. 10/25/21   Nannette Braun APRN        Social History:   Social History     Tobacco Use   • Smoking status: Never Smoker   • Smokeless tobacco: Never Used   Vaping Use   • Vaping Use: Never used   Substance Use Topics   • Alcohol use: Never   • Drug use: Never       Record Review:  I have reviewed the patient's records in [facility].     Review of Systems:  Review of Systems   Constitutional: Negative for chills and fever.   HENT: Negative for nosebleeds.    Eyes: Negative for redness.   Respiratory: Negative for cough and shortness of breath.    Cardiovascular: Negative for chest pain.   Gastrointestinal: Negative for diarrhea and vomiting.   Genitourinary: Negative for dysuria and frequency.   Musculoskeletal: Positive for myalgias. Negative for back pain and neck pain.   Skin: Negative for rash.   Neurological: Positive for dizziness and weakness. Negative for seizures and syncope.   All other systems reviewed and are negative.       Physical Exam:  /57   Pulse 105   Temp 97.8 °F (36.6 °C) (Oral)   Resp 20   Ht 165.1 cm (65\")   Wt 104 kg (229 lb 4.5 oz)   SpO2 93%   Breastfeeding No   BMI 38.15 kg/m²     Physical Exam  Vitals and nursing note reviewed.   Constitutional:       General: She is not in acute distress.     Appearance: Normal appearance. She is not toxic-appearing.   HENT:      Head: Normocephalic and atraumatic.      Nose: Nose normal.      Mouth/Throat:      Mouth: Mucous membranes are moist.   Eyes:      General: Lids are normal. No scleral icterus.     Conjunctiva/sclera: Conjunctivae normal.   Cardiovascular:      Rate and Rhythm: Regular rhythm. Tachycardia present.      Heart sounds: Normal heart sounds. No murmur heard.      Pulmonary:      " Effort: Pulmonary effort is normal. No respiratory distress.      Breath sounds: Normal breath sounds and air entry.   Chest:      Chest wall: No tenderness.   Abdominal:      Palpations: Abdomen is soft.      Tenderness: There is no abdominal tenderness. There is no guarding or rebound.   Musculoskeletal:         General: No tenderness. Normal range of motion.      Cervical back: Normal range of motion and neck supple.      Right lower leg: No edema.      Left lower leg: No edema.   Skin:     General: Skin is warm and dry.      Findings: No rash.   Neurological:      Mental Status: She is alert and oriented to person, place, and time. Mental status is at baseline.      Sensory: Sensation is intact.      Motor: Motor function is intact.   Psychiatric:         Mood and Affect: Mood is anxious (mild).         Behavior: Behavior normal.                Medications in the Emergency Department:  Medications   sodium chloride 0.9 % bolus 1,000 mL (0 mL Intravenous Stopped 12/5/21 0400)   ketorolac (TORADOL) injection 30 mg (30 mg Intravenous Given 12/5/21 0259)        Labs  Lab Results (last 24 hours)     Procedure Component Value Units Date/Time    CBC & Differential [[id number]]  (Abnormal) Collected: 12/05/21 0136    Specimen: Blood Updated: 12/05/21 0147    Narrative:      The following orders were created for panel order CBC & Differential.  Procedure                               Abnormality         Status                     ---------                               -----------         ------                     CBC Auto Differential[[id number]]        Abnormal            Final result                 Please view results for these tests on the individual orders.    Comprehensive Metabolic Panel [[id number]]  (Abnormal) Collected: 12/05/21 0136    Specimen: Blood Updated: 12/05/21 0206     Glucose 188 mg/dL      BUN 16 mg/dL      Creatinine 0.79 mg/dL      Sodium 140 mmol/L      Potassium 4.4 mmol/L      Chloride 101  mmol/L      CO2 26.0 mmol/L      Calcium 9.2 mg/dL      Total Protein 6.6 g/dL      Albumin 4.30 g/dL      ALT (SGPT) 54 U/L      AST (SGOT) 25 U/L      Alkaline Phosphatase 112 U/L      Total Bilirubin 0.9 mg/dL      eGFR Non African Amer 79 mL/min/1.73      Globulin 2.3 gm/dL      A/G Ratio 1.9 g/dL      BUN/Creatinine Ratio 20.3     Anion Gap 13.0 mmol/L     Narrative:      GFR Normal >60  Chronic Kidney Disease <60  Kidney Failure <15      Magnesium [[id number]]  (Normal) Collected: 12/05/21 0136    Specimen: Blood Updated: 12/05/21 0206     Magnesium 1.8 mg/dL     Troponin [[id number]]  (Normal) Collected: 12/05/21 0136    Specimen: Blood Updated: 12/05/21 0206     Troponin T <0.010 ng/mL     Narrative:      Troponin T Reference Range:  <= 0.03 ng/mL-   Negative for AMI  >0.03 ng/mL-     Abnormal for myocardial necrosis.  Clinicians would have to utilize clinical acumen, EKG, Troponin and serial changes to determine if it is an Acute Myocardial Infarction or myocardial injury due to an underlying chronic condition.       Results may be falsely decreased if patient taking Biotin.      CBC Auto Differential [[id number]]  (Abnormal) Collected: 12/05/21 0136    Specimen: Blood Updated: 12/05/21 0147     WBC 13.42 10*3/mm3      RBC 5.00 10*6/mm3      Hemoglobin 14.2 g/dL      Hematocrit 43.3 %      MCV 86.6 fL      MCH 28.4 pg      MCHC 32.8 g/dL      RDW 14.4 %      RDW-SD 45.3 fl      MPV 10.9 fL      Platelets 232 10*3/mm3      Neutrophil % 87.7 %      Lymphocyte % 7.0 %      Monocyte % 4.3 %      Eosinophil % 0.0 %      Basophil % 0.2 %      Immature Grans % 0.8 %      Neutrophils, Absolute 11.76 10*3/mm3      Lymphocytes, Absolute 0.94 10*3/mm3      Monocytes, Absolute 0.58 10*3/mm3      Eosinophils, Absolute 0.00 10*3/mm3      Basophils, Absolute 0.03 10*3/mm3      Immature Grans, Absolute 0.11 10*3/mm3      nRBC 0.0 /100 WBC            Imaging:  XR Chest 1 View    Result Date: 12/5/2021  PROCEDURE: XR CHEST 1 VW   COMPARISON: 9/28/2021.  INDICATIONS: tachycardia  FINDINGS: A single frontal chest radiograph reveals no cardiac enlargement and no acute infiltrate. No pneumothorax is seen.  Interval clearing of the bilateral infiltrates.  Low lung volumes persist.  External artifacts obscure detail.  There may be chronic calcified granulomatous disease of the chest.  CONCLUSION: No acute cardiopulmonary disease is seen radiographically.  No cardiac enlargement.       MARY COULTER JR, MD       Electronically Signed and Approved By: MARY COULTER JR, MD on 12/05/2021 at 3:11               Procedures:  Procedures   EKG:  Rhythm: sinus  Rate: 91  Normal P waves  Normal QRS  Normal ST segment  Normal qT    EKG Comparison: no previous    Interpreted by me    Progress                            Medical Decision Making:  MDM  Number of Diagnoses or Management Options     Amount and/or Complexity of Data Reviewed  Review and summarize past medical records: yes (Pt was seen on 11/11 by cardiology and was ordered to wear a holter monitor for 24 hours which showed: rare PACs reported events with sinus tachycardia. )       The patient´s CBC was reviewed and shows no abnormalities of critical concern. Of note, there is no anemia requiring a blood transfusion and the platelet count is acceptable.  The patient´s CMP was reviewed and shows no abnormalities of critical concern. Of note, the patient´s sodium and potassium are acceptable. The patient´s liver enzymes are unremarkable. The patient´s renal function (creatinine) is preserved. The patient has a normal anion gap.  Chest x-ray shows no acute cardiopulmonary process.  We discussed return precautions including worsening symptoms or any additional concerns.    Final diagnoses:   Lightheadedness        Disposition:  ED Disposition     ED Disposition Condition Comment    Discharge Stable             Portions of this medical record may have been created using voice recognition  software.    Documentation assistance provided by Silvia Lee acting as scribe for Bob Cheung MD. Information recorded by the scribe was done at my direction and has been verified and validated by me.          Silvia Lee  12/05/21 [id number]       Bob Cheung MD  12/05/21 9851

## 2021-12-06 ENCOUNTER — TELEPHONE (OUTPATIENT)
Dept: CARDIOLOGY | Facility: CLINIC | Age: 46
End: 2021-12-06

## 2021-12-06 NOTE — TELEPHONE ENCOUNTER
Received VM from patient requesting holter monitor results.    Returned call. Advised of holter monitor results. Advised to keep f/u as scheduled.

## 2021-12-07 LAB — GLUCOSE BLDC GLUCOMTR-MCNC: 161 MG/DL (ref 70–99)

## 2021-12-09 ENCOUNTER — OFFICE VISIT (OUTPATIENT)
Dept: FAMILY MEDICINE CLINIC | Facility: CLINIC | Age: 46
End: 2021-12-09

## 2021-12-09 VITALS
DIASTOLIC BLOOD PRESSURE: 83 MMHG | SYSTOLIC BLOOD PRESSURE: 148 MMHG | OXYGEN SATURATION: 96 % | BODY MASS INDEX: 37.57 KG/M2 | TEMPERATURE: 96.8 F | WEIGHT: 225.5 LBS | RESPIRATION RATE: 16 BRPM | HEIGHT: 65 IN | HEART RATE: 103 BPM

## 2021-12-09 DIAGNOSIS — R00.0 RACING HEART BEAT: ICD-10-CM

## 2021-12-09 DIAGNOSIS — Z86.16 HISTORY OF COVID-19: ICD-10-CM

## 2021-12-09 DIAGNOSIS — I10 PRIMARY HYPERTENSION: Primary | ICD-10-CM

## 2021-12-09 DIAGNOSIS — R53.83 FATIGUE, UNSPECIFIED TYPE: ICD-10-CM

## 2021-12-09 PROCEDURE — 99214 OFFICE O/P EST MOD 30 MIN: CPT | Performed by: NURSE PRACTITIONER

## 2021-12-09 RX ORDER — FLUOXETINE HYDROCHLORIDE 20 MG/1
20 CAPSULE ORAL DAILY
COMMUNITY

## 2021-12-09 NOTE — PROGRESS NOTES
"Chief Complaint  Follow-up (1 month follow up ) and Pneumonia    Subjective          Estela Deras presents to [facility]  History of Present Illness  She is off her O2.  She is tolerating overall ok.  She went off prednisone today.  She did call Tran's office and she has talked with Tran--She went ot neuro and he told her that \"psych need to fix her\".   She does have MRI schedule.  She has not went back to work.   She doesn't go to [facility] until 2/11.  She cont to have the fatigue.  She has to sit for a while before she can get back up.  She can feel her heart racing. She is taking the toprol daily.  Her BP is running high in the ER.  She got home from the ER and she did having diarrhea when she got home.  She feels that I have food poison.  She did start the ozempic from Dr BROWN.          Past Medical History:   • Abnormal electrocardiogram   • Allergic   • Allergic rhinitis   • Anemia   • Arthritis   • Asthma   • COVID-19   • Depression   • Dysfunctional uterine bleeding   • Fatty liver   • Foot pain   • Gastroesophageal reflux disease without esophagitis   • Generalized anxiety disorder   • H/O cold sores   • Hyperlipidemia   • Hypertriglyceridemia   • Hypothyroidism   • Impaired fasting glucose   • Insomnia, unspecified   • Liver function study, abnormal   • Low back pain   • Macromastia   • Migraine headache   • Mild episode of recurrent major depressive disorder (HCC)   • Mitral valve prolapse   • Night sweats   • Obesity   • Sinus trouble   • Thyroid disorder   • Tremor   • Vitamin D deficiency       Allergies  Norco [hydrocodone-acetaminophen], Chlorhexidine, Adhesive tape, Cetirizine, Codeine, Meperidine, Propoxyphene, Sesame oil, and Acetaminophen    Past Surgical History:   • ADENOIDECTOMY   • BILATERAL BREAST REDUCTION   • BREAST SURGERY   • LAPAROSCOPIC HYSTERECTOMY    DR JULIANE PARHAM;[facility]   • REDUCTION MAMMAPLASTY   • SHOULDER SURGERY   • TONSILLECTOMY   • " TONSILLECTOMY       Social History     Tobacco Use   • Smoking status: Never Smoker   • Smokeless tobacco: Never Used   Vaping Use   • Vaping Use: Never used   Substance Use Topics   • Alcohol use: Never   • Drug use: Never       Family History   Problem Relation Age of Onset   • Arthritis Mother    • Osteoporosis Mother    • Depression Mother    • Hyperlipidemia Mother    • Other Father         RENAL CALCULUS   • Depression Father    • Hyperlipidemia Father    • Diabetes Paternal Grandfather    • Heart disease Paternal Grandfather    • Lung cancer Other    • Diabetes type II Other    • Hyperlipidemia Other    • Heart disease Other         ISCHEMIC   • Cancer Other    • Hypertension Other    • Arthritis Paternal Uncle    • Diabetes Paternal Uncle    • Asthma Daughter    • Depression Daughter    • Cancer Maternal Grandfather    • Thyroid disease Paternal Grandmother         Health Maintenance Due   Topic Date Due   • URINE MICROALBUMIN  Never done   • ANNUAL PHYSICAL  Never done   • DIABETIC FOOT EXAM  Never done   • DIABETIC EYE EXAM  Never done          Current Outpatient Medications:   •  ARIPiprazole (ABILIFY) 2 MG tablet, Take 2 mg by mouth Daily., Disp: , Rfl:   •  atomoxetine (STRATTERA) 40 MG capsule, Take 40 mg by mouth Every Morning., Disp: , Rfl:   •  atorvastatin (Lipitor) 80 MG tablet, Take 1 tablet by mouth Daily., Disp: 90 tablet, Rfl: 1  •  cyproheptadine (PERIACTIN) 4 MG tablet, Take 2 tablets by mouth Every Night., Disp: 180 tablet, Rfl: 1  •  diclofenac (VOLTAREN) 50 MG EC tablet, Take 1 tablet by mouth 2 (Two) Times a Day As Needed (pain)., Disp: 60 tablet, Rfl: 2  •  DULoxetine (CYMBALTA) 60 MG capsule, Take 40 mg by mouth Every Morning., Disp: , Rfl:   •  ezetimibe (ZETIA) 10 MG tablet, Take 1 tablet by mouth Daily. (Patient taking differently: Take 10 mg by mouth Every Night.), Disp: 90 tablet, Rfl: 1  •  FLUoxetine (PROzac) 20 MG capsule, Take 20 mg by mouth Daily., Disp: , Rfl:   •   Fluticasone Furoate-Vilanterol (Breo Ellipta) 200-25 MCG/INH inhaler, Inhale 1 puff Daily. Patient need to gargle mouth before and after taking the Breo Ellipta., Disp: 1 each, Rfl: 5  •  lamoTRIgine (LaMICtal) 100 MG tablet, Take 100 mg by mouth Every Night., Disp: , Rfl:   •  loratadine (Claritin) 10 MG tablet, Take 10 mg by mouth Daily., Disp: , Rfl:   •  metFORMIN ER (GLUCOPHAGE-XR) 500 MG 24 hr tablet, Take 1 tablet by mouth Daily With Breakfast., Disp: 90 tablet, Rfl: 1  •  metoprolol succinate XL (TOPROL-XL) 25 MG 24 hr tablet, Take 1 tablet by mouth Daily., Disp: 90 tablet, Rfl: 3  •  omeprazole (priLOSEC) 20 MG capsule, Take 1 capsule by mouth Daily. (Patient taking differently: Take 20 mg by mouth Every Morning.), Disp: 90 capsule, Rfl: 1  •  traZODone (DESYREL) 100 MG tablet, Take 1 tablet by mouth Every Night., Disp: 90 tablet, Rfl: 1  •  valACYclovir (VALTREX) 500 MG tablet, Take 1 tablet by mouth Daily. (Patient taking differently: Take 500 mg by mouth Every Morning.), Disp: 90 tablet, Rfl: 1  •  vitamin D (ERGOCALCIFEROL) 1.25 MG (30516 UT) capsule capsule, Take 1 capsule by mouth 1 (One) Time Per Week., Disp: 5 capsule, Rfl: 5  •  albuterol (PROVENTIL) (2.5 MG/3ML) 0.083% nebulizer solution, Take 2.5 mg by nebulization 4 (Four) Times a Day As Needed for Wheezing for up to 30 days., Disp: 120 each, Rfl: 5  •  albuterol sulfate  (90 Base) MCG/ACT inhaler, Inhale 1-2 puffs Every 4 (Four) Hours As Needed for Wheezing or Shortness of Air for up to 30 days., Disp: 18 g, Rfl: 11  •  ALPRAZolam (XANAX) 0.5 MG tablet, , Disp: , Rfl:   •  levothyroxine (Synthroid) 25 MCG tablet, Take 1 tablet by mouth Daily., Disp: 90 tablet, Rfl: 1  •  midodrine (PROAMATINE) 5 MG tablet, Take 1 tablet by mouth 3 (Three) Times a Day Before Meals., Disp: 90 tablet, Rfl: 0    There are no discontinued medications.    Immunization History   Administered Date(s) Administered   • FluLaval/Fluarix/Fluzone >6 01/06/2014,  "10/13/2021   • Hepatitis A 05/15/2018, 11/05/2018   • Influenza, Unspecified 02/22/2019   • MMR 02/22/2019   • TD Preservative Free 11/05/2018   • Tdap 11/06/2018       Review of Systems   Constitutional: Positive for fatigue.   Respiratory: Negative for cough and wheezing.         Objective       Vitals:    12/09/21 1645   BP: 148/83   Pulse:    Resp:    Temp:    SpO2:      Body mass index is 37.53 kg/m².     Vitals:    12/09/21 1559 12/09/21 1645   BP: 149/67 148/83   Pulse: 103    Resp: 16    Temp: 96.8 °F (36 °C)    SpO2: 96%    Weight: 102 kg (225 lb 8 oz)    Height: 165.1 cm (65\")          Physical Exam  Vitals reviewed.   Constitutional:       Appearance: Normal appearance. She is well-developed.   Cardiovascular:      Rate and Rhythm: Normal rate and regular rhythm.      Heart sounds: Normal heart sounds. No murmur heard.      Pulmonary:      Effort: Pulmonary effort is normal.      Breath sounds: Normal breath sounds.   Neurological:      Mental Status: She is alert and oriented to person, place, and time.      Cranial Nerves: No cranial nerve deficit.      Motor: No weakness.   Psychiatric:         Mood and Affect: Mood and affect normal.             Result Review :     The following data was reviewed by: HAILEY Doan on 12/09/2021:        pulm function test             Assessment and Plan      Diagnoses and all orders for this visit:    1. Primary hypertension (Primary)    2. Fatigue, unspecified type    3. Racing heart beat    4. History of COVID-19        I spent 46 minutes caring for Estela on this date of service. This time includes time spent by me in the following activities:preparing for the visit, reviewing tests and documenting information in the medical record    Follow Up     Return if symptoms worsen or fail to improve.  She will call pulm to see if she can be seen sooner with the NP on their thoughts of returning to work.  If the are ok with her to return we will do short hours " per wk(cut hours).  She will check with her work on what they consider light duty.  If pulm feels with the lungs that she can go back to work we will start with 2 hour increments then increase to 4, 6, 8.  I will check for the long hauler pulm rehab for the pt.  Check BP at home and let me know how she is doing with her BP. No return to work until seeing what pulm and your work says     Patient was given instructions and counseling regarding her condition or for health maintenance advice. Please see specific information pulled into the AVS if appropriate.   Nannette Braun, APRN

## 2021-12-16 ENCOUNTER — APPOINTMENT (OUTPATIENT)
Dept: MRI IMAGING | Facility: HOSPITAL | Age: 46
End: 2021-12-16

## 2021-12-30 ENCOUNTER — OFFICE VISIT (OUTPATIENT)
Dept: PULMONOLOGY | Facility: CLINIC | Age: 46
End: 2021-12-30

## 2021-12-30 VITALS
WEIGHT: 221.6 LBS | BODY MASS INDEX: 36.92 KG/M2 | RESPIRATION RATE: 16 BRPM | OXYGEN SATURATION: 99 % | HEIGHT: 65 IN | TEMPERATURE: 98 F

## 2021-12-30 DIAGNOSIS — J30.9 ALLERGIC RHINITIS, UNSPECIFIED SEASONALITY, UNSPECIFIED TRIGGER: ICD-10-CM

## 2021-12-30 DIAGNOSIS — G47.33 OSA (OBSTRUCTIVE SLEEP APNEA): ICD-10-CM

## 2021-12-30 DIAGNOSIS — G47.19 EXCESSIVE DAYTIME SLEEPINESS: ICD-10-CM

## 2021-12-30 DIAGNOSIS — J30.2 SEASONAL ALLERGIES: ICD-10-CM

## 2021-12-30 DIAGNOSIS — E66.9 CLASS 2 OBESITY WITH BODY MASS INDEX (BMI) OF 37.0 TO 37.9 IN ADULT, UNSPECIFIED OBESITY TYPE, UNSPECIFIED WHETHER SERIOUS COMORBIDITY PRESENT: ICD-10-CM

## 2021-12-30 DIAGNOSIS — J96.01 ACUTE RESPIRATORY FAILURE WITH HYPOXIA (HCC): ICD-10-CM

## 2021-12-30 DIAGNOSIS — R06.00 DYSPNEA, UNSPECIFIED TYPE: ICD-10-CM

## 2021-12-30 DIAGNOSIS — R05.9 COUGH: ICD-10-CM

## 2021-12-30 DIAGNOSIS — U09.9 POST-ACUTE COVID-19 SYNDROME: Primary | ICD-10-CM

## 2021-12-30 PROCEDURE — 99214 OFFICE O/P EST MOD 30 MIN: CPT | Performed by: NURSE PRACTITIONER

## 2021-12-30 RX ORDER — DULOXETIN HYDROCHLORIDE 20 MG/1
CAPSULE, DELAYED RELEASE ORAL
COMMUNITY
Start: 2021-11-30 | End: 2022-02-22

## 2021-12-30 RX ORDER — SEMAGLUTIDE 1.34 MG/ML
INJECTION, SOLUTION SUBCUTANEOUS
COMMUNITY
Start: 2021-12-03 | End: 2022-03-03

## 2021-12-30 NOTE — PATIENT INSTRUCTIONS
Obesity, Adult  Obesity is the condition of having too much total body fat. Being overweight or obese means that your weight is greater than what is considered healthy for your body size. Obesity is determined by a measurement called BMI. BMI is an estimate of body fat and is calculated from height and weight. For adults, a BMI of 30 or higher is considered obese.  Obesity can lead to other health concerns and major illnesses, including:  · Stroke.  · Coronary artery disease (CAD).  · Type 2 diabetes.  · Some types of cancer, including cancers of the colon, breast, uterus, and gallbladder.  · Osteoarthritis.  · High blood pressure (hypertension).  · High cholesterol.  · Sleep apnea.  · Gallbladder stones.  · Infertility problems.  What are the causes?  Common causes of this condition include:  · Eating daily meals that are high in calories, sugar, and fat.  · Being born with genes that may make you more likely to become obese.  · Having a medical condition that causes obesity, including:  ? Hypothyroidism.  ? Polycystic ovarian syndrome (PCOS).  ? Binge-eating disorder.  ? Cushing syndrome.  · Taking certain medicines, such as steroids, antidepressants, and seizure medicines.  · Not being physically active (sedentary lifestyle).  · Not getting enough sleep.  · Drinking high amounts of sugar-sweetened beverages, such as soft drinks.  What increases the risk?  The following factors may make you more likely to develop this condition:  · Having a family history of obesity.  · Being a woman of  descent.  · Being a man of  descent.  · Living in an area with limited access to:  ? [place], recreation centers, or sidewalks.  ? Healthy food choices, such as grocery stores and farmers' markets.  What are the signs or symptoms?  The main sign of this condition is having too much body fat.  How is this diagnosed?  This condition is diagnosed based on:  · Your BMI. If you are an adult with a BMI of 30 or  higher, you are considered obese.  · Your waist circumference. This measures the distance around your waistline.  · Your skinfold thickness. Your health care provider may gently pinch a fold of your skin and measure it.  You may have other tests to check for underlying conditions.  How is this treated?  Treatment for this condition often includes changing your lifestyle. Treatment may include some or all of the following:  · Dietary changes. This may include developing a healthy meal plan.  · Regular physical activity. This may include activity that causes your heart to beat faster (aerobic exercise) and strength training. Work with your health care provider to design an exercise program that works for you.  · Medicine to help you lose weight if you are unable to lose 1 pound a week after 6 weeks of healthy eating and more physical activity.  · Treating conditions that cause the obesity (underlying conditions).  · Surgery. Surgical options may include gastric banding and gastric bypass. Surgery may be done if:  ? Other treatments have not helped to improve your condition.  ? You have a BMI of 40 or higher.  ? You have life-threatening health problems related to obesity.  Follow these instructions at home:  Eating and drinking    · Follow recommendations from your health care provider about what you eat and drink. Your health care provider may advise you to:  ? Limit fast food, sweets, and processed snack foods.  ? Choose low-fat options, such as low-fat milk instead of whole milk.  ? Eat 5 or more servings of fruits or vegetables every day.  ? Eat at home more often. This gives you more control over what you eat.  ? Choose healthy foods when you eat out.  ? Learn to read food labels. This will help you understand how much food is considered 1 serving.  ? Learn what a healthy serving size is.  ? Keep low-fat snacks available.  ? Limit sugary drinks, such as soda, fruit juice, sweetened iced tea, and flavored  milk.  · Drink enough water to keep your urine pale yellow.  · Do not follow a fad diet. Fad diets can be unhealthy and even dangerous.    Physical activity  · Exercise regularly, as told by your health care provider.  ? Most adults should get up to 150 minutes of moderate-intensity exercise every week.  ? Ask your health care provider what types of exercise are safe for you and how often you should exercise.  · Warm up and stretch before being active.  · Cool down and stretch after being active.  · Rest between periods of activity.  Lifestyle  · Work with your health care provider and a dietitian to set a weight-loss goal that is healthy and reasonable for you.  · Limit your screen time.  · Find ways to reward yourself that do not involve food.  · Do not drink alcohol if:  ? Your health care provider tells you not to drink.  ? You are pregnant, may be pregnant, or are planning to become pregnant.  · If you drink alcohol:  ? Limit how much you use to:  § 0-1 drink a day for women.  § 0-2 drinks a day for men.  ? Be aware of how much alcohol is in your drink. In the [place] one drink equals one 12 oz bottle of beer (355 mL), one 5 oz glass of wine (148 mL), or one 1½ oz glass of hard liquor (44 mL).  General instructions  · Keep a weight-loss journal to keep track of the food you eat and how much exercise you get.  · Take over-the-counter and prescription medicines only as told by your health care provider.  · Take vitamins and supplements only as told by your health care provider.  · Consider joining a support group. Your health care provider may be able to recommend a support group.  · Keep all follow-up visits as told by your health care provider. This is important.  Contact a health care provider if:  · You are unable to meet your weight loss goal after 6 weeks of dietary and lifestyle changes.  Get help right away if you are having:  · Trouble breathing.  · Suicidal thoughts or behaviors.  Summary  · Obesity is  the condition of having too much total body fat.  · Being overweight or obese means that your weight is greater than what is considered healthy for your body size.  · Work with your health care provider and a dietitian to set a weight-loss goal that is healthy and reasonable for you.  · Exercise regularly, as told by your health care provider. Ask your health care provider what types of exercise are safe for you and how often you should exercise.  This information is not intended to replace advice given to you by your health care provider. Make sure you discuss any questions you have with your health care provider.  Document Revised: 08/22/2019 Document Reviewed: 08/22/2019  [employer] Patient Education © 2021 [employer]      Sleep Apnea  Sleep apnea is a condition in which breathing pauses or becomes shallow during sleep. Episodes of sleep apnea usually last 10 seconds or longer, and they may occur as many as 20 times an hour. Sleep apnea disrupts your sleep and keeps your body from getting the rest that it needs. This condition can increase your risk of certain health problems, including:  · Heart attack.  · Stroke.  · Obesity.  · Diabetes.  · Heart failure.  · Irregular heartbeat.  What are the causes?  There are three kinds of sleep apnea:  · Obstructive sleep apnea. This kind is caused by a blocked or collapsed airway.  · Central sleep apnea. This kind happens when the part of the brain that controls breathing does not send the correct signals to the muscles that control breathing.  · Mixed sleep apnea. This is a combination of obstructive and central sleep apnea.  The most common cause of this condition is a collapsed or blocked airway. An airway can collapse or become blocked if:  · Your throat muscles are abnormally relaxed.  · Your tongue and tonsils are larger than normal.  · You are overweight.  · Your airway is smaller than normal.  What increases the risk?  You are more likely to develop this condition  if you:  · Are overweight.  · Smoke.  · Have a smaller than normal airway.  · Are elderly.  · Are male.  · Drink alcohol.  · Take sedatives or tranquilizers.  · Have a family history of sleep apnea.  What are the signs or symptoms?  Symptoms of this condition include:  · Trouble staying asleep.  · Daytime sleepiness and tiredness.  · Irritability.  · Loud snoring.  · Morning headaches.  · Trouble concentrating.  · Forgetfulness.  · Decreased interest in sex.  · Unexplained sleepiness.  · Mood swings.  · Personality changes.  · Feelings of depression.  · Waking up often during the night to urinate.  · Dry mouth.  · Sore throat.  How is this diagnosed?  This condition may be diagnosed with:  · A medical history.  · A physical exam.  · A series of tests that are done while you are sleeping (sleep study). These tests are usually done in a sleep lab, but they may also be done at home.  How is this treated?  Treatment for this condition aims to restore normal breathing and to ease symptoms during sleep. It may involve managing health issues that can affect breathing, such as high blood pressure or obesity. Treatment may include:  · Sleeping on your side.  · Using a decongestant if you have nasal congestion.  · Avoiding the use of depressants, including alcohol, sedatives, and narcotics.  · Losing weight if you are overweight.  · Making changes to your diet.  · Quitting smoking.  · Using a device to open your airway while you sleep, such as:  ? An oral appliance. This is a custom-made mouthpiece that shifts your lower jaw forward.  ? A continuous positive airway pressure (CPAP) device. This device blows air through a mask when you breathe out (exhale).  ? A nasal expiratory positive airway pressure (EPAP) device. This device has valves that you put into each nostril.  ? A bi-level positive airway pressure (BPAP) device. This device blows air through a mask when you breathe in (inhale) and breathe out (exhale).  · Having  surgery if other treatments do not work. During surgery, excess tissue is removed to create a wider airway.  It is important to get treatment for sleep apnea. Without treatment, this condition can lead to:  · High blood pressure.  · Coronary artery disease.  · In men, an inability to achieve or maintain an erection (impotence).  · Reduced thinking abilities.  Follow these instructions at home:  Lifestyle  · Make any lifestyle changes that your health care provider recommends.  · Eat a healthy, well-balanced diet.  · Take steps to lose weight if you are overweight.  · Avoid using depressants, including alcohol, sedatives, and narcotics.  · Do not use any products that contain nicotine or tobacco, such as cigarettes, e-cigarettes, and chewing tobacco. If you need help quitting, ask your health care provider.  General instructions  · Take over-the-counter and prescription medicines only as told by your health care provider.  · If you were given a device to open your airway while you sleep, use it only as told by your health care provider.  · If you are having surgery, make sure to tell your health care provider you have sleep apnea. You may need to bring your device with you.  · Keep all follow-up visits as told by your health care provider. This is important.  Contact a health care provider if:  · The device that you received to open your airway during sleep is uncomfortable or does not seem to be working.  · Your symptoms do not improve.  · Your symptoms get worse.  Get help right away if:  · You develop:  ? Chest pain.  ? Shortness of breath.  ? Discomfort in your back, arms, or stomach.  · You have:  ? Trouble speaking.  ? Weakness on one side of your body.  ? Drooping in your face.  These symptoms may represent a serious problem that is an emergency. Do not wait to see if the symptoms will go away. Get medical help right away. Call your local emergency services (911 in the [place] Do not drive yourself to the  [facility].  Summary  · Sleep apnea is a condition in which breathing pauses or becomes shallow during sleep.  · The most common cause is a collapsed or blocked airway.  · The goal of treatment is to restore normal breathing and to ease symptoms during sleep.  This information is not intended to replace advice given to you by your health care provider. Make sure you discuss any questions you have with your health care provider.  Document Revised: 06/03/2020 Document Reviewed: 08/13/2019  [employer] Patient Education © 2021 [employer]

## 2021-12-30 NOTE — PROGRESS NOTES
"Primary Care Provider  Nannette Braun APRN     Referring Provider  No ref. provider found     Chief Complaint  Follow-up, Results (PFT results), Shortness of Breath (with rest and ambulation), and Cough (\"dry hacking, bark\")    Subjective          Estela Deras presents to [facility]  History of Present Illness  Estela Deras is a 46 y.o. female patient of recently seen by Dr. Haley for post COVID-19 syndrome.  She is here for a follow-up visit today.    Patient recently had a pulmonary function test on 11/23/2021 which showed no obstruction, no significant response to bronchodilator therapy, a mildly reduced diffusion capacity, and mild restrictive defect.  Explained patient that her reduced diffusion capacity and mild restriction could be a result of things such as interstitial lung disease, obesity, pulmonary edema, and pulmonary hypertension.  Patient is scheduled to have a chest CT and echocardiogram in February 2022.  Explained to patient that we will know more at that point in time.  Patient did complete a 6-minute walk on 2 L of oxygen and her lowest saturations were 99%.  Patient also had a an alpha-1 drawn at her last office visit and she has a normal genotype MM with a normal level of 118.7.  Patient is currently on Breo.  She has albuterol to use as needed.  Patient was also ordered to have a home sleep study to assess for sleep apnea.  There are also labs that need to be completed such as an IgE level and allergens panel.  Strongly encouraged patient to have his labs completed.    Patient states that since her last visit she has been doing okay, and had gone approximately 1 month without wearing her oxygen.  Unfortunately, this past Monday she started having an increase in shortness of breath and started wearing her oxygen at 2 L again.  She was prescribed Breo at a previous visit, but states it is a too expensive and she has not been using " this.  She uses her albuterol inhaler approximately twice a month.  Patient states that she was never contacted about a home sleep study.  Spoke with patient about weight reduction in terms of 30 minutes of exercise, and referral to dietitian.  She would like to hold off on the referral at this time.  She was recently placed on Ozempic, and her thyroid medications are being adjusted.  Patient states that she has lost 4 pounds since her last visit with our office.  Patient is trying to stay as active as possible.  She is up-to-date with her flu vaccine.  She states that she will be receiving her Covid and pneumonia vaccines very soon.  She is able to perform her ADLs without difficulty as long she paces herself.     Her history of smoking is      Tobacco Use: Low Risk    • Smoking Tobacco Use: Never Smoker   • Smokeless Tobacco Use: Never Used   .    Review of Systems   Constitutional: Negative for chills, fatigue, fever, unexpected weight gain and unexpected weight loss.   HENT: Negative for congestion (Nasal), hearing loss, mouth sores, nosebleeds, postnasal drip, sore throat and trouble swallowing.    Eyes: Negative for blurred vision and visual disturbance.   Respiratory: Positive for cough and shortness of breath. Negative for apnea and wheezing.         Negative for Hemoptysis     Cardiovascular: Negative for chest pain, palpitations and leg swelling.   Gastrointestinal: Negative for abdominal pain, constipation, diarrhea, nausea, vomiting and GERD.        Negative for Jaundice  Negative for Bloating  Negative for Melena   Musculoskeletal: Negative for joint swelling and myalgias.        Negative for Joint pain  Negative for Joint stiffness   Skin: Negative for color change.        Negative for cyanosis   Neurological: Negative for syncope, weakness, numbness and headache.      Sleep: Positive for Excessive daytime sleepiness  Negative for morning headaches  Negative for Snoring    Family History   Problem  Relation Age of Onset   • Arthritis Mother    • Osteoporosis Mother    • Depression Mother    • Hyperlipidemia Mother    • Other Father         RENAL CALCULUS   • Depression Father    • Hyperlipidemia Father    • Diabetes Paternal Grandfather    • Heart disease Paternal Grandfather    • Lung cancer Other    • Diabetes type II Other    • Hyperlipidemia Other    • Heart disease Other         ISCHEMIC   • Cancer Other    • Hypertension Other    • Arthritis Paternal Uncle    • Diabetes Paternal Uncle    • Asthma Daughter    • Depression Daughter    • Cancer Maternal Grandfather    • Thyroid disease Paternal Grandmother         Social History     Socioeconomic History   • Marital status:    Tobacco Use   • Smoking status: Never Smoker   • Smokeless tobacco: Never Used   Vaping Use   • Vaping Use: Never used   Substance and Sexual Activity   • Alcohol use: Never   • Drug use: Never   • Sexual activity: Not Currently     Partners: Male     Birth control/protection: Surgical        Past Medical History:   Diagnosis Date   • Abnormal electrocardiogram    • Allergic    • Allergic rhinitis    • Anemia    • Arthritis    • Asthma    • COVID-19 09/2021   • Depression    • Dysfunctional uterine bleeding    • Fatty liver    • Foot pain    • Gastroesophageal reflux disease without esophagitis    • Generalized anxiety disorder    • H/O cold sores    • Hyperlipidemia    • Hypertriglyceridemia    • Hypothyroidism    • Impaired fasting glucose    • Insomnia, unspecified    • Liver function study, abnormal    • Low back pain    • Macromastia    • Migraine headache    • Mild episode of recurrent major depressive disorder (HCC)    • Mitral valve prolapse    • Night sweats    • Obesity    • Sinus trouble    • Thyroid disorder    • Tremor    • Vitamin D deficiency         Immunization History   Administered Date(s) Administered   • FluLaval/Fluarix/Fluzone >6 01/06/2014, 10/13/2021   • Hepatitis A 05/15/2018, 11/05/2018   •  Influenza, Unspecified 02/22/2019   • MMR 02/22/2019   • TD Preservative Free 11/05/2018   • Tdap 11/06/2018         Allergies   Allergen Reactions   • Norco [Hydrocodone-Acetaminophen] Headache   • Chlorhexidine Rash   • Adhesive Tape Other (See Comments)     BURNS SKIN   • Cetirizine Itching   • Codeine Headache and Other (See Comments)     MIGRANES   • Meperidine Headache and Other (See Comments)     MIGRANES   • Propoxyphene Other (See Comments)     MIGRANES   • Sesame Oil Nausea And Vomiting   • Acetaminophen Palpitations          Current Outpatient Medications:   •  albuterol sulfate  (90 Base) MCG/ACT inhaler, Inhale 1-2 puffs Every 4 (Four) Hours As Needed for Wheezing or Shortness of Air for up to 30 days., Disp: 18 g, Rfl: 11  •  ALPRAZolam (XANAX) 0.5 MG tablet, , Disp: , Rfl:   •  ARIPiprazole (ABILIFY) 2 MG tablet, Take 2 mg by mouth Daily., Disp: , Rfl:   •  atomoxetine (STRATTERA) 40 MG capsule, Take 40 mg by mouth Every Morning., Disp: , Rfl:   •  atorvastatin (Lipitor) 80 MG tablet, Take 1 tablet by mouth Daily., Disp: 90 tablet, Rfl: 1  •  cyproheptadine (PERIACTIN) 4 MG tablet, Take 2 tablets by mouth Every Night., Disp: 180 tablet, Rfl: 1  •  diclofenac (VOLTAREN) 50 MG EC tablet, Take 1 tablet by mouth 2 (Two) Times a Day As Needed (pain)., Disp: 60 tablet, Rfl: 2  •  DULoxetine (CYMBALTA) 20 MG capsule, , Disp: , Rfl:   •  DULoxetine (CYMBALTA) 60 MG capsule, Take 40 mg by mouth Every Morning., Disp: , Rfl:   •  ezetimibe (ZETIA) 10 MG tablet, Take 1 tablet by mouth Daily. (Patient taking differently: Take 10 mg by mouth Every Night.), Disp: 90 tablet, Rfl: 1  •  FLUoxetine (PROzac) 20 MG capsule, Take 20 mg by mouth Daily., Disp: , Rfl:   •  lamoTRIgine (LaMICtal) 100 MG tablet, Take 100 mg by mouth Every Night., Disp: , Rfl:   •  levothyroxine (Synthroid) 25 MCG tablet, Take 1 tablet by mouth Daily., Disp: 90 tablet, Rfl: 1  •  loratadine (Claritin) 10 MG tablet, Take 10 mg by mouth  Daily., Disp: , Rfl:   •  metFORMIN ER (GLUCOPHAGE-XR) 500 MG 24 hr tablet, Take 1 tablet by mouth Daily With Breakfast., Disp: 90 tablet, Rfl: 1  •  metoprolol succinate XL (TOPROL-XL) 25 MG 24 hr tablet, Take 1 tablet by mouth Daily., Disp: 90 tablet, Rfl: 3  •  midodrine (PROAMATINE) 5 MG tablet, Take 1 tablet by mouth 3 (Three) Times a Day Before Meals., Disp: 90 tablet, Rfl: 0  •  omeprazole (priLOSEC) 20 MG capsule, Take 1 capsule by mouth Daily. (Patient taking differently: Take 20 mg by mouth Every Morning.), Disp: 90 capsule, Rfl: 1  •  Ozempic, 0.25 or 0.5 MG/DOSE, 2 MG/1.5ML solution pen-injector, , Disp: , Rfl:   •  traZODone (DESYREL) 100 MG tablet, Take 1 tablet by mouth Every Night., Disp: 90 tablet, Rfl: 1  •  valACYclovir (VALTREX) 500 MG tablet, Take 1 tablet by mouth Daily. (Patient taking differently: Take 500 mg by mouth Every Morning.), Disp: 90 tablet, Rfl: 1  •  vitamin D (ERGOCALCIFEROL) 1.25 MG (76408 UT) capsule capsule, Take 1 capsule by mouth 1 (One) Time Per Week., Disp: 5 capsule, Rfl: 5  •  albuterol (PROVENTIL) (2.5 MG/3ML) 0.083% nebulizer solution, Take 2.5 mg by nebulization 4 (Four) Times a Day As Needed for Wheezing for up to 30 days., Disp: 120 each, Rfl: 5  •  Fluticasone Furoate-Vilanterol (Breo Ellipta) 200-25 MCG/INH inhaler, Inhale 1 puff Daily. Patient need to gargle mouth before and after taking the Breo Ellipta., Disp: 1 each, Rfl: 5     Objective   Physical Exam  Constitutional:       General: She is not in acute distress.     Appearance: Normal appearance. She is obese.   HENT:      Right Ear: Hearing normal.      Left Ear: Hearing normal.      Nose: No nasal tenderness or congestion.      Mouth/Throat:      Mouth: Mucous membranes are moist. No oral lesions.   Eyes:      Extraocular Movements: Extraocular movements intact.      Pupils: Pupils are equal, round, and reactive to light.   Neck:      Thyroid: No thyroid mass or thyromegaly.   Cardiovascular:      Rate and  "Rhythm: Normal rate and regular rhythm.      Pulses: Normal pulses.      Heart sounds: Normal heart sounds. No murmur heard.      Pulmonary:      Effort: Pulmonary effort is normal.      Breath sounds: Decreased breath sounds present. No wheezing, rhonchi or rales.      Comments: Patient is on 2 L of oxygen.  She is able to speak full sentences without difficulty.  Chest:   Breasts:      Right: No axillary adenopathy.       Abdominal:      General: Bowel sounds are normal. There is no distension.      Palpations: Abdomen is soft.      Tenderness: There is no abdominal tenderness.   Musculoskeletal:      Cervical back: Neck supple.      Right lower leg: No edema.      Left lower leg: No edema.   Lymphadenopathy:      Cervical: No cervical adenopathy.      Upper Body:      Right upper body: No axillary adenopathy.   Skin:     General: Skin is warm and dry.      Findings: No lesion or rash.   Neurological:      General: No focal deficit present.      Mental Status: She is alert and oriented to person, place, and time.      Cranial Nerves: Cranial nerves are intact.   Psychiatric:         Mood and Affect: Affect normal. Mood is not anxious or depressed.         Vital Signs:   Temp 98 °F (36.7 °C) (Tympanic)   Resp 16   Ht 165.1 cm (65\")   Wt 101 kg (221 lb 9.6 oz)   SpO2 99% Comment: nasal cannula 2 liters  BMI 36.88 kg/m²        Result Review :     CMP    CMP 9/28/21 10/22/21 12/5/21   Glucose 242 (A) 123 (A) 188 (A)   BUN 19 16 16   Creatinine 0.63 0.67 0.79   eGFR Non African Am 103 96 79   Sodium 135 (A) 136 140   Potassium 3.7 3.5 4.4   Chloride 100 100 101   Calcium 8.8 9.5 9.2   Albumin 3.50 4.40 4.30   Total Bilirubin 0.4 0.7 0.9   Alkaline Phosphatase 90 77 112   AST (SGOT) 26 41 (A) 25   ALT (SGPT) 50 (A) 86 (A) 54 (A)   (A) Abnormal value       Comments are available for some flowsheets but are not being displayed.           CBC w/diff    CBC w/Diff 9/28/21 10/22/21 12/5/21   WBC 12.84 (A) 12.72 (A) " 13.42 (A)   RBC 4.87 4.86 5.00   Hemoglobin 13.6 13.6 14.2   Hematocrit 41.9 43.1 43.3   MCV 86.0 88.7 86.6   MCH 27.9 28.0 28.4   MCHC 32.5 31.6 32.8   RDW 13.2 14.6 14.4   Platelets 269 165 232   Neutrophil Rel % 75.9 57.0 87.7 (A)   Immature Granulocyte Rel % 0.8 (A) 0.7 (A) 0.8 (A)   Lymphocyte Rel % 16.4 (A) 35.9 7.0 (A)   Monocyte Rel % 6.2 5.7 4.3 (A)   Eosinophil Rel % 0.5 0.5 0.0 (A)   Basophil Rel % 0.2 0.2 0.2   (A) Abnormal value            Covid Tests    Common Labsle 9/20/21   COVID19 Detected (A)   (A) Abnormal value            Data reviewed: Radiologic studies Chest x-ray 12/5/2021, pulmonary function test and 6-minute walk 11/23/2021 and Dr. Haley last office note   Procedures        Assessment and Plan    Diagnoses and all orders for this visit:    1. 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 (Primary)  -     Oxygen Therapy    2. Allergic rhinitis, unspecified seasonality, unspecified trigger    3. Seasonal allergies    4. Dyspnea, unspecified type  -     Oxygen Therapy    5. RODERICK (obstructive sleep apnea)  -     Home Sleep Study; Future    6. Acute respiratory failure with hypoxia (HCC)  -     Oxygen Therapy    7. Cough    8. Class 2 obesity with body mass index (BMI) of 37.0 to 37.9 in adult, unspecified obesity type, unspecified whether serious comorbidity present    9. Excessive daytime sleepiness  -     Home Sleep Study; Future    10.  Continue albuterol as needed  11.  Continue Claritin for allergies  12.  Encouraged patient to try to stay as active as possible.  Recommended 30 minutes of daily exercise.  Offered patient referral to dietitian.  She politely declines at this time, as her endocrinologist is changing her thyroid medications and placed her on Ozempic.  13.  Strongly encouraged patient to keep appointments for CT scan and echocardiogram in February.  We will know more about her lung restriction and decreased diffusion capacity at that time.  14.  I will reorder patient to have a home sleep  study.  15.  Follow-up as scheduled in February after CT and echocardiogram, sooner if needed.    I spent 35 minutes caring for Estela on this date of service. This time includes time spent by me in the following activities:preparing for the visit, reviewing tests, obtaining and/or reviewing a separately obtained history, performing a medically appropriate examination and/or evaluation , counseling and educating the patient/family/caregiver, ordering medications, tests, or procedures and documenting information in the medical record    Follow Up   Return for Next scheduled follow up.  Patient was given instructions and counseling regarding her condition or for health maintenance advice. Please see specific information pulled into the AVS if appropriate.

## 2022-01-03 ENCOUNTER — HOSPITAL ENCOUNTER (OUTPATIENT)
Dept: MRI IMAGING | Facility: HOSPITAL | Age: 47
Discharge: HOME | End: 2022-01-03
Admitting: PSYCHIATRY & NEUROLOGY

## 2022-01-03 DIAGNOSIS — R41.3 MEMORY LOSS: ICD-10-CM

## 2022-01-03 PROCEDURE — 70551 MRI BRAIN STEM W/O DYE: CPT

## 2022-01-04 ENCOUNTER — APPOINTMENT (OUTPATIENT)
Dept: MRI IMAGING | Facility: HOSPITAL | Age: 47
End: 2022-01-04

## 2022-01-04 ENCOUNTER — LAB (OUTPATIENT)
Dept: LAB | Facility: HOSPITAL | Age: 47
End: 2022-01-04

## 2022-01-04 DIAGNOSIS — T78.40XA ALLERGY, INITIAL ENCOUNTER: ICD-10-CM

## 2022-01-04 DIAGNOSIS — U09.9 POST-ACUTE COVID-19 SYNDROME: ICD-10-CM

## 2022-01-04 DIAGNOSIS — R05.9 COUGH: ICD-10-CM

## 2022-01-04 PROCEDURE — 86003 ALLG SPEC IGE CRUDE XTRC EA: CPT

## 2022-01-04 PROCEDURE — 36415 COLL VENOUS BLD VENIPUNCTURE: CPT

## 2022-01-04 PROCEDURE — 82103 ALPHA-1-ANTITRYPSIN TOTAL: CPT | Performed by: SPECIALIST

## 2022-01-04 PROCEDURE — 82104 ALPHA-1-ANTITRYPSIN PHENO: CPT | Performed by: SPECIALIST

## 2022-01-04 PROCEDURE — 82785 ASSAY OF IGE: CPT

## 2022-01-06 LAB — IGE SERPL-ACNC: <2 KU/L

## 2022-01-07 LAB
A1AT PHENOTYP SERPL IFE: NORMAL
A1AT SERPL-MCNC: 132 MG/DL (ref 101–187)

## 2022-01-08 LAB
A ALTERNATA IGE QN: <0.1 KU/L
A FUMIGATUS IGE QN: <0.1 KU/L
AMER ROACH IGE QN: <0.1 KU/L
BAHIA GRASS IGE QN: <0.1 KU/L
BAYBERRY POLN IGE QN: <0.1 KU/L
BERMUDA GRASS IGE QN: <0.1 KU/L
BOXELDER IGE QN: <0.1 KU/L
C HERBARUM IGE QN: <0.1 KU/L
CAT DANDER IGE QN: <0.1 KU/L
COMMON RAGWEED IGE QN: <0.1 KU/L
CONV CLASS DESCRIPTION: NORMAL
D FARINAE IGE QN: <0.1 KU/L
D PTERONYSS IGE QN: <0.1 KU/L
DOG DANDER IGE QN: <0.1 KU/L
DOG FENNEL IGE QN: <0.1 KU/L
ENGL PLANTAIN IGE QN: <0.1 KU/L
GOOSEFOOT IGE QN: <0.1 KU/L
GUM-TREE IGE QN: <0.1 KU/L
ITALIAN CYPRESS IGE QN: <0.1 KU/L
JOHNSON GRASS IGE QN: <0.1 KU/L
M RACEMOSUS IGE QN: <0.1 KU/L
P NOTATUM IGE QN: <0.1 KU/L
PEPPER TREE IGE QN: <0.1 KU/L
PER RYE GRASS IGE QN: <0.1 KU/L
PRIVET IGE QN: <0.1 KU/L
QUEEN PALM IGE QN: <0.1 KU/L
S BOTRYOSUM IGE QN: <0.1 KU/L
SHEEP SORREL IGE QN: <0.1 KU/L
VIRG LIVE OAK IGE QN: <0.1 KU/L
WHITE ELM IGE QN: <0.1 KU/L

## 2022-01-20 ENCOUNTER — APPOINTMENT (OUTPATIENT)
Dept: SLEEP MEDICINE | Facility: HOSPITAL | Age: 47
End: 2022-01-20

## 2022-01-24 ENCOUNTER — HOSPITAL ENCOUNTER (OUTPATIENT)
Dept: SLEEP MEDICINE | Facility: HOSPITAL | Age: 47
Discharge: HOME | End: 2022-01-24
Admitting: NURSE PRACTITIONER

## 2022-01-24 DIAGNOSIS — G47.33 OSA (OBSTRUCTIVE SLEEP APNEA): ICD-10-CM

## 2022-01-24 DIAGNOSIS — G47.19 EXCESSIVE DAYTIME SLEEPINESS: ICD-10-CM

## 2022-01-24 PROCEDURE — 95806 SLEEP STUDY UNATT&RESP EFFT: CPT

## 2022-01-24 PROCEDURE — 95806 SLEEP STUDY UNATT&RESP EFFT: CPT | Performed by: INTERNAL MEDICINE

## 2022-01-25 ENCOUNTER — TELEPHONE (OUTPATIENT)
Dept: URGENT CARE | Facility: CLINIC | Age: 47
End: 2022-01-25

## 2022-01-25 PROCEDURE — U0004 COV-19 TEST NON-CDC HGH THRU: HCPCS | Performed by: NURSE PRACTITIONER

## 2022-01-25 NOTE — TELEPHONE ENCOUNTER
----- Message from HAILEY Nelson sent at 1/25/2022  5:19 PM EST -----  Please call the patient regarding her negative result.

## 2022-02-01 DIAGNOSIS — G47.33 OSA (OBSTRUCTIVE SLEEP APNEA): Primary | ICD-10-CM

## 2022-02-07 ENCOUNTER — HOSPITAL ENCOUNTER (OUTPATIENT)
Dept: CT IMAGING | Facility: HOSPITAL | Age: 47
Discharge: HOME | End: 2022-02-07
Admitting: SPECIALIST

## 2022-02-07 DIAGNOSIS — R05.9 COUGH: ICD-10-CM

## 2022-02-07 DIAGNOSIS — U09.9 POST-ACUTE COVID-19 SYNDROME: ICD-10-CM

## 2022-02-07 PROCEDURE — 71250 CT THORAX DX C-: CPT

## 2022-02-08 ENCOUNTER — APPOINTMENT (OUTPATIENT)
Dept: CARDIAC REHAB | Facility: HOSPITAL | Age: 47
End: 2022-02-08

## 2022-02-08 ENCOUNTER — APPOINTMENT (OUTPATIENT)
Dept: RESPIRATORY THERAPY | Facility: HOSPITAL | Age: 47
End: 2022-02-08

## 2022-02-08 ENCOUNTER — HOSPITAL ENCOUNTER (OUTPATIENT)
Dept: CARDIOLOGY | Facility: HOSPITAL | Age: 47
Discharge: HOME | End: 2022-02-08
Admitting: NURSE PRACTITIONER

## 2022-02-08 DIAGNOSIS — R06.00 DYSPNEA, UNSPECIFIED TYPE: ICD-10-CM

## 2022-02-08 PROCEDURE — 93306 TTE W/DOPPLER COMPLETE: CPT | Performed by: INTERNAL MEDICINE

## 2022-02-08 PROCEDURE — 93306 TTE W/DOPPLER COMPLETE: CPT

## 2022-02-09 LAB
BH CV ECHO MEAS - AO ROOT DIAM: 2.4 CM
BH CV ECHO MEAS - EF(MOD-BP): 65 %
BH CV ECHO MEAS - IVSD: 1 CM
BH CV ECHO MEAS - LA DIMENSION(2D): 3.1 CM
BH CV ECHO MEAS - LAT PEAK E' VEL: 7 CM/SEC
BH CV ECHO MEAS - LVIDD: 4.3 CM
BH CV ECHO MEAS - LVIDS: 2.5 CM
BH CV ECHO MEAS - LVPWD: 0.9 CM
BH CV ECHO MEAS - MED PEAK E' VEL: 10 CM/SEC
BH CV ECHO MEAS - MV A MAX VEL: 79 CM/SEC
BH CV ECHO MEAS - MV DEC TIME: 191 MSEC
BH CV ECHO MEAS - MV E MAX VEL: 78 CM/SEC
BH CV ECHO MEAS - MV E/A: 1
BH CV ECHO MEASUREMENTS AVERAGE E/E' RATIO: 9.18
IVRT: 55 MSEC
LEFT ATRIUM VOLUME INDEX: 24 ML/M2
MAXIMAL PREDICTED HEART RATE: 176 BPM
STRESS TARGET HR: 150 BPM

## 2022-02-11 ENCOUNTER — OFFICE VISIT (OUTPATIENT)
Dept: PULMONOLOGY | Facility: CLINIC | Age: 47
End: 2022-02-11

## 2022-02-11 VITALS
TEMPERATURE: 98 F | SYSTOLIC BLOOD PRESSURE: 132 MMHG | HEART RATE: 87 BPM | RESPIRATION RATE: 14 BRPM | OXYGEN SATURATION: 98 % | BODY MASS INDEX: 34.72 KG/M2 | WEIGHT: 216 LBS | DIASTOLIC BLOOD PRESSURE: 79 MMHG | HEIGHT: 66 IN

## 2022-02-11 DIAGNOSIS — E66.9 CLASS 1 OBESITY WITH BODY MASS INDEX (BMI) OF 34.0 TO 34.9 IN ADULT, UNSPECIFIED OBESITY TYPE, UNSPECIFIED WHETHER SERIOUS COMORBIDITY PRESENT: ICD-10-CM

## 2022-02-11 DIAGNOSIS — J45.909 ASTHMA, UNSPECIFIED ASTHMA SEVERITY, UNSPECIFIED WHETHER COMPLICATED, UNSPECIFIED WHETHER PERSISTENT: Primary | ICD-10-CM

## 2022-02-11 DIAGNOSIS — R06.09 DYSPNEA ON EXERTION: ICD-10-CM

## 2022-02-11 DIAGNOSIS — R05.9 COUGH: ICD-10-CM

## 2022-02-11 DIAGNOSIS — J30.9 ALLERGIC RHINITIS, UNSPECIFIED SEASONALITY, UNSPECIFIED TRIGGER: ICD-10-CM

## 2022-02-11 DIAGNOSIS — K21.9 GASTROESOPHAGEAL REFLUX DISEASE WITHOUT ESOPHAGITIS: ICD-10-CM

## 2022-02-11 DIAGNOSIS — G47.33 OSA (OBSTRUCTIVE SLEEP APNEA): ICD-10-CM

## 2022-02-11 DIAGNOSIS — Z86.16 HISTORY OF COVID-19: ICD-10-CM

## 2022-02-11 DIAGNOSIS — J18.9 MULTIFOCAL PNEUMONIA: ICD-10-CM

## 2022-02-11 DIAGNOSIS — Z23 ENCOUNTER FOR IMMUNIZATION: ICD-10-CM

## 2022-02-11 PROCEDURE — 90471 IMMUNIZATION ADMIN: CPT | Performed by: NURSE PRACTITIONER

## 2022-02-11 PROCEDURE — 99214 OFFICE O/P EST MOD 30 MIN: CPT | Performed by: NURSE PRACTITIONER

## 2022-02-11 PROCEDURE — 90732 PPSV23 VACC 2 YRS+ SUBQ/IM: CPT | Performed by: NURSE PRACTITIONER

## 2022-02-11 RX ORDER — BLOOD SUGAR DIAGNOSTIC
STRIP MISCELLANEOUS
COMMUNITY
Start: 2022-01-24

## 2022-02-11 NOTE — PROGRESS NOTES
Primary Care Provider  Nannette Braun APRN     Referring Provider  No ref. provider found     Chief Complaint  Asthma (3mo f/u )    Subjective          Estela Deras presents to [facility]  History of Present Illness  Estela Deras is a 46 y.o. female patient usually seen by myself for sleep apnea, post Covid, asthma, and shortness of breath.  She is here for a follow-up visit today.    Patient recently had a CT scan of the chest on 2/7/2022 which shows persistent but improving multifocal groundglass opacities.  She also had an echocardiogram on 2/8/2021 which shows a normal left ventricular diastolic function, ejection fraction of 65%, and no hemodynamically significant valvular pathology.  These results were shared with patient today.  She also had a sleep study on 1/25/2022 and this shows an AHI of 20.3.  It is recommended that patient start auto CPAP 8-16.  Patient is being seen by [facility] next week for potential inspire device.  Patient states that her breathing has significantly improved since her last visit in December.  She describes her shortness of breath as mild in severity, worse with exertion and improved with rest.  She is also wanting to return back to work.  She has since stopped using her Breo, and states that she has not needed to use her albuterol inhaler.  Patient has lost approximately 5 pounds since her last visit.  She states that she is trying to be more active in was started on Ozempic and metformin by her endocrinologist.  Overall, patient has no other concerns at this time.  She is up-to-date with her flu vaccine.  She would like to receive a pneumonia vaccine today.  And she states that in approximately 2 weeks she will start the process of scheduling her Covid vaccines.  She is able to form her ADLs without difficulty.     Her history of smoking is      Tobacco Use: Low Risk    • Smoking Tobacco Use: Never Smoker   •  Smokeless Tobacco Use: Never Used   .    Review of Systems   Constitutional: Negative for chills, fatigue, fever, unexpected weight gain and unexpected weight loss.   HENT: Negative for congestion (Nasal), hearing loss, mouth sores, nosebleeds, postnasal drip, sore throat and trouble swallowing.    Eyes: Negative for blurred vision and visual disturbance.   Respiratory: Positive for shortness of breath (Mild). Negative for apnea, cough and wheezing.         Negative for Hemoptysis     Cardiovascular: Negative for chest pain, palpitations and leg swelling.   Gastrointestinal: Negative for abdominal pain, constipation, diarrhea, nausea, vomiting and GERD.        Negative for Jaundice  Negative for Bloating  Negative for Melena   Musculoskeletal: Negative for joint swelling and myalgias.        Negative for Joint pain  Negative for Joint stiffness   Skin: Negative for color change.        Negative for cyanosis   Neurological: Negative for syncope, weakness, numbness and headache.      Sleep: Negative for Excessive daytime sleepiness  Negative for morning headaches  Negative for Snoring    Family History   Problem Relation Age of Onset   • Arthritis Mother    • Osteoporosis Mother    • Depression Mother    • Hyperlipidemia Mother    • Other Father         RENAL CALCULUS   • Depression Father    • Hyperlipidemia Father    • Diabetes Paternal Grandfather    • Heart disease Paternal Grandfather    • Lung cancer Other    • Diabetes type II Other    • Hyperlipidemia Other    • Heart disease Other         ISCHEMIC   • Cancer Other    • Hypertension Other    • Arthritis Paternal Uncle    • Diabetes Paternal Uncle    • Asthma Daughter    • Depression Daughter    • Cancer Maternal Grandfather    • Thyroid disease Paternal Grandmother         Social History     Socioeconomic History   • Marital status:    Tobacco Use   • Smoking status: Never Smoker   • Smokeless tobacco: Never Used   Vaping Use   • Vaping Use: Never used    Substance and Sexual Activity   • Alcohol use: Never   • Drug use: Never   • Sexual activity: Not Currently     Partners: Male     Birth control/protection: Surgical        Past Medical History:   Diagnosis Date   • Abnormal electrocardiogram    • Allergic    • Allergic rhinitis    • Anemia    • Arthritis    • Asthma    • COVID-19 09/2021   • Depression    • Dysfunctional uterine bleeding    • Fatty liver    • Foot pain    • Gastroesophageal reflux disease without esophagitis    • Generalized anxiety disorder    • H/O cold sores    • Hyperlipidemia    • Hypertriglyceridemia    • Hypothyroidism    • Impaired fasting glucose    • Insomnia, unspecified    • Liver function study, abnormal    • Low back pain    • Macromastia    • Migraine headache    • Mild episode of recurrent major depressive disorder (HCC)    • Mitral valve prolapse    • Night sweats    • Obesity    • Sinus trouble    • Thyroid disorder    • Tremor    • Vitamin D deficiency         Immunization History   Administered Date(s) Administered   • FluLaval/Fluarix/Fluzone >6 01/06/2014, 10/13/2021   • Hepatitis A 05/15/2018, 11/05/2018   • Influenza, Unspecified 02/22/2019   • MMR 02/22/2019   • Pneumococcal Polysaccharide (PPSV23) 02/11/2022   • TD Preservative Free 11/05/2018   • Tdap 11/06/2018         Allergies   Allergen Reactions   • Cetirizine Itching   • Codeine Other (See Comments) and Headache     MIGRANES   • Meperidine Other (See Comments) and Headache     MIGRANES   • Norco [Hydrocodone-Acetaminophen] Headache   • Propoxyphene Other (See Comments)     MIGRANES   • Sesame Oil Nausea And Vomiting   • Chlorhexidine Rash   • Acetaminophen Palpitations   • Adhesive Tape Rash     BURNS SKIN          Current Outpatient Medications:   •  Accu-Chek Guide test strip, USE 1 ONCE DAILY, Disp: , Rfl:   •  albuterol (PROVENTIL) (2.5 MG/3ML) 0.083% nebulizer solution, Take 2.5 mg by nebulization 4 (Four) Times a Day As Needed for Wheezing for up to 30  days., Disp: 120 each, Rfl: 5  •  albuterol sulfate  (90 Base) MCG/ACT inhaler, Inhale 1-2 puffs Every 4 (Four) Hours As Needed for Wheezing or Shortness of Air for up to 30 days., Disp: 18 g, Rfl: 11  •  ARIPiprazole (ABILIFY) 5 MG tablet, , Disp: , Rfl:   •  atomoxetine (STRATTERA) 40 MG capsule, Take 40 mg by mouth Every Morning., Disp: , Rfl:   •  atorvastatin (Lipitor) 80 MG tablet, Take 1 tablet by mouth Daily., Disp: 90 tablet, Rfl: 1  •  diclofenac (VOLTAREN) 50 MG EC tablet, Take 1 tablet by mouth 2 (Two) Times a Day As Needed (pain)., Disp: 60 tablet, Rfl: 2  •  ezetimibe (ZETIA) 10 MG tablet, Take 1 tablet by mouth Daily. (Patient taking differently: Take 10 mg by mouth Every Night.), Disp: 90 tablet, Rfl: 1  •  FLUoxetine (PROzac) 20 MG capsule, Take 40 mg by mouth Daily., Disp: , Rfl:   •  lamoTRIgine (LaMICtal) 100 MG tablet, Take 100 mg by mouth Every Night., Disp: , Rfl:   •  levothyroxine (Synthroid) 25 MCG tablet, Take 1 tablet by mouth Daily., Disp: 90 tablet, Rfl: 1  •  loratadine (Claritin) 10 MG tablet, Take 10 mg by mouth Daily., Disp: , Rfl:   •  metFORMIN ER (GLUCOPHAGE-XR) 500 MG 24 hr tablet, Take 500 mg by mouth 2 (Two) Times a Day., Disp: , Rfl:   •  metoprolol succinate XL (TOPROL-XL) 25 MG 24 hr tablet, Take 1 tablet by mouth Daily., Disp: 90 tablet, Rfl: 3  •  omeprazole (priLOSEC) 20 MG capsule, Take 1 capsule by mouth Daily. (Patient taking differently: Take 20 mg by mouth Every Morning.), Disp: 90 capsule, Rfl: 1  •  Ozempic, 0.25 or 0.5 MG/DOSE, 2 MG/1.5ML solution pen-injector, , Disp: , Rfl:   •  traZODone (DESYREL) 100 MG tablet, Take 1 tablet by mouth Every Night., Disp: 90 tablet, Rfl: 1  •  valACYclovir (VALTREX) 500 MG tablet, Take 1 tablet by mouth Daily. (Patient taking differently: Take 500 mg by mouth Every Morning.), Disp: 90 tablet, Rfl: 1  •  vitamin D (ERGOCALCIFEROL) 1.25 MG (55849 UT) capsule capsule, Take 1 capsule by mouth 1 (One) Time Per Week., Disp: 5  capsule, Rfl: 5  •  ALPRAZolam (XANAX) 0.5 MG tablet, , Disp: , Rfl:   •  cyproheptadine (PERIACTIN) 4 MG tablet, Take 2 tablets by mouth Every Night., Disp: 180 tablet, Rfl: 1  •  DULoxetine (CYMBALTA) 20 MG capsule, , Disp: , Rfl:   •  DULoxetine (CYMBALTA) 60 MG capsule, Take 40 mg by mouth Every Morning., Disp: , Rfl:   •  midodrine (PROAMATINE) 5 MG tablet, Take 1 tablet by mouth 3 (Three) Times a Day Before Meals., Disp: 90 tablet, Rfl: 0     Objective   Physical Exam  Constitutional:       General: She is not in acute distress.     Appearance: Normal appearance. She is overweight.   HENT:      Right Ear: Hearing normal.      Left Ear: Hearing normal.      Nose: No nasal tenderness or congestion.      Mouth/Throat:      Mouth: Mucous membranes are moist. No oral lesions.   Eyes:      Extraocular Movements: Extraocular movements intact.      Pupils: Pupils are equal, round, and reactive to light.   Neck:      Thyroid: No thyroid mass or thyromegaly.   Cardiovascular:      Rate and Rhythm: Normal rate and regular rhythm.      Pulses: Normal pulses.      Heart sounds: Normal heart sounds. No murmur heard.      Pulmonary:      Effort: Pulmonary effort is normal.      Breath sounds: Normal breath sounds. No wheezing, rhonchi or rales.   Chest:   Breasts:      Right: No axillary adenopathy.       Abdominal:      General: Bowel sounds are normal. There is no distension.      Palpations: Abdomen is soft.      Tenderness: There is no abdominal tenderness.   Musculoskeletal:      Cervical back: Neck supple.      Right lower leg: No edema.      Left lower leg: No edema.   Lymphadenopathy:      Cervical: No cervical adenopathy.      Upper Body:      Right upper body: No axillary adenopathy.   Skin:     General: Skin is warm and dry.      Findings: No lesion or rash.   Neurological:      General: No focal deficit present.      Mental Status: She is alert and oriented to person, place, and time.      Cranial Nerves: Cranial  "nerves are intact.   Psychiatric:         Mood and Affect: Affect normal. Mood is not anxious or depressed.         Vital Signs:   /79 (BP Location: Left arm, Patient Position: Sitting, Cuff Size: Adult)   Pulse 87   Temp 98 °F (36.7 °C) (Infrared)   Resp 14   Ht 167.6 cm (66\")   Wt 98 kg (216 lb)   SpO2 98% Comment: room air  BMI 34.86 kg/m²        Result Review :     CMP    CMP 9/28/21 10/22/21 12/5/21   Glucose 242 (A) 123 (A) 188 (A)   BUN 19 16 16   Creatinine 0.63 0.67 0.79   eGFR Non African Am 103 96 79   Sodium 135 (A) 136 140   Potassium 3.7 3.5 4.4   Chloride 100 100 101   Calcium 8.8 9.5 9.2   Albumin 3.50 4.40 4.30   Total Bilirubin 0.4 0.7 0.9   Alkaline Phosphatase 90 77 112   AST (SGOT) 26 41 (A) 25   ALT (SGPT) 50 (A) 86 (A) 54 (A)   (A) Abnormal value       Comments are available for some flowsheets but are not being displayed.           CBC w/diff    CBC w/Diff 9/28/21 10/22/21 12/5/21   WBC 12.84 (A) 12.72 (A) 13.42 (A)   RBC 4.87 4.86 5.00   Hemoglobin 13.6 13.6 14.2   Hematocrit 41.9 43.1 43.3   MCV 86.0 88.7 86.6   MCH 27.9 28.0 28.4   MCHC 32.5 31.6 32.8   RDW 13.2 14.6 14.4   Platelets 269 165 232   Neutrophil Rel % 75.9 57.0 87.7 (A)   Immature Granulocyte Rel % 0.8 (A) 0.7 (A) 0.8 (A)   Lymphocyte Rel % 16.4 (A) 35.9 7.0 (A)   Monocyte Rel % 6.2 5.7 4.3 (A)   Eosinophil Rel % 0.5 0.5 0.0 (A)   Basophil Rel % 0.2 0.2 0.2   (A) Abnormal value            Data reviewed: Radiologic studies Chest CT 2/7/2022, Cardiology studies Echocardiogram 2/8/2022, pulmonary function test and 6-minute walk 11/23/2021, sleep study 1/25/2022, and My last office note   Procedures        Assessment and Plan    Diagnoses and all orders for this visit:    1. Asthma, unspecified asthma severity, unspecified whether complicated, unspecified whether persistent (Primary)    2. RODERICK (obstructive sleep apnea)    3. Encounter for immunization  -     Pneumococcal Polysaccharide Vaccine 23-Valent Greater Than " or Equal To 1yo Subcutaneous / IM    4. Allergic rhinitis, unspecified seasonality, unspecified trigger    5. Gastroesophageal reflux disease without esophagitis    6. Multifocal pneumonia    7. Cough    8. History of COVID-19    9. Dyspnea on exertion    10. Class 1 obesity with body mass index (BMI) of 34.0 to 34.9 in adult, unspecified obesity type, unspecified whether serious comorbidity present    11.  Continue albuterol as needed.  12.  Continue Claritin for allergies.  13.  [facility]-up with [facility] as scheduled.  14.  Follow-up with endocrinology as scheduled.  15.  Patient is able to return to work on Monday, February 14, 2022.  16.  Encouraged patient try to stay as active as possible.  Recommended 30 minutes of daily exercise.  17.  Follow-up with our office as needed    I spent 30 minutes caring for Estela on this date of service. This time includes time spent by me in the following activities:preparing for the visit, reviewing tests, obtaining and/or reviewing a separately obtained history, performing a medically appropriate examination and/or evaluation , counseling and educating the patient/family/caregiver and documenting information in the medical record    Follow Up   Return if symptoms worsen or fail to improve.  Patient was given instructions and counseling regarding her condition or for health maintenance advice. Please see specific information pulled into the AVS if appropriate.

## 2022-02-14 ENCOUNTER — PATIENT ROUNDING (BHMG ONLY) (OUTPATIENT)
Dept: PULMONOLOGY | Facility: CLINIC | Age: 47
End: 2022-02-14

## 2022-02-14 NOTE — PROGRESS NOTES
February 14, 2022    Hello, may I speak with Estela Deras?    My name is Tequila      I am  with [facility] PULMON[facility] CRITICAL CARE MEDICINE  [address]  [address]  [place] [address] [phone].    Before we get started may I verify your date of birth? 1975    I am calling to officially welcome you to our practice and ask about your recent visit. Is this a good time to talk? No- my chart message sent for patient rounding.

## 2022-02-16 ENCOUNTER — APPOINTMENT (OUTPATIENT)
Dept: CT IMAGING | Facility: HOSPITAL | Age: 47
End: 2022-02-16

## 2022-02-22 ENCOUNTER — OFFICE VISIT (OUTPATIENT)
Dept: FAMILY MEDICINE CLINIC | Facility: CLINIC | Age: 47
End: 2022-02-22

## 2022-02-22 VITALS
SYSTOLIC BLOOD PRESSURE: 121 MMHG | BODY MASS INDEX: 33.77 KG/M2 | HEIGHT: 66 IN | DIASTOLIC BLOOD PRESSURE: 72 MMHG | OXYGEN SATURATION: 97 % | RESPIRATION RATE: 16 BRPM | TEMPERATURE: 97.5 F | WEIGHT: 210.1 LBS | HEART RATE: 88 BPM

## 2022-02-22 DIAGNOSIS — R53.83 FATIGUE, UNSPECIFIED TYPE: Primary | ICD-10-CM

## 2022-02-22 DIAGNOSIS — U09.9 POST-ACUTE COVID-19 SYNDROME: ICD-10-CM

## 2022-02-22 DIAGNOSIS — E11.65 TYPE 2 DIABETES MELLITUS WITH HYPERGLYCEMIA, WITHOUT LONG-TERM CURRENT USE OF INSULIN: ICD-10-CM

## 2022-02-22 DIAGNOSIS — J01.10 ACUTE NON-RECURRENT FRONTAL SINUSITIS: ICD-10-CM

## 2022-02-22 PROCEDURE — 99214 OFFICE O/P EST MOD 30 MIN: CPT | Performed by: NURSE PRACTITIONER

## 2022-02-22 RX ORDER — AZITHROMYCIN 250 MG/1
TABLET, FILM COATED ORAL
Qty: 6 TABLET | Refills: 0 | Status: SHIPPED | OUTPATIENT
Start: 2022-02-22 | End: 2022-03-03

## 2022-02-22 NOTE — PROGRESS NOTES
Chief Complaint  Follow-up (Release to return to work)    Pawel Deras presents to [facility]  History of Present Illness  She saw pulm on 2/11 and cleared for work.  She went back to work this past Friday and did 6 hours then the next day was 5 hours and worn out since then.  She is supposed to go back to work for 8 hours.  She doesn't think that she can do the full 8 hours.  She was off work for about 5 months.  She is tired.  Her mom cont to tell her not to get discouraged.  She is still checking sugar.  Her sugars 140's at times.  She is having some 's in the AM.  She is doing well with her sugars--she goes back with him in April.   She is having wt loss and she is trying.  She started with sinus issues since Sept.  She went to ENT last week and they wanted to check her and she has been sick with sinus and she has been sick since then.  She was told to get a mouth guard.    Her teeth hurt and sinus pressure.  No fever noted.  She has been using afrin and sudafed.       Allergies  Cetirizine, Codeine, Meperidine, Norco [hydrocodone-acetaminophen], Propoxyphene, Sesame oil, Chlorhexidine, Acetaminophen, and Adhesive tape    Social History     Tobacco Use   • Smoking status: Never Smoker   • Smokeless tobacco: Never Used   Vaping Use   • Vaping Use: Never used   Substance Use Topics   • Alcohol use: Never   • Drug use: Never       Family History   Problem Relation Age of Onset   • Arthritis Mother    • Osteoporosis Mother    • Depression Mother    • Hyperlipidemia Mother    • Other Father         RENAL CALCULUS   • Depression Father    • Hyperlipidemia Father    • Diabetes Paternal Grandfather    • Heart disease Paternal Grandfather    • Lung cancer Other    • Diabetes type II Other    • Hyperlipidemia Other    • Heart disease Other         ISCHEMIC   • Cancer Other    • Hypertension Other    • Arthritis Paternal Uncle    • Diabetes Paternal Uncle    •  "Asthma Daughter    • Depression Daughter    • Cancer Maternal Grandfather    • Thyroid disease Paternal Grandmother         Health Maintenance Due   Topic Date Due   • URINE MICROALBUMIN  Never done   • ANNUAL PHYSICAL  Never done   • DIABETIC FOOT EXAM  Never done   • DIABETIC EYE EXAM  Never done        Immunization History   Administered Date(s) Administered   • FluLaval/Fluarix/Fluzone >6 01/06/2014, 10/13/2021   • Hepatitis A 05/15/2018, 11/05/2018   • Influenza, Unspecified 02/22/2019   • MMR 02/22/2019   • Pneumococcal Polysaccharide (PPSV23) 02/11/2022   • TD Preservative Free 11/05/2018   • Tdap 11/06/2018       Review of Systems   Constitutional: Positive for fatigue.   Respiratory: Negative for shortness of breath.         Objective       Vitals:    02/22/22 1523   BP: 121/72   Pulse: 88   Resp: 16   Temp: 97.5 °F (36.4 °C)   SpO2: 97%   Weight: 95.3 kg (210 lb 1.6 oz)   Height: 167.6 cm (66\")       Body mass index is 33.91 kg/m².         Physical Exam  Vitals reviewed.   Constitutional:       Appearance: Normal appearance. She is well-developed.   HENT:      Right Ear: Tympanic membrane and ear canal normal.      Left Ear: Tympanic membrane and ear canal normal.      Nose: Congestion present.      Mouth/Throat:      Pharynx: Posterior oropharyngeal erythema present.   Cardiovascular:      Rate and Rhythm: Normal rate and regular rhythm.      Heart sounds: Normal heart sounds. No murmur heard.      Pulmonary:      Effort: Pulmonary effort is normal.      Breath sounds: Normal breath sounds.   Neurological:      Mental Status: She is alert and oriented to person, place, and time.      Cranial Nerves: No cranial nerve deficit.      Motor: No weakness.   Psychiatric:         Mood and Affect: Mood and affect normal.             Result Review :     The following data was reviewed by: HAILEY Doan on 02/22/2022:    Common labs    Common Labsle 9/28/21 9/28/21 10/22/21 10/22/21 10/22/21 10/22/21 " 12/5/21 12/5/21    0502 0502 1216 1216 1216 1216 0136 0136   Glucose  242 (A)   123 (A)   188 (A)   BUN  19   16   16   Creatinine  0.63   0.67   0.79   eGFR Non  Am  103   96   79   Sodium  135 (A)   136   140   Potassium  3.7   3.5   4.4   Chloride  100   100   101   Calcium  8.8   9.5   9.2   Albumin  3.50   4.40   4.30   Total Bilirubin  0.4   0.7   0.9   Alkaline Phosphatase  90   77   112   AST (SGOT)  26   41 (A)   25   ALT (SGPT)  50 (A)   86 (A)   54 (A)   WBC 12.84 (A)   12.72 (A)   13.42 (A)    Hemoglobin 13.6   13.6   14.2    Hematocrit 41.9   43.1   43.3    Platelets 269   165   232    Total Cholesterol      226 (A)     Triglycerides      208 (A)     HDL Cholesterol      76 (A)     LDL Cholesterol       114 (A)     Hemoglobin A1C   7.45 (A)        (A) Abnormal value       Comments are available for some flowsheets but are not being displayed.                          Assessment and Plan      Diagnoses and all orders for this visit:    1. Fatigue, unspecified type (Primary)    2. 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    3. Type 2 diabetes mellitus with hyperglycemia, without long-term current use of insulin (HCC)    4. Acute non-recurrent frontal sinusitis  -     azithromycin (Zithromax Z-Valdo) 250 MG tablet; Take 2 tablets by mouth on day 1, then 1 tablet daily on days 2-5  Dispense: 6 tablet; Refill: 0            Follow Up     Return if symptoms worsen or fail to improve.  She will start back at work with 4 hour and work up to 8 hours in the month if we need further time to increase to 8 hours f/u in the office again.  Call with any concerns or questions.    She is willing to take abx and cont with the afrin.   Patient was given instructions and counseling regarding her condition or for health maintenance advice. Please see specific information pulled into the AVS if appropriate.         Nannette Braun, APRN  02/22/2022

## 2022-02-22 NOTE — PROGRESS NOTES
Chief Complaint  Follow-up (Release to return to work)    Subjective     {Problem List  Visit Diagnosis   Encounters  Notes  Medications  Labs  Result Review Imaging  Media :23}     Estela Deras presents to [facility]  History of Present Illness      {Depression Screening Performed?:11787}        Allergies  Cetirizine, Codeine, Meperidine, Norco [hydrocodone-acetaminophen], Propoxyphene, Sesame oil, Chlorhexidine, Acetaminophen, and Adhesive tape    Social History     Tobacco Use   • Smoking status: Never Smoker   • Smokeless tobacco: Never Used   Vaping Use   • Vaping Use: Never used   Substance Use Topics   • Alcohol use: Never   • Drug use: Never       Family History   Problem Relation Age of Onset   • Arthritis Mother    • Osteoporosis Mother    • Depression Mother    • Hyperlipidemia Mother    • Other Father         RENAL CALCULUS   • Depression Father    • Hyperlipidemia Father    • Diabetes Paternal Grandfather    • Heart disease Paternal Grandfather    • Lung cancer Other    • Diabetes type II Other    • Hyperlipidemia Other    • Heart disease Other         ISCHEMIC   • Cancer Other    • Hypertension Other    • Arthritis Paternal Uncle    • Diabetes Paternal Uncle    • Asthma Daughter    • Depression Daughter    • Cancer Maternal Grandfather    • Thyroid disease Paternal Grandmother         Health Maintenance Due   Topic Date Due   • URINE MICROALBUMIN  Never done   • ANNUAL PHYSICAL  Never done   • DIABETIC FOOT EXAM  Never done   • DIABETIC EYE EXAM  Never done        Immunization History   Administered Date(s) Administered   • FluLaval/Fluarix/Fluzone >6 01/06/2014, 10/13/2021   • Hepatitis A 05/15/2018, 11/05/2018   • Influenza, Unspecified 02/22/2019   • MMR 02/22/2019   • Pneumococcal Polysaccharide (PPSV23) 02/11/2022   • TD Preservative Free 11/05/2018   • Tdap 11/06/2018       Review of Systems     Objective       Vitals:    02/22/22 1523   BP: 121/72  "  Pulse: 88   Resp: 16   Temp: 97.5 °F (36.4 °C)   SpO2: 97%   Weight: 95.3 kg (210 lb 1.6 oz)   Height: 167.6 cm (66\")       Body mass index is 33.91 kg/m².         Physical Exam        Result Review :{Labs  Result Review  Imaging  Med Tab  Media :23}     The following data was reviewed by: HAILEY Doan on 02/22/2022:    {Ambulatory Labs:00813}    {Data reviewed (optional):67282}             Assessment and Plan {CC Problem List  Visit Diagnosis  ROS  Review (Popup)  Health Maintenance  Quality  BestPractice  Medications  SmartSets  SnapShot Encounters  Media :23}     There are no diagnoses linked to this encounter.    {Time Spent (Optional):18757}    Follow Up {Instructions Charge Capture  Follow-up Communications :23}    No follow-ups on file.    Patient was given instructions and counseling regarding her condition or for health maintenance advice. Please see specific information pulled into the AVS if appropriate.     {Cessation (Optional):56467}    HAILEY Dona  02/22/2022  "

## 2022-03-03 ENCOUNTER — OFFICE VISIT (OUTPATIENT)
Dept: FAMILY MEDICINE CLINIC | Facility: CLINIC | Age: 47
End: 2022-03-03

## 2022-03-03 VITALS
HEIGHT: 66 IN | WEIGHT: 206.9 LBS | HEART RATE: 71 BPM | TEMPERATURE: 97.4 F | BODY MASS INDEX: 33.25 KG/M2 | SYSTOLIC BLOOD PRESSURE: 129 MMHG | RESPIRATION RATE: 12 BRPM | OXYGEN SATURATION: 96 % | DIASTOLIC BLOOD PRESSURE: 44 MMHG

## 2022-03-03 DIAGNOSIS — E55.9 VITAMIN D DEFICIENCY: ICD-10-CM

## 2022-03-03 DIAGNOSIS — F51.01 PRIMARY INSOMNIA: ICD-10-CM

## 2022-03-03 DIAGNOSIS — N94.9 VAGINAL DISCOMFORT: ICD-10-CM

## 2022-03-03 DIAGNOSIS — E78.2 MIXED HYPERLIPIDEMIA: ICD-10-CM

## 2022-03-03 DIAGNOSIS — M19.90 ARTHRITIS: ICD-10-CM

## 2022-03-03 DIAGNOSIS — K21.9 GASTROESOPHAGEAL REFLUX DISEASE WITHOUT ESOPHAGITIS: ICD-10-CM

## 2022-03-03 DIAGNOSIS — J01.10 ACUTE FRONTAL SINUSITIS, RECURRENCE NOT SPECIFIED: ICD-10-CM

## 2022-03-03 DIAGNOSIS — R19.7 DIARRHEA, UNSPECIFIED TYPE: ICD-10-CM

## 2022-03-03 DIAGNOSIS — B00.9 HSV (HERPES SIMPLEX VIRUS) INFECTION: ICD-10-CM

## 2022-03-03 DIAGNOSIS — E03.9 ACQUIRED HYPOTHYROIDISM: Primary | ICD-10-CM

## 2022-03-03 DIAGNOSIS — E11.65 TYPE 2 DIABETES MELLITUS WITH HYPERGLYCEMIA, WITHOUT LONG-TERM CURRENT USE OF INSULIN: ICD-10-CM

## 2022-03-03 LAB
ALBUMIN SERPL-MCNC: 4.5 G/DL (ref 3.5–5.2)
ALBUMIN/GLOB SERPL: 2 G/DL
ALP SERPL-CCNC: 92 U/L (ref 39–117)
ALT SERPL W P-5'-P-CCNC: 74 U/L (ref 1–33)
ANION GAP SERPL CALCULATED.3IONS-SCNC: 12.4 MMOL/L (ref 5–15)
AST SERPL-CCNC: 41 U/L (ref 1–32)
BASOPHILS # BLD AUTO: 0.05 10*3/MM3 (ref 0–0.2)
BASOPHILS NFR BLD AUTO: 0.7 % (ref 0–1.5)
BILIRUB BLD-MCNC: ABNORMAL MG/DL
BILIRUB SERPL-MCNC: 0.5 MG/DL (ref 0–1.2)
BUN SERPL-MCNC: 14 MG/DL (ref 6–20)
BUN/CREAT SERPL: 20.6 (ref 7–25)
CALCIUM SPEC-SCNC: 9.5 MG/DL (ref 8.6–10.5)
CHLORIDE SERPL-SCNC: 105 MMOL/L (ref 98–107)
CHOLEST SERPL-MCNC: 210 MG/DL (ref 0–200)
CLARITY, POC: CLEAR
CO2 SERPL-SCNC: 22.6 MMOL/L (ref 22–29)
COLOR UR: YELLOW
CREAT SERPL-MCNC: 0.68 MG/DL (ref 0.57–1)
DEPRECATED RDW RBC AUTO: 41.5 FL (ref 37–54)
EGFRCR SERPLBLD CKD-EPI 2021: 110.3 ML/MIN/1.73
EOSINOPHIL # BLD AUTO: 0.09 10*3/MM3 (ref 0–0.4)
EOSINOPHIL NFR BLD AUTO: 1.2 % (ref 0.3–6.2)
ERYTHROCYTE [DISTWIDTH] IN BLOOD BY AUTOMATED COUNT: 13.6 % (ref 12.3–15.4)
EXPIRATION DATE: ABNORMAL
GLOBULIN UR ELPH-MCNC: 2.3 GM/DL
GLUCOSE SERPL-MCNC: 103 MG/DL (ref 65–99)
GLUCOSE UR STRIP-MCNC: NEGATIVE MG/DL
HBA1C MFR BLD: 6.4 % (ref 4.8–5.6)
HCT VFR BLD AUTO: 39.6 % (ref 34–46.6)
HDLC SERPL-MCNC: 36 MG/DL (ref 40–60)
HGB BLD-MCNC: 12.7 G/DL (ref 12–15.9)
IMM GRANULOCYTES # BLD AUTO: 0.02 10*3/MM3 (ref 0–0.05)
IMM GRANULOCYTES NFR BLD AUTO: 0.3 % (ref 0–0.5)
KETONES UR QL: ABNORMAL
LDLC SERPL CALC-MCNC: 133 MG/DL (ref 0–100)
LDLC/HDLC SERPL: 3.57 {RATIO}
LEUKOCYTE EST, POC: NEGATIVE
LYMPHOCYTES # BLD AUTO: 2.25 10*3/MM3 (ref 0.7–3.1)
LYMPHOCYTES NFR BLD AUTO: 29.5 % (ref 19.6–45.3)
Lab: ABNORMAL
MCH RBC QN AUTO: 27.1 PG (ref 26.6–33)
MCHC RBC AUTO-ENTMCNC: 32.1 G/DL (ref 31.5–35.7)
MCV RBC AUTO: 84.4 FL (ref 79–97)
MONOCYTES # BLD AUTO: 0.45 10*3/MM3 (ref 0.1–0.9)
MONOCYTES NFR BLD AUTO: 5.9 % (ref 5–12)
NEUTROPHILS NFR BLD AUTO: 4.78 10*3/MM3 (ref 1.7–7)
NEUTROPHILS NFR BLD AUTO: 62.4 % (ref 42.7–76)
NITRITE UR-MCNC: NEGATIVE MG/ML
NRBC BLD AUTO-RTO: 0 /100 WBC (ref 0–0.2)
PH UR: 5.5 [PH] (ref 5–8)
PLATELET # BLD AUTO: 224 10*3/MM3 (ref 140–450)
PMV BLD AUTO: 12 FL (ref 6–12)
POTASSIUM SERPL-SCNC: 3.7 MMOL/L (ref 3.5–5.2)
PROT SERPL-MCNC: 6.8 G/DL (ref 6–8.5)
PROT UR STRIP-MCNC: ABNORMAL MG/DL
RBC # BLD AUTO: 4.69 10*6/MM3 (ref 3.77–5.28)
RBC # UR STRIP: NEGATIVE /UL
SODIUM SERPL-SCNC: 140 MMOL/L (ref 136–145)
SP GR UR: 1.02 (ref 1–1.03)
T4 FREE SERPL-MCNC: 1.37 NG/DL (ref 0.93–1.7)
TRIGL SERPL-MCNC: 228 MG/DL (ref 0–150)
TSH SERPL DL<=0.05 MIU/L-ACNC: 1.78 UIU/ML (ref 0.27–4.2)
UROBILINOGEN UR QL: NORMAL
VLDLC SERPL-MCNC: 41 MG/DL (ref 5–40)
WBC NRBC COR # BLD: 7.64 10*3/MM3 (ref 3.4–10.8)

## 2022-03-03 PROCEDURE — 81003 URINALYSIS AUTO W/O SCOPE: CPT | Performed by: NURSE PRACTITIONER

## 2022-03-03 PROCEDURE — 82306 VITAMIN D 25 HYDROXY: CPT | Performed by: NURSE PRACTITIONER

## 2022-03-03 PROCEDURE — 36415 COLL VENOUS BLD VENIPUNCTURE: CPT | Performed by: NURSE PRACTITIONER

## 2022-03-03 PROCEDURE — 84439 ASSAY OF FREE THYROXINE: CPT | Performed by: NURSE PRACTITIONER

## 2022-03-03 PROCEDURE — 80061 LIPID PANEL: CPT | Performed by: NURSE PRACTITIONER

## 2022-03-03 PROCEDURE — 84443 ASSAY THYROID STIM HORMONE: CPT | Performed by: NURSE PRACTITIONER

## 2022-03-03 PROCEDURE — 85025 COMPLETE CBC W/AUTO DIFF WBC: CPT | Performed by: NURSE PRACTITIONER

## 2022-03-03 PROCEDURE — 99214 OFFICE O/P EST MOD 30 MIN: CPT | Performed by: NURSE PRACTITIONER

## 2022-03-03 PROCEDURE — 83036 HEMOGLOBIN GLYCOSYLATED A1C: CPT | Performed by: NURSE PRACTITIONER

## 2022-03-03 PROCEDURE — 80053 COMPREHEN METABOLIC PANEL: CPT | Performed by: NURSE PRACTITIONER

## 2022-03-03 RX ORDER — EZETIMIBE 10 MG/1
10 TABLET ORAL NIGHTLY
Qty: 90 TABLET | Refills: 1 | Status: SHIPPED | OUTPATIENT
Start: 2022-03-03 | End: 2022-09-12 | Stop reason: SDUPTHER

## 2022-03-03 RX ORDER — ATORVASTATIN CALCIUM 80 MG/1
80 TABLET, FILM COATED ORAL DAILY
Qty: 90 TABLET | Refills: 1 | Status: SHIPPED | OUTPATIENT
Start: 2022-03-03 | End: 2022-09-12 | Stop reason: SDUPTHER

## 2022-03-03 RX ORDER — LEVOTHYROXINE SODIUM 0.03 MG/1
25 TABLET ORAL DAILY
Qty: 90 TABLET | Refills: 1 | Status: SHIPPED | OUTPATIENT
Start: 2022-03-03 | End: 2022-08-17 | Stop reason: SDUPTHER

## 2022-03-03 RX ORDER — VALACYCLOVIR HYDROCHLORIDE 500 MG/1
500 TABLET, FILM COATED ORAL EVERY MORNING
Qty: 90 TABLET | Refills: 1 | Status: SHIPPED | OUTPATIENT
Start: 2022-03-03 | End: 2022-09-12 | Stop reason: SDUPTHER

## 2022-03-03 RX ORDER — PREDNISONE 50 MG/1
50 TABLET ORAL DAILY
Qty: 5 TABLET | Refills: 0 | Status: SHIPPED | OUTPATIENT
Start: 2022-03-03 | End: 2022-03-30

## 2022-03-03 RX ORDER — OMEPRAZOLE 20 MG/1
20 CAPSULE, DELAYED RELEASE ORAL EVERY MORNING
Qty: 90 CAPSULE | Refills: 1 | Status: SHIPPED | OUTPATIENT
Start: 2022-03-03 | End: 2022-04-22

## 2022-03-03 RX ORDER — DICYCLOMINE HYDROCHLORIDE 10 MG/1
10 CAPSULE ORAL
Qty: 120 CAPSULE | Refills: 0 | Status: SHIPPED | OUTPATIENT
Start: 2022-03-03 | End: 2022-08-17

## 2022-03-03 RX ORDER — FLUOXETINE HYDROCHLORIDE 20 MG/1
40 CAPSULE ORAL DAILY
Status: CANCELLED | OUTPATIENT
Start: 2022-03-03

## 2022-03-03 RX ORDER — TRAZODONE HYDROCHLORIDE 100 MG/1
100 TABLET ORAL NIGHTLY
Qty: 90 TABLET | Refills: 1 | Status: SHIPPED | OUTPATIENT
Start: 2022-03-03 | End: 2022-09-12 | Stop reason: SDUPTHER

## 2022-03-03 RX ORDER — ERGOCALCIFEROL 1.25 MG/1
50000 CAPSULE ORAL WEEKLY
Qty: 5 CAPSULE | Refills: 5 | Status: SHIPPED | OUTPATIENT
Start: 2022-03-03 | End: 2022-08-17

## 2022-03-03 NOTE — PROGRESS NOTES
"Chief Complaint  Anxiety, Depression, and Pain (painful intercourse)    Subjective          Estela Deras presents to [facility]  History of Present Illness   She is here for fasting labs and refills.  She is requesting that I refill all psychiatric medications currently though she is aware that I cannot and she can continue with Ms. Mayfield for care.  She is also wishing to increase her trazodone though with all of her medications 100 is the limit.  She has had the same partner for over a year.  She is now over the last 3 weeks having painful intercourse.  No drainage no blood.  She did have a hysterectomy.  She has had diarrhea and some nausea with her new Ozempic.  She still does see endocrinology and will see them in April.  She still has her gallbladder.  She does not feel like she is having any bladder issues just painful intercourse.  She said that it feels more as if it is in the top part of the vaginal area not necessarily at the bottom portion.  She said it feels like when \"I used to have a cervix and then there would be pain with that.\"  She is doing well with her sugars and diet.  She is doing a higher protein diet.  She is still taking her Lipitor, diclofenac, Zetia, metformin, Toprol, omeprazole, trazodone, vitamin D weekly.  She is not having chest pain.  She is still also having sinus congestion feeling like she is got drainage in the back of her throat.  We did treat with Zithromax and that did help though it brought the drainage back in the back of her throat.  She does have burning at times when she takes a deep breath in.        Allergies  Cetirizine, Codeine, Meperidine, Norco [hydrocodone-acetaminophen], Propoxyphene, Sesame oil, Chlorhexidine, Acetaminophen, and Adhesive tape    Social History     Tobacco Use   • Smoking status: Never Smoker   • Smokeless tobacco: Never Used   Vaping Use   • Vaping Use: Never used   Substance Use Topics   • Alcohol use: Never   • " "Drug use: Never       Family History   Problem Relation Age of Onset   • Arthritis Mother    • Osteoporosis Mother    • Depression Mother    • Hyperlipidemia Mother    • Other Father         RENAL CALCULUS   • Depression Father    • Hyperlipidemia Father    • Diabetes Paternal Grandfather    • Heart disease Paternal Grandfather    • Lung cancer Other    • Diabetes type II Other    • Hyperlipidemia Other    • Heart disease Other         ISCHEMIC   • Cancer Other    • Hypertension Other    • Arthritis Paternal Uncle    • Diabetes Paternal Uncle    • Asthma Daughter    • Depression Daughter    • Cancer Maternal Grandfather    • Thyroid disease Paternal Grandmother         Health Maintenance Due   Topic Date Due   • URINE MICROALBUMIN  Never done   • ANNUAL PHYSICAL  Never done   • DIABETIC FOOT EXAM  Never done   • DIABETIC EYE EXAM  Never done        Immunization History   Administered Date(s) Administered   • FluLaval/Fluarix/Fluzone >6 01/06/2014, 10/13/2021   • Hepatitis A 05/15/2018, 11/05/2018   • Influenza, Unspecified 02/22/2019   • MMR 02/22/2019   • Pneumococcal Polysaccharide (PPSV23) 02/11/2022   • TD Preservative Free 11/05/2018   • Tdap 11/06/2018       Review of Systems   Constitutional: Positive for fatigue.   HENT: Positive for congestion and postnasal drip.    Respiratory: Negative for cough and shortness of breath.    Gastrointestinal: Positive for diarrhea and nausea.        Objective       Vitals:    03/03/22 1146   BP: 129/44   Pulse: 71   Resp: 12   Temp: 97.4 °F (36.3 °C)   SpO2: 96%   Weight: 93.8 kg (206 lb 14.4 oz)   Height: 167.6 cm (66\")       Body mass index is 33.39 kg/m².         Physical Exam  Vitals reviewed.   Constitutional:       Appearance: Normal appearance. She is well-developed.   Cardiovascular:      Rate and Rhythm: Normal rate and regular rhythm.      Heart sounds: Normal heart sounds. No murmur heard.      Pulmonary:      Effort: Pulmonary effort is normal.      Breath " sounds: Normal breath sounds.   Neurological:      Mental Status: She is alert and oriented to person, place, and time.      Cranial Nerves: No cranial nerve deficit.      Motor: No weakness.   Psychiatric:         Mood and Affect: Mood and affect normal.             Result Review :     The following data was reviewed by: HAILEY Doan on 03/03/2022:    Common labs    Common Labsle 9/28/21 9/28/21 10/22/21 10/22/21 10/22/21 10/22/21 12/5/21 12/5/21    0502 0502 1216 1216 1216 1216 0136 0136   Glucose  242 (A)   123 (A)   188 (A)   BUN  19   16   16   Creatinine  0.63   0.67   0.79   eGFR Non  Am  103   96   79   Sodium  135 (A)   136   140   Potassium  3.7   3.5   4.4   Chloride  100   100   101   Calcium  8.8   9.5   9.2   Albumin  3.50   4.40   4.30   Total Bilirubin  0.4   0.7   0.9   Alkaline Phosphatase  90   77   112   AST (SGOT)  26   41 (A)   25   ALT (SGPT)  50 (A)   86 (A)   54 (A)   WBC 12.84 (A)   12.72 (A)   13.42 (A)    Hemoglobin 13.6   13.6   14.2    Hematocrit 41.9   43.1   43.3    Platelets 269   165   232    Total Cholesterol      226 (A)     Triglycerides      208 (A)     HDL Cholesterol      76 (A)     LDL Cholesterol       114 (A)     Hemoglobin A1C   7.45 (A)        (A) Abnormal value       Comments are available for some flowsheets but are not being displayed.                          Assessment and Plan      Diagnoses and all orders for this visit:    1. Acquired hypothyroidism (Primary)  -     levothyroxine (Synthroid) 25 MCG tablet; Take 1 tablet by mouth Daily.  Dispense: 90 tablet; Refill: 1  -     TSH  -     T4, free    2. HSV (herpes simplex virus) infection  -     valACYclovir (VALTREX) 500 MG tablet; Take 1 tablet by mouth Every Morning.  Dispense: 90 tablet; Refill: 1    3. Gastroesophageal reflux disease without esophagitis  -     omeprazole (priLOSEC) 20 MG capsule; Take 1 capsule by mouth Every Morning.  Dispense: 90 capsule; Refill: 1    4. Primary  insomnia  -     traZODone (DESYREL) 100 MG tablet; Take 1 tablet by mouth Every Night.  Dispense: 90 tablet; Refill: 1    5. Mixed hyperlipidemia  -     ezetimibe (ZETIA) 10 MG tablet; Take 1 tablet by mouth Every Night.  Dispense: 90 tablet; Refill: 1  -     atorvastatin (Lipitor) 80 MG tablet; Take 1 tablet by mouth Daily.  Dispense: 90 tablet; Refill: 1  -     Comprehensive Metabolic Panel  -     CBC & Differential  -     Lipid Panel    6. Vitamin D deficiency  -     vitamin D (ERGOCALCIFEROL) 1.25 MG (18733 UT) capsule capsule; Take 1 capsule by mouth 1 (One) Time Per Week.  Dispense: 5 capsule; Refill: 5  -     Vitamin D 25 Hydroxy    7. Vaginal discomfort  -     POCT urinalysis dipstick, automated  -     US Non-ob Transvaginal; Future    8. Arthritis  -     diclofenac (VOLTAREN) 50 MG EC tablet; Take 1 tablet by mouth 2 (Two) Times a Day. for pain  Dispense: 60 tablet; Refill: 5    9. Type 2 diabetes mellitus with hyperglycemia, without long-term current use of insulin (HCC)  -     Hemoglobin A1c    10. Acute frontal sinusitis, recurrence not specified  -     predniSONE (DELTASONE) 50 MG tablet; Take 1 tablet by mouth Daily.  Dispense: 5 tablet; Refill: 0    11. Diarrhea, unspecified type  -     dicyclomine (Bentyl) 10 MG capsule; Take 1 capsule by mouth 4 (Four) Times a Day Before Meals & at Bedtime.  Dispense: 120 capsule; Refill: 0            Follow Up     Return in about 6 months (around 9/3/2022).  We will do transvag u/s and if that is normal then we will f.u for pelvic exam.   Follow-up in 6 months for labs and appt. Call with any concerns or questions that you may have regarding your medications or history.    I did add Bentyl to help with the diarrhea and help with the nausea.  She is aware that I will do not feel that I could refill all of her medications currently for her anxiety and depression.  She will get her insurance back shortly with her work.  She is still working 4 hours like we discussed  with last appointment.  Patient was given instructions and counseling regarding her condition or for health maintenance advice. Please see specific information pulled into the AVS if appropriate.         Nannette Braun, APRN  03/03/2022

## 2022-03-04 LAB — 25(OH)D3 SERPL-MCNC: 17.9 NG/ML (ref 30–100)

## 2022-03-30 ENCOUNTER — HOSPITAL ENCOUNTER (OUTPATIENT)
Dept: GENERAL RADIOLOGY | Facility: HOSPITAL | Age: 47
Discharge: HOME | End: 2022-03-30
Admitting: NURSE PRACTITIONER

## 2022-03-30 ENCOUNTER — TELEMEDICINE (OUTPATIENT)
Dept: FAMILY MEDICINE CLINIC | Facility: CLINIC | Age: 47
End: 2022-03-30

## 2022-03-30 DIAGNOSIS — R50.9 FEVER, UNSPECIFIED FEVER CAUSE: ICD-10-CM

## 2022-03-30 DIAGNOSIS — J06.9 UPPER RESPIRATORY TRACT INFECTION, UNSPECIFIED TYPE: ICD-10-CM

## 2022-03-30 DIAGNOSIS — R05.9 COUGH: Primary | ICD-10-CM

## 2022-03-30 DIAGNOSIS — R05.9 COUGH: ICD-10-CM

## 2022-03-30 PROCEDURE — 99213 OFFICE O/P EST LOW 20 MIN: CPT | Performed by: NURSE PRACTITIONER

## 2022-03-30 PROCEDURE — 71046 X-RAY EXAM CHEST 2 VIEWS: CPT

## 2022-03-30 RX ORDER — AZITHROMYCIN 250 MG/1
TABLET, FILM COATED ORAL
Qty: 6 TABLET | Refills: 0 | Status: SHIPPED | OUTPATIENT
Start: 2022-03-30 | End: 2022-06-27

## 2022-03-30 RX ORDER — BROMPHENIRAMINE MALEATE, PSEUDOEPHEDRINE HYDROCHLORIDE, AND DEXTROMETHORPHAN HYDROBROMIDE 2; 30; 10 MG/5ML; MG/5ML; MG/5ML
5 SYRUP ORAL 4 TIMES DAILY PRN
Qty: 240 ML | Refills: 1 | Status: SHIPPED | OUTPATIENT
Start: 2022-03-30 | End: 2022-06-27

## 2022-03-30 RX ORDER — PREDNISONE 20 MG/1
TABLET ORAL
Qty: 15 TABLET | Refills: 0 | Status: SHIPPED | OUTPATIENT
Start: 2022-03-30 | End: 2022-07-12

## 2022-03-30 NOTE — PROGRESS NOTES
Chief Complaint  Cough (Saturday cough started/), Wheezing, Fever (Freezing last night/), and Headache    Subjective         Estela Deras presents to [facility]  History of Present Illness    Mode of Visit: Video doximity  Location of patient: home  You have chosen to receive care through a telehealth visit.  The patient has signed the video visit consent form.  The visit included audio and video interaction. No technical issues occurred during this visit.   She has been coughing since Saturday.  She started with wheezing last night.  She started her breathing treatment.  Her O2 93-94. Last night she was fever and freezing to death.  She didn't take her temp.  No naseua or vomiting but when she coughs she may get a little sick.  Her lungs feels tightness and chest.    Objective   Vital Signs:   There were no vitals taken for this visit.    Physical Exam   Constitutional: She appears well-developed and well-nourished.   HENT:   Head: Normocephalic.   Pulmonary/Chest: Effort normal.  No respiratory distress.  Neurological: She is alert. No cranial nerve deficit.   Skin: No bruising and no rash noted.   Psychiatric: She has a normal mood and affect. Her speech is normal and behavior is normal. Thought content normal.     Result Review :                 Assessment and Plan   Diagnoses and all orders for this visit:    1. Cough (Primary)  -     brompheniramine-pseudoephedrine-DM 30-2-10 MG/5ML syrup; Take 5 mL by mouth 4 (Four) Times a Day As Needed for Congestion, Cough or Allergies.  Dispense: 240 mL; Refill: 1  -     XR Chest PA & Lateral; Future  -     azithromycin (Zithromax Z-Valdo) 250 MG tablet; Take 2 tablets by mouth on day 1, then 1 tablet daily on days 2-5  Dispense: 6 tablet; Refill: 0  -     predniSONE (DELTASONE) 20 MG tablet; 1 tab po bid for 5 days then 1 tab po daily for 5 days  Dispense: 15 tablet; Refill: 0    2. Fever, unspecified fever cause  -      brompheniramine-pseudoephedrine-DM 30-2-10 MG/5ML syrup; Take 5 mL by mouth 4 (Four) Times a Day As Needed for Congestion, Cough or Allergies.  Dispense: 240 mL; Refill: 1  -     XR Chest PA & Lateral; Future  -     azithromycin (Zithromax Z-Valdo) 250 MG tablet; Take 2 tablets by mouth on day 1, then 1 tablet daily on days 2-5  Dispense: 6 tablet; Refill: 0  -     predniSONE (DELTASONE) 20 MG tablet; 1 tab po bid for 5 days then 1 tab po daily for 5 days  Dispense: 15 tablet; Refill: 0    3. Upper respiratory tract infection, unspecified type        Follow Up   Return if symptoms worsen or fail to improve.  Will do antibiotics, steroid, cough medicine.  She will continue doing her neb treatments.  Push the fluids.  Call with questions or concerns.  She will get chest x-ray today.  She does have a history of Covid.  Call or return to clinic PRN if these symptoms worsen or fail to improve as anticipated.  Patient was given instructions and counseling regarding her condition or for health maintenance advice. Please see specific information pulled into the AVS if appropriate.     Nannette Braun, APRN  03/30/2022

## 2022-04-05 ENCOUNTER — APPOINTMENT (OUTPATIENT)
Dept: ULTRASOUND IMAGING | Facility: HOSPITAL | Age: 47
End: 2022-04-05

## 2022-04-22 DIAGNOSIS — K21.9 GASTROESOPHAGEAL REFLUX DISEASE WITHOUT ESOPHAGITIS: ICD-10-CM

## 2022-04-22 RX ORDER — OMEPRAZOLE 20 MG/1
CAPSULE, DELAYED RELEASE ORAL
Qty: 30 CAPSULE | Refills: 0 | Status: SHIPPED | OUTPATIENT
Start: 2022-04-22 | End: 2022-09-12 | Stop reason: SDUPTHER

## 2022-06-27 ENCOUNTER — OFFICE VISIT (OUTPATIENT)
Dept: FAMILY MEDICINE CLINIC | Facility: CLINIC | Age: 47
End: 2022-06-27

## 2022-06-27 VITALS
RESPIRATION RATE: 16 BRPM | BODY MASS INDEX: 34.13 KG/M2 | OXYGEN SATURATION: 97 % | HEART RATE: 74 BPM | HEIGHT: 66 IN | WEIGHT: 212.4 LBS | TEMPERATURE: 97.2 F | DIASTOLIC BLOOD PRESSURE: 73 MMHG | SYSTOLIC BLOOD PRESSURE: 135 MMHG

## 2022-06-27 DIAGNOSIS — R52 GENERALIZED BODY ACHES: ICD-10-CM

## 2022-06-27 DIAGNOSIS — M25.471 BILATERAL SWELLING OF FEET AND ANKLES: Primary | ICD-10-CM

## 2022-06-27 DIAGNOSIS — M25.475 BILATERAL SWELLING OF FEET AND ANKLES: Primary | ICD-10-CM

## 2022-06-27 DIAGNOSIS — R06.02 SOB (SHORTNESS OF BREATH): ICD-10-CM

## 2022-06-27 DIAGNOSIS — M25.472 BILATERAL SWELLING OF FEET AND ANKLES: Primary | ICD-10-CM

## 2022-06-27 DIAGNOSIS — M25.474 BILATERAL SWELLING OF FEET AND ANKLES: Primary | ICD-10-CM

## 2022-06-27 PROCEDURE — U0004 COV-19 TEST NON-CDC HGH THRU: HCPCS | Performed by: NURSE PRACTITIONER

## 2022-06-27 PROCEDURE — 99213 OFFICE O/P EST LOW 20 MIN: CPT | Performed by: NURSE PRACTITIONER

## 2022-06-27 RX ORDER — SEMAGLUTIDE 1.34 MG/ML
1 INJECTION, SOLUTION SUBCUTANEOUS WEEKLY
COMMUNITY
Start: 2022-04-27

## 2022-06-27 RX ORDER — POTASSIUM CHLORIDE 600 MG/1
8 TABLET, FILM COATED, EXTENDED RELEASE ORAL DAILY
Qty: 5 TABLET | Refills: 0 | Status: SHIPPED | OUTPATIENT
Start: 2022-06-27 | End: 2022-08-17

## 2022-06-27 RX ORDER — CYCLOBENZAPRINE HCL 10 MG
10 TABLET ORAL 3 TIMES DAILY PRN
Qty: 30 TABLET | Refills: 0 | Status: SHIPPED | OUTPATIENT
Start: 2022-06-27

## 2022-06-27 RX ORDER — FUROSEMIDE 20 MG/1
20 TABLET ORAL DAILY
Qty: 5 TABLET | Refills: 0 | Status: SHIPPED | OUTPATIENT
Start: 2022-06-27 | End: 2022-08-03 | Stop reason: SDUPTHER

## 2022-06-27 NOTE — PROGRESS NOTES
"Chief Complaint  Shortness of Breath (Started post covid ), Leg Swelling (Bi-lateral foot and ankle swelling), and Generalized Body Aches (Started a couple ago )    Subjective          Estela Deras, 47 y.o. female presents to [facility]  History of Present Illness   She presents today for an acute visit, she is a patient HAILEY Doan.  She is complaining of swelling of her feet and ankles and she is complaining of shortness of breath.  She also complains of a burning sensation anterior lungs when she takes deep breaths.  She states the burning sensation resolves on its own.  She had COVID last year in September.  She is also complaining of general body aches.    She states she has mitral valve prolapse.  She states that 1 time she was taken off her metoprolol but was restarted on a lower dose at 25 mg daily.  She states occasionally her heart rate races and has been as high as 120.  Her heart rate is stable today at 74.  She was following with cardiology Dr. Loya.    Objective   Vital Signs:   /73   Pulse 74   Temp 97.2 °F (36.2 °C)   Resp 16   Ht 167.6 cm (66\")   Wt 96.3 kg (212 lb 6.4 oz)   SpO2 97%   BMI 34.28 kg/m²     Current Outpatient Medications on File Prior to Visit   Medication Sig Dispense Refill   • Accu-Chek Guide test strip USE 1 ONCE DAILY     • ALPRAZolam (XANAX) 0.5 MG tablet      • ARIPiprazole (ABILIFY) 5 MG tablet      • atomoxetine (STRATTERA) 40 MG capsule Take 40 mg by mouth Every Morning.     • atorvastatin (Lipitor) 80 MG tablet Take 1 tablet by mouth Daily. 90 tablet 1   • diclofenac (VOLTAREN) 50 MG EC tablet Take 1 tablet by mouth 2 (Two) Times a Day. for pain 60 tablet 5   • dicyclomine (Bentyl) 10 MG capsule Take 1 capsule by mouth 4 (Four) Times a Day Before Meals & at Bedtime. 120 capsule 0   • ezetimibe (ZETIA) 10 MG tablet Take 1 tablet by mouth Every Night. 90 tablet 1   • FLUoxetine (PROzac) 20 MG capsule Take 40 " mg by mouth Daily.     • lamoTRIgine (LaMICtal) 100 MG tablet Take 100 mg by mouth Every Night.     • levothyroxine (Synthroid) 25 MCG tablet Take 1 tablet by mouth Daily. 90 tablet 1   • loratadine (CLARITIN) 10 MG tablet Take 10 mg by mouth Daily.     • metFORMIN ER (GLUCOPHAGE-XR) 500 MG 24 hr tablet Take 500 mg by mouth 2 (Two) Times a Day.     • metoprolol succinate XL (TOPROL-XL) 25 MG 24 hr tablet Take 1 tablet by mouth Daily. 90 tablet 3   • omeprazole (priLOSEC) 20 MG capsule Take 1 capsule by mouth once daily 30 capsule 0   • Ozempic, 1 MG/DOSE, 4 MG/3ML solution pen-injector Inject 1 mg under the skin into the appropriate area as directed 1 (One) Time Per Week.     • predniSONE (DELTASONE) 20 MG tablet 1 tab po bid for 5 days then 1 tab po daily for 5 days 15 tablet 0   • traZODone (DESYREL) 100 MG tablet Take 1 tablet by mouth Every Night. 90 tablet 1   • valACYclovir (VALTREX) 500 MG tablet Take 1 tablet by mouth Every Morning. 90 tablet 1   • vitamin D (ERGOCALCIFEROL) 1.25 MG (01016 UT) capsule capsule Take 1 capsule by mouth 1 (One) Time Per Week. 5 capsule 5   • albuterol (PROVENTIL) (2.5 MG/3ML) 0.083% nebulizer solution Take 2.5 mg by nebulization 4 (Four) Times a Day As Needed for Wheezing for up to 30 days. 120 each 5   • albuterol sulfate  (90 Base) MCG/ACT inhaler Inhale 1-2 puffs Every 4 (Four) Hours As Needed for Wheezing or Shortness of Air for up to 30 days. 18 g 11   • [DISCONTINUED] azithromycin (Zithromax Z-Valdo) 250 MG tablet Take 2 tablets by mouth on day 1, then 1 tablet daily on days 2-5 6 tablet 0   • [DISCONTINUED] brompheniramine-pseudoephedrine-DM 30-2-10 MG/5ML syrup Take 5 mL by mouth 4 (Four) Times a Day As Needed for Congestion, Cough or Allergies. 240 mL 1   • [DISCONTINUED] Semaglutide,0.25 or 0.5MG/DOS, (OZEMPIC) 2 MG/1.5ML solution pen-injector Inject 0.5 mg under the skin into the appropriate area as directed 1 (One) Time Per Week.       No current  facility-administered medications on file prior to visit.     Past Medical History:   Diagnosis Date   • Abnormal electrocardiogram    • Allergic    • Allergic rhinitis    • Anemia    • Arthritis    • Asthma    • COVID-19 09/2021   • Depression    • Dysfunctional uterine bleeding    • Fatty liver    • Foot pain    • Gastroesophageal reflux disease without esophagitis    • Generalized anxiety disorder    • H/O cold sores    • Hyperlipidemia    • Hypertriglyceridemia    • Hypothyroidism    • Impaired fasting glucose    • Insomnia, unspecified    • Liver function study, abnormal    • Low back pain    • Macromastia    • Migraine headache    • Mild episode of recurrent major depressive disorder (HCC)    • Mitral valve prolapse    • Night sweats    • Obesity    • Sinus trouble    • Thyroid disorder    • Tremor    • Vitamin D deficiency       Physical Exam  Vitals reviewed.   Constitutional:       Appearance: Normal appearance. She is well-developed.   Neck:      Thyroid: No thyroid mass, thyromegaly or thyroid tenderness.   Cardiovascular:      Rate and Rhythm: Normal rate and regular rhythm.      Heart sounds: No murmur heard.    No friction rub. No gallop.      Comments: Trace bilateral edema.  Pulmonary:      Effort: Pulmonary effort is normal.      Breath sounds: Normal breath sounds. No wheezing or rhonchi.   Lymphadenopathy:      Cervical: No cervical adenopathy.   Skin:     General: Skin is warm and dry.   Neurological:      Mental Status: She is alert and oriented to person, place, and time.      Cranial Nerves: No cranial nerve deficit.   Psychiatric:         Mood and Affect: Mood and affect normal.         Behavior: Behavior normal.         Thought Content: Thought content normal. Thought content does not include homicidal or suicidal ideation.         Judgment: Judgment normal.        Result Review :                 Assessment and Plan    Diagnoses and all orders for this visit:    1. Bilateral swelling of feet  and ankles (Primary)  Comments:  I will start her on Lasix 20 mg once daily and potassium 8 mEq daily.  I will have her follow-up in 1 week.  Orders:  -     COVID-19,APTIMA PANTHER(CLOTILDE),[facility] J[facility]Y, NP/OP SWAB IN UTM/VTM/SALINE TRANSPORT MEDIA,24 HR TAT - Swab, Nasal Cavity    2. SOB (shortness of breath)  -     COVID-19,APTIMA PANTHER(CLOTILDE),[facility] J[facility]YN/[facility] JONY, NP/OP SWAB IN UTM/VTM/SALINE TRANSPORT MEDIA,24 HR TAT - Swab, Nasal Cavity    3. Generalized body aches  Comments:  Due to her general body aches and shortness of breath, will check her for COVID.  Orders:  -     COVID-19,APTIMA PANTHER(CLOTILDE),[facility] JALYN/[facility] JONY, NP/OP SWAB IN UTM/VTM/SALINE TRANSPORT MEDIA,24 HR TAT - Swab, Nasal Cavity    Other orders  -     furosemide (Lasix) 20 MG tablet; Take 1 tablet by mouth Daily.  Dispense: 5 tablet; Refill: 0  -     potassium chloride (KLOR-CON) 8 MEQ CR tablet; Take 1 tablet by mouth Daily. Take with lasix once daily  Dispense: 5 tablet; Refill: 0  -     cyclobenzaprine (FLEXERIL) 10 MG tablet; Take 1 tablet by mouth 3 (Three) Times a Day As Needed for Muscle Spasms.  Dispense: 30 tablet; Refill: 0        Follow Up   Return in about 1 week (around 7/4/2022) for Recheck swelling, shortness of breath.  Patient was given instructions and counseling regarding her condition or for health maintenance advice. Please see specific information pulled into the AVS if appropriate.

## 2022-06-28 ENCOUNTER — HOSPITAL ENCOUNTER (EMERGENCY)
Facility: HOSPITAL | Age: 47
Discharge: HOME | End: 2022-06-28
Attending: EMERGENCY MEDICINE | Admitting: EMERGENCY MEDICINE

## 2022-06-28 ENCOUNTER — APPOINTMENT (OUTPATIENT)
Dept: GENERAL RADIOLOGY | Facility: HOSPITAL | Age: 47
End: 2022-06-28

## 2022-06-28 VITALS
DIASTOLIC BLOOD PRESSURE: 55 MMHG | OXYGEN SATURATION: 99 % | HEART RATE: 93 BPM | SYSTOLIC BLOOD PRESSURE: 124 MMHG | RESPIRATION RATE: 21 BRPM | BODY MASS INDEX: 34.16 KG/M2 | TEMPERATURE: 98.2 F | HEIGHT: 66 IN | WEIGHT: 212.52 LBS

## 2022-06-28 DIAGNOSIS — R00.2 PALPITATIONS: Primary | ICD-10-CM

## 2022-06-28 LAB
ALBUMIN SERPL-MCNC: 4.3 G/DL (ref 3.5–5.2)
ALBUMIN/GLOB SERPL: 1.6 G/DL
ALP SERPL-CCNC: 104 U/L (ref 39–117)
ALT SERPL W P-5'-P-CCNC: 90 U/L (ref 1–33)
ANION GAP SERPL CALCULATED.3IONS-SCNC: 11.8 MMOL/L (ref 5–15)
AST SERPL-CCNC: 50 U/L (ref 1–32)
BASOPHILS # BLD AUTO: 0.02 10*3/MM3 (ref 0–0.2)
BASOPHILS NFR BLD AUTO: 0.2 % (ref 0–1.5)
BILIRUB SERPL-MCNC: 0.2 MG/DL (ref 0–1.2)
BUN SERPL-MCNC: 15 MG/DL (ref 6–20)
BUN/CREAT SERPL: 18.5 (ref 7–25)
CALCIUM SPEC-SCNC: 10.1 MG/DL (ref 8.6–10.5)
CHLORIDE SERPL-SCNC: 102 MMOL/L (ref 98–107)
CO2 SERPL-SCNC: 24.2 MMOL/L (ref 22–29)
CREAT SERPL-MCNC: 0.81 MG/DL (ref 0.57–1)
DEPRECATED RDW RBC AUTO: 42.4 FL (ref 37–54)
EGFRCR SERPLBLD CKD-EPI 2021: 91.4 ML/MIN/1.73
EOSINOPHIL # BLD AUTO: 0.06 10*3/MM3 (ref 0–0.4)
EOSINOPHIL NFR BLD AUTO: 0.6 % (ref 0.3–6.2)
ERYTHROCYTE [DISTWIDTH] IN BLOOD BY AUTOMATED COUNT: 13.8 % (ref 12.3–15.4)
GLOBULIN UR ELPH-MCNC: 2.7 GM/DL
GLUCOSE SERPL-MCNC: 111 MG/DL (ref 65–99)
HCT VFR BLD AUTO: 40.3 % (ref 34–46.6)
HGB BLD-MCNC: 12.9 G/DL (ref 12–15.9)
HOLD SPECIMEN: NORMAL
HOLD SPECIMEN: NORMAL
IMM GRANULOCYTES # BLD AUTO: 0.05 10*3/MM3 (ref 0–0.05)
IMM GRANULOCYTES NFR BLD AUTO: 0.5 % (ref 0–0.5)
LYMPHOCYTES # BLD AUTO: 2.75 10*3/MM3 (ref 0.7–3.1)
LYMPHOCYTES NFR BLD AUTO: 27.5 % (ref 19.6–45.3)
MCH RBC QN AUTO: 27.3 PG (ref 26.6–33)
MCHC RBC AUTO-ENTMCNC: 32 G/DL (ref 31.5–35.7)
MCV RBC AUTO: 85.2 FL (ref 79–97)
MONOCYTES # BLD AUTO: 0.54 10*3/MM3 (ref 0.1–0.9)
MONOCYTES NFR BLD AUTO: 5.4 % (ref 5–12)
NEUTROPHILS NFR BLD AUTO: 6.59 10*3/MM3 (ref 1.7–7)
NEUTROPHILS NFR BLD AUTO: 65.8 % (ref 42.7–76)
NRBC BLD AUTO-RTO: 0 /100 WBC (ref 0–0.2)
NT-PROBNP SERPL-MCNC: 89.8 PG/ML (ref 0–450)
PLATELET # BLD AUTO: 228 10*3/MM3 (ref 140–450)
PMV BLD AUTO: 11.5 FL (ref 6–12)
POTASSIUM SERPL-SCNC: 4.4 MMOL/L (ref 3.5–5.2)
PROT SERPL-MCNC: 7 G/DL (ref 6–8.5)
RBC # BLD AUTO: 4.73 10*6/MM3 (ref 3.77–5.28)
SARS-COV-2 RNA PNL SPEC NAA+PROBE: NOT DETECTED
SODIUM SERPL-SCNC: 138 MMOL/L (ref 136–145)
T4 FREE SERPL-MCNC: 0.93 NG/DL (ref 0.93–1.7)
TROPONIN T SERPL-MCNC: <0.01 NG/ML (ref 0–0.03)
TSH SERPL DL<=0.05 MIU/L-ACNC: 2.46 UIU/ML (ref 0.27–4.2)
WBC NRBC COR # BLD: 10.01 10*3/MM3 (ref 3.4–10.8)
WHOLE BLOOD HOLD COAG: NORMAL
WHOLE BLOOD HOLD SPECIMEN: NORMAL

## 2022-06-28 PROCEDURE — 84484 ASSAY OF TROPONIN QUANT: CPT | Performed by: EMERGENCY MEDICINE

## 2022-06-28 PROCEDURE — 99283 EMERGENCY DEPT VISIT LOW MDM: CPT

## 2022-06-28 PROCEDURE — 83880 ASSAY OF NATRIURETIC PEPTIDE: CPT | Performed by: EMERGENCY MEDICINE

## 2022-06-28 PROCEDURE — 80053 COMPREHEN METABOLIC PANEL: CPT | Performed by: EMERGENCY MEDICINE

## 2022-06-28 PROCEDURE — 84439 ASSAY OF FREE THYROXINE: CPT | Performed by: NURSE PRACTITIONER

## 2022-06-28 PROCEDURE — 85025 COMPLETE CBC W/AUTO DIFF WBC: CPT | Performed by: EMERGENCY MEDICINE

## 2022-06-28 PROCEDURE — 84443 ASSAY THYROID STIM HORMONE: CPT | Performed by: NURSE PRACTITIONER

## 2022-06-28 PROCEDURE — 93005 ELECTROCARDIOGRAM TRACING: CPT | Performed by: EMERGENCY MEDICINE

## 2022-06-28 PROCEDURE — 71045 X-RAY EXAM CHEST 1 VIEW: CPT

## 2022-06-28 PROCEDURE — 36415 COLL VENOUS BLD VENIPUNCTURE: CPT

## 2022-06-28 RX ORDER — METOPROLOL SUCCINATE 50 MG/1
50 TABLET, EXTENDED RELEASE ORAL DAILY
Qty: 30 TABLET | Refills: 0 | Status: SHIPPED | OUTPATIENT
Start: 2022-06-28 | End: 2022-07-25 | Stop reason: SDUPTHER

## 2022-06-28 RX ORDER — SODIUM CHLORIDE 0.9 % (FLUSH) 0.9 %
10 SYRINGE (ML) INJECTION AS NEEDED
Status: DISCONTINUED | OUTPATIENT
Start: 2022-06-28 | End: 2022-06-28 | Stop reason: HOSPADM

## 2022-06-29 ENCOUNTER — TELEPHONE (OUTPATIENT)
Dept: CARDIOLOGY | Facility: CLINIC | Age: 47
End: 2022-06-29

## 2022-06-29 LAB — QT INTERVAL: 364 MS

## 2022-07-12 ENCOUNTER — OFFICE VISIT (OUTPATIENT)
Dept: FAMILY MEDICINE CLINIC | Facility: CLINIC | Age: 47
End: 2022-07-12

## 2022-07-12 VITALS
TEMPERATURE: 97.5 F | HEIGHT: 66 IN | SYSTOLIC BLOOD PRESSURE: 136 MMHG | HEART RATE: 78 BPM | RESPIRATION RATE: 20 BRPM | WEIGHT: 213.3 LBS | OXYGEN SATURATION: 97 % | BODY MASS INDEX: 34.28 KG/M2 | DIASTOLIC BLOOD PRESSURE: 79 MMHG

## 2022-07-12 DIAGNOSIS — R60.9 SWELLING: Primary | ICD-10-CM

## 2022-07-12 DIAGNOSIS — R21 RASH: ICD-10-CM

## 2022-07-12 DIAGNOSIS — K76.0 FATTY LIVER: ICD-10-CM

## 2022-07-12 DIAGNOSIS — Z12.11 SCREEN FOR COLON CANCER: ICD-10-CM

## 2022-07-12 DIAGNOSIS — R74.8 LIVER ENZYME ELEVATION: ICD-10-CM

## 2022-07-12 DIAGNOSIS — Z12.31 SCREENING MAMMOGRAM FOR BREAST CANCER: ICD-10-CM

## 2022-07-12 LAB
CRP SERPL-MCNC: <0.3 MG/DL (ref 0–0.5)
URATE SERPL-MCNC: 5.5 MG/DL (ref 2.4–5.7)

## 2022-07-12 PROCEDURE — 86258 DGP ANTIBODY EACH IG CLASS: CPT | Performed by: NURSE PRACTITIONER

## 2022-07-12 PROCEDURE — 86231 EMA EACH IG CLASS: CPT | Performed by: NURSE PRACTITIONER

## 2022-07-12 PROCEDURE — 86235 NUCLEAR ANTIGEN ANTIBODY: CPT | Performed by: NURSE PRACTITIONER

## 2022-07-12 PROCEDURE — 86431 RHEUMATOID FACTOR QUANT: CPT | Performed by: NURSE PRACTITIONER

## 2022-07-12 PROCEDURE — 84550 ASSAY OF BLOOD/URIC ACID: CPT | Performed by: NURSE PRACTITIONER

## 2022-07-12 PROCEDURE — 86364 TISS TRNSGLTMNASE EA IG CLAS: CPT | Performed by: NURSE PRACTITIONER

## 2022-07-12 PROCEDURE — 99214 OFFICE O/P EST MOD 30 MIN: CPT | Performed by: NURSE PRACTITIONER

## 2022-07-12 PROCEDURE — 82784 ASSAY IGA/IGD/IGG/IGM EACH: CPT | Performed by: NURSE PRACTITIONER

## 2022-07-12 PROCEDURE — 86140 C-REACTIVE PROTEIN: CPT | Performed by: NURSE PRACTITIONER

## 2022-07-12 RX ORDER — LEVOTHYROXINE SODIUM 0.03 MG/1
25 TABLET ORAL DAILY
COMMUNITY
Start: 2022-07-08

## 2022-07-12 RX ORDER — CYPROHEPTADINE HYDROCHLORIDE 4 MG/1
2 TABLET ORAL DAILY
COMMUNITY
Start: 2022-07-10 | End: 2022-09-12

## 2022-07-12 NOTE — PROGRESS NOTES
Chief Complaint  Edema (One week follow up on swelling; hands,feet, ankles, and face ) and Med Change Request (Would like dose change of Metoprolol )    Subjective          Estela Deras presents to [facility]  History of Present Illness  She is here for her 1 week checkup for swelling.  She saw Silvia about a week ago for leg swelling and the Lasix did nothing for her swelling.  She showed me pictures of her pitting edema in her lower legs and ankles.  She does not understand why she is getting swelling.  She is now getting swelling in her hands breast and face.  She has not had any change whatsoever.  She is still working at [employer] and still having a little bit of stress at home.  There are several things going on with her family currently.  She was worried about her blood pressure and her heart rate though it is well controlled today.  She follows up with cardiology in August currently.  No chest pain noted.  She does have a history of COVID in the past.  She saw Silvia on the 27th and then her blood pressure and her heart rate mainly spiked into the 130s to 150s and she went to the emergency room then it dropped down into the 60s and as she went to walk around and stopped and her heart rate went back up high.  They did not admit her but they discharged her.  She has a call out to cardiology 6/29 though no one returned her call that I can tell.  She has not changed her eating habits or dietary changes noted she is unsure why she is having so much swelling.  She has not had any change in eating habits.  Her hands and feet hurt.  The Flexeril did not even touch it.  She could not work due to the drowsiness of the Flexeril and that has never happened before.  The Lasix really did not help either.          Allergies  Cetirizine, Codeine, Meperidine, Norco [hydrocodone-acetaminophen], Propoxyphene, Sesame oil, Chlorhexidine, Acetaminophen, and Adhesive tape    Social History  "    Tobacco Use   • Smoking status: Never Smoker   • Smokeless tobacco: Never Used   Vaping Use   • Vaping Use: Never used   Substance Use Topics   • Alcohol use: Never   • Drug use: Never       Family History   Problem Relation Age of Onset   • Arthritis Mother    • Osteoporosis Mother    • Depression Mother    • Hyperlipidemia Mother    • Other Father         RENAL CALCULUS   • Depression Father    • Hyperlipidemia Father    • Diabetes Paternal Grandfather    • Heart disease Paternal Grandfather    • Lung cancer Other    • Diabetes type II Other    • Hyperlipidemia Other    • Heart disease Other         ISCHEMIC   • Cancer Other    • Hypertension Other    • Arthritis Paternal Uncle    • Diabetes Paternal Uncle    • Asthma Daughter    • Depression Daughter    • Cancer Maternal Grandfather    • Thyroid disease Paternal Grandmother         Health Maintenance Due   Topic Date Due   • URINE MICROALBUMIN  Never done   • COLORECTAL CANCER SCREENING  Never done   • ANNUAL PHYSICAL  Never done   • DIABETIC FOOT EXAM  Never done        Immunization History   Administered Date(s) Administered   • FluLaval/Fluarix/Fluzone >6 01/06/2014, 10/13/2021   • Hepatitis A 05/15/2018, 11/05/2018   • Influenza, Unspecified 02/22/2019   • MMR 02/22/2019   • Pneumococcal Polysaccharide (PPSV23) 02/11/2022   • TD Preservative Free 11/05/2018   • Tdap 11/06/2018       Review of Systems   Cardiovascular: Positive for leg swelling. Negative for chest pain.   Psychiatric/Behavioral: Positive for stress.        Objective       Vitals:    07/12/22 1536   BP: 136/79   Pulse: 78   Resp: 20   Temp: 97.5 °F (36.4 °C)   SpO2: 97%   Weight: 96.8 kg (213 lb 4.8 oz)   Height: 167.6 cm (66\")       Body mass index is 34.43 kg/m².         Physical Exam  Vitals reviewed.   Constitutional:       Appearance: Normal appearance. She is well-developed.   Cardiovascular:      Rate and Rhythm: Normal rate and regular rhythm.      Heart sounds: Normal heart sounds. " No murmur heard.  Pulmonary:      Effort: Pulmonary effort is normal.      Breath sounds: Normal breath sounds.   Musculoskeletal:         General: Swelling present.      Right lower leg: Edema present.      Left lower leg: Edema present.   Neurological:      Mental Status: She is alert and oriented to person, place, and time.      Cranial Nerves: No cranial nerve deficit.      Motor: No weakness.   Psychiatric:         Mood and Affect: Mood and affect normal.       Trace bilateral ankle swelling noted.  Her hands look swollen today.  Scattered red macular rash noted lower extremities    Result Review :     The following data was reviewed by: HAILEY Doan on 07/12/2022:    Common labs    Common Labsle 3/3/22 3/3/22 3/3/22 3/3/22 6/28/22 6/28/22 7/12/22    1215 1215 1215 1215 1900 1900    Glucose   103 (A)   111 (A)    BUN   14   15    Creatinine   0.68   0.81    Sodium   140   138    Potassium   3.7   4.4    Chloride   105   102    Calcium   9.5   10.1    Albumin   4.50   4.30    Total Bilirubin   0.5   0.2    Alkaline Phosphatase   92   104    AST (SGOT)   41 (A)   50 (A)    ALT (SGPT)   74 (A)   90 (A)    WBC 7.64    10.01     Hemoglobin 12.7    12.9     Hematocrit 39.6    40.3     Platelets 224    228     Total Cholesterol  210 (A)        Triglycerides  228 (A)        HDL Cholesterol  36 (A)        LDL Cholesterol   133 (A)        Hemoglobin A1C    6.40 (A)      Uric Acid       5.5   (A) Abnormal value            Most Recent A1C    HGBA1C Most Recent 3/3/22   Hemoglobin A1C 6.40 (A)   (A) Abnormal value                           Assessment and Plan      Diagnoses and all orders for this visit:    1. Swelling (Primary)  -     Celiac Comprehensive Panel  -     Systemic Lupus Profile A  -     Systemic Lupus Erythematosus (SLE) Profile B  -     Cancel: Systemic Lupus Erythematosus Profile C  -     Cancel: Sjogren's Antibody, Anti-SS-A / -SS-B  -     US Ankle / Brachial Indices Extremity Complete;  Future  -     Uric acid  -     C-reactive protein    2. Screen for colon cancer  -     Cologuard - Stool, Per Rectum; Future    3. Screening mammogram for breast cancer  -     Mammo Screening Digital Tomosynthesis Bilateral With CAD; Future    4. Liver enzyme elevation  -     Cancel: US Liver; Future  -     US Liver; Future    5. Rash  -     Celiac Comprehensive Panel  -     Systemic Lupus Profile A  -     Systemic Lupus Erythematosus (SLE) Profile B  -     Cancel: Systemic Lupus Erythematosus Profile C  -     Cancel: Sjogren's Antibody, Anti-SS-A / -SS-B  -     Uric acid  -     C-reactive protein        I spent 37 minutes caring for Estela on this date of service. This time includes time spent by me in the following activities:preparing for the visit, reviewing tests, obtaining and/or reviewing a separately obtained history, performing a medically appropriate examination and/or evaluation , counseling and educating the patient/family/caregiver, ordering medications, tests, or procedures and documenting information in the medical record    Follow Up     Return if symptoms worsen or fail to improve.  We will check labs today.  We will also do a liver ultrasound to look for the fatty liver or the potential of what could go on with the swelling.  We will set up for mammogram and a Cologuard.  Call with questions or concerns.  Patient was given instructions and counseling regarding her condition or for health maintenance advice. Please see specific information pulled into the AVS if appropriate.         Nannette Braun, APRN  07/12/2022

## 2022-07-13 LAB
CHROMATIN AB SERPL-ACNC: <0.2 AI (ref 0–0.9)
DSDNA AB SER-ACNC: <1 IU/ML (ref 0–9)
ENA RNP AB SER-ACNC: <0.2 AI (ref 0–0.9)
ENA SM AB SER-ACNC: <0.2 AI (ref 0–0.9)
ENA SS-A AB SER-ACNC: <0.2 AI (ref 0–0.9)
ENA SS-B AB SER-ACNC: <0.2 AI (ref 0–0.9)
ENDOMYSIUM IGA SER QL: NEGATIVE
GLIADIN PEPTIDE IGA SER-ACNC: 11 UNITS (ref 0–19)
GLIADIN PEPTIDE IGG SER-ACNC: 2 UNITS (ref 0–19)
IGA SERPL-MCNC: 359 MG/DL (ref 87–352)
RHEUMATOID FACT SERPL-ACNC: <10 IU/ML
TTG IGA SER-ACNC: <2 U/ML (ref 0–3)
TTG IGG SER-ACNC: 2 U/ML (ref 0–5)

## 2022-07-17 ENCOUNTER — APPOINTMENT (OUTPATIENT)
Dept: GENERAL RADIOLOGY | Facility: HOSPITAL | Age: 47
End: 2022-07-17

## 2022-07-17 ENCOUNTER — HOSPITAL ENCOUNTER (EMERGENCY)
Facility: HOSPITAL | Age: 47
Discharge: HOME | End: 2022-07-17
Attending: EMERGENCY MEDICINE | Admitting: EMERGENCY MEDICINE

## 2022-07-17 VITALS
SYSTOLIC BLOOD PRESSURE: 128 MMHG | OXYGEN SATURATION: 93 % | TEMPERATURE: 102 F | DIASTOLIC BLOOD PRESSURE: 52 MMHG | WEIGHT: 211.2 LBS | HEIGHT: 66 IN | BODY MASS INDEX: 33.94 KG/M2 | RESPIRATION RATE: 20 BRPM | HEART RATE: 102 BPM

## 2022-07-17 DIAGNOSIS — U07.1 COVID-19: Primary | ICD-10-CM

## 2022-07-17 LAB
ALBUMIN SERPL-MCNC: 4.5 G/DL (ref 3.5–5.2)
ALBUMIN/GLOB SERPL: 1.5 G/DL
ALP SERPL-CCNC: 113 U/L (ref 39–117)
ALT SERPL W P-5'-P-CCNC: 92 U/L (ref 1–33)
ANION GAP SERPL CALCULATED.3IONS-SCNC: 11.9 MMOL/L (ref 5–15)
AST SERPL-CCNC: 69 U/L (ref 1–32)
BASOPHILS # BLD AUTO: 0.03 10*3/MM3 (ref 0–0.2)
BASOPHILS NFR BLD AUTO: 0.4 % (ref 0–1.5)
BILIRUB SERPL-MCNC: 0.8 MG/DL (ref 0–1.2)
BUN SERPL-MCNC: 11 MG/DL (ref 6–20)
BUN/CREAT SERPL: 12.1 (ref 7–25)
CALCIUM SPEC-SCNC: 9.9 MG/DL (ref 8.6–10.5)
CHLORIDE SERPL-SCNC: 101 MMOL/L (ref 98–107)
CO2 SERPL-SCNC: 25.1 MMOL/L (ref 22–29)
CREAT SERPL-MCNC: 0.91 MG/DL (ref 0.57–1)
DEPRECATED RDW RBC AUTO: 42.5 FL (ref 37–54)
EGFRCR SERPLBLD CKD-EPI 2021: 79.4 ML/MIN/1.73
EOSINOPHIL # BLD AUTO: 0 10*3/MM3 (ref 0–0.4)
EOSINOPHIL NFR BLD AUTO: 0 % (ref 0.3–6.2)
ERYTHROCYTE [DISTWIDTH] IN BLOOD BY AUTOMATED COUNT: 14.1 % (ref 12.3–15.4)
GLOBULIN UR ELPH-MCNC: 3 GM/DL
GLUCOSE SERPL-MCNC: 128 MG/DL (ref 65–99)
HCT VFR BLD AUTO: 39.7 % (ref 34–46.6)
HGB BLD-MCNC: 12.7 G/DL (ref 12–15.9)
HOLD SPECIMEN: NORMAL
HOLD SPECIMEN: NORMAL
IMM GRANULOCYTES # BLD AUTO: 0.03 10*3/MM3 (ref 0–0.05)
IMM GRANULOCYTES NFR BLD AUTO: 0.4 % (ref 0–0.5)
LYMPHOCYTES # BLD AUTO: 0.81 10*3/MM3 (ref 0.7–3.1)
LYMPHOCYTES NFR BLD AUTO: 10.8 % (ref 19.6–45.3)
MCH RBC QN AUTO: 26.5 PG (ref 26.6–33)
MCHC RBC AUTO-ENTMCNC: 32 G/DL (ref 31.5–35.7)
MCV RBC AUTO: 82.9 FL (ref 79–97)
MONOCYTES # BLD AUTO: 0.71 10*3/MM3 (ref 0.1–0.9)
MONOCYTES NFR BLD AUTO: 9.5 % (ref 5–12)
NEUTROPHILS NFR BLD AUTO: 5.89 10*3/MM3 (ref 1.7–7)
NEUTROPHILS NFR BLD AUTO: 78.9 % (ref 42.7–76)
NRBC BLD AUTO-RTO: 0 /100 WBC (ref 0–0.2)
NT-PROBNP SERPL-MCNC: 116 PG/ML (ref 0–450)
PLATELET # BLD AUTO: 208 10*3/MM3 (ref 140–450)
PMV BLD AUTO: 11.6 FL (ref 6–12)
POTASSIUM SERPL-SCNC: 4.1 MMOL/L (ref 3.5–5.2)
PROT SERPL-MCNC: 7.5 G/DL (ref 6–8.5)
RBC # BLD AUTO: 4.79 10*6/MM3 (ref 3.77–5.28)
SODIUM SERPL-SCNC: 138 MMOL/L (ref 136–145)
TROPONIN T SERPL-MCNC: <0.01 NG/ML (ref 0–0.03)
WBC NRBC COR # BLD: 7.47 10*3/MM3 (ref 3.4–10.8)
WHOLE BLOOD HOLD COAG: NORMAL
WHOLE BLOOD HOLD SPECIMEN: NORMAL

## 2022-07-17 PROCEDURE — 83880 ASSAY OF NATRIURETIC PEPTIDE: CPT | Performed by: EMERGENCY MEDICINE

## 2022-07-17 PROCEDURE — 99284 EMERGENCY DEPT VISIT MOD MDM: CPT

## 2022-07-17 PROCEDURE — 85025 COMPLETE CBC W/AUTO DIFF WBC: CPT | Performed by: EMERGENCY MEDICINE

## 2022-07-17 PROCEDURE — 84484 ASSAY OF TROPONIN QUANT: CPT | Performed by: EMERGENCY MEDICINE

## 2022-07-17 PROCEDURE — 71045 X-RAY EXAM CHEST 1 VIEW: CPT

## 2022-07-17 PROCEDURE — 63710000001 ONDANSETRON ODT 4 MG TABLET DISPERSIBLE: Performed by: EMERGENCY MEDICINE

## 2022-07-17 PROCEDURE — 36415 COLL VENOUS BLD VENIPUNCTURE: CPT | Performed by: EMERGENCY MEDICINE

## 2022-07-17 PROCEDURE — 80053 COMPREHEN METABOLIC PANEL: CPT | Performed by: EMERGENCY MEDICINE

## 2022-07-17 RX ORDER — SODIUM CHLORIDE 0.9 % (FLUSH) 0.9 %
10 SYRINGE (ML) INJECTION AS NEEDED
Status: DISCONTINUED | OUTPATIENT
Start: 2022-07-17 | End: 2022-07-17 | Stop reason: HOSPADM

## 2022-07-17 RX ORDER — PROMETHAZINE HYDROCHLORIDE 25 MG/1
25 TABLET ORAL EVERY 6 HOURS PRN
Qty: 14 TABLET | Refills: 0 | Status: SHIPPED | OUTPATIENT
Start: 2022-07-17 | End: 2022-08-17

## 2022-07-17 RX ORDER — IBUPROFEN 600 MG/1
600 TABLET ORAL ONCE
Status: DISCONTINUED | OUTPATIENT
Start: 2022-07-17 | End: 2022-07-17

## 2022-07-17 RX ORDER — ONDANSETRON 4 MG/1
4 TABLET, ORALLY DISINTEGRATING ORAL EVERY 8 HOURS PRN
Qty: 14 TABLET | Refills: 0 | Status: SHIPPED | OUTPATIENT
Start: 2022-07-17 | End: 2022-08-17

## 2022-07-17 RX ORDER — ONDANSETRON 4 MG/1
4 TABLET, ORALLY DISINTEGRATING ORAL ONCE
Status: COMPLETED | OUTPATIENT
Start: 2022-07-17 | End: 2022-07-17

## 2022-07-17 RX ORDER — IBUPROFEN 400 MG/1
800 TABLET ORAL ONCE
Status: COMPLETED | OUTPATIENT
Start: 2022-07-17 | End: 2022-07-17

## 2022-07-17 RX ADMIN — IBUPROFEN 800 MG: 400 TABLET, FILM COATED ORAL at 15:49

## 2022-07-17 RX ADMIN — ONDANSETRON 4 MG: 4 TABLET, ORALLY DISINTEGRATING ORAL at 15:49

## 2022-07-17 NOTE — ED PROVIDER NOTES
Time: 3:38 PM EDT  Arrived by: private car  Chief Complaint: SOB  History provided by: patient  History is limited by: N/A     History of Present Illness:  Patient is a 47 y.o.  female that presents to the emergency department with SOB for the past day. She was referred to [facility] from [facility] for further evaluation of her worsening SOB. According to Pt, her SaO2 was not low at . Pt was diagnosed with covid-19 today. Pt had not taken any medications today. Pt also has been complaining of some nausea. Pt has been complaining of a fever since morning, and her temp was 102.6F at triage.    HPI    Patient Care Team  Primary Care Provider: Nannette Braun APRN    Past Medical History:     Allergies   Allergen Reactions   • Cetirizine Itching   • Codeine Other (See Comments) and Headache     MIGRANES   • Meperidine Other (See Comments) and Headache     MIGRANES   • Norco [Hydrocodone-Acetaminophen] Headache   • Propoxyphene Other (See Comments)     MIGRANES   • Sesame Oil Nausea And Vomiting   • Chlorhexidine Rash   • Acetaminophen Palpitations   • Adhesive Tape Rash     BURNS SKIN     Past Medical History:   Diagnosis Date   • Abnormal electrocardiogram    • Allergic    • Allergic rhinitis    • Anemia    • Arthritis    • Asthma    • COVID-19 09/2021   • Depression    • Dysfunctional uterine bleeding    • Fatty liver    • Foot pain    • Gastroesophageal reflux disease without esophagitis    • Generalized anxiety disorder    • H/O cold sores    • Hyperlipidemia    • Hypertriglyceridemia    • Hypothyroidism    • Impaired fasting glucose    • Insomnia, unspecified    • Liver function study, abnormal    • Low back pain    • Macromastia    • Migraine headache    • Mild episode of recurrent major depressive disorder (HCC)    • Mitral valve prolapse    • Night sweats    • Obesity    • Sinus trouble    • Thyroid disorder    • Tremor    • Vitamin D deficiency      Past Surgical History:   Procedure Laterality  Date   • ADENOIDECTOMY     • BILATERAL BREAST REDUCTION Bilateral    • BREAST SURGERY     • LAPAROSCOPIC HYSTERECTOMY  06/30/2014    DR JULIANE PARHAM;[facility]   • REDUCTION MAMMAPLASTY  2002   • SHOULDER SURGERY  2017   • TONSILLECTOMY     • TONSILLECTOMY       Family History   Problem Relation Age of Onset   • Arthritis Mother    • Osteoporosis Mother    • Depression Mother    • Hyperlipidemia Mother    • Other Father         RENAL CALCULUS   • Depression Father    • Hyperlipidemia Father    • Diabetes Paternal Grandfather    • Heart disease Paternal Grandfather    • Lung cancer Other    • Diabetes type II Other    • Hyperlipidemia Other    • Heart disease Other         ISCHEMIC   • Cancer Other    • Hypertension Other    • Arthritis Paternal Uncle    • Diabetes Paternal Uncle    • Asthma Daughter    • Depression Daughter    • Cancer Maternal Grandfather    • Thyroid disease Paternal Grandmother        Home Medications:  Prior to Admission medications    Medication Sig Start Date End Date Taking? Authorizing Provider   Accu-Chek Guide test strip USE 1 ONCE DAILY 1/24/22   Lois Hastings MD   albuterol (PROVENTIL) (2.5 MG/3ML) 0.083% nebulizer solution Take 2.5 mg by nebulization 4 (Four) Times a Day As Needed for Wheezing for up to 30 days. 10/13/21 3/3/22  Isadora Katz APRN   albuterol sulfate  (90 Base) MCG/ACT inhaler Inhale 1-2 puffs Every 4 (Four) Hours As Needed for Wheezing or Shortness of Air for up to 30 days. 10/13/21 3/3/22  Isadora Katz APRN   ALPRAZolam (XANAX) 0.5 MG tablet  9/29/21   ProviderLois MD   ARIPiprazole (ABILIFY) 5 MG tablet  1/24/22   Emergency, Nurse Pito, RN   atomoxetine (STRATTERA) 40 MG capsule Take 40 mg by mouth Every Morning. 7/11/21   Emergency, Nurse Pito, RN   atorvastatin (Lipitor) 80 MG tablet Take 1 tablet by mouth Daily. 3/3/22   Nannette Braun APRN   cyclobenzaprine (FLEXERIL) 10 MG tablet Take 1 tablet by mouth 3  (Three) Times a Day As Needed for Muscle Spasms. 6/27/22   Silvia Madrigal APRN   cyproheptadine (PERIACTIN) 4 MG tablet Take 2 tablets by mouth Daily. 7/10/22   Lois Hastings MD   diclofenac (VOLTAREN) 50 MG EC tablet Take 1 tablet by mouth 2 (Two) Times a Day. for pain 3/3/22   Nannette Braun APRN   dicyclomine (Bentyl) 10 MG capsule Take 1 capsule by mouth 4 (Four) Times a Day Before Meals & at Bedtime. 3/3/22   Nannette Braun APRN   ezetimibe (ZETIA) 10 MG tablet Take 1 tablet by mouth Every Night. 3/3/22   Nannette Braun APRN   FLUoxetine (PROzac) 20 MG capsule Take 40 mg by mouth Daily.    Lois Hastings MD   furosemide (Lasix) 20 MG tablet Take 1 tablet by mouth Daily. 6/27/22   Silvia Madrigal APRN   lamoTRIgine (LaMICtal) 100 MG tablet Take 100 mg by mouth Every Night. 7/11/21   Emergency, Nurse Pito RN   levothyroxine (Synthroid) 25 MCG tablet Take 1 tablet by mouth Daily. 3/3/22   Nannette Braun APRN   loratadine (CLARITIN) 10 MG tablet Take 10 mg by mouth Daily.    Lois Hastings MD   metFORMIN ER (GLUCOPHAGE-XR) 500 MG 24 hr tablet Take 500 mg by mouth 2 (Two) Times a Day.    Emergency, Nurse Pito, RN   metoprolol succinate XL (TOPROL-XL) 50 MG 24 hr tablet Take 1 tablet by mouth Daily. 6/28/22   Janice Villarreal APRN   omeprazole (priLOSEC) 20 MG capsule Take 1 capsule by mouth once daily 4/22/22   Nannette Braun APRN   Ozempic, 1 MG/DOSE, 4 MG/3ML solution pen-injector Inject 1 mg under the skin into the appropriate area as directed 1 (One) Time Per Week. 4/27/22   Lois Hastings MD   potassium chloride (KLOR-CON) 8 MEQ CR tablet Take 1 tablet by mouth Daily. Take with lasix once daily 6/27/22   Silvia Madrigal APRN   Synthroid 50 MCG tablet Take 50 mcg by mouth Daily. 7/8/22   Provider, MD Lois   traZODone (DESYREL) 100 MG tablet Take 1 tablet by mouth Every Night. 3/3/22   Nannette Braun APRN  "  valACYclovir (VALTREX) 500 MG tablet Take 1 tablet by mouth Every Morning. 3/3/22   Nannette Braun APRN   vitamin D (ERGOCALCIFEROL) 1.25 MG (70593 UT) capsule capsule Take 1 capsule by mouth 1 (One) Time Per Week. 3/3/22   Nannette Braun APRN        Social History:   Social History     Tobacco Use   • Smoking status: Never Smoker   • Smokeless tobacco: Never Used   Vaping Use   • Vaping Use: Never used   Substance Use Topics   • Alcohol use: Never   • Drug use: Never       Review of Systems:  Review of Systems   Constitutional: Positive for fever. Negative for chills.   HENT: Negative for congestion, rhinorrhea and sore throat.    Eyes: Negative for pain and visual disturbance.   Respiratory: Positive for shortness of breath. Negative for apnea, cough and chest tightness.    Cardiovascular: Negative for chest pain and palpitations.   Gastrointestinal: Positive for nausea. Negative for abdominal pain, diarrhea and vomiting.   Genitourinary: Negative for difficulty urinating and dysuria.   Musculoskeletal: Negative for joint swelling and myalgias.   Skin: Negative for color change.   Neurological: Negative for seizures and headaches.   Psychiatric/Behavioral: Negative.    All other systems reviewed and are negative.         Physical Exam:  /85   Pulse 102   Temp (!) 102.6 °F (39.2 °C) (Oral)   Resp 20   Ht 167.6 cm (66\")   Wt 95.8 kg (211 lb 3.2 oz)   SpO2 92%   BMI 34.09 kg/m²     Physical Exam  Vitals and nursing note reviewed.   Constitutional:       General: She is not in acute distress.     Appearance: Normal appearance. She is not toxic-appearing.   HENT:      Head: Normocephalic and atraumatic.      Jaw: There is normal jaw occlusion.   Eyes:      General: Lids are normal.      Extraocular Movements: Extraocular movements intact.      Conjunctiva/sclera: Conjunctivae normal.      Pupils: Pupils are equal, round, and reactive to light.   Cardiovascular:      Rate and Rhythm: " Regular rhythm. Tachycardia present.      Pulses: Normal pulses.      Heart sounds: Normal heart sounds.   Pulmonary:      Effort: Pulmonary effort is normal. No respiratory distress.      Breath sounds: Normal breath sounds. No wheezing or rhonchi.   Abdominal:      General: Abdomen is flat.      Palpations: Abdomen is soft.      Tenderness: There is no abdominal tenderness. There is no guarding or rebound.   Musculoskeletal:         General: Normal range of motion.      Cervical back: Normal range of motion and neck supple.      Right lower leg: No edema.      Left lower leg: No edema.   Skin:     General: Skin is warm and dry.   Neurological:      Mental Status: She is alert and oriented to person, place, and time. Mental status is at baseline.   Psychiatric:         Mood and Affect: Mood normal.                Medications in the Emergency Department:  Medications   sodium chloride 0.9 % flush 10 mL (has no administration in time range)   ibuprofen (ADVIL,MOTRIN) tablet 800 mg (800 mg Oral Given 7/17/22 1549)   ondansetron ODT (ZOFRAN-ODT) disintegrating tablet 4 mg (4 mg Oral Given 7/17/22 1549)        Labs  Lab Results (last 24 hours)     Procedure Component Value Units Date/Time    CBC & Differential [[id number]]  (Abnormal) Collected: 07/17/22 1311    Specimen: Blood Updated: 07/17/22 1332    Narrative:      The following orders were created for panel order CBC & Differential.  Procedure                               Abnormality         Status                     ---------                               -----------         ------                     CBC Auto Differential[[id number]]        Abnormal            Final result                 Please view results for these tests on the individual orders.    Comprehensive Metabolic Panel [[id number]]  (Abnormal) Collected: 07/17/22 1311    Specimen: Blood Updated: 07/17/22 1409     Glucose 128 mg/dL      BUN 11 mg/dL      Creatinine 0.91 mg/dL      Sodium 138 mmol/L       Potassium 4.1 mmol/L      Chloride 101 mmol/L      CO2 25.1 mmol/L      Calcium 9.9 mg/dL      Total Protein 7.5 g/dL      Albumin 4.50 g/dL      ALT (SGPT) 92 U/L      AST (SGOT) 69 U/L      Alkaline Phosphatase 113 U/L      Total Bilirubin 0.8 mg/dL      Globulin 3.0 gm/dL      A/G Ratio 1.5 g/dL      BUN/Creatinine Ratio 12.1     Anion Gap 11.9 mmol/L      eGFR 79.4 mL/min/1.73      Comment: National Kidney Foundation and American Society of Nephrology (ASN) Task Force recommended calculation based on the Chronic Kidney Disease Epidemiology Collaboration (CKD-EPI) equation refit without adjustment for race.       Narrative:      GFR Normal >60  Chronic Kidney Disease <60  Kidney Failure <15      BNP [[id number]]  (Normal) Collected: 07/17/22 1311    Specimen: Blood Updated: 07/17/22 1407     proBNP 116.0 pg/mL     Narrative:      Among patients with dyspnea, NT-proBNP is highly sensitive for the detection of acute congestive heart failure. In addition NT-proBNP of <300 pg/ml effectively rules out acute congestive heart failure with 99% negative predictive value.    Results may be falsely decreased if patient taking Biotin.      Troponin [[id number]]  (Normal) Collected: 07/17/22 1311    Specimen: Blood Updated: 07/17/22 1409     Troponin T <0.010 ng/mL     Narrative:      Troponin T Reference Range:  <= 0.03 ng/mL-   Negative for AMI  >0.03 ng/mL-     Abnormal for myocardial necrosis.  Clinicians would have to utilize clinical acumen, EKG, Troponin and serial changes to determine if it is an Acute Myocardial Infarction or myocardial injury due to an underlying chronic condition.       Results may be falsely decreased if patient taking Biotin.      CBC Auto Differential [[id number]]  (Abnormal) Collected: 07/17/22 1311    Specimen: Blood Updated: 07/17/22 1332     WBC 7.47 10*3/mm3      RBC 4.79 10*6/mm3      Hemoglobin 12.7 g/dL      Hematocrit 39.7 %      MCV 82.9 fL      MCH 26.5 pg      MCHC 32.0 g/dL      RDW  14.1 %      RDW-SD 42.5 fl      MPV 11.6 fL      Platelets 208 10*3/mm3      Neutrophil % 78.9 %      Lymphocyte % 10.8 %      Monocyte % 9.5 %      Eosinophil % 0.0 %      Basophil % 0.4 %      Immature Grans % 0.4 %      Neutrophils, Absolute 5.89 10*3/mm3      Lymphocytes, Absolute 0.81 10*3/mm3      Monocytes, Absolute 0.71 10*3/mm3      Eosinophils, Absolute 0.00 10*3/mm3      Basophils, Absolute 0.03 10*3/mm3      Immature Grans, Absolute 0.03 10*3/mm3      nRBC 0.0 /100 WBC            Imaging:  XR Chest 1 View    Result Date: 7/17/2022  PROCEDURE: XR CHEST 1 VW  COMPARISON: [facility], CR, XR CHEST 1 VW, 6/28/2022, 20:08.  INDICATIONS: SHORTNESS OF BREATH, BODY ACHES. COVID + X TODAY  FINDINGS:  Single frontal view chest is compared previous study performed on 6/28/2022.  Today's study reveals heart mediastinum are normal.  There are bilateral diffuse increased markings consistent with chronic lung disease and bronchitis unchanged since previous study.  No evidence of focal infiltrate or pleural effusion or pneumothorax or mass right or left lungs.        1. Bilateral diffuse increased lung markings unchanged since previous study consistent with chronic lung disease and bronchitis       JAILENE GREENBERG MD       Electronically Signed and Approved By: JAILENE GREENBERG MD on 7/17/2022 at 14:21                EKG:      Procedures:  Procedures    Progress                            Medical Decision Making:  MDM  Number of Diagnoses or Management Options  COVID-19  Diagnosis management comments: In-service is 47-year-old female presents emerged department with complaints of body aches and shortness of air.  She was seen at immediate care center today and tested positive for COVID-19.  She is febrile upon arrival however vital signs are otherwise stable with mild tachycardia but no hypoxia.  Physical exam is unremarkable.  Laboratory evaluation and chest x-ray are also unremarkable.  Patient  is well-appearing, nontoxic-appearing and in no acute respiratory distress and we discharged home.  Very strict return to ER and follow-up instructions have been provided to the patient.         Final diagnoses:   COVID-19        Disposition:  ED Disposition     ED Disposition   Discharge    Condition   Stable    Comment   --                Lele Cabello  07/17/22 1540       Lele Cabello  07/17/22 1542       Lele Cabello  07/17/22 1543       Abhishek Sanchez MD  07/17/22 [id number]

## 2022-07-17 NOTE — ED TRIAGE NOTES
Referred to [facility] from [facility] for further evaluation of SOA.  Pt dx with COVID.

## 2022-07-18 ENCOUNTER — TELEPHONE (OUTPATIENT)
Dept: FAMILY MEDICINE CLINIC | Facility: CLINIC | Age: 47
End: 2022-07-18

## 2022-07-18 NOTE — TELEPHONE ENCOUNTER
7/13 - please put in new order for VAS35 - the above test is only done at [facility].  Message from the Hub regarding Brachial order

## 2022-07-22 ENCOUNTER — HOSPITAL ENCOUNTER (OUTPATIENT)
Dept: CARDIOLOGY | Facility: HOSPITAL | Age: 47
Discharge: HOME | End: 2022-07-22
Admitting: NURSE PRACTITIONER

## 2022-07-22 DIAGNOSIS — R60.9 SWELLING: ICD-10-CM

## 2022-07-22 DIAGNOSIS — I73.9 CLAUDICATION: ICD-10-CM

## 2022-07-22 LAB
BH CV LOWER ARTERIAL LEFT ABI RATIO: 1
BH CV LOWER ARTERIAL LEFT DORSALIS PEDIS SYS MAX: 127
BH CV LOWER ARTERIAL LEFT GREAT TOE SYS MAX: 101
BH CV LOWER ARTERIAL LEFT POST TIBIAL SYS MAX: 128
BH CV LOWER ARTERIAL LEFT TBI RATIO: 0.86
BH CV LOWER ARTERIAL RIGHT ABI RATIO: 1.1
BH CV LOWER ARTERIAL RIGHT DORSALIS PEDIS SYS MAX: 129
BH CV LOWER ARTERIAL RIGHT GREAT TOE SYS MAX: 103
BH CV LOWER ARTERIAL RIGHT POST TIBIAL SYS MAX: 125
BH CV LOWER ARTERIAL RIGHT TBI RATIO: 0.88
MAXIMAL PREDICTED HEART RATE: 175 BPM
STRESS TARGET HR: 149 BPM
UPPER ARTERIAL LEFT ARM BRACHIAL SYS MAX: 115 MMHG
UPPER ARTERIAL RIGHT ARM BRACHIAL SYS MAX: 117 MMHG

## 2022-07-22 PROCEDURE — 93922 UPR/L XTREMITY ART 2 LEVELS: CPT | Performed by: SURGERY

## 2022-07-22 PROCEDURE — 93922 UPR/L XTREMITY ART 2 LEVELS: CPT

## 2022-07-25 ENCOUNTER — OFFICE VISIT (OUTPATIENT)
Dept: FAMILY MEDICINE CLINIC | Facility: CLINIC | Age: 47
End: 2022-07-25

## 2022-07-25 VITALS
OXYGEN SATURATION: 97 % | BODY MASS INDEX: 33.8 KG/M2 | HEIGHT: 66 IN | DIASTOLIC BLOOD PRESSURE: 64 MMHG | HEART RATE: 84 BPM | WEIGHT: 210.3 LBS | RESPIRATION RATE: 12 BRPM | SYSTOLIC BLOOD PRESSURE: 110 MMHG | TEMPERATURE: 96.4 F

## 2022-07-25 DIAGNOSIS — R05.9 COUGH: ICD-10-CM

## 2022-07-25 DIAGNOSIS — U09.9 POST-ACUTE COVID-19 SYNDROME: Primary | ICD-10-CM

## 2022-07-25 PROCEDURE — 99213 OFFICE O/P EST LOW 20 MIN: CPT | Performed by: NURSE PRACTITIONER

## 2022-07-25 RX ORDER — METOPROLOL SUCCINATE 50 MG/1
50 TABLET, EXTENDED RELEASE ORAL DAILY
Qty: 30 TABLET | Refills: 2 | Status: SHIPPED | OUTPATIENT
Start: 2022-07-25 | End: 2022-11-07

## 2022-07-25 RX ORDER — BROMPHENIRAMINE MALEATE, PSEUDOEPHEDRINE HYDROCHLORIDE, AND DEXTROMETHORPHAN HYDROBROMIDE 2; 30; 10 MG/5ML; MG/5ML; MG/5ML
5 SYRUP ORAL 4 TIMES DAILY PRN
Qty: 240 ML | Refills: 0 | Status: SHIPPED | OUTPATIENT
Start: 2022-07-25 | End: 2022-08-17

## 2022-07-25 RX ORDER — AZITHROMYCIN 250 MG/1
TABLET, FILM COATED ORAL
Qty: 6 TABLET | Refills: 0 | Status: SHIPPED | OUTPATIENT
Start: 2022-07-25 | End: 2022-08-17

## 2022-07-25 NOTE — PROGRESS NOTES
Chief Complaint  Follow-up (ER positive covid 07/17/2022)    Pawel Deras presents to [facility]  History of Present Illness  Her swelling is better.  She does not have any leg swelling.    She went to urgent care and she was in pain and her pain was causing her head to bust feeling.  When she went to urgent care and was told her to get to the ER.  She was having some cough and she is now getting something up.  She was dx with covid on 7/17/22.  She did run a 102 + higher fever.    She is feeling better overall.    Allergies  Cetirizine, Codeine, Hydrocodone-acetaminophen, Meperidine, Propoxyphene, Sesame oil, Chlorhexidine, Acetaminophen, and Adhesive tape    Social History     Tobacco Use   • Smoking status: Never Smoker   • Smokeless tobacco: Never Used   Vaping Use   • Vaping Use: Never used   Substance Use Topics   • Alcohol use: Never   • Drug use: Never       Family History   Problem Relation Age of Onset   • Arthritis Mother    • Osteoporosis Mother    • Depression Mother    • Hyperlipidemia Mother    • Other Father         RENAL CALCULUS   • Depression Father    • Hyperlipidemia Father    • Diabetes Paternal Grandfather    • Heart disease Paternal Grandfather    • Lung cancer Other    • Diabetes type II Other    • Hyperlipidemia Other    • Heart disease Other         ISCHEMIC   • Cancer Other    • Hypertension Other    • Arthritis Paternal Uncle    • Diabetes Paternal Uncle    • Asthma Daughter    • Depression Daughter    • Cancer Maternal Grandfather    • Thyroid disease Paternal Grandmother         Health Maintenance Due   Topic Date Due   • URINE MICROALBUMIN  Never done   • COLORECTAL CANCER SCREENING  Never done   • ANNUAL PHYSICAL  Never done   • DIABETIC FOOT EXAM  Never done        Immunization History   Administered Date(s) Administered   • FluLaval/Fluarix/Fluzone >6 01/06/2014, 10/13/2021   • Hepatitis A 05/15/2018, 11/05/2018   • Influenza,  "Unspecified 02/22/2019   • MMR 02/22/2019   • Pneumococcal Polysaccharide (PPSV23) 02/11/2022   • TD Preservative Free 11/05/2018   • Tdap 11/06/2018       Review of Systems   Constitutional: Positive for fatigue and fever.   Respiratory: Positive for cough and shortness of breath.         Objective       Vitals:    07/25/22 1417   BP: 110/64   Pulse: 84   Resp: 12   Temp: 96.4 °F (35.8 °C)   SpO2: 97%   Weight: 95.4 kg (210 lb 4.8 oz)   Height: 167.6 cm (66\")       Body mass index is 33.94 kg/m².         Physical Exam  Vitals reviewed.   Constitutional:       Appearance: Normal appearance. She is well-developed.   Cardiovascular:      Rate and Rhythm: Normal rate and regular rhythm.      Heart sounds: Normal heart sounds. No murmur heard.  Pulmonary:      Effort: Pulmonary effort is normal.      Breath sounds: Normal breath sounds.   Neurological:      Mental Status: She is alert and oriented to person, place, and time.      Cranial Nerves: No cranial nerve deficit.      Motor: No weakness.   Psychiatric:         Mood and Affect: Mood and affect normal.             Result Review :     The following data was reviewed by: HAILEY Doan on 07/25/2022:              Reviewed ER and urgent care report noted       Assessment and Plan      Diagnoses and all orders for this visit:    1. 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 Cough  -     azithromycin (Zithromax Z-Valdo) 250 MG tablet; Take 2 tablets by mouth on day 1, then 1 tablet daily on days 2-5  Dispense: 6 tablet; Refill: 0  -     brompheniramine-pseudoephedrine-DM 30-2-10 MG/5ML syrup; Take 5 mL by mouth 4 (Four) Times a Day As Needed for Congestion, Cough or Allergies.  Dispense: 240 mL; Refill: 0    Other orders  -     metoprolol succinate XL (TOPROL-XL) 50 MG 24 hr tablet; Take 1 tablet by mouth " Daily.  Dispense: 30 tablet; Refill: 2            Follow Up     Return if symptoms worsen or fail to improve.  We will do abx and cough med.  Drink plenty of fluids.  Call with any concerns or questions. Discussed about her fatty liver.   Patient was given instructions and counseling regarding her condition or for health maintenance advice. Please see specific information pulled into the AVS if appropriate.         Nannette Braun, HAILEY  07/25/2022

## 2022-08-03 ENCOUNTER — PATIENT MESSAGE (OUTPATIENT)
Dept: FAMILY MEDICINE CLINIC | Facility: CLINIC | Age: 47
End: 2022-08-03

## 2022-08-03 DIAGNOSIS — R60.9 SWELLING: Primary | ICD-10-CM

## 2022-08-03 RX ORDER — FUROSEMIDE 20 MG/1
20 TABLET ORAL DAILY
Qty: 5 TABLET | Refills: 0 | Status: SHIPPED | OUTPATIENT
Start: 2022-08-03 | End: 2022-08-17

## 2022-08-17 ENCOUNTER — OFFICE VISIT (OUTPATIENT)
Dept: CARDIOLOGY | Facility: CLINIC | Age: 47
End: 2022-08-17

## 2022-08-17 VITALS
SYSTOLIC BLOOD PRESSURE: 94 MMHG | DIASTOLIC BLOOD PRESSURE: 65 MMHG | WEIGHT: 213 LBS | BODY MASS INDEX: 34.23 KG/M2 | HEIGHT: 66 IN | HEART RATE: 58 BPM

## 2022-08-17 DIAGNOSIS — R07.89 CHEST PAIN, ATYPICAL: ICD-10-CM

## 2022-08-17 DIAGNOSIS — E78.2 MIXED DYSLIPIDEMIA: ICD-10-CM

## 2022-08-17 DIAGNOSIS — R60.9 SWELLING: ICD-10-CM

## 2022-08-17 DIAGNOSIS — R00.2 PALPITATIONS: Primary | ICD-10-CM

## 2022-08-17 PROCEDURE — 99214 OFFICE O/P EST MOD 30 MIN: CPT | Performed by: INTERNAL MEDICINE

## 2022-08-17 NOTE — PROGRESS NOTES
Chief Complaint  Palpitations, Hyperlipidemia, and Edema    Subjective      Patient is here for follow-up on palpitations and chest discomfort.  Overall, she is feeling much better than her last visit which was in November 2021.  She denies further chest discomfort.  Her palpitations have lessened and are not bothering her so much.  She has no dyspnea, orthopnea, presyncope or syncope.  She has generalized swelling in her legs, face and arm of uncertain etiology.    Past Medical History:   Diagnosis Date   • Abnormal electrocardiogram    • Allergic    • Allergic rhinitis    • Anemia    • Arthritis    • Asthma    • COVID-19 09/2021   • Depression    • Dysfunctional uterine bleeding    • Fatty liver    • Foot pain    • Gastroesophageal reflux disease without esophagitis    • Generalized anxiety disorder    • H/O cold sores    • Hyperlipidemia    • Hypertriglyceridemia    • Hypothyroidism    • Impaired fasting glucose    • Insomnia, unspecified    • Liver function study, abnormal    • Low back pain    • Macromastia    • Migraine headache    • Mild episode of recurrent major depressive disorder (HCC)    • Mitral valve prolapse    • Night sweats    • Obesity    • Sinus trouble    • Thyroid disorder    • Tremor    • Vitamin D deficiency          Current Outpatient Medications:   •  Accu-Chek Guide test strip, USE 1 ONCE DAILY, Disp: , Rfl:   •  ALPRAZolam (XANAX) 0.5 MG tablet, , Disp: , Rfl:   •  ARIPiprazole (ABILIFY) 5 MG tablet, 5 mg Daily., Disp: , Rfl:   •  atomoxetine (STRATTERA) 40 MG capsule, Take 40 mg by mouth Every Morning., Disp: , Rfl:   •  atorvastatin (Lipitor) 80 MG tablet, Take 1 tablet by mouth Daily., Disp: 90 tablet, Rfl: 1  •  cyclobenzaprine (FLEXERIL) 10 MG tablet, Take 1 tablet by mouth 3 (Three) Times a Day As Needed for Muscle Spasms., Disp: 30 tablet, Rfl: 0  •  cyproheptadine (PERIACTIN) 4 MG tablet, Take 2 tablets by mouth Daily., Disp: , Rfl:   •  diclofenac (VOLTAREN) 50 MG EC tablet, Take  1 tablet by mouth 2 (Two) Times a Day. for pain, Disp: 60 tablet, Rfl: 5  •  ezetimibe (ZETIA) 10 MG tablet, Take 1 tablet by mouth Every Night., Disp: 90 tablet, Rfl: 1  •  FLUoxetine (PROzac) 20 MG capsule, Take 20 mg by mouth 2 (Two) Times a Day., Disp: , Rfl:   •  lamoTRIgine (LaMICtal) 50 MG tablet dispersible disintegrating tablet, Take 50 mg by mouth Every Night., Disp: , Rfl:   •  levothyroxine (SYNTHROID, LEVOTHROID) 25 MCG tablet, Take 25 mcg by mouth Daily., Disp: , Rfl:   •  loratadine (CLARITIN) 10 MG tablet, Take 10 mg by mouth Daily., Disp: , Rfl:   •  metFORMIN ER (GLUCOPHAGE-XR) 500 MG 24 hr tablet, Take 500 mg by mouth 2 (Two) Times a Day., Disp: , Rfl:   •  metoprolol succinate XL (TOPROL-XL) 50 MG 24 hr tablet, Take 1 tablet by mouth Daily., Disp: 30 tablet, Rfl: 2  •  omeprazole (priLOSEC) 20 MG capsule, Take 1 capsule by mouth once daily, Disp: 30 capsule, Rfl: 0  •  Ozempic, 1 MG/DOSE, 4 MG/3ML solution pen-injector, Inject 1 mg under the skin into the appropriate area as directed 1 (One) Time Per Week., Disp: , Rfl:   •  traZODone (DESYREL) 100 MG tablet, Take 1 tablet by mouth Every Night., Disp: 90 tablet, Rfl: 1  •  valACYclovir (VALTREX) 500 MG tablet, Take 1 tablet by mouth Every Morning., Disp: 90 tablet, Rfl: 1  •  albuterol (PROVENTIL) (2.5 MG/3ML) 0.083% nebulizer solution, Take 2.5 mg by nebulization 4 (Four) Times a Day As Needed for Wheezing for up to 30 days., Disp: 120 each, Rfl: 5  •  albuterol sulfate  (90 Base) MCG/ACT inhaler, Inhale 1-2 puffs Every 4 (Four) Hours As Needed for Wheezing or Shortness of Air for up to 30 days., Disp: 18 g, Rfl: 11    Medications Discontinued During This Encounter   Medication Reason   • azithromycin (Zithromax Z-Valdo) 250 MG tablet *Therapy completed   • brompheniramine-pseudoephedrine-DM 30-2-10 MG/5ML syrup *Therapy completed   • dicyclomine (Bentyl) 10 MG capsule *Therapy completed   • furosemide (Lasix) 20 MG tablet *Therapy  "completed   • levothyroxine (Synthroid) 25 MCG tablet Duplicate order   • ondansetron ODT (ZOFRAN-ODT) 4 MG disintegrating tablet *Therapy completed   • potassium chloride (KLOR-CON) 8 MEQ CR tablet *Therapy completed   • promethazine (PHENERGAN) 25 MG tablet *Therapy completed   • vitamin D (ERGOCALCIFEROL) 1.25 MG (25972 UT) capsule capsule *Therapy completed     Allergies   Allergen Reactions   • Cetirizine Itching   • Codeine Other (See Comments) and Headache     MIGRANES   • Hydrocodone-Acetaminophen Headache and Other (See Comments)   • Meperidine Other (See Comments) and Headache     MIGRANES   • Propoxyphene Other (See Comments)     MIGRANES   • Sesame Oil Nausea And Vomiting   • Chlorhexidine Rash   • Acetaminophen Palpitations   • Adhesive Tape Rash and Other (See Comments)     BURNS SKIN          Social History     Tobacco Use   • Smoking status: Never Smoker   • Smokeless tobacco: Never Used   Vaping Use   • Vaping Use: Never used   Substance Use Topics   • Alcohol use: Never   • Drug use: Never       Family History   Problem Relation Age of Onset   • Arthritis Mother    • Osteoporosis Mother    • Depression Mother    • Hyperlipidemia Mother    • Other Father         RENAL CALCULUS   • Depression Father    • Hyperlipidemia Father    • Diabetes Paternal Grandfather    • Heart disease Paternal Grandfather    • Lung cancer Other    • Diabetes type II Other    • Hyperlipidemia Other    • Heart disease Other         ISCHEMIC   • Cancer Other    • Hypertension Other    • Arthritis Paternal Uncle    • Diabetes Paternal Uncle    • Asthma Daughter    • Depression Daughter    • Cancer Maternal Grandfather    • Thyroid disease Paternal Grandmother         Objective     BP 94/65   Pulse 58   Ht 167.6 cm (66\")   Wt 96.6 kg (213 lb)   BMI 34.38 kg/m²       Physical Exam    General Appearance:   · no acute distress  · Alert and oriented x3  HENT:   · lips not cyanotic  · Atraumatic  Neck:  · No jvd "   · supple  Respiratory:  · no respiratory distress  · normal breath sounds  · no rales  Cardiovascular:  · no S3, no S4   · no murmur  · no rub  Extremities  · No cyanosis  · lower extremity edema: none    Skin:   · warm, dry  · No rashes      Result Review :     proBNP   Date Value Ref Range Status   07/17/2022 116.0 0.0 - 450.0 pg/mL Final     CMP    CMP 3/3/22 6/28/22 7/17/22   Glucose 103 (A) 111 (A) 128 (A)   BUN 14 15 11   Creatinine 0.68 0.81 0.91   Sodium 140 138 138   Potassium 3.7 4.4 4.1   Chloride 105 102 101   Calcium 9.5 10.1 9.9   Albumin 4.50 4.30 4.50   Total Bilirubin 0.5 0.2 0.8   Alkaline Phosphatase 92 104 113   AST (SGOT) 41 (A) 50 (A) 69 (A)   ALT (SGPT) 74 (A) 90 (A) 92 (A)   (A) Abnormal value            CBC w/diff    CBC w/Diff 3/3/22 6/28/22 7/17/22   WBC 7.64 10.01 7.47   RBC 4.69 4.73 4.79   Hemoglobin 12.7 12.9 12.7   Hematocrit 39.6 40.3 39.7   MCV 84.4 85.2 82.9   MCH 27.1 27.3 26.5 (A)   MCHC 32.1 32.0 32.0   RDW 13.6 13.8 14.1   Platelets 224 228 208   Neutrophil Rel % 62.4 65.8 78.9 (A)   Immature Granulocyte Rel % 0.3 0.5 0.4   Lymphocyte Rel % 29.5 27.5 10.8 (A)   Monocyte Rel % 5.9 5.4 9.5   Eosinophil Rel % 1.2 0.6 0.0 (A)   Basophil Rel % 0.7 0.2 0.4   (A) Abnormal value             Lab Results   Component Value Date    TSH 2.460 06/28/2022      Lab Results   Component Value Date    FREET4 0.93 06/28/2022      No results found for: DDIMERQUANT  Magnesium   Date Value Ref Range Status   12/05/2021 1.8 1.6 - 2.6 mg/dL Final      No results found for: DIGOXIN   Lab Results   Component Value Date    TROPONINT <0.010 07/17/2022           Lipid Panel    Lipid Panel 10/22/21 3/3/22   Total Cholesterol 226 (A) 210 (A)   Triglycerides 208 (A) 228 (A)   HDL Cholesterol 76 (A) 36 (A)   VLDL Cholesterol 36 41 (A)   LDL Cholesterol  114 (A) 133 (A)   LDL/HDL Ratio 1.43 3.57   (A) Abnormal value            No results found for: POCTROP    Results for orders placed during the hospital  encounter of 02/08/22    Adult Transthoracic Echo Complete W/ Cont if Necessary Per Protocol    Interpretation Summary  · Calculated left ventricular EF = 65% Estimated left ventricular EF was in agreement with the calculated left ventricular EF.  · Left ventricular diastolic function was normal.  · No hemodynamically significant valvular pathology.                 Diagnoses and all orders for this visit:    1. Palpitations (Primary)    2. Chest pain, atypical    3. Mixed dyslipidemia    4. Swelling        Assessment:    -Palpitations: She has rare palpitations which seem to be secondary to PACs/PAT as documented by previous Holter monitor.  She has been doing well on metoprolol which will be continued.    -Chest pain: She has been doing well lately without recurrent chest pain.  Monitor clinically.    -Mixed dyslipidemia: Continue statin therapy.  Recent lipid profile was noted.  Transaminases are mildly elevated.  She will need regular monitoring of liver enzymes.  No indication to discontinue statin at this stage.    -Swelling: She has bilateral swelling in her arms, legs and face of uncertain etiology.  Echocardiogram was unremarkable.  Recent blood work was nonrevealing.  Her symptoms are not related to CHF.  She might benefit from nephrology evaluation.  She will discuss this with her PCP.    Follow Up     Return in about 1 year (around 8/17/2023).        Patient was given instructions and counseling regarding her condition or for health maintenance advice. Please see specific information pulled into the AVS if appropriate.

## 2022-08-26 ENCOUNTER — HOSPITAL ENCOUNTER (OUTPATIENT)
Dept: MAMMOGRAPHY | Facility: HOSPITAL | Age: 47
Discharge: HOME | End: 2022-08-26
Admitting: NURSE PRACTITIONER

## 2022-08-26 DIAGNOSIS — Z12.31 SCREENING MAMMOGRAM FOR BREAST CANCER: ICD-10-CM

## 2022-08-26 PROCEDURE — 77067 SCR MAMMO BI INCL CAD: CPT

## 2022-08-26 PROCEDURE — 77063 BREAST TOMOSYNTHESIS BI: CPT

## 2022-08-29 ENCOUNTER — LAB (OUTPATIENT)
Dept: LAB | Facility: HOSPITAL | Age: 47
End: 2022-08-29

## 2022-08-29 ENCOUNTER — OFFICE VISIT (OUTPATIENT)
Dept: GASTROENTEROLOGY | Facility: CLINIC | Age: 47
End: 2022-08-29

## 2022-08-29 ENCOUNTER — PREP FOR SURGERY (OUTPATIENT)
Dept: OTHER | Facility: HOSPITAL | Age: 47
End: 2022-08-29

## 2022-08-29 VITALS
WEIGHT: 220 LBS | SYSTOLIC BLOOD PRESSURE: 113 MMHG | HEART RATE: 70 BPM | HEIGHT: 66 IN | DIASTOLIC BLOOD PRESSURE: 55 MMHG | BODY MASS INDEX: 35.36 KG/M2

## 2022-08-29 DIAGNOSIS — E66.01 CLASS 2 SEVERE OBESITY WITH SERIOUS COMORBIDITY AND BODY MASS INDEX (BMI) OF 35.0 TO 35.9 IN ADULT, UNSPECIFIED OBESITY TYPE: ICD-10-CM

## 2022-08-29 DIAGNOSIS — R79.89 ELEVATED LFTS: Primary | ICD-10-CM

## 2022-08-29 DIAGNOSIS — K76.0 FATTY LIVER: ICD-10-CM

## 2022-08-29 DIAGNOSIS — R79.89 ELEVATED LFTS: ICD-10-CM

## 2022-08-29 DIAGNOSIS — R10.13 EPIGASTRIC PAIN: ICD-10-CM

## 2022-08-29 DIAGNOSIS — R10.31 RIGHT LOWER QUADRANT ABDOMINAL PAIN: ICD-10-CM

## 2022-08-29 DIAGNOSIS — R92.8 ABNORMAL MAMMOGRAM: Primary | ICD-10-CM

## 2022-08-29 DIAGNOSIS — R12 HEARTBURN: ICD-10-CM

## 2022-08-29 LAB
ALBUMIN SERPL-MCNC: 4.1 G/DL (ref 3.5–5.2)
ALP SERPL-CCNC: 86 U/L (ref 39–117)
ALPHA-FETOPROTEIN: <2 NG/ML (ref 0–8.3)
ALT SERPL W P-5'-P-CCNC: 27 U/L (ref 1–33)
AST SERPL-CCNC: 22 U/L (ref 1–32)
BILIRUB CONJ SERPL-MCNC: <0.2 MG/DL (ref 0–0.3)
BILIRUB INDIRECT SERPL-MCNC: NORMAL MG/DL
BILIRUB SERPL-MCNC: 0.4 MG/DL (ref 0–1.2)
CERULOPLASMIN SERPL-MCNC: 28 MG/DL (ref 19–39)
FERRITIN SERPL-MCNC: 31.4 NG/ML (ref 13–150)
HAV IGM SERPL QL IA: NORMAL
HBV CORE IGM SERPL QL IA: NORMAL
HBV SURFACE AG SERPL QL IA: NORMAL
HCV AB SER DONR QL: NORMAL
INR PPP: 0.96 (ref 0.86–1.15)
IRON 24H UR-MRATE: 42 MCG/DL (ref 37–145)
IRON SATN MFR SERPL: 9 % (ref 20–50)
PROT SERPL-MCNC: 6.6 G/DL (ref 6–8.5)
PROTHROMBIN TIME: 12.9 SECONDS (ref 11.8–14.9)
TIBC SERPL-MCNC: 460 MCG/DL (ref 298–536)
TRANSFERRIN SERPL-MCNC: 309 MG/DL (ref 200–360)

## 2022-08-29 PROCEDURE — 80074 ACUTE HEPATITIS PANEL: CPT

## 2022-08-29 PROCEDURE — 85610 PROTHROMBIN TIME: CPT

## 2022-08-29 PROCEDURE — 82103 ALPHA-1-ANTITRYPSIN TOTAL: CPT

## 2022-08-29 PROCEDURE — 83540 ASSAY OF IRON: CPT

## 2022-08-29 PROCEDURE — 86038 ANTINUCLEAR ANTIBODIES: CPT

## 2022-08-29 PROCEDURE — 99214 OFFICE O/P EST MOD 30 MIN: CPT | Performed by: NURSE PRACTITIONER

## 2022-08-29 PROCEDURE — 82728 ASSAY OF FERRITIN: CPT

## 2022-08-29 PROCEDURE — 80076 HEPATIC FUNCTION PANEL: CPT

## 2022-08-29 PROCEDURE — 82104 ALPHA-1-ANTITRYPSIN PHENO: CPT

## 2022-08-29 PROCEDURE — 84466 ASSAY OF TRANSFERRIN: CPT

## 2022-08-29 PROCEDURE — 84155 ASSAY OF PROTEIN SERUM: CPT

## 2022-08-29 PROCEDURE — 36415 COLL VENOUS BLD VENIPUNCTURE: CPT

## 2022-08-29 PROCEDURE — 86334 IMMUNOFIX E-PHORESIS SERUM: CPT

## 2022-08-29 PROCEDURE — 86015 ACTIN ANTIBODY EACH: CPT

## 2022-08-29 PROCEDURE — 82784 ASSAY IGA/IGD/IGG/IGM EACH: CPT

## 2022-08-29 PROCEDURE — 84165 PROTEIN E-PHORESIS SERUM: CPT

## 2022-08-29 PROCEDURE — 82105 ALPHA-FETOPROTEIN SERUM: CPT

## 2022-08-29 PROCEDURE — 82525 ASSAY OF COPPER: CPT

## 2022-08-29 PROCEDURE — 86381 MITOCHONDRIAL ANTIBODY EACH: CPT

## 2022-08-29 PROCEDURE — 82390 ASSAY OF CERULOPLASMIN: CPT

## 2022-08-29 PROCEDURE — 86225 DNA ANTIBODY NATIVE: CPT

## 2022-08-29 RX ORDER — POLYETHYLENE GLYCOL 3350, SODIUM SULFATE ANHYDROUS, SODIUM BICARBONATE, SODIUM CHLORIDE, POTASSIUM CHLORIDE 227.1; 21.5; 6.36; 5.53; .754 G/L; G/L; G/L; G/L; G/L
4 POWDER, FOR SOLUTION ORAL DAILY
Qty: 1 EACH | Refills: 0 | Status: SHIPPED | OUTPATIENT
Start: 2022-08-29 | End: 2022-08-30

## 2022-08-29 NOTE — PATIENT INSTRUCTIONS
Recommended probiotic, Florajen OTC daily   Stop NSAIDs    Fatty Liver Disease    The liver converts food into energy, removes toxic material from the blood, makes important proteins, and absorbs necessary vitamins from food. Fatty liver disease occurs when too much fat has built up in your liver cells. Fatty liver disease is also called hepatic steatosis.  In many cases, fatty liver disease does not cause symptoms or problems. It is often diagnosed when tests are being done for other reasons. However, over time, fatty liver can cause inflammation that may lead to more serious liver problems, such as scarring of the liver (cirrhosis) and liver failure.  Fatty liver is associated with insulin resistance, increased body fat, high blood pressure (hypertension), and high cholesterol. These are features of metabolic syndrome and increase your risk for stroke, diabetes, and heart disease.  What are the causes?  This condition may be caused by components of metabolic syndrome:  Obesity.  Insulin resistance.  High cholesterol.  Other causes:  Alcohol abuse.  Poor nutrition.  Cushing syndrome.  Pregnancy.  Certain drugs.  Poisons.  Some viral infections.  What increases the risk?  You are more likely to develop this condition if you:  Abuse alcohol.  Are overweight.  Have diabetes.  Have hepatitis.  Have a high triglyceride level.  Are pregnant.  What are the signs or symptoms?  Fatty liver disease often does not cause symptoms. If symptoms do develop, they can include:  Fatigue and weakness.  Weight loss.  Confusion.  Nausea, vomiting, or abdominal pain.  Yellowing of your skin and the white parts of your eyes (jaundice).  Itchy skin.  How is this diagnosed?  This condition may be diagnosed by:  A physical exam and your medical history.  Blood tests.  Imaging tests, such as an ultrasound, CT scan, or MRI.  A liver biopsy. A small sample of liver tissue is removed using a needle. The sample is then looked at under a  microscope.  How is this treated?  Fatty liver disease is often caused by other health conditions. Treatment for fatty liver may involve medicines and lifestyle changes to manage conditions such as:  Alcoholism.  High cholesterol.  Diabetes.  Being overweight or obese.  Follow these instructions at home:    Do not drink alcohol. If you have trouble quitting, ask your health care provider how to safely quit with the help of medicine or a supervised program. This is important to keep your condition from getting worse.  Eat a healthy diet as told by your health care provider. Ask your health care provider about working with a dietitian to develop an eating plan.  Exercise regularly. This can help you lose weight and control your cholesterol and diabetes. Talk to your health care provider about an exercise plan and which activities are best for you.  Take over-the-counter and prescription medicines only as told by your health care provider.  Keep all follow-up visits. This is important.  Contact a health care provider if:  You have trouble controlling your:  Blood sugar. This is especially important if you have diabetes.  Cholesterol.  Drinking of alcohol.  Get help right away if:  You have abdominal pain.  You have jaundice.  You have nausea and are vomiting.  You vomit blood or material that looks like coffee grounds.  You have stools that are black, tar-like, or bloody.  Summary  Fatty liver disease develops when too much fat builds up in the cells of your liver.  Fatty liver disease often causes no symptoms or problems. However, over time, fatty liver can cause inflammation that may lead to more serious liver problems, such as scarring of the liver (cirrhosis).  You are more likely to develop this condition if you abuse alcohol, are pregnant, are overweight, have diabetes, have hepatitis, or have high triglyceride or cholesterol levels.  Contact your health care provider if you have trouble controlling your blood  sugar, cholesterol, or drinking of alcohol.  This information is not intended to replace advice given to you by your health care provider. Make sure you discuss any questions you have with your health care provider.  Document Revised: 09/30/2021 Document Reviewed: 09/30/2021  [employer] Patient Education © 2021 [employer]

## 2022-08-29 NOTE — PROGRESS NOTES
Patient Name: Estela Deras   Visit Date: 08/29/2022   Patient ID: [id number]  Provider: HAILEY Ruano    Sex: female  Location:  Location Address:  Location Phone: [address] [place] [address] [phone]    YOB: 1975  Age: 47 y.o.   Primary Care Provider Nannette Braun APRN      Referring Provider: WENDI Doan        Chief Complaint  Elevated Hepatic Enzymes (Fatty liver, pt states also is having swelling everywhere) and Abdominal Pain (Pt states occasionally having RLQ pain )    History of Present Illness  Pt presents w elev LFTs and fatty liver  Pt c/o swelling in joints, face, feels stomach feels swollen/distended, has regular BM's. Has had LE edema that's pitting, has seen Dr Dominguez (cardio) neg w/u, has appt w nephro.  States stool soft but w foul odor. Has had frequent antibiotics (Covid last year / this year, sinusitis)Has gained 4# recently, states has gained 10# total in the last couple months.  NO blood in stool or black stool. No abd pain w meals, occasionally has RLQ pain-not sure of triggers, states may occur 2 x week, lasting 30 min.   Pt w diabetes  , no ETOH  Pt cannot miss a day of Prilosec has been taking for several years, works well  Frequent NSAIDs    Liver US 7/14/22: fatty liver, CBD 4mm    Past Medical History:   Diagnosis Date   • Abnormal electrocardiogram    • Allergic    • Allergic rhinitis    • Anemia    • Arthritis    • Asthma    • COVID-19 09/2021   • Depression    • Dysfunctional uterine bleeding    • Fatty liver    • Foot pain    • Gastroesophageal reflux disease without esophagitis    • Generalized anxiety disorder    • H/O cold sores    • Hyperlipidemia    • Hypertriglyceridemia    • Hypothyroidism    • Impaired fasting glucose    • Insomnia, unspecified    • Liver function study, abnormal    • Low back pain    • Macromastia    • Migraine headache    • Mild episode of recurrent major depressive disorder (HCC)    • Mitral  "valve prolapse    • Night sweats    • Obesity    • Sinus trouble    • Thyroid disorder    • Tremor    • Vitamin D deficiency        Past Surgical History:   Procedure Laterality Date   • ADENOIDECTOMY     • BILATERAL BREAST REDUCTION Bilateral    • BREAST SURGERY     • LAPAROSCOPIC HYSTERECTOMY  06/30/2014    DR JULIANE PARHAM;[facility]   • REDUCTION MAMMAPLASTY  2002   • SHOULDER SURGERY  2017   • TONSILLECTOMY     • TONSILLECTOMY         Allergies   Allergen Reactions   • Adhesive Tape Rash and Other (See Comments)     BURNS SKIN     • Cetirizine Itching   • Chlorhexidine Rash   • Codeine Other (See Comments) and Headache     MIGRANES   • Hydrocodone-Acetaminophen Headache   • Meperidine Other (See Comments) and Headache     MIGRANES   • Propoxyphene Other (See Comments)     MIGRANES   • Sesame Oil Nausea And Vomiting   • Acetaminophen Palpitations       Family History   Problem Relation Age of Onset   • Arthritis Mother    • Osteoporosis Mother    • Depression Mother    • Hyperlipidemia Mother    • Other Father         RENAL CALCULUS   • Depression Father    • Hyperlipidemia Father    • Arthritis Paternal Uncle    • Diabetes Paternal Uncle    • Cancer Maternal Grandfather    • Thyroid disease Paternal Grandmother    • Diabetes Paternal Grandfather    • Heart disease Paternal Grandfather    • Asthma Daughter    • Depression Daughter    • Lung cancer Other    • Diabetes type II Other    • Hyperlipidemia Other    • Heart disease Other         ISCHEMIC   • Cancer Other    • Hypertension Other    • Colon cancer Neg Hx         Social History     Tobacco Use   • Smoking status: Never Smoker   • Smokeless tobacco: Never Used   Vaping Use   • Vaping Use: Never used   Substance Use Topics   • Alcohol use: Never   • Drug use: Never       Objective     Vital Signs:   /55 (BP Location: Left arm, Patient Position: Sitting, Cuff Size: Adult)   Pulse 70   Ht 167.6 cm (66\")   Wt 99.8 kg (220 lb)   BMI 35.51 " kg/m²       Physical Exam  Constitutional:       General: The patient is not in acute distress.     Appearance: Normal appearance.   HENT:      Head: Normocephalic and atraumatic.      Nose: Nose normal.   Pulmonary:      Effort: Pulmonary effort is normal. No respiratory distress.   Abdominal:      General: Abdomen is flat.      Palpations: Abdomen is soft. There is no mass.      Tenderness: There is no abdominal tenderness--mild epigastric tenderness. There is no guarding.   Musculoskeletal:      Cervical back: Neck supple.      Right lower leg: No edema.      Left lower leg: No edema.   Skin:     General: Skin is warm and dry.   Neurological:      General: No focal deficit present.      Mental Status: The patient is alert and oriented to person, place, and time.      Gait: Gait normal.   Psychiatric:         Mood and Affect: Mood normal.         Speech: Speech normal.         Behavior: Behavior normal.         Thought Content: Thought content normal.     Result Review :   The following data was reviewed by: HAILEY Ruano on 08/29/2022:    CBC w/diff    CBC w/Diff 3/3/22 6/28/22 7/17/22   WBC 7.64 10.01 7.47   RBC 4.69 4.73 4.79   Hemoglobin 12.7 12.9 12.7   Hematocrit 39.6 40.3 39.7   MCV 84.4 85.2 82.9   MCH 27.1 27.3 26.5 (A)   MCHC 32.1 32.0 32.0   RDW 13.6 13.8 14.1   Platelets 224 228 208   Neutrophil Rel % 62.4 65.8 78.9 (A)   Immature Granulocyte Rel % 0.3 0.5 0.4   Lymphocyte Rel % 29.5 27.5 10.8 (A)   Monocyte Rel % 5.9 5.4 9.5   Eosinophil Rel % 1.2 0.6 0.0 (A)   Basophil Rel % 0.7 0.2 0.4   (A) Abnormal value            CMP    CMP 7/17/22 8/29/22 8/29/22 9/6/22 9/6/22     1025 1025 1048 1048   Glucose 128 (A)   112 (A)    BUN 11   10    Creatinine 0.91   0.72    Sodium 138   138    Potassium 4.1   3.8    Chloride 101   101    Calcium 9.9   9.6    Total Protein   6.5  6.8   Albumin 4.50 4.10 3.4 4.40 3.6   Globulin   3.1  3.2   Total Bilirubin 0.8 0.4      Alkaline Phosphatase 113 86       AST (SGOT) 69 (A) 22      ALT (SGPT) 92 (A) 27      (A) Abnormal value                          Assessment and Plan    Diagnoses and all orders for this visit:    1. Elevated LFTs (Primary)  -     Liver Elastography  -     AFP Tumor Marker; Future  -     Alpha - 1 - Antitrypsin Phenotype; Future  -     ZOEY; Future  -     Hepatitis Panel, Acute; Future  -     Iron Profile; Future  -     Ferritin; Future  -     Copper, Serum; Future  -     Protime-INR; Future  -     Ceruloplasmin; Future  -     Mitochondrial Antibodies, M2; Future  -     Anti-Smooth Muscle Antibody Titer; Future  -     ANABEL + PE; Future  -     Hepatic Function Panel; Future    2. Fatty liver  -     Liver Elastography  -     AFP Tumor Marker; Future  -     Alpha - 1 - Antitrypsin Phenotype; Future  -     ZOEY; Future  -     Hepatitis Panel, Acute; Future  -     Iron Profile; Future  -     Ferritin; Future  -     Copper, Serum; Future  -     Protime-INR; Future  -     Ceruloplasmin; Future  -     Mitochondrial Antibodies, M2; Future  -     Anti-Smooth Muscle Antibody Titer; Future  -     ANABEL + PE; Future  -     Hepatic Function Panel; Future    3. Heartburn    4. Right lower quadrant abdominal pain    5. Epigastric pain    6. Class 2 severe obesity with serious comorbidity and body mass index (BMI) of 35.0 to 35.9 in adult, unspecified obesity type (HCC)    Other orders  -     PEG 3350-KCl-NaBcb-NaCl-NaSulf (Golytely) 227.1 g pack; Take 4 L by mouth Daily for 1 day. Take per office instructions  Dispense: 1 each; Refill: 0            Follow Up   Return for follow up after procedure.   Recommended probiotic, Florajen OTC daily   Try to D/C NSAIDs  Discussed with patient importance of weight loss with 10% weight loss goal, low-carb diet reviewed, and the benefit of 2-4 c. Coffee/d discussed.  Written information given to patient today.  Check liver w/u and Fibroscan  EGD/COLONOSCOPY Surgical Risk and Benefits: Possible risks/complications, benefits, and  alternatives to surgical or invasive procedure have been explained to patient and/or legal guardian; risks include bleeding, infection, and perforation. Patient has been evaluated and can tolerate anesthesia and/or sedation. Risks, benefits, and alternatives to anesthesia and sedation have been explained to patient and/or legal guardian.       Patient was given instructions and counseling regarding her condition or for health maintenance advice. Please see specific information pulled into the AVS if appropriate.

## 2022-08-30 LAB
ALBUMIN SERPL ELPH-MCNC: 3.4 G/DL (ref 2.9–4.4)
ALBUMIN/GLOB SERPL: 1.1 {RATIO} (ref 0.7–1.7)
ALPHA1 GLOB SERPL ELPH-MCNC: 0.3 G/DL (ref 0–0.4)
ALPHA2 GLOB SERPL ELPH-MCNC: 1 G/DL (ref 0.4–1)
B-GLOBULIN SERPL ELPH-MCNC: 1.3 G/DL (ref 0.7–1.3)
DSDNA IGG SERPL IA-ACNC: NEGATIVE [IU]/ML
GAMMA GLOB SERPL ELPH-MCNC: 0.5 G/DL (ref 0.4–1.8)
GLOBULIN SER-MCNC: 3.1 G/DL (ref 2.2–3.9)
IGA SERPL-MCNC: 351 MG/DL (ref 87–352)
IGG SERPL-MCNC: 635 MG/DL (ref 586–1602)
IGM SERPL-MCNC: 27 MG/DL (ref 26–217)
INTERPRETATION SERPL IEP-IMP: NORMAL
LABORATORY COMMENT REPORT: NORMAL
M PROTEIN SERPL ELPH-MCNC: NORMAL G/DL
MITOCHONDRIA M2 IGG SER-ACNC: <20 UNITS (ref 0–20)
NUCLEAR IGG SER IA-RTO: NEGATIVE
PROT SERPL-MCNC: 6.5 G/DL (ref 6–8.5)
SMA IGG SER-ACNC: 3 UNITS (ref 0–19)

## 2022-08-31 ENCOUNTER — TRANSCRIBE ORDERS (OUTPATIENT)
Dept: ADMINISTRATIVE | Facility: HOSPITAL | Age: 47
End: 2022-08-31

## 2022-08-31 DIAGNOSIS — R92.8 ABNORMAL MAMMOGRAM: Primary | ICD-10-CM

## 2022-08-31 LAB — COPPER SERPL-MCNC: 133 UG/DL (ref 80–158)

## 2022-09-01 LAB
A1AT PHENOTYP SERPL IFE: NORMAL
A1AT SERPL-MCNC: 153 MG/DL (ref 101–187)

## 2022-09-06 ENCOUNTER — TRANSCRIBE ORDERS (OUTPATIENT)
Dept: ADMINISTRATIVE | Facility: HOSPITAL | Age: 47
End: 2022-09-06

## 2022-09-06 ENCOUNTER — LAB (OUTPATIENT)
Dept: LAB | Facility: HOSPITAL | Age: 47
End: 2022-09-06

## 2022-09-06 DIAGNOSIS — R80.9 PROTEINURIA, UNSPECIFIED TYPE: ICD-10-CM

## 2022-09-06 DIAGNOSIS — R80.9 PROTEINURIA, UNSPECIFIED TYPE: Primary | ICD-10-CM

## 2022-09-06 LAB
25(OH)D3 SERPL-MCNC: 30.7 NG/ML (ref 30–100)
ALBUMIN SERPL-MCNC: 4.4 G/DL (ref 3.5–5.2)
ALBUMIN UR-MCNC: <1.2 MG/DL
ANION GAP SERPL CALCULATED.3IONS-SCNC: 12.4 MMOL/L (ref 5–15)
BACTERIA UR QL AUTO: NORMAL /HPF
BASOPHILS # BLD AUTO: 0.04 10*3/MM3 (ref 0–0.2)
BASOPHILS NFR BLD AUTO: 0.4 % (ref 0–1.5)
BILIRUB UR QL STRIP: NEGATIVE
BUN SERPL-MCNC: 10 MG/DL (ref 6–20)
BUN/CREAT SERPL: 13.9 (ref 7–25)
CALCIUM SPEC-SCNC: 9.6 MG/DL (ref 8.6–10.5)
CHLORIDE SERPL-SCNC: 101 MMOL/L (ref 98–107)
CLARITY UR: CLEAR
CO2 SERPL-SCNC: 24.6 MMOL/L (ref 22–29)
COLOR UR: YELLOW
CREAT SERPL-MCNC: 0.72 MG/DL (ref 0.57–1)
CREAT UR-MCNC: 106.9 MG/DL
DEPRECATED RDW RBC AUTO: 45.7 FL (ref 37–54)
EGFRCR SERPLBLD CKD-EPI 2021: 105.2 ML/MIN/1.73
EOSINOPHIL # BLD AUTO: 0.06 10*3/MM3 (ref 0–0.4)
EOSINOPHIL NFR BLD AUTO: 0.6 % (ref 0.3–6.2)
ERYTHROCYTE [DISTWIDTH] IN BLOOD BY AUTOMATED COUNT: 15.7 % (ref 12.3–15.4)
GLUCOSE SERPL-MCNC: 112 MG/DL (ref 65–99)
GLUCOSE UR STRIP-MCNC: NEGATIVE MG/DL
HCT VFR BLD AUTO: 37.2 % (ref 34–46.6)
HGB BLD-MCNC: 12.2 G/DL (ref 12–15.9)
HGB UR QL STRIP.AUTO: NEGATIVE
HYALINE CASTS UR QL AUTO: NORMAL /LPF
IMM GRANULOCYTES # BLD AUTO: 0.03 10*3/MM3 (ref 0–0.05)
IMM GRANULOCYTES NFR BLD AUTO: 0.3 % (ref 0–0.5)
KETONES UR QL STRIP: NEGATIVE
LEUKOCYTE ESTERASE UR QL STRIP.AUTO: NEGATIVE
LYMPHOCYTES # BLD AUTO: 2.83 10*3/MM3 (ref 0.7–3.1)
LYMPHOCYTES NFR BLD AUTO: 28.4 % (ref 19.6–45.3)
MCH RBC QN AUTO: 26.7 PG (ref 26.6–33)
MCHC RBC AUTO-ENTMCNC: 32.8 G/DL (ref 31.5–35.7)
MCV RBC AUTO: 81.4 FL (ref 79–97)
MONOCYTES # BLD AUTO: 0.5 10*3/MM3 (ref 0.1–0.9)
MONOCYTES NFR BLD AUTO: 5 % (ref 5–12)
NEUTROPHILS NFR BLD AUTO: 6.49 10*3/MM3 (ref 1.7–7)
NEUTROPHILS NFR BLD AUTO: 65.3 % (ref 42.7–76)
NITRITE UR QL STRIP: NEGATIVE
NRBC BLD AUTO-RTO: 0 /100 WBC (ref 0–0.2)
PH UR STRIP.AUTO: 6 [PH] (ref 5–8)
PHOSPHATE SERPL-MCNC: 4.1 MG/DL (ref 2.5–4.5)
PLATELET # BLD AUTO: 241 10*3/MM3 (ref 140–450)
PMV BLD AUTO: 12.5 FL (ref 6–12)
POTASSIUM SERPL-SCNC: 3.8 MMOL/L (ref 3.5–5.2)
PROT ?TM UR-MCNC: 6.7 MG/DL
PROT UR QL STRIP: NEGATIVE
PROT/CREAT UR: 0.06 MG/G{CREAT}
PTH-INTACT SERPL-MCNC: 49.6 PG/ML (ref 15–65)
RBC # BLD AUTO: 4.57 10*6/MM3 (ref 3.77–5.28)
RBC # UR STRIP: NORMAL /HPF
REF LAB TEST METHOD: NORMAL
SODIUM SERPL-SCNC: 138 MMOL/L (ref 136–145)
SP GR UR STRIP: 1.01 (ref 1–1.03)
SQUAMOUS #/AREA URNS HPF: NORMAL /HPF
UROBILINOGEN UR QL STRIP: NORMAL
WBC # UR STRIP: NORMAL /HPF
WBC NRBC COR # BLD: 9.95 10*3/MM3 (ref 3.4–10.8)

## 2022-09-06 PROCEDURE — 84156 ASSAY OF PROTEIN URINE: CPT

## 2022-09-06 PROCEDURE — 86335 IMMUNFIX E-PHORSIS/URINE/CSF: CPT

## 2022-09-06 PROCEDURE — 83970 ASSAY OF PARATHORMONE: CPT

## 2022-09-06 PROCEDURE — 36415 COLL VENOUS BLD VENIPUNCTURE: CPT

## 2022-09-06 PROCEDURE — 80069 RENAL FUNCTION PANEL: CPT

## 2022-09-06 PROCEDURE — 83521 IG LIGHT CHAINS FREE EACH: CPT

## 2022-09-06 PROCEDURE — 84165 PROTEIN E-PHORESIS SERUM: CPT

## 2022-09-06 PROCEDURE — 82570 ASSAY OF URINE CREATININE: CPT

## 2022-09-06 PROCEDURE — 84166 PROTEIN E-PHORESIS/URINE/CSF: CPT

## 2022-09-06 PROCEDURE — 84155 ASSAY OF PROTEIN SERUM: CPT

## 2022-09-06 PROCEDURE — 82043 UR ALBUMIN QUANTITATIVE: CPT

## 2022-09-06 PROCEDURE — 82784 ASSAY IGA/IGD/IGG/IGM EACH: CPT

## 2022-09-06 PROCEDURE — 86334 IMMUNOFIX E-PHORESIS SERUM: CPT

## 2022-09-06 PROCEDURE — 85025 COMPLETE CBC W/AUTO DIFF WBC: CPT

## 2022-09-06 PROCEDURE — 82306 VITAMIN D 25 HYDROXY: CPT

## 2022-09-06 PROCEDURE — 81001 URINALYSIS AUTO W/SCOPE: CPT

## 2022-09-07 LAB
ALBUMIN SERPL ELPH-MCNC: 3.6 G/DL (ref 2.9–4.4)
ALBUMIN/GLOB SERPL: 1.2 {RATIO} (ref 0.7–1.7)
ALPHA1 GLOB SERPL ELPH-MCNC: 0.3 G/DL (ref 0–0.4)
ALPHA2 GLOB SERPL ELPH-MCNC: 1 G/DL (ref 0.4–1)
B-GLOBULIN SERPL ELPH-MCNC: 1.4 G/DL (ref 0.7–1.3)
GAMMA GLOB SERPL ELPH-MCNC: 0.6 G/DL (ref 0.4–1.8)
GLOBULIN SER-MCNC: 3.2 G/DL (ref 2.2–3.9)
IGA SERPL-MCNC: 372 MG/DL (ref 87–352)
IGG SERPL-MCNC: 643 MG/DL (ref 586–1602)
IGM SERPL-MCNC: 29 MG/DL (ref 26–217)
INTERPRETATION SERPL IEP-IMP: ABNORMAL
LABORATORY COMMENT REPORT: ABNORMAL
M PROTEIN SERPL ELPH-MCNC: ABNORMAL G/DL
PROT SERPL-MCNC: 6.8 G/DL (ref 6–8.5)

## 2022-09-08 LAB
ALBUMIN 24H MFR UR ELPH: 16.3 %
ALPHA1 GLOB 24H MFR UR ELPH: 9.5 %
ALPHA2 GLOB 24H MFR UR ELPH: 27.2 %
B-GLOBULIN MFR UR ELPH: 25.6 %
GAMMA GLOB 24H MFR UR ELPH: 21.5 %
HIV 1 & 2 AB SER-IMP: NORMAL
INTERPRETATION UR IFE-IMP: NORMAL
KAPPA LC FREE SER-MCNC: 13.8 MG/L (ref 3.3–19.4)
KAPPA LC FREE/LAMBDA FREE SER: 1.35 {RATIO} (ref 0.26–1.65)
LAMBDA LC FREE SERPL-MCNC: 10.2 MG/L (ref 5.7–26.3)
M PROTEIN 24H MFR UR ELPH: NORMAL %
PROT UR-MCNC: 11.4 MG/DL

## 2022-09-12 ENCOUNTER — OFFICE VISIT (OUTPATIENT)
Dept: FAMILY MEDICINE CLINIC | Facility: CLINIC | Age: 47
End: 2022-09-12

## 2022-09-12 VITALS
BODY MASS INDEX: 33.7 KG/M2 | WEIGHT: 209.7 LBS | DIASTOLIC BLOOD PRESSURE: 70 MMHG | TEMPERATURE: 96.9 F | OXYGEN SATURATION: 98 % | RESPIRATION RATE: 20 BRPM | HEIGHT: 66 IN | SYSTOLIC BLOOD PRESSURE: 118 MMHG | HEART RATE: 75 BPM

## 2022-09-12 DIAGNOSIS — K21.9 GASTROESOPHAGEAL REFLUX DISEASE WITHOUT ESOPHAGITIS: ICD-10-CM

## 2022-09-12 DIAGNOSIS — G89.29 CHRONIC RIGHT SI JOINT PAIN: ICD-10-CM

## 2022-09-12 DIAGNOSIS — B00.9 HSV (HERPES SIMPLEX VIRUS) INFECTION: ICD-10-CM

## 2022-09-12 DIAGNOSIS — M51.37 DDD (DEGENERATIVE DISC DISEASE), LUMBOSACRAL: Primary | ICD-10-CM

## 2022-09-12 DIAGNOSIS — M25.551 RIGHT HIP PAIN: ICD-10-CM

## 2022-09-12 DIAGNOSIS — F51.01 PRIMARY INSOMNIA: ICD-10-CM

## 2022-09-12 DIAGNOSIS — M53.3 CHRONIC RIGHT SI JOINT PAIN: ICD-10-CM

## 2022-09-12 DIAGNOSIS — E78.2 MIXED HYPERLIPIDEMIA: ICD-10-CM

## 2022-09-12 PROCEDURE — 99214 OFFICE O/P EST MOD 30 MIN: CPT | Performed by: NURSE PRACTITIONER

## 2022-09-12 RX ORDER — ATORVASTATIN CALCIUM 80 MG/1
80 TABLET, FILM COATED ORAL DAILY
Qty: 90 TABLET | Refills: 1 | Status: SHIPPED | OUTPATIENT
Start: 2022-09-12 | End: 2023-03-27 | Stop reason: SDUPTHER

## 2022-09-12 RX ORDER — OMEPRAZOLE 20 MG/1
20 CAPSULE, DELAYED RELEASE ORAL DAILY
Qty: 30 CAPSULE | Refills: 5 | Status: SHIPPED | OUTPATIENT
Start: 2022-09-12 | End: 2022-11-07 | Stop reason: DRUGHIGH

## 2022-09-12 RX ORDER — TRAZODONE HYDROCHLORIDE 100 MG/1
100 TABLET ORAL NIGHTLY
Qty: 90 TABLET | Refills: 1 | Status: SHIPPED | OUTPATIENT
Start: 2022-09-12 | End: 2023-03-27 | Stop reason: SDUPTHER

## 2022-09-12 RX ORDER — EZETIMIBE 10 MG/1
10 TABLET ORAL NIGHTLY
Qty: 90 TABLET | Refills: 1 | Status: SHIPPED | OUTPATIENT
Start: 2022-09-12 | End: 2023-03-27 | Stop reason: SDUPTHER

## 2022-09-12 RX ORDER — VALACYCLOVIR HYDROCHLORIDE 500 MG/1
500 TABLET, FILM COATED ORAL EVERY MORNING
Qty: 90 TABLET | Refills: 1 | Status: SHIPPED | OUTPATIENT
Start: 2022-09-12 | End: 2023-03-27 | Stop reason: SDUPTHER

## 2022-09-12 NOTE — PROGRESS NOTES
Chief Complaint  Back Pain (Low back pain and down left hip/States she went to chiropractor for an adjustment Saturday morning), Hypertension, and Hyperlipidemia (Med refills)    Subjective          Estela Deras presents to [facility]  History of Present Illness  HSV: She takes her Valtrex with no issues and no breakouts.  LIPID: She is taking her cholesterol medicine every day.  She is currently seeing GI for her fatty liver.  GERD: She is doing well with her Prilosec.  Insomnia: Trazodone does a good job for her sleep overall.    She is having back pain.  This started back when she had COVID and it was on the right-hand side and she thought it was more because of the way she was lying in bed and in the recliner for such a long period of time.  Over the last couple days it is gotten worse.  She went to the chiropractor on Saturday which did help for an adjustment but she is still having the pain.  It is actually going down her right hip but not down her leg.  She went to urgent care and they tested x-rays which did show a little bit of degeneration and she also needs an MRI per patient.  She is continuing to go to her specialty appointments.  She cannot tolerate muscle relaxer as it causes too much grogginess.  No bowel or bladder leaking.  She has not had any falls recently.      Allergies  Adhesive tape, Cetirizine, Chlorhexidine, Codeine, Hydrocodone-acetaminophen, Meperidine, Propoxyphene, Sesame oil, and Acetaminophen    Social History     Tobacco Use   • Smoking status: Never Smoker   • Smokeless tobacco: Never Used   Vaping Use   • Vaping Use: Never used   Substance Use Topics   • Alcohol use: Never   • Drug use: Never       Family History   Problem Relation Age of Onset   • Arthritis Mother    • Osteoporosis Mother    • Depression Mother    • Hyperlipidemia Mother    • Other Father         RENAL CALCULUS   • Depression Father    • Hyperlipidemia Father    • Arthritis  "Paternal Uncle    • Diabetes Paternal Uncle    • Cancer Maternal Grandfather    • Thyroid disease Paternal Grandmother    • Diabetes Paternal Grandfather    • Heart disease Paternal Grandfather    • Asthma Daughter    • Depression Daughter    • Lung cancer Other    • Diabetes type II Other    • Hyperlipidemia Other    • Heart disease Other         ISCHEMIC   • Cancer Other    • Hypertension Other    • Colon cancer Neg Hx         Health Maintenance Due   Topic Date Due   • COLORECTAL CANCER SCREENING  Never done   • ANNUAL PHYSICAL  Never done   • DIABETIC FOOT EXAM  Never done   • HEMOGLOBIN A1C  09/03/2022        Immunization History   Administered Date(s) Administered   • FluLaval/Fluzone >6mos 01/06/2014, 10/13/2021   • Hepatitis A 05/15/2018, 11/05/2018   • Influenza, Unspecified 02/22/2019   • MMR 02/22/2019   • Pneumococcal Polysaccharide (PPSV23) 02/11/2022   • TD Preservative Free 11/05/2018   • Tdap 11/06/2018       Review of Systems   Constitutional: Positive for fatigue.   Respiratory: Negative for cough and shortness of breath.    Cardiovascular: Negative for chest pain.   Gastrointestinal: Negative for diarrhea, nausea and vomiting.   Musculoskeletal: Positive for arthralgias, back pain, gait problem and myalgias.        Objective       Vitals:    09/12/22 1506   BP: 118/70   Pulse: 75   Resp: 20   Temp: 96.9 °F (36.1 °C)   SpO2: 98%   Weight: 95.1 kg (209 lb 11.2 oz)   Height: 167.6 cm (66\")       Body mass index is 33.85 kg/m².         Physical Exam  Vitals reviewed.   Constitutional:       Appearance: Normal appearance. She is well-developed.   Cardiovascular:      Rate and Rhythm: Normal rate and regular rhythm.      Heart sounds: Normal heart sounds. No murmur heard.  Pulmonary:      Effort: Pulmonary effort is normal.      Breath sounds: Normal breath sounds.   Skin:     Comments: She has SI joint tenderness noted on the right-hand side along with L4-L5 tenderness noted to the right versus " actually on the spine.   Neurological:      Mental Status: She is alert and oriented to person, place, and time.      Cranial Nerves: No cranial nerve deficit.      Motor: No weakness.   Psychiatric:         Mood and Affect: Mood and affect normal.             Result Review :     The following data was reviewed by: HAILEY Doan on 09/12/2022:    Common labs    Common Labsle 7/17/22 7/17/22 8/29/22 8/29/22 9/6/22 9/6/22 9/6/22 9/6/22    1311 1311 1025 1025 1048 1048 1048 1049   Glucose  128 (A)    112 (A)     BUN  11    10     Creatinine  0.91    0.72     Sodium  138    138     Potassium  4.1    3.8     Chloride  101    101     Calcium  9.9    9.6     Total Protein    6.5   6.8    Albumin  4.50 4.10 3.4  4.40 3.6    Total Bilirubin  0.8 0.4        Alkaline Phosphatase  113 86        AST (SGOT)  69 (A) 22        ALT (SGPT)  92 (A) 27        WBC 7.47       9.95   Hemoglobin 12.7       12.2   Hematocrit 39.7       37.2   Platelets 208       241   Microalbumin, Urine     <1.2      (A) Abnormal value            Most Recent A1C    HGBA1C Most Recent 3/3/22   Hemoglobin A1C 6.40 (A)   (A) Abnormal value                           Assessment and Plan      Diagnoses and all orders for this visit:    1. DDD (degenerative disc disease), lumbosacral (Primary)  -     MRI Lumbar Spine Without Contrast; Future  -     Ambulatory Referral to Pain Management  -     Ambulatory Referral to Physical Therapy Evaluate and treat    2. HSV (herpes simplex virus) infection  -     valACYclovir (VALTREX) 500 MG tablet; Take 1 tablet by mouth Every Morning.  Dispense: 90 tablet; Refill: 1    3. Primary insomnia  -     traZODone (DESYREL) 100 MG tablet; Take 1 tablet by mouth Every Night.  Dispense: 90 tablet; Refill: 1    4. Gastroesophageal reflux disease without esophagitis  -     omeprazole (priLOSEC) 20 MG capsule; Take 1 capsule by mouth Daily.  Dispense: 30 capsule; Refill: 5    5. Mixed hyperlipidemia  -     ezetimibe (ZETIA)  10 MG tablet; Take 1 tablet by mouth Every Night.  Dispense: 90 tablet; Refill: 1  -     atorvastatin (Lipitor) 80 MG tablet; Take 1 tablet by mouth Daily.  Dispense: 90 tablet; Refill: 1    6. Right hip pain  -     MRI Lumbar Spine Without Contrast; Future  -     Ambulatory Referral to Pain Management  -     Ambulatory Referral to Physical Therapy Evaluate and treat    7. Chronic right SI joint pain  -     Ambulatory Referral to Pain Management  -     Ambulatory Referral to Physical Therapy Evaluate and treat            Follow Up     Return in about 6 months (around 3/12/2023).  We will do lab work in 6 months that she has had several lab draws recently with specialty.  I did review those with the patient.  We will do a referral to pain management for injections of the SI joint.  We will also go ahead and start physical therapy for stretches of the right hip and into the lower back which I feel that it is all relating to her back pain.  I did place an MRI in for her lower back as she does have degenerative disc and we need to check for any type of bulging or herniated disc.  She does not have pain going down the leg which I do not feel that she has any pinched nerve noted.  Patient will continue regular medication and we will go from there.  Patient was given instructions and counseling regarding her condition or for health maintenance advice. Please see specific information pulled into the AVS if appropriate.         Nannette Braun, HAILEY  09/12/2022

## 2022-09-13 ENCOUNTER — HOSPITAL ENCOUNTER (OUTPATIENT)
Dept: MAMMOGRAPHY | Facility: HOSPITAL | Age: 47
Discharge: HOME | End: 2022-09-13

## 2022-09-13 ENCOUNTER — HOSPITAL ENCOUNTER (OUTPATIENT)
Dept: ULTRASOUND IMAGING | Facility: HOSPITAL | Age: 47
Discharge: HOME | End: 2022-09-13

## 2022-09-13 DIAGNOSIS — R92.8 ABNORMAL MAMMOGRAM: ICD-10-CM

## 2022-09-13 PROCEDURE — G0279 TOMOSYNTHESIS, MAMMO: HCPCS

## 2022-09-13 PROCEDURE — 77066 DX MAMMO INCL CAD BI: CPT

## 2022-09-16 ENCOUNTER — PROCEDURE VISIT (OUTPATIENT)
Dept: OTHER | Facility: HOSPITAL | Age: 47
End: 2022-09-16

## 2022-09-16 DIAGNOSIS — K76.0 FATTY LIVER: Primary | ICD-10-CM

## 2022-09-16 PROCEDURE — 91200 LIVER ELASTOGRAPHY: CPT | Performed by: NURSE PRACTITIONER

## 2022-09-20 NOTE — PROGRESS NOTES
Liver Elastography  Performed by: Silvia Palmer APRN  Authorized by: Madelin Allen APRN   Ordering Provider: Madelin Allen APRN    Probe:  XL+  Procedure Details:  Procedure: After providing an oral and written explanation of the Fibroscan vibration controlled transient elastography (VTCE) test procedure to the patient. The patient was placed in supine position with right arm in maximum abduction to allow optimal exposure of right lateral abdomen. Patient was briefly assessed, identifying terminus of the xyphoid process and locating an ideal transient elastography testing site, midline and lateral to this point. Patient was instructed to breathe normally and remain stationary during the test process. Pre-measurement data confirmed the transient elastography probe was centered over the liver parenchyma. A series of ten 50 Hz mechanical pulses were applied with controlled application pressure to induce a mechanical shear wave in the liver tissue. For each measurement, the shear wave propagation speed was detected, displayed and converted to its equivalent liver stiffness value in kilopascals. Skin to liver capsules distance and shear wave characteristics were monitored during the entire examination to assure quality data. Median liver stiffness measurement and interquartile range were calculated and displayed in real time. Acquired measurement data was stored and submitted to the provider for review and interpretation. Patient tolerated the procedure well and was discharged without incident.   Clinical Information:     NPO 3 Hours or More: Yes      Actively Drinking: No    Findings:     Median Liver Stiffness Score:  10.3    Interquartile Range (IQR) to Median Ratio:  26    Araceli Stiffness Consistent with:  F3 Significant Fibrosis    Current Scan Considered Reliab: Yes      Median Controlled Attenuation Parameter (dB/m):  327    IQR:  35    CAP SCORE:  Moderate/severe liver fat

## 2022-09-27 ENCOUNTER — TELEPHONE (OUTPATIENT)
Dept: FAMILY MEDICINE CLINIC | Facility: CLINIC | Age: 47
End: 2022-09-27

## 2022-10-09 ENCOUNTER — HOSPITAL ENCOUNTER (OUTPATIENT)
Dept: MRI IMAGING | Facility: HOSPITAL | Age: 47
Discharge: HOME | End: 2022-10-09
Admitting: NURSE PRACTITIONER

## 2022-10-09 DIAGNOSIS — M25.551 RIGHT HIP PAIN: ICD-10-CM

## 2022-10-09 DIAGNOSIS — M51.37 DDD (DEGENERATIVE DISC DISEASE), LUMBOSACRAL: ICD-10-CM

## 2022-10-09 PROCEDURE — 72148 MRI LUMBAR SPINE W/O DYE: CPT

## 2022-10-31 ENCOUNTER — ANESTHESIA EVENT (OUTPATIENT)
Dept: GASTROENTEROLOGY | Facility: HOSPITAL | Age: 47
End: 2022-10-31

## 2022-10-31 RX ORDER — DAPAGLIFLOZIN 5 MG/1
5 TABLET, FILM COATED ORAL DAILY
COMMUNITY

## 2022-11-01 ENCOUNTER — ANESTHESIA (OUTPATIENT)
Dept: GASTROENTEROLOGY | Facility: HOSPITAL | Age: 47
End: 2022-11-01

## 2022-11-01 ENCOUNTER — HOSPITAL ENCOUNTER (OUTPATIENT)
Facility: HOSPITAL | Age: 47
Setting detail: HOSPITAL OUTPATIENT SURGERY
Discharge: HOME | End: 2022-11-01
Attending: INTERNAL MEDICINE | Admitting: INTERNAL MEDICINE

## 2022-11-01 VITALS
TEMPERATURE: 97.9 F | RESPIRATION RATE: 23 BRPM | SYSTOLIC BLOOD PRESSURE: 100 MMHG | DIASTOLIC BLOOD PRESSURE: 42 MMHG | HEART RATE: 72 BPM | WEIGHT: 208.11 LBS | HEIGHT: 66 IN | OXYGEN SATURATION: 98 % | BODY MASS INDEX: 33.45 KG/M2

## 2022-11-01 DIAGNOSIS — R10.31 RIGHT LOWER QUADRANT ABDOMINAL PAIN: ICD-10-CM

## 2022-11-01 DIAGNOSIS — R10.13 EPIGASTRIC PAIN: ICD-10-CM

## 2022-11-01 DIAGNOSIS — R79.89 ELEVATED LFTS: ICD-10-CM

## 2022-11-01 DIAGNOSIS — R12 HEARTBURN: ICD-10-CM

## 2022-11-01 LAB — GLUCOSE BLDC GLUCOMTR-MCNC: 87 MG/DL (ref 70–99)

## 2022-11-01 PROCEDURE — 25010000002 PROPOFOL 10 MG/ML EMULSION

## 2022-11-01 PROCEDURE — 82962 GLUCOSE BLOOD TEST: CPT

## 2022-11-01 PROCEDURE — 45390 COLONOSCOPY W/RESECTION: CPT | Performed by: INTERNAL MEDICINE

## 2022-11-01 PROCEDURE — 25010000002 MIDAZOLAM PER 1 MG: Performed by: ANESTHESIOLOGY

## 2022-11-01 PROCEDURE — 88305 TISSUE EXAM BY PATHOLOGIST: CPT | Performed by: INTERNAL MEDICINE

## 2022-11-01 PROCEDURE — 43239 EGD BIOPSY SINGLE/MULTIPLE: CPT | Performed by: INTERNAL MEDICINE

## 2022-11-01 RX ORDER — PROPOFOL 10 MG/ML
VIAL (ML) INTRAVENOUS AS NEEDED
Status: DISCONTINUED | OUTPATIENT
Start: 2022-11-01 | End: 2022-11-01 | Stop reason: SURG

## 2022-11-01 RX ORDER — LIDOCAINE HYDROCHLORIDE 20 MG/ML
INJECTION, SOLUTION EPIDURAL; INFILTRATION; INTRACAUDAL; PERINEURAL AS NEEDED
Status: DISCONTINUED | OUTPATIENT
Start: 2022-11-01 | End: 2022-11-01 | Stop reason: SURG

## 2022-11-01 RX ORDER — MIDAZOLAM HYDROCHLORIDE 1 MG/ML
1 INJECTION INTRAMUSCULAR; INTRAVENOUS ONCE
Status: COMPLETED | OUTPATIENT
Start: 2022-11-01 | End: 2022-11-01

## 2022-11-01 RX ORDER — SODIUM CHLORIDE, SODIUM LACTATE, POTASSIUM CHLORIDE, CALCIUM CHLORIDE 600; 310; 30; 20 MG/100ML; MG/100ML; MG/100ML; MG/100ML
30 INJECTION, SOLUTION INTRAVENOUS CONTINUOUS
Status: DISCONTINUED | OUTPATIENT
Start: 2022-11-01 | End: 2022-11-01 | Stop reason: HOSPADM

## 2022-11-01 RX ADMIN — PROPOFOL 20 MG: 10 INJECTION, EMULSION INTRAVENOUS at 15:47

## 2022-11-01 RX ADMIN — LIDOCAINE HYDROCHLORIDE 100 MG: 20 INJECTION, SOLUTION EPIDURAL; INFILTRATION; INTRACAUDAL; PERINEURAL at 15:22

## 2022-11-01 RX ADMIN — MIDAZOLAM 1 MG: 1 INJECTION, SOLUTION INTRAMUSCULAR; INTRAVENOUS at 14:31

## 2022-11-01 RX ADMIN — PROPOFOL 70 MG: 10 INJECTION, EMULSION INTRAVENOUS at 15:25

## 2022-11-01 RX ADMIN — PROPOFOL 175 MCG/KG/MIN: 10 INJECTION, EMULSION INTRAVENOUS at 15:22

## 2022-11-01 RX ADMIN — PROPOFOL 40 MG: 10 INJECTION, EMULSION INTRAVENOUS at 15:42

## 2022-11-01 RX ADMIN — SODIUM CHLORIDE, POTASSIUM CHLORIDE, SODIUM LACTATE AND CALCIUM CHLORIDE 30 ML/HR: 600; 310; 30; 20 INJECTION, SOLUTION INTRAVENOUS at 14:08

## 2022-11-01 RX ADMIN — PROPOFOL 100 MG: 10 INJECTION, EMULSION INTRAVENOUS at 15:22

## 2022-11-01 NOTE — ANESTHESIA POSTPROCEDURE EVALUATION
Patient: Estela Deras    Procedure Summary     Date: 11/01/22 Room / Location: [facility] END[facility]COPY 3 / [facility] END[facility]COPY    Anesthesia Start: 1520 Anesthesia Stop: 1554    Procedures:       ESOPHAGOGASTRODUODENOSCOPY WITH BIOPSIES      COLONOSCOPY WITH POLYPECTOMY, CAUTERY, ORISE INJECTION Diagnosis:       Elevated LFTs      Heartburn      Right lower quadrant abdominal pain      Epigastric pain      (Elevated LFTs [R79.89])      (Heartburn [R12])      (Right lower quadrant abdominal pain [R10.31])      (Epigastric pain [R10.13])    Surgeons: Gómez Nicole MD Provider: Ever Ruelas MD    Anesthesia Type: general ASA Status: 3          Anesthesia Type: general    Vitals  Vitals Value Taken Time   /42 11/01/22 1615   Temp 36.6 °C (97.9 °F) 11/01/22 1614   Pulse 70 11/01/22 1616   Resp 23 11/01/22 1614   SpO2 94 % 11/01/22 1616   Vitals shown include unvalidated device data.        Post Anesthesia Care and Evaluation    Patient location during evaluation: bedside  Patient participation: complete - patient participated  Level of consciousness: awake  Pain management: adequate    Airway patency: patent  Anesthetic complications: No anesthetic complications  PONV Status: none  Cardiovascular status: acceptable and stable  Respiratory status: acceptable  Hydration status: acceptable    Comments: An Anesthesiologist personally participated in the most demanding procedures (including induction and emergence if applicable) in the anesthesia plan, monitored the course of anesthesia administration at frequent intervals and remained physically present and available for immediate diagnosis and treatment of emergencies.

## 2022-11-01 NOTE — ANESTHESIA PREPROCEDURE EVALUATION
Anesthesia Evaluation     Patient summary reviewed and Nursing notes reviewed   no history of anesthetic complications:  NPO Solid Status: > 8 hours  NPO Liquid Status: > 2 hours           Airway   Mallampati: III  TM distance: >3 FB  Neck ROM: full  No difficulty expected  Dental      Pulmonary - normal exam    breath sounds clear to auscultation  (+) pneumonia resolved , asthma,shortness of breath, sleep apnea on CPAP,   Cardiovascular - normal exam  Exercise tolerance: good (4-7 METS)    Rhythm: regular  Rate: normal    (+) valvular problems/murmurs MVP, hyperlipidemia,       Neuro/Psych  (+) headaches, tremors, psychiatric history,    GI/Hepatic/Renal/Endo    (+) obesity, morbid obesity, GERD,  liver disease, diabetes mellitus type 2 well controlled, thyroid problem hypothyroidism    Musculoskeletal     Abdominal    Substance History - negative use     OB/GYN negative ob/gyn ROS         Other   arthritis,      ROS/Med Hx Other: PAT Nursing Notes unavailable.                   Anesthesia Plan    ASA 3     general     intravenous induction     Anesthetic plan, risks, benefits, and alternatives have been provided, discussed and informed consent has been obtained with: patient.  Pre-procedure education provided  Plan discussed with CRNA.        CODE STATUS:

## 2022-11-01 NOTE — H&P
Pre Procedure History & Physical    Chief Complaint:   Heartburn and right lower quadrant abdominal pain    Subjective     HPI:   Heartburn and right lower quadrant abdominal pain    Past Medical History:   Past Medical History:   Diagnosis Date   • Abnormal electrocardiogram    • Allergic    • Allergic rhinitis    • Anemia    • Arthritis    • Asthma    • COVID-19 09/2021   • Depression    • Diabetes mellitus (HCC)    • Dysfunctional uterine bleeding    • Fatty liver    • Foot pain    • Gastroesophageal reflux disease without esophagitis    • Generalized anxiety disorder    • H/O cold sores    • Hyperlipidemia    • Hypertriglyceridemia    • Hypothyroidism    • Impaired fasting glucose    • Insomnia, unspecified    • Liver function study, abnormal    • Low back pain    • Macromastia    • Migraine headache    • Mild episode of recurrent major depressive disorder (HCC)    • Mitral valve prolapse    • Night sweats    • Obesity    • Sinus trouble    • Thyroid disorder    • Tremor    • Vitamin D deficiency        Past Surgical History:  Past Surgical History:   Procedure Laterality Date   • ADENOIDECTOMY     • BILATERAL BREAST REDUCTION Bilateral    • BREAST SURGERY     • LAPAROSCOPIC HYSTERECTOMY  06/30/2014    DR JULIANE PARHAM;[facility]   • REDUCTION MAMMAPLASTY  2002   • SHOULDER SURGERY  2017   • TONSILLECTOMY     • TONSILLECTOMY         Family History:  Family History   Problem Relation Age of Onset   • Arthritis Mother    • Osteoporosis Mother    • Depression Mother    • Hyperlipidemia Mother    • Other Father         RENAL CALCULUS   • Depression Father    • Hyperlipidemia Father    • Asthma Daughter    • Depression Daughter    • Arthritis Paternal Uncle    • Diabetes Paternal Uncle    • Cancer Maternal Grandfather    • Thyroid disease Paternal Grandmother    • Diabetes Paternal Grandfather    • Heart disease Paternal Grandfather    • Lung cancer Other    • Diabetes type II Other    • Hyperlipidemia Other     • Heart disease Other         ISCHEMIC   • Cancer Other    • Hypertension Other    • Colon cancer Neg Hx    • Malig Hyperthermia Neg Hx        Social History:   reports that she has never smoked. She has never used smokeless tobacco. She reports that she does not drink alcohol and does not use drugs.    Medications:   Medications Prior to Admission   Medication Sig Dispense Refill Last Dose   • ARIPiprazole (ABILIFY) 5 MG tablet Take 1 tablet by mouth Every Night.   Past Week   • atomoxetine (STRATTERA) 40 MG capsule Take 40 mg by mouth Every Morning.   10/31/2022   • dapagliflozin (Farxiga) 5 MG tablet tablet Take 1 tablet by mouth Daily. HOLD TIL AFTER YOUR PROCEDURE   Past Week   • ezetimibe (ZETIA) 10 MG tablet Take 1 tablet by mouth Every Night. 90 tablet 1 Past Week   • FLUoxetine (PROzac) 20 MG capsule Take 1 capsule by mouth Daily.   Past Week   • lamoTRIgine (LaMICtal) 50 MG tablet dispersible disintegrating tablet Take 50 mg by mouth Every Night.   Past Week   • levothyroxine (SYNTHROID, LEVOTHROID) 25 MCG tablet Take 25 mcg by mouth Daily.   10/31/2022   • loratadine (CLARITIN) 10 MG tablet Take 10 mg by mouth Daily.   Past Week   • metFORMIN ER (GLUCOPHAGE-XR) 500 MG 24 hr tablet Take 1 tablet by mouth 2 (Two) Times a Day. HOLD TONIGHT PM AND IN AM DOSE 11/1/22   Past Week   • metoprolol succinate XL (TOPROL-XL) 50 MG 24 hr tablet Take 1 tablet by mouth Daily. (Patient taking differently: Take 1 tablet by mouth Every Night.) 30 tablet 2 Past Week   • omeprazole (priLOSEC) 20 MG capsule Take 1 capsule by mouth Daily. 30 capsule 5 10/31/2022   • Ozempic, 1 MG/DOSE, 4 MG/3ML solution pen-injector Inject 1 mg under the skin into the appropriate area as directed 1 (One) Time Per Week. TUESDAY   Past Week   • traZODone (DESYREL) 100 MG tablet Take 1 tablet by mouth Every Night. 90 tablet 1 Past Week   • valACYclovir (VALTREX) 500 MG tablet Take 1 tablet by mouth Every Morning. 90 tablet 1 Past Month   •  "Accu-Chek Guide test strip USE 1 ONCE DAILY      • albuterol (PROVENTIL) (2.5 MG/3ML) 0.083% nebulizer solution Take 2.5 mg by nebulization 4 (Four) Times a Day As Needed for Wheezing for up to 30 days. 120 each 5 More than a month   • albuterol sulfate  (90 Base) MCG/ACT inhaler Inhale 1-2 puffs Every 4 (Four) Hours As Needed for Wheezing or Shortness of Air for up to 30 days. 18 g 11 More than a month   • ALPRAZolam (XANAX) 0.5 MG tablet Take 1 tablet by mouth As Needed. HASN'T BEEN TAKING   More than a month   • atorvastatin (Lipitor) 80 MG tablet Take 1 tablet by mouth Daily. (Patient taking differently: Take 1 tablet by mouth Every Night.) 90 tablet 1    • cyclobenzaprine (FLEXERIL) 10 MG tablet Take 1 tablet by mouth 3 (Three) Times a Day As Needed for Muscle Spasms. (Patient taking differently: Take 1 tablet by mouth As Needed for Muscle Spasms.) 30 tablet 0 More than a month   • Diclofenac Sodium (Voltaren) 1 % gel gel Apply 4 g topically to the appropriate area as directed 2 (Two) Times a Day As Needed (pain). (Patient taking differently: Apply 4 g topically to the appropriate area as directed As Needed (pain).) 50 g 0 More than a month       Allergies:  Adhesive tape, Cetirizine, Chlorhexidine, Codeine, Hydrocodone-acetaminophen, Meperidine, Propoxyphene, Sesame oil, and Acetaminophen        Objective     Blood pressure 107/55, pulse 68, temperature 97 °F (36.1 °C), temperature source Temporal, resp. rate 16, height 167.6 cm (65.98\"), weight 94.4 kg (208 lb 1.8 oz), SpO2 95 %, not currently breastfeeding.    Physical Exam   Constitutional: Pt is oriented to person, place, and time and well-developed, well-nourished, and in no distress.   Mouth/Throat: Oropharynx is clear and moist.   Neck: Normal range of motion.   Cardiovascular: Normal rate, regular rhythm and normal heart sounds.    Pulmonary/Chest: Effort normal and breath sounds normal.   Abdominal: Soft. Nontender  Skin: Skin is warm and dry. "   Psychiatric: Mood, memory, affect and judgment normal.     Assessment & Plan     Diagnosis:  Burning right lower quadrant abdominal pain    Anticipated Surgical Procedure:  EGD and colonoscopy    The risks, benefits, and alternatives of this procedure have been discussed with the patient or the responsible party- the patient understands and agrees to proceed.

## 2022-11-03 LAB
CYTO UR: NORMAL
LAB AP CASE REPORT: NORMAL
LAB AP CLINICAL INFORMATION: NORMAL
PATH REPORT.FINAL DX SPEC: NORMAL
PATH REPORT.GROSS SPEC: NORMAL

## 2022-11-07 ENCOUNTER — OFFICE VISIT (OUTPATIENT)
Dept: GASTROENTEROLOGY | Facility: CLINIC | Age: 47
End: 2022-11-07

## 2022-11-07 VITALS
WEIGHT: 212.4 LBS | DIASTOLIC BLOOD PRESSURE: 41 MMHG | HEART RATE: 59 BPM | BODY MASS INDEX: 34.13 KG/M2 | SYSTOLIC BLOOD PRESSURE: 113 MMHG | HEIGHT: 66 IN

## 2022-11-07 DIAGNOSIS — R79.89 ELEVATED LFTS: Primary | ICD-10-CM

## 2022-11-07 DIAGNOSIS — E66.9 CLASS 1 OBESITY WITH SERIOUS COMORBIDITY AND BODY MASS INDEX (BMI) OF 34.0 TO 34.9 IN ADULT, UNSPECIFIED OBESITY TYPE: ICD-10-CM

## 2022-11-07 DIAGNOSIS — K76.0 FATTY LIVER: ICD-10-CM

## 2022-11-07 DIAGNOSIS — R12 HEARTBURN: ICD-10-CM

## 2022-11-07 DIAGNOSIS — R10.11 RIGHT UPPER QUADRANT ABDOMINAL PAIN: ICD-10-CM

## 2022-11-07 PROCEDURE — 99214 OFFICE O/P EST MOD 30 MIN: CPT | Performed by: NURSE PRACTITIONER

## 2022-11-07 RX ORDER — METOPROLOL SUCCINATE 50 MG/1
TABLET, EXTENDED RELEASE ORAL
Qty: 30 TABLET | Refills: 2 | Status: SHIPPED | OUTPATIENT
Start: 2022-11-07 | End: 2023-03-27 | Stop reason: SDUPTHER

## 2022-11-07 RX ORDER — OMEPRAZOLE 40 MG/1
40 CAPSULE, DELAYED RELEASE ORAL DAILY
Qty: 30 CAPSULE | Refills: 0 | Status: SHIPPED | OUTPATIENT
Start: 2022-11-07 | End: 2023-03-27 | Stop reason: SDUPTHER

## 2022-11-07 NOTE — PROGRESS NOTES
Patient Name: Estela Deras   Visit Date: 11/07/2022   Patient ID: [id number]  Provider: HAILEY Ruano    Sex: female  Location:  Location Address:  Location Phone: [phone]  [place] [address] [phone]    YOB: 1975  Age: 47 y.o.   Primary Care Provider Nannette Braun APRN      Referring Provider: No ref. provider found        Chief Complaint  Elevated LFTs (Follow up from scopes and discuss Fibroscan results)    History of Present Illness  Patient initially presented 8/29/2022 with elevated liver enzymes and fatty liver, also complained of lower extremity edema, negative work-up with cardio and had appointment pending with nephrology.  Reported persistent heartburn but controlled with Prilosec.  Weight loss goals discussed,  FibroScan 9/16/2022: S3, F3  Liver w/u negative 8/29/22  EGD colonoscopy 11/1/2022: Small hiatal hernia, gastritis noted-stomach biopsy with mild chronic inactive gastritis otherwise negative; 13 mm sessile serrated polyp found in the cecum, completely removed, internal hemorrhoids, repeat colonoscopy 3 years.  Recommended no NSAIDs    Pt has some RUQ pain, not r/t meals. We discussed fatty liver and results of w/u, including bridging fibrosis.   has been out of Ozempic - [facility] drug shortage  Bowels moving normally. NO other GI c/o today other than intermittent RUQ pain.   Past Medical History:   Diagnosis Date   • Abnormal electrocardiogram    • Allergic    • Allergic rhinitis    • Anemia    • Arthritis    • Asthma    • COVID-19 09/2021   • Depression    • Diabetes mellitus (HCC)    • Dysfunctional uterine bleeding    • Fatty liver    • Foot pain    • Gastroesophageal reflux disease without esophagitis    • Generalized anxiety disorder    • H/O cold sores    • Hyperlipidemia    • Hypertriglyceridemia    • Hypothyroidism    • Impaired fasting glucose    • Insomnia, unspecified    • Liver function study, abnormal    • Low back pain    • Macromastia     • Migraine headache    • Mild episode of recurrent major depressive disorder (HCC)    • Mitral valve prolapse    • Night sweats    • Obesity    • Sinus trouble    • Thyroid disorder    • Tremor    • Vitamin D deficiency        Past Surgical History:   Procedure Laterality Date   • ADENOIDECTOMY     • BILATERAL BREAST REDUCTION Bilateral    • BREAST SURGERY     • COLONOSCOPY N/A 11/1/2022    Procedure: COLONOSCOPY WITH POLYPECTOMY, CAUTERY, ORISE INJECTION;  Surgeon: Gómez Nicole MD;  Location: [facility] ENDOSCOPY;  Service: Gastroenterology;  Laterality: N/A;  COLON POLYP, HEMORRHOIDS   • ENDOSCOPY N/A 11/1/2022    Procedure: ESOPHAGOGASTRODUODENOSCOPY WITH BIOPSIES;  Surgeon: Gómez Nicole MD;  Location: [facility] ENDOSCOPY;  Service: Gastroenterology;  Laterality: N/A;  HIATAL HERNIA, EROSIVE GASTRITIS   • LAPAROSCOPIC HYSTERECTOMY  06/30/2014    DR JULIANE PARHAM;[facility]   • REDUCTION MAMMAPLASTY  2002   • SHOULDER SURGERY  2017   • TONSILLECTOMY     • TONSILLECTOMY         Allergies   Allergen Reactions   • Adhesive Tape Rash and Other (See Comments)     BURNS SKIN     • Cetirizine Itching   • Chlorhexidine Rash   • Codeine Other (See Comments) and Headache     MIGRANES   • Hydrocodone-Acetaminophen Headache   • Meperidine Other (See Comments) and Headache     MIGRANES   • Propoxyphene Other (See Comments)     MIGRANES   • Sesame Oil Nausea And Vomiting   • Acetaminophen Palpitations       Family History   Problem Relation Age of Onset   • Arthritis Mother    • Osteoporosis Mother    • Depression Mother    • Hyperlipidemia Mother    • Other Father         RENAL CALCULUS   • Depression Father    • Hyperlipidemia Father    • Asthma Daughter    • Depression Daughter    • Arthritis Paternal Uncle    • Diabetes Paternal Uncle    • Cancer Maternal Grandfather    • Thyroid disease Paternal Grandmother    • Diabetes Paternal Grandfather    • Heart disease Paternal Grandfather    • Lung cancer Other  "   • Diabetes type II Other    • Hyperlipidemia Other    • Heart disease Other         ISCHEMIC   • Cancer Other    • Hypertension Other    • Colon cancer Neg Hx    • Malig Hyperthermia Neg Hx         Social History     Tobacco Use   • Smoking status: Never   • Smokeless tobacco: Never   Vaping Use   • Vaping Use: Never used   Substance Use Topics   • Alcohol use: Never   • Drug use: Never       Objective     Vital Signs:   /41 (BP Location: Right arm, Patient Position: Sitting, Cuff Size: Adult)   Pulse 59   Ht 167.6 cm (65.98\")   Wt 96.3 kg (212 lb 6.4 oz)   BMI 34.30 kg/m²       Physical Exam  Constitutional:       General: The patient is not in acute distress.     Appearance: Normal appearance.   HENT:      Head: Normocephalic and atraumatic.      Nose: Nose normal.   Pulmonary:      Effort: Pulmonary effort is normal. No respiratory distress.   Abdominal:      General: Abdomen is flat.      Palpations: Abdomen is soft. There is no mass.      Tenderness: There is no abdominal tenderness-mild tenderness epigastric and right upper quadrant area. There is no guarding.   Musculoskeletal:      Cervical back: Neck supple.      Right lower leg: No edema.      Left lower leg: No edema.   Skin:     General: Skin is warm and dry.   Neurological:      General: No focal deficit present.      Mental Status: The patient is alert and oriented to person, place, and time.      Gait: Gait normal.   Psychiatric:         Mood and Affect: Mood normal.         Speech: Speech normal.         Behavior: Behavior normal.         Thought Content: Thought content normal.     Result Review :   The following data was reviewed by: HAILEY Ruano on 11/07/2022:    CBC w/diff    CBC w/Diff 6/28/22 7/17/22 9/6/22   WBC 10.01 7.47 9.95   RBC 4.73 4.79 4.57   Hemoglobin 12.9 12.7 12.2   Hematocrit 40.3 39.7 37.2   MCV 85.2 82.9 81.4   MCH 27.3 26.5 (A) 26.7   MCHC 32.0 32.0 32.8   RDW 13.8 14.1 15.7 (A)   Platelets 228 208 " 241   Neutrophil Rel % 65.8 78.9 (A) 65.3   Immature Granulocyte Rel % 0.5 0.4 0.3   Lymphocyte Rel % 27.5 10.8 (A) 28.4   Monocyte Rel % 5.4 9.5 5.0   Eosinophil Rel % 0.6 0.0 (A) 0.6   Basophil Rel % 0.2 0.4 0.4   (A) Abnormal value            CMP    CMP 7/17/22 8/29/22 8/29/22 9/6/22 9/6/22     1025 1025 1048 1048   Glucose 128 (A)   112 (A)    BUN 11   10    Creatinine 0.91   0.72    Sodium 138   138    Potassium 4.1   3.8    Chloride 101   101    Calcium 9.9   9.6    Total Protein   6.5  6.8   Albumin 4.50 4.10 3.4 4.40 3.6   Globulin   3.1  3.2   Total Bilirubin 0.8 0.4      Alkaline Phosphatase 113 86      AST (SGOT) 69 (A) 22      ALT (SGPT) 92 (A) 27      (A) Abnormal value              Liver Workup   A-1 Antitrypsin   Date Value Ref Range Status   08/29/2022 153 101 - 187 mg/dL Final     dsDNA   Date Value Ref Range Status   08/29/2022 Negative Negative Final     Expanded ENEDINA Screen   Date Value Ref Range Status   08/29/2022 Negative Negative Final     Smooth Muscle Ab   Date Value Ref Range Status   08/29/2022 3 0 - 19 Units Final     Comment:                      Negative                     0 - 19                   Weak positive               20 - 30                   Moderate to strong positive     >30   Actin Antibodies are found in 52-85% of patients with   autoimmune hepatitis or chronic active hepatitis and   in 22% of patients with primary biliary cirrhosis.     Ceruloplasmin   Date Value Ref Range Status   08/29/2022 28 19 - 39 mg/dL Final     Ferritin   Date Value Ref Range Status   08/29/2022 31.40 13.00 - 150.00 ng/mL Final     IgG   Date Value Ref Range Status   09/06/2022 643 586 - 1602 mg/dL Final     IgA   Date Value Ref Range Status   09/06/2022 372 (H) 87 - 352 mg/dL Final     IgM   Date Value Ref Range Status   09/06/2022 29 26 - 217 mg/dL Final     Iron   Date Value Ref Range Status   08/29/2022 42 37 - 145 mcg/dL Final     TIBC   Date Value Ref Range Status   08/29/2022 460 298 - 536  mcg/dL Final     Iron Saturation   Date Value Ref Range Status   08/29/2022 9 (L) 20 - 50 % Final     Transferrin   Date Value Ref Range Status   08/29/2022 309 200 - 360 mg/dL Final     Mitochondrial Ab   Date Value Ref Range Status   08/29/2022 <20.0 0.0 - 20.0 Units Final     Comment:                                     Negative    0.0 - 20.0                                  Equivocal  20.1 - 24.9                                  Positive         >24.9  Mitochondrial (M2) Antibodies are found in 90-96% of  patients with primary biliary cirrhosis.     Protime   Date Value Ref Range Status   08/29/2022 12.9 11.8 - 14.9 Seconds Final     INR   Date Value Ref Range Status   08/29/2022 0.96 0.86 - 1.15 Final     Acute HEP Panel   Hepatitis B Surface Ag   Date Value Ref Range Status   08/29/2022 Non-Reactive Non-Reactive Final     Hep A IgM   Date Value Ref Range Status   08/29/2022 Non-Reactive Non-Reactive Final     Hep B C IgM   Date Value Ref Range Status   08/29/2022 Non-Reactive Non-Reactive Final     Hepatitis C Ab   Date Value Ref Range Status   08/29/2022 Non-Reactive Non-Reactive Final               Assessment and Plan    Diagnoses and all orders for this visit:    1. Elevated LFTs (Primary)    2. Fatty liver  Comments:  F3 on Fibroscan  Orders:  -     CBC (No Diff); Future  -     Comprehensive Metabolic Panel; Future  -     Protime-INR; Future  -     AFP Tumor Marker; Future  -     US Liver; Future    3. Heartburn    4. Class 1 obesity with serious comorbidity and body mass index (BMI) of 34.0 to 34.9 in adult, unspecified obesity type    5. Right upper quadrant abdominal pain  Comments:  suspect r/t fatty liver     Other orders  -     omeprazole (priLOSEC) 40 MG capsule; Take 1 capsule by mouth Daily.  Dispense: 30 capsule; Refill: 0            Follow Up   Return in about 6 months (around 5/7/2023).   Discussed with patient F3 fibrosis on FibroScan and recommended that we follow her every 6 months  Liver US  Ji, labs in Ji   We discussed the importance of wt loss, 10% goal, benefit of 2-4 c. Coffee/d. Pt verbalized understanding. Hopefully pt can resume Ozempic, I suggested she call around to other pharmacies  Recommended vitamin E 400 IUs twice a day  Increase omeprazole to 40 mg/day x 1 month to trial to see if would have any impact on right upper quadrant discomfort and patient had some tenderness with palpation of epigastric area today.  Recall colon 3 yrs    Patient was given instructions and counseling regarding her condition or for health maintenance advice. Please see specific information pulled into the AVS if appropriate.

## 2023-01-10 ENCOUNTER — TELEPHONE (OUTPATIENT)
Dept: GASTROENTEROLOGY | Facility: CLINIC | Age: 48
End: 2023-01-10
Payer: MEDICAID

## 2023-01-10 NOTE — TELEPHONE ENCOUNTER
Left message with patient asking for a returned call to follow up on overdue results for laboratory tests, Liver US.            Pt has been contacted multiple times to schedule her US as well to no avail. I will try to reach pt once more then cx and send letter.

## 2023-01-17 NOTE — TELEPHONE ENCOUNTER
Regular and Certified Letters sent to patient.    CERTIFIED LETTER NUMBER: [phone]

## 2023-03-27 ENCOUNTER — HOSPITAL ENCOUNTER (EMERGENCY)
Facility: HOSPITAL | Age: 48
Discharge: HOME | End: 2023-03-27
Attending: EMERGENCY MEDICINE | Admitting: EMERGENCY MEDICINE
Payer: MEDICAID

## 2023-03-27 ENCOUNTER — TELEPHONE (OUTPATIENT)
Dept: FAMILY MEDICINE CLINIC | Facility: CLINIC | Age: 48
End: 2023-03-27

## 2023-03-27 ENCOUNTER — OFFICE VISIT (OUTPATIENT)
Dept: FAMILY MEDICINE CLINIC | Facility: CLINIC | Age: 48
End: 2023-03-27
Payer: MEDICAID

## 2023-03-27 ENCOUNTER — APPOINTMENT (OUTPATIENT)
Dept: CT IMAGING | Facility: HOSPITAL | Age: 48
End: 2023-03-27
Payer: MEDICAID

## 2023-03-27 VITALS
OXYGEN SATURATION: 100 % | DIASTOLIC BLOOD PRESSURE: 96 MMHG | BODY MASS INDEX: 34.75 KG/M2 | HEIGHT: 65 IN | WEIGHT: 208.56 LBS | TEMPERATURE: 98.1 F | RESPIRATION RATE: 20 BRPM | SYSTOLIC BLOOD PRESSURE: 137 MMHG | HEART RATE: 86 BPM

## 2023-03-27 DIAGNOSIS — S16.1XXA STRAIN OF NECK MUSCLE, INITIAL ENCOUNTER: ICD-10-CM

## 2023-03-27 DIAGNOSIS — S09.90XA CLOSED HEAD INJURY, INITIAL ENCOUNTER: ICD-10-CM
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
## 2023-03-27 DIAGNOSIS — B00.9 HSV (HERPES SIMPLEX VIRUS) INFECTION: ICD-10-CM

## 2023-03-27 DIAGNOSIS — E03.9 ACQUIRED HYPOTHYROIDISM: ICD-10-CM

## 2023-03-27 DIAGNOSIS — R73.01 IMPAIRED FASTING GLUCOSE: ICD-10-CM

## 2023-03-27 DIAGNOSIS — I34.1 MITRAL VALVE PROLAPSE: ICD-10-CM

## 2023-03-27 DIAGNOSIS — F51.01 PRIMARY INSOMNIA: ICD-10-CM

## 2023-03-27 DIAGNOSIS — K21.9 GASTROESOPHAGEAL REFLUX DISEASE WITHOUT ESOPHAGITIS: ICD-10-CM

## 2023-03-27 DIAGNOSIS — E78.2 MIXED HYPERLIPIDEMIA: Primary | ICD-10-CM

## 2023-03-27 DIAGNOSIS — K76.0 FATTY LIVER: Primary | ICD-10-CM

## 2023-03-27 DIAGNOSIS — E55.9 VITAMIN D DEFICIENCY: ICD-10-CM

## 2023-03-27 PROBLEM — M25.471 EFFUSION OF RIGHT ANKLE: Status: ACTIVE | Noted: 2022-06-27

## 2023-03-27 PROCEDURE — 72125 CT NECK SPINE W/O DYE: CPT

## 2023-03-27 PROCEDURE — 70450 CT HEAD/BRAIN W/O DYE: CPT

## 2023-03-27 PROCEDURE — 99282 EMERGENCY DEPT VISIT SF MDM: CPT

## 2023-03-27 RX ORDER — TRAZODONE HYDROCHLORIDE 100 MG/1
100 TABLET ORAL NIGHTLY
Qty: 90 TABLET | Refills: 1 | Status: SHIPPED | OUTPATIENT
Start: 2023-03-27

## 2023-03-27 RX ORDER — OMEPRAZOLE 40 MG/1
40 CAPSULE, DELAYED RELEASE ORAL DAILY
Qty: 90 CAPSULE | Refills: 1 | Status: SHIPPED | OUTPATIENT
Start: 2023-03-27

## 2023-03-27 RX ORDER — CYCLOBENZAPRINE HCL 10 MG
10 TABLET ORAL 3 TIMES DAILY PRN
Qty: 15 TABLET | Refills: 0 | Status: SHIPPED | OUTPATIENT
Start: 2023-03-27

## 2023-03-27 RX ORDER — VALACYCLOVIR HYDROCHLORIDE 500 MG/1
500 TABLET, FILM COATED ORAL EVERY MORNING
Qty: 90 TABLET | Refills: 1 | Status: SHIPPED | OUTPATIENT
Start: 2023-03-27

## 2023-03-27 RX ORDER — ATORVASTATIN CALCIUM 80 MG/1
80 TABLET, FILM COATED ORAL DAILY
Qty: 90 TABLET | Refills: 1 | Status: SHIPPED | OUTPATIENT
Start: 2023-03-27

## 2023-03-27 RX ORDER — METOPROLOL SUCCINATE 50 MG/1
50 TABLET, EXTENDED RELEASE ORAL DAILY
Qty: 90 TABLET | Refills: 1 | Status: SHIPPED | OUTPATIENT
Start: 2023-03-27

## 2023-03-27 RX ORDER — EZETIMIBE 10 MG/1
10 TABLET ORAL NIGHTLY
Qty: 90 TABLET | Refills: 1 | Status: SHIPPED | OUTPATIENT
Start: 2023-03-27
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
## 2023-03-30 ENCOUNTER — OFFICE VISIT (OUTPATIENT)
Dept: FAMILY MEDICINE CLINIC | Facility: CLINIC | Age: 48
End: 2023-03-30
Payer: MEDICAID

## 2023-03-30 ENCOUNTER — LAB (OUTPATIENT)
Dept: LAB | Facility: HOSPITAL | Age: 48
End: 2023-03-30
Payer: MEDICAID

## 2023-03-30 VITALS
BODY MASS INDEX: 34.81 KG/M2 | DIASTOLIC BLOOD PRESSURE: 80 MMHG | SYSTOLIC BLOOD PRESSURE: 118 MMHG | RESPIRATION RATE: 18 BRPM | TEMPERATURE: 97.4 F | WEIGHT: 208.9 LBS | HEIGHT: 65 IN | OXYGEN SATURATION: 94 % | HEART RATE: 97 BPM

## 2023-03-30 DIAGNOSIS — E78.2 MIXED HYPERLIPIDEMIA: ICD-10-CM

## 2023-03-30 DIAGNOSIS — R73.01 IMPAIRED FASTING GLUCOSE: ICD-10-CM

## 2023-03-30 DIAGNOSIS — M79.10 MUSCLE TENSION PAIN: Primary | ICD-10-CM

## 2023-03-30 DIAGNOSIS — I34.1 MITRAL VALVE PROLAPSE: ICD-10-CM

## 2023-03-30 DIAGNOSIS — E03.9 ACQUIRED HYPOTHYROIDISM: ICD-10-CM

## 2023-03-30 DIAGNOSIS — G25.81 RESTLESS LEGS: ICD-10-CM

## 2023-03-30 DIAGNOSIS — E55.9 VITAMIN D DEFICIENCY: ICD-10-CM

## 2023-03-30 LAB
25(OH)D3 SERPL-MCNC: 21.9 NG/ML (ref 30–100)
BASOPHILS # BLD AUTO: 0.04 10*3/MM3 (ref 0–0.2)
BASOPHILS NFR BLD AUTO: 0.4 % (ref 0–1.5)
DEPRECATED RDW RBC AUTO: 48.9 FL (ref 37–54)
EOSINOPHIL # BLD AUTO: 0.07 10*3/MM3 (ref 0–0.4)
EOSINOPHIL NFR BLD AUTO: 0.6 % (ref 0.3–6.2)
ERYTHROCYTE [DISTWIDTH] IN BLOOD BY AUTOMATED COUNT: 16.7 % (ref 12.3–15.4)
HCT VFR BLD AUTO: 40 % (ref 34–46.6)
HGB BLD-MCNC: 12.7 G/DL (ref 12–15.9)
IMM GRANULOCYTES # BLD AUTO: 0.03 10*3/MM3 (ref 0–0.05)
IMM GRANULOCYTES NFR BLD AUTO: 0.3 % (ref 0–0.5)
LYMPHOCYTES # BLD AUTO: 2.84 10*3/MM3 (ref 0.7–3.1)
LYMPHOCYTES NFR BLD AUTO: 26.1 % (ref 19.6–45.3)
MCH RBC QN AUTO: 25.4 PG (ref 26.6–33)
MCHC RBC AUTO-ENTMCNC: 31.8 G/DL (ref 31.5–35.7)
MCV RBC AUTO: 80 FL (ref 79–97)
MONOCYTES # BLD AUTO: 0.66 10*3/MM3 (ref 0.1–0.9)
MONOCYTES NFR BLD AUTO: 6.1 % (ref 5–12)
NEUTROPHILS NFR BLD AUTO: 66.5 % (ref 42.7–76)
NEUTROPHILS NFR BLD AUTO: 7.25 10*3/MM3 (ref 1.7–7)
NRBC BLD AUTO-RTO: 0 /100 WBC (ref 0–0.2)
PLATELET # BLD AUTO: 223 10*3/MM3 (ref 140–450)
PMV BLD AUTO: 11.8 FL (ref 6–12)
RBC # BLD AUTO: 5 10*6/MM3 (ref 3.77–5.28)
WBC NRBC COR # BLD: 10.89 10*3/MM3 (ref 3.4–10.8)

## 2023-03-30 PROCEDURE — 80053 COMPREHEN METABOLIC PANEL: CPT

## 2023-03-30 PROCEDURE — 36415 COLL VENOUS BLD VENIPUNCTURE: CPT

## 2023-03-30 PROCEDURE — 83036 HEMOGLOBIN GLYCOSYLATED A1C: CPT

## 2023-03-30 PROCEDURE — 84443 ASSAY THYROID STIM HORMONE: CPT

## 2023-03-30 PROCEDURE — 85025 COMPLETE CBC W/AUTO DIFF WBC: CPT

## 2023-03-30 PROCEDURE — 80061 LIPID PANEL: CPT

## 2023-03-30 PROCEDURE — 82306 VITAMIN D 25 HYDROXY: CPT

## 2023-03-30 RX ORDER — PRAMIPEXOLE DIHYDROCHLORIDE 0.12 MG/1
0.12 TABLET ORAL
Qty: 30 TABLET | Refills: 1 | Status: SHIPPED | OUTPATIENT
Start: 2023-03-30

## 2023-03-30 RX ORDER — PREDNISONE 20 MG/1
TABLET ORAL
Qty: 18 TABLET | Refills: 0 | Status: SHIPPED | OUTPATIENT
Start: 2023-03-30
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
## 2023-03-31 LAB
ALBUMIN SERPL-MCNC: 4.3 G/DL (ref 3.5–5.2)
ALBUMIN/GLOB SERPL: 1.7 G/DL
ALP SERPL-CCNC: 92 U/L (ref 39–117)
ALT SERPL W P-5'-P-CCNC: 23 U/L (ref 1–33)
ANION GAP SERPL CALCULATED.3IONS-SCNC: 11.6 MMOL/L (ref 5–15)
AST SERPL-CCNC: 18 U/L (ref 1–32)
BILIRUB SERPL-MCNC: 0.4 MG/DL (ref 0–1.2)
BUN SERPL-MCNC: 15 MG/DL (ref 6–20)
BUN/CREAT SERPL: 20 (ref 7–25)
CALCIUM SPEC-SCNC: 9.2 MG/DL (ref 8.6–10.5)
CHLORIDE SERPL-SCNC: 103 MMOL/L (ref 98–107)
CHOLEST SERPL-MCNC: 128 MG/DL (ref 0–200)
CO2 SERPL-SCNC: 24.4 MMOL/L (ref 22–29)
CREAT SERPL-MCNC: 0.75 MG/DL (ref 0.57–1)
EGFRCR SERPLBLD CKD-EPI 2021: 99.6 ML/MIN/1.73
GLOBULIN UR ELPH-MCNC: 2.6 GM/DL
GLUCOSE SERPL-MCNC: 106 MG/DL (ref 65–99)
HBA1C MFR BLD: 6.7 % (ref 4.8–5.6)
HDLC SERPL-MCNC: 45 MG/DL (ref 40–60)
LDLC SERPL CALC-MCNC: 57 MG/DL (ref 0–100)
LDLC/HDLC SERPL: 1.17 {RATIO}
POTASSIUM SERPL-SCNC: 4.1 MMOL/L (ref 3.5–5.2)
PROT SERPL-MCNC: 6.9 G/DL (ref 6–8.5)
SODIUM SERPL-SCNC: 139 MMOL/L (ref 136–145)
TRIGL SERPL-MCNC: 152 MG/DL (ref 0–150)
TSH SERPL DL<=0.05 MIU/L-ACNC: 4.85 UIU/ML (ref 0.27–4.2)
VLDLC SERPL-MCNC: 26 MG/DL (ref 5–40)

## 2023-04-14 ENCOUNTER — HOSPITAL ENCOUNTER (OUTPATIENT)
Dept: ULTRASOUND IMAGING | Facility: HOSPITAL | Age: 48
Discharge: HOME | End: 2023-04-14
Admitting: NURSE PRACTITIONER
Payer: MEDICAID

## 2023-04-14 DIAGNOSIS — K76.0 FATTY LIVER: ICD-10-CM

## 2023-04-14 PROCEDURE — 76705 ECHO EXAM OF ABDOMEN: CPT

## 2023-05-05 ENCOUNTER — TELEPHONE (OUTPATIENT)
Dept: URGENT CARE | Facility: CLINIC | Age: 48
End: 2023-05-05

## 2023-05-05 PROCEDURE — U0004 COV-19 TEST NON-CDC HGH THRU: HCPCS | Performed by: STUDENT IN AN ORGANIZED HEALTH CARE EDUCATION/TRAINING PROGRAM

## 2023-05-05 PROCEDURE — 87081 CULTURE SCREEN ONLY: CPT | Performed by: STUDENT IN AN ORGANIZED HEALTH CARE EDUCATION/TRAINING PROGRAM

## 2023-05-07 ENCOUNTER — TELEPHONE (OUTPATIENT)
Dept: URGENT CARE | Facility: CLINIC | Age: 48
End: 2023-05-07
Payer: MEDICAID

## 2023-05-07 NOTE — TELEPHONE ENCOUNTER
----- Message from HAILEY Alan sent at 5/7/2023  1:26 PM EDT -----  Please notify patient of negative beta strep test result.

## 2023-05-08 ENCOUNTER — OFFICE VISIT (OUTPATIENT)
Dept: GASTROENTEROLOGY | Facility: CLINIC | Age: 48
End: 2023-05-08
Payer: MEDICAID

## 2023-05-08 VITALS
SYSTOLIC BLOOD PRESSURE: 121 MMHG | BODY MASS INDEX: 34.52 KG/M2 | HEIGHT: 65 IN | DIASTOLIC BLOOD PRESSURE: 65 MMHG | WEIGHT: 207.2 LBS | HEART RATE: 75 BPM

## 2023-05-08 DIAGNOSIS — R12 HEARTBURN: ICD-10-CM

## 2023-05-08 DIAGNOSIS — E66.9 CLASS 1 OBESITY WITH SERIOUS COMORBIDITY AND BODY MASS INDEX (BMI) OF 34.0 TO 34.9 IN ADULT, UNSPECIFIED OBESITY TYPE: ICD-10-CM

## 2023-05-08 DIAGNOSIS — R10.11 RIGHT UPPER QUADRANT ABDOMINAL PAIN: ICD-10-CM

## 2023-05-08 DIAGNOSIS — K76.0 FATTY LIVER: Primary | ICD-10-CM

## 2023-05-08 PROCEDURE — 1159F MED LIST DOCD IN RCRD: CPT | Performed by: NURSE PRACTITIONER

## 2023-05-08 PROCEDURE — 99214 OFFICE O/P EST MOD 30 MIN: CPT | Performed by: NURSE PRACTITIONER

## 2023-05-08 PROCEDURE — 1160F RVW MEDS BY RX/DR IN RCRD: CPT | Performed by: NURSE PRACTITIONER

## 2023-05-08 RX ORDER — VITAMIN E 268 MG
400 CAPSULE ORAL 2 TIMES DAILY
Qty: 60 CAPSULE | Refills: 5 | Status: SHIPPED | OUTPATIENT
Start: 2023-05-08

## 2023-05-08 NOTE — PROGRESS NOTES
Patient Name: Estela Deras   Visit Date: 05/08/2023   Patient ID: [id number]  Provider: HAILEY Ruano    Sex: female  Location:  Location Address:  Location Phone: [phone]  [place] [address] [phone]    YOB: 1975  Age: 48 y.o.   Primary Care Provider Nannette Braun APRN      Referring Provider: No ref. provider found        Chief Complaint  Elevated LFTs (6 month follow up )    History of Present Illness    Patient initially presented 8/29/2022 with elevated liver enzymes and fatty liver, also complained of lower extremity edema, negative work-up with cardio and had appointment pending with nephrology.  Reported persistent heartburn but controlled with Prilosec.  Weight loss goals discussed,  FibroScan 9/16/2022: S3, F3  Liver w/u negative 8/29/22  EGD colonoscopy 11/1/2022: Small hiatal hernia, gastritis noted-stomach biopsy with mild chronic inactive gastritis otherwise negative; 13 mm sessile serrated polyp found in the cecum, completely removed, internal hemorrhoids, repeat colonoscopy 3 years.  Recommended no NSAIDs     Patient was last seen 11/7/2022 and had some right upper quadrant pain not related to meals, we discussed fatty liver and negative liver work-up but bridging fibrosis noted on FibroScan.  Recommended vitamin E 400 IU twice per day, increase omeprazole to 40 mg/day for 1 month to see if would improve right upper quadrant pain, also discussed with patient may be related to fatty liver    Ultrasound of the liver 4/14/2023: Liver showed minimal increased echogenicity may reflect fatty liver    Pt has been sick w sinusitis - on Augmentin  Doesn't like coffee  Trying to lose weight, has lost 5# since last visit, not currently on Ozempic, was out for quite a while and doesn't know what dose to start with now. Planning to call Dr Browning today for apt to discuss.   States RUQ pain is better, has been taking Omeprazole 40 mg. Pt does c/o acid reflux still  and some regurgitation    Past Medical History:   Diagnosis Date   • Abnormal electrocardiogram    • Allergic    • Allergic rhinitis    • Anemia    • Arthritis    • Asthma    • COVID-19 09/2021   • Depression    • Diabetes mellitus    • Dysfunctional uterine bleeding    • Fatty liver    • Foot pain    • Gastroesophageal reflux disease without esophagitis    • Generalized anxiety disorder    • H/O cold sores    • Hyperlipidemia    • Hypertriglyceridemia    • Hypothyroidism    • Impaired fasting glucose    • Insomnia, unspecified    • Liver function study, abnormal    • Low back pain    • Macromastia    • Migraine headache    • Mild episode of recurrent major depressive disorder    • Mitral valve prolapse    • Night sweats    • Obesity    • Sinus trouble    • Thyroid disorder    • Tremor    • Vitamin D deficiency        Past Surgical History:   Procedure Laterality Date   • ADENOIDECTOMY     • BILATERAL BREAST REDUCTION Bilateral    • BREAST SURGERY     • COLONOSCOPY N/A 11/1/2022    Procedure: COLONOSCOPY WITH POLYPECTOMY, CAUTERY, ORISE INJECTION;  Surgeon: Gómez Nicole MD;  Location: [facility] ENDOSCOPY;  Service: Gastroenterology;  Laterality: N/A;  COLON POLYP, HEMORRHOIDS   • ENDOSCOPY N/A 11/1/2022    Procedure: ESOPHAGOGASTRODUODENOSCOPY WITH BIOPSIES;  Surgeon: Gómez Nicole MD;  Location: [facility] ENDOSCOPY;  Service: Gastroenterology;  Laterality: N/A;  HIATAL HERNIA, EROSIVE GASTRITIS   • LAPAROSCOPIC HYSTERECTOMY  06/30/2014    DR JULIANE PARHAM;[facility]   • REDUCTION MAMMAPLASTY  2002   • SHOULDER SURGERY  2017   • TONSILLECTOMY     • TONSILLECTOMY         Allergies   Allergen Reactions   • Adhesive Tape Rash and Other (See Comments)     BURNS SKIN     • Cetirizine Itching   • Chlorhexidine Rash   • Codeine Other (See Comments) and Headache     MIGRANES   • Hydrocodone-Acetaminophen Headache   • Meperidine Other (See Comments) and Headache     MIGRANES   • Propoxyphene Other (See  "Comments)     MIGRANES   • Sesame Oil Nausea And Vomiting   • Acetaminophen Palpitations       Family History   Problem Relation Age of Onset   • Arthritis Mother    • Osteoporosis Mother    • Depression Mother    • Hyperlipidemia Mother    • Other Father         RENAL CALCULUS   • Depression Father    • Hyperlipidemia Father    • Asthma Daughter    • Depression Daughter    • Arthritis Paternal Uncle    • Diabetes Paternal Uncle    • Cancer Maternal Grandfather    • Thyroid disease Paternal Grandmother    • Diabetes Paternal Grandfather    • Heart disease Paternal Grandfather    • Lung cancer Other    • Diabetes type II Other    • Hyperlipidemia Other    • Heart disease Other         ISCHEMIC   • Cancer Other    • Hypertension Other    • Colon cancer Neg Hx    • Malig Hyperthermia Neg Hx         Social History     Tobacco Use   • Smoking status: Never   • Smokeless tobacco: Never   Vaping Use   • Vaping Use: Never used   Substance Use Topics   • Alcohol use: Never   • Drug use: Never       Objective     Vital Signs:   /65 (BP Location: Left arm, Patient Position: Sitting, Cuff Size: Adult)   Pulse 75   Ht 165.1 cm (65\")   Wt 94 kg (207 lb 3.2 oz)   BMI 34.48 kg/m²       Physical Exam  Constitutional:       General: The patient is not in acute distress.     Appearance: Normal appearance.   HENT:      Head: Normocephalic and atraumatic.      Nose: Nose normal.   Pulmonary:      Effort: Pulmonary effort is normal. No respiratory distress.   Abdominal:      General: Abdomen is flat.      Palpations: Abdomen is soft. There is no mass.      Tenderness: There is no abdominal tenderness. There is no guarding.   Musculoskeletal:      Cervical back: Neck supple.      Right lower leg: No edema.      Left lower leg: No edema.   Skin:     General: Skin is warm and dry.   Neurological:      General: No focal deficit present.      Mental Status: The patient is alert and oriented to person, place, and time.      Gait: " Gait normal.   Psychiatric:         Mood and Affect: Mood normal.         Speech: Speech normal.         Behavior: Behavior normal.         Thought Content: Thought content normal.     Result Review :   The following data was reviewed by: HAILEY Ruano on 05/08/2023:    CBC w/diff        7/17/2022    13:11 9/6/2022    10:49 3/30/2023    12:27   CBC w/Diff   WBC 7.47   9.95   10.89     RBC 4.79   4.57   5.00     Hemoglobin 12.7   12.2   12.7     Hematocrit 39.7   37.2   40.0     MCV 82.9   81.4   80.0     MCH 26.5   26.7   25.4     MCHC 32.0   32.8   31.8     RDW 14.1   15.7   16.7     Platelets 208   241   223     Neutrophil Rel % 78.9   65.3   66.5     Immature Granulocyte Rel % 0.4   0.3   0.3     Lymphocyte Rel % 10.8   28.4   26.1     Monocyte Rel % 9.5   5.0   6.1     Eosinophil Rel % 0.0   0.6   0.6     Basophil Rel % 0.4   0.4   0.4       CMP        8/29/2022    10:25 9/6/2022    10:48 3/30/2023    12:27   CMP   Glucose  112   106     BUN  10   15     Creatinine  0.72   0.75     EGFR  105.2   99.6     Sodium  138   139     Potassium  3.8   4.1     Chloride  101   103     Calcium  9.6   9.2     Total Protein 6.5   6.8      Total Protein 6.6    6.9     Albumin 3.4      4.10   4.40      3.6   4.3     Globulin 3.1   3.2      Globulin   2.6     Total Bilirubin 0.4    0.4     Alkaline Phosphatase 86    92     AST (SGOT) 22    18     ALT (SGPT) 27    23     Albumin/Globulin Ratio   1.7     BUN/Creatinine Ratio  13.9   20.0     Anion Gap  12.4   11.6         PT/INR   Protime   Date Value Ref Range Status   08/29/2022 12.9 11.8 - 14.9 Seconds Final     INR   Date Value Ref Range Status   08/29/2022 0.96 0.86 - 1.15 Final               Assessment and Plan    Diagnoses and all orders for this visit:    1. Fatty liver (Primary)  -     CBC (No Diff); Future  -     Hepatic Function Panel; Future  -     Protime-INR; Future  -     US Liver; Future    2. Class 1 obesity with serious comorbidity and body mass  index (BMI) of 34.0 to 34.9 in adult, unspecified obesity type    3. Right upper quadrant abdominal pain  Comments:  improved    4. Heartburn    Other orders  -     vitamin E 400 UNIT capsule; Take 1 capsule by mouth 2 (Two) Times a Day.  Dispense: 60 capsule; Refill: 5              Follow Up   Return in about 6 months (around 11/8/2023).   Check labs and ultrasound again in October  Encouraged patient to continue with weight loss and low-carb diet, unfortunately patient does not like coffee. D/w pt 10% wt loss goal.   Patient did not try vitamin E yet as recommended at last visit so prescribed today 400 IUs twice daily  F/U w Endocrinology as planned   Recommended small frequent meals, do not eat 3 hours before bed.         Patient was given instructions and counseling regarding her condition or for health maintenance advice. Please see specific information pulled into the AVS if appropriate.

## 2023-05-17 ENCOUNTER — OFFICE VISIT (OUTPATIENT)
Dept: FAMILY MEDICINE CLINIC | Facility: CLINIC | Age: 48
End: 2023-05-17
Payer: MEDICAID

## 2023-05-17 VITALS
SYSTOLIC BLOOD PRESSURE: 132 MMHG | HEIGHT: 65 IN | OXYGEN SATURATION: 97 % | WEIGHT: 204.8 LBS | BODY MASS INDEX: 34.12 KG/M2 | TEMPERATURE: 96.8 F | DIASTOLIC BLOOD PRESSURE: 90 MMHG | HEART RATE: 81 BPM

## 2023-05-17 DIAGNOSIS — J01.40 ACUTE NON-RECURRENT PANSINUSITIS: ICD-10-CM

## 2023-05-17 DIAGNOSIS — J20.9 ACUTE BRONCHITIS, UNSPECIFIED ORGANISM: ICD-10-CM

## 2023-05-17 DIAGNOSIS — R09.81 NASAL CONGESTION: Primary | ICD-10-CM

## 2023-05-17 DIAGNOSIS — J01.10 ACUTE NON-RECURRENT FRONTAL SINUSITIS: ICD-10-CM

## 2023-05-17 LAB
EXPIRATION DATE: NORMAL
FLUAV AG UPPER RESP QL IA.RAPID: NOT DETECTED
FLUBV AG UPPER RESP QL IA.RAPID: NOT DETECTED
INTERNAL CONTROL: NORMAL
Lab: NORMAL
SARS-COV-2 AG UPPER RESP QL IA.RAPID: NOT DETECTED

## 2023-05-17 PROCEDURE — 87428 SARSCOV & INF VIR A&B AG IA: CPT | Performed by: NURSE PRACTITIONER

## 2023-05-17 PROCEDURE — 1160F RVW MEDS BY RX/DR IN RCRD: CPT | Performed by: NURSE PRACTITIONER

## 2023-05-17 PROCEDURE — 99213 OFFICE O/P EST LOW 20 MIN: CPT | Performed by: NURSE PRACTITIONER

## 2023-05-17 PROCEDURE — 3044F HG A1C LEVEL LT 7.0%: CPT | Performed by: NURSE PRACTITIONER

## 2023-05-17 PROCEDURE — 1159F MED LIST DOCD IN RCRD: CPT | Performed by: NURSE PRACTITIONER

## 2023-05-17 RX ORDER — DEXTROMETHORPHAN HYDROBROMIDE AND PROMETHAZINE HYDROCHLORIDE 15; 6.25 MG/5ML; MG/5ML
5 SYRUP ORAL 4 TIMES DAILY PRN
Qty: 240 ML | Refills: 0 | Status: SHIPPED | OUTPATIENT
Start: 2023-05-17

## 2023-05-17 RX ORDER — DOXYCYCLINE 100 MG/1
100 CAPSULE ORAL 2 TIMES DAILY
Qty: 14 CAPSULE | Refills: 0 | Status: SHIPPED | OUTPATIENT
Start: 2023-05-17 | End: 2023-05-24

## 2023-05-17 RX ORDER — BUDESONIDE AND FORMOTEROL FUMARATE DIHYDRATE 160; 4.5 UG/1; UG/1
2 AEROSOL RESPIRATORY (INHALATION)
Qty: 10.2 G | Refills: 1 | Status: SHIPPED | OUTPATIENT
Start: 2023-05-17

## 2023-05-17 NOTE — PROGRESS NOTES
Chief Complaint  Sore Throat, Cough, Nasal Congestion, and Earache    Subjective          Estela Deras presents to [facility]  History of Present Illness  She has been to urgent care over the last 3 weeks twice.  She was given an antibiotic and a steroid but the antibiotic was not available at [facility].  She is coughing with mucus.  She has nasal congestion and her right ear is hurting.  Her throat is sore and raw.  She has not had any fevers.  She has been tested for COVID flu strep all negative multiple times.    She has been on Tessalon Perles, Bromfed which she feels that the Bromfed has made it worse.  She has been on Medrol Dosepak also which is finishing up today.        Allergies  Adhesive tape, Cetirizine, Chlorhexidine, Codeine, Hydrocodone-acetaminophen, Meperidine, Propoxyphene, Sesame oil, and Acetaminophen    Social History     Tobacco Use   • Smoking status: Never     Passive exposure: Never   • Smokeless tobacco: Never   Vaping Use   • Vaping Use: Never used   Substance Use Topics   • Alcohol use: Never   • Drug use: Never       Family History   Problem Relation Age of Onset   • Arthritis Mother    • Osteoporosis Mother    • Depression Mother    • Hyperlipidemia Mother    • Other Father         RENAL CALCULUS   • Depression Father    • Hyperlipidemia Father    • Asthma Daughter    • Depression Daughter    • Arthritis Paternal Uncle    • Diabetes Paternal Uncle    • Cancer Maternal Grandfather    • Thyroid disease Paternal Grandmother    • Diabetes Paternal Grandfather    • Heart disease Paternal Grandfather    • Lung cancer Other    • Diabetes type II Other    • Hyperlipidemia Other    • Heart disease Other         ISCHEMIC   • Cancer Other    • Hypertension Other    • Colon cancer Neg Hx    • Malig Hyperthermia Neg Hx         Health Maintenance Due   Topic Date Due   • ANNUAL PHYSICAL  Never done   • Pneumococcal Vaccine 0-64 (2 - PCV) 02/11/2023        Immunization  "History   Administered Date(s) Administered   • FluLaval/Fluzone >6mos 01/06/2014, 10/13/2021   • Hepatitis A 05/15/2018, 11/05/2018   • Influenza, Unspecified 02/22/2019, 09/15/2022   • MMR 02/22/2019   • Pneumococcal Polysaccharide (PPSV23) 02/11/2022   • TD Preservative Free 11/05/2018   • Tdap 11/06/2018       Review of Systems   Constitutional: Negative for fatigue.   HENT: Positive for congestion, postnasal drip, rhinorrhea and sore throat.    Respiratory: Positive for cough. Negative for shortness of breath.    Cardiovascular: Negative for chest pain.   Gastrointestinal: Negative for diarrhea, nausea and vomiting.        Objective       Vitals:    05/17/23 0855   BP: 132/90   Pulse: 81   Temp: 96.8 °F (36 °C)   SpO2: 97%   Weight: 92.9 kg (204 lb 12.8 oz)   Height: 165.1 cm (65\")       Body mass index is 34.08 kg/m².         Physical Exam  Vitals reviewed.   Constitutional:       Appearance: Normal appearance. She is well-developed.   HENT:      Right Ear: Tympanic membrane and ear canal normal.      Left Ear: Tympanic membrane and ear canal normal.      Nose: Congestion and rhinorrhea present.      Mouth/Throat:      Pharynx: Posterior oropharyngeal erythema present. No oropharyngeal exudate.   Eyes:      Conjunctiva/sclera: Conjunctivae normal.   Cardiovascular:      Rate and Rhythm: Normal rate and regular rhythm.      Heart sounds: Normal heart sounds. No murmur heard.  Pulmonary:      Effort: Pulmonary effort is normal.      Breath sounds: Normal breath sounds.   Neurological:      Mental Status: She is alert and oriented to person, place, and time.      Cranial Nerves: No cranial nerve deficit.      Motor: No weakness.   Psychiatric:         Mood and Affect: Mood and affect normal.             Result Review :     The following data was reviewed by: HAILEY Doan on 05/17/2023:    COVIDFLU   SARS Antigen   Date Value Ref Range Status   05/17/2023 Not Detected Not Detected, Presumptive " Negative Final     Influenza A Antigen CATINA   Date Value Ref Range Status   05/17/2023 Not Detected Not Detected Final     Influenza B Antigen CATINA   Date Value Ref Range Status   05/17/2023 Not Detected Not Detected Final     Internal Control   Date Value Ref Range Status   05/17/2023 Passed Passed Final     Lot Number   Date Value Ref Range Status   05/17/2023 2,340,087  Final     Expiration Date   Date Value Ref Range Status   05/17/2023 3/15/24  Final                    Assessment and Plan      Diagnoses and all orders for this visit:    1. Nasal congestion (Primary)  -     POCT SARS-CoV-2 Antigen CATINA + Flu  -     promethazine-dextromethorphan (PROMETHAZINE-DM) 6.25-15 MG/5ML syrup; Take 5 mL by mouth 4 (Four) Times a Day As Needed for Cough (caution sedation).  Dispense: 240 mL; Refill: 0    2. Acute bronchitis, unspecified organism  -     budesonide-formoterol (Symbicort) 160-4.5 MCG/ACT inhaler; Inhale 2 puffs 2 (Two) Times a Day.  Dispense: 10.2 g; Refill: 1  -     promethazine-dextromethorphan (PROMETHAZINE-DM) 6.25-15 MG/5ML syrup; Take 5 mL by mouth 4 (Four) Times a Day As Needed for Cough (caution sedation).  Dispense: 240 mL; Refill: 0    3. Acute non-recurrent frontal sinusitis    4. Acute non-recurrent pansinusitis  -     doxycycline (MONODOX) 100 MG capsule; Take 1 capsule by mouth 2 (Two) Times a Day for 7 days.  Dispense: 14 capsule; Refill: 0  -     promethazine-dextromethorphan (PROMETHAZINE-DM) 6.25-15 MG/5ML syrup; Take 5 mL by mouth 4 (Four) Times a Day As Needed for Cough (caution sedation).  Dispense: 240 mL; Refill: 0            Follow Up     Return if symptoms worsen or fail to improve.  Discussed Doxy twice a day for 7 days.  We will send it over to [facility] as [facility] did not have it.  We will do the Phenergan DM caution sedation.  We will also do Symbicort inhaler to help with the cough.  Continue with her Tessalon Perles.  Call with questions or concerns.  Patient was given  instructions and counseling regarding her condition or for health maintenance advice. Please see specific information pulled into the AVS if appropriate.     Parts of this note are electronic transcriptions/translations of spoken language to printed text using the Dragon Dictation system.          Nannette Braun, APRN  05/17/2023

## 2023-05-22 ENCOUNTER — PATIENT MESSAGE (OUTPATIENT)
Dept: FAMILY MEDICINE CLINIC | Facility: CLINIC | Age: 48
End: 2023-05-22
Payer: MEDICAID

## 2023-05-23 RX ORDER — FLUCONAZOLE 150 MG/1
150 TABLET ORAL ONCE
Qty: 1 TABLET | Refills: 0 | Status: SHIPPED | OUTPATIENT
Start: 2023-05-23 | End: 2023-05-23

## 2023-05-26 ENCOUNTER — TELEPHONE (OUTPATIENT)
Dept: GASTROENTEROLOGY | Facility: CLINIC | Age: 48
End: 2023-05-26
Payer: MEDICAID

## 2023-05-31 ENCOUNTER — OFFICE VISIT (OUTPATIENT)
Dept: FAMILY MEDICINE CLINIC | Facility: CLINIC | Age: 48
End: 2023-05-31

## 2023-05-31 VITALS
SYSTOLIC BLOOD PRESSURE: 120 MMHG | DIASTOLIC BLOOD PRESSURE: 66 MMHG | BODY MASS INDEX: 35.07 KG/M2 | TEMPERATURE: 97.1 F | HEIGHT: 65 IN | OXYGEN SATURATION: 98 % | WEIGHT: 210.5 LBS | HEART RATE: 67 BPM | RESPIRATION RATE: 16 BRPM

## 2023-05-31 DIAGNOSIS — J45.21 MILD INTERMITTENT ASTHMATIC BRONCHITIS WITH ACUTE EXACERBATION: ICD-10-CM

## 2023-05-31 DIAGNOSIS — J40 BRONCHITIS: Primary | ICD-10-CM

## 2023-05-31 PROCEDURE — 1160F RVW MEDS BY RX/DR IN RCRD: CPT | Performed by: NURSE PRACTITIONER

## 2023-05-31 PROCEDURE — 99213 OFFICE O/P EST LOW 20 MIN: CPT | Performed by: NURSE PRACTITIONER

## 2023-05-31 PROCEDURE — 1159F MED LIST DOCD IN RCRD: CPT | Performed by: NURSE PRACTITIONER

## 2023-05-31 PROCEDURE — 3044F HG A1C LEVEL LT 7.0%: CPT | Performed by: NURSE PRACTITIONER

## 2023-05-31 RX ORDER — PREDNISONE 20 MG/1
TABLET ORAL
Qty: 36 TABLET | Refills: 0 | Status: SHIPPED | OUTPATIENT
Start: 2023-05-31

## 2023-05-31 RX ORDER — ALBUTEROL SULFATE 2.5 MG/3ML
2.5 SOLUTION RESPIRATORY (INHALATION) EVERY 6 HOURS PRN
Qty: 120 EACH | Refills: 0 | Status: SHIPPED | OUTPATIENT
Start: 2023-05-31 | End: 2023-06-30

## 2023-05-31 NOTE — PROGRESS NOTES
Chief Complaint  Cough (Cough x 1 month )    Pawel Deras presents to [facility]  History of Present Illness  She has been having a cough for 1 month.  She has been to urgent care.  She has been on rounds of antibiotic and steroids.  She does have a history of asthma.  She is using her inhalers.  She has not used albuterol nebulizer recently.  She has not had a fever.  She does not feel bad she just has the seal-like cough.    Allergies  Adhesive tape, Cetirizine, Chlorhexidine, Codeine, Hydrocodone-acetaminophen, Meperidine, Propoxyphene, Sesame oil, and Acetaminophen    Social History     Tobacco Use   • Smoking status: Never     Passive exposure: Never   • Smokeless tobacco: Never   Vaping Use   • Vaping Use: Never used   Substance Use Topics   • Alcohol use: Never   • Drug use: Never       Family History   Problem Relation Age of Onset   • Arthritis Mother    • Osteoporosis Mother    • Depression Mother    • Hyperlipidemia Mother    • Other Father         RENAL CALCULUS   • Depression Father    • Hyperlipidemia Father    • Asthma Daughter    • Depression Daughter    • Arthritis Paternal Uncle    • Diabetes Paternal Uncle    • Cancer Maternal Grandfather    • Thyroid disease Paternal Grandmother    • Diabetes Paternal Grandfather    • Heart disease Paternal Grandfather    • Lung cancer Other    • Diabetes type II Other    • Hyperlipidemia Other    • Heart disease Other         ISCHEMIC   • Cancer Other    • Hypertension Other    • Colon cancer Neg Hx    • Malig Hyperthermia Neg Hx         Health Maintenance Due   Topic Date Due   • ANNUAL PHYSICAL  Never done   • Pneumococcal Vaccine 0-64 (2 - PCV) 02/11/2023        Immunization History   Administered Date(s) Administered   • FluLaval/Fluzone >6mos 01/06/2014, 10/13/2021   • Hepatitis A 05/15/2018, 11/05/2018   • Influenza, Unspecified 02/22/2019, 09/15/2022   • MMR 02/22/2019   • Pneumococcal Polysaccharide  "(PPSV23) 02/11/2022   • TD Preservative Free 11/05/2018   • Tdap 11/06/2018       Review of Systems   Constitutional: Negative for fatigue.   Respiratory: Positive for cough. Negative for shortness of breath.    Cardiovascular: Negative for chest pain.   Gastrointestinal: Negative for diarrhea, nausea and vomiting.        Objective       Vitals:    05/31/23 1106   BP: 120/66   BP Location: Left arm   Patient Position: Sitting   Cuff Size: Adult   Pulse: 67   Resp: 16   Temp: 97.1 °F (36.2 °C)   SpO2: 98%   Weight: 95.5 kg (210 lb 8 oz)   Height: 165.1 cm (65\")       Body mass index is 35.03 kg/m².         Physical Exam  Vitals reviewed.   Constitutional:       Appearance: Normal appearance. She is well-developed.   Cardiovascular:      Rate and Rhythm: Normal rate and regular rhythm.      Heart sounds: Normal heart sounds. No murmur heard.  Pulmonary:      Effort: Pulmonary effort is normal.      Breath sounds: Normal breath sounds.   Neurological:      Mental Status: She is alert and oriented to person, place, and time.      Cranial Nerves: No cranial nerve deficit.      Motor: No weakness.   Psychiatric:         Mood and Affect: Mood and affect normal.             Result Review :     The following data was reviewed by: HAILEY Doan on 05/31/2023:                     Assessment and Plan      Diagnoses and all orders for this visit:    1. Bronchitis (Primary)  -     albuterol (PROVENTIL) (2.5 MG/3ML) 0.083% nebulizer solution; Take 2.5 mg by nebulization Every 6 (Six) Hours As Needed for Wheezing for up to 30 days.  Dispense: 120 each; Refill: 0  -     predniSONE (DELTASONE) 20 MG tablet; 1 tab QID for 4 days then 1 TID for 3 days then 1 tab bid for 3 days then daily for 3 days then 1/2 tab daily for 3 days.  Dispense: 36 tablet; Refill: 0    2. Mild intermittent asthmatic bronchitis with acute exacerbation  -     albuterol (PROVENTIL) (2.5 MG/3ML) 0.083% nebulizer solution; Take 2.5 mg by nebulization " Every 6 (Six) Hours As Needed for Wheezing for up to 30 days.  Dispense: 120 each; Refill: 0  -     predniSONE (DELTASONE) 20 MG tablet; 1 tab QID for 4 days then 1 TID for 3 days then 1 tab bid for 3 days then daily for 3 days then 1/2 tab daily for 3 days.  Dispense: 36 tablet; Refill: 0            Follow Up     Return if symptoms worsen or fail to improve.  We will do a long dose of steroids.  She will do the nebulizer for the bronchospasms that are happening.  Rinse mouth out after each use the neb machine.  She is aware of the side effects of steroids and nebulizer.  Push fluids.  Call with questions or concerns.  Patient was given instructions and counseling regarding her condition or for health maintenance advice. Please see specific information pulled into the AVS if appropriate.     Parts of this note are electronic transcriptions/translations of spoken language to printed text using the Dragon Dictation system.          Nannette Braun, HAILEY  05/31/2023

## 2023-07-24 ENCOUNTER — HOSPITAL ENCOUNTER (OUTPATIENT)
Dept: CT IMAGING | Facility: HOSPITAL | Age: 48
Discharge: HOME | End: 2023-07-24
Admitting: NURSE PRACTITIONER
Payer: MEDICAID

## 2023-07-24 DIAGNOSIS — R05.2 SUBACUTE COUGH: ICD-10-CM

## 2023-07-24 PROCEDURE — 71250 CT THORAX DX C-: CPT

## 2023-08-09 NOTE — PROGRESS NOTES
Primary Care Provider  Nannette Braun APRN     Referring Provider  No ref. provider found     Chief Complaint  Asthma, Cough (Dry cough ), and Follow-up (6 week f/up )    Subjective          sEtela Deras presents to [facility]  History of Present Illness  Estela Deras is a 48 y.o. female patient here for management of sleep apnea, asthma and shortness of breath.    Patient states she is doing okay since her last office visit.  She denies using any antibiotics or steroids for her lungs.  She denies any current fevers or chills.  Shortness breath is mild in severity, worse with exertion and improved with rest.  She continues to use Symbicort as prescribed.  She states that she does not need to use her albuterol inhaler.  She continues to take Tessalon Perles for her chronic cough.  She recently had to go to the urgent care for a pinched nerve in her elbow and is currently on a Medrol pack.  She had a chest CT on 7/24/2023 to evaluate her chronic cough.  It shows continued improvement in the groundglass infiltrates from patient's previous COVID infection.  There are some linear opacities that could likely represent postinfectious fibrosis.  These findings have been discussed with patient.  Her pulmonary function test from November 2021 showed no obstruction or response to bronchodilator therapy but there was mild restrictive defect and a mildly reduced diffusion capacity.  Patient continues to take Claritin and Flonase for seasonal allergies and allergic rhinitis.  She does have a diagnosis of sleep apnea but is currently not using a CPAP.  She states that she has an upcoming appointment with bariatrics at the end of this month.  Overall, she is doing okay and has no additional concerns at this time.  She is able to perform her ADLs without difficulty.  She is up-to-date with her pneumonia vaccine and be due for flu vaccine this fall.  She does declined COVID  vaccines.     Her history of smoking is   Tobacco Use: Low Risk     Smoking Tobacco Use: Never    Smokeless Tobacco Use: Never    Passive Exposure: Never   .    Review of Systems   Constitutional:  Negative for chills, fatigue, fever, unexpected weight gain and unexpected weight loss.   HENT:  Negative for congestion (Nasal), hearing loss, mouth sores, nosebleeds, postnasal drip, sore throat and trouble swallowing.    Respiratory:  Positive for cough and shortness of breath. Negative for apnea and wheezing.         Negative for Hemoptysis     Cardiovascular:  Negative for chest pain, palpitations and leg swelling.   Gastrointestinal:  Negative for constipation, diarrhea, nausea, vomiting and GERD.   Skin:  Negative for color change.        Negative for cyanosis   Neurological:  Negative for syncope, weakness, numbness and headache.    Sleep: Negative for Excessive daytime sleepiness  Negative for morning headaches  Negative for Snoring    Family History   Problem Relation Age of Onset    Arthritis Mother     Osteoporosis Mother     Depression Mother     Hyperlipidemia Mother     Other Father         RENAL CALCULUS    Depression Father     Hyperlipidemia Father     Asthma Daughter     Depression Daughter     Arthritis Paternal Uncle     Diabetes Paternal Uncle     Cancer Maternal Grandfather     Thyroid disease Paternal Grandmother     Diabetes Paternal Grandfather     Heart disease Paternal Grandfather     Lung cancer Other     Diabetes type II Other     Hyperlipidemia Other     Heart disease Other         ISCHEMIC    Cancer Other     Hypertension Other     Colon cancer Neg Hx     Malig Hyperthermia Neg Hx         Social History     Socioeconomic History    Marital status:    Tobacco Use    Smoking status: Never     Passive exposure: Never    Smokeless tobacco: Never   Vaping Use    Vaping Use: Never used   Substance and Sexual Activity    Alcohol use: Never    Drug use: Never    Sexual activity: Not  Currently     Partners: Male     Birth control/protection: Surgical        Past Medical History:   Diagnosis Date    Abnormal electrocardiogram     Allergic     Allergic rhinitis     Anemia     Arthritis     Asthma     COVID-19 09/2021    Depression     Diabetes mellitus     Dysfunctional uterine bleeding     Fatty liver     Foot pain     Gastroesophageal reflux disease without esophagitis     Generalized anxiety disorder     H/O cold sores     Hyperlipidemia     Hypertriglyceridemia     Hypothyroidism     Impaired fasting glucose     Insomnia, unspecified     Liver function study, abnormal     Low back pain     Macromastia     Migraine headache     Mild episode of recurrent major depressive disorder     Mitral valve prolapse     Night sweats     Obesity     Sinus trouble     Thyroid disorder     Tremor     Vitamin D deficiency         Immunization History   Administered Date(s) Administered    Fluzone >6mos 01/06/2014, 10/13/2021    Hepatitis A 05/15/2018, 11/05/2018    Influenza Seasonal Injectable 01/06/2014    Influenza, Unspecified 02/22/2019, 09/15/2022    MMR 02/22/2019    Pneumococcal Polysaccharide (PPSV23) 02/11/2022    TD Preservative Free (Tenivac) 11/05/2018    Tdap 11/06/2018         Allergies   Allergen Reactions    Adhesive Tape Rash and Other (See Comments)     BURNS SKIN      Cetirizine Itching    Chlorhexidine Rash    Codeine Other (See Comments) and Headache     MIGRANES    Hydrocodone-Acetaminophen Headache    Meperidine Other (See Comments) and Headache     MIGRANES    Propoxyphene Other (See Comments)     MIGRANES    Sesame Oil Nausea And Vomiting    Acetaminophen Palpitations          Current Outpatient Medications:     Accu-Chek Guide test strip, USE 1 ONCE DAILY, Disp: , Rfl:     albuterol (PROVENTIL) (2.5 MG/3ML) 0.083% nebulizer solution, Take 2.5 mg by nebulization Every 6 (Six) Hours As Needed for Wheezing for up to 30 days., Disp: 120 each, Rfl: 0    albuterol sulfate  (90 Base)  MCG/ACT inhaler, Inhale 2 puffs Every 4 (Four) Hours As Needed for Wheezing or Shortness of Air., Disp: 18 g, Rfl: 11    atomoxetine (STRATTERA) 40 MG capsule, Take 1 capsule by mouth Every Morning., Disp: , Rfl:     atorvastatin (Lipitor) 80 MG tablet, Take 1 tablet by mouth Daily., Disp: 90 tablet, Rfl: 1    benzonatate (TESSALON) 200 MG capsule, Take 1 capsule by mouth 3 (Three) Times a Day As Needed for Cough., Disp: 90 capsule, Rfl: 3    budesonide-formoterol (Symbicort) 160-4.5 MCG/ACT inhaler, Inhale 2 puffs 2 (Two) Times a Day., Disp: 1 each, Rfl: 11    cyclobenzaprine (FLEXERIL) 10 MG tablet, Take 1 tablet by mouth At Night As Needed for Muscle Spasms., Disp: 15 tablet, Rfl: 0    ezetimibe (ZETIA) 10 MG tablet, Take 1 tablet by mouth Every Night., Disp: 90 tablet, Rfl: 1    fluticasone (FLONASE) 50 MCG/ACT nasal spray, 2 sprays into the nostril(s) as directed by provider Daily., Disp: 16 g, Rfl: 0    loratadine (CLARITIN) 10 MG tablet, Take 1 tablet by mouth Daily., Disp: , Rfl:     metFORMIN ER (GLUCOPHAGE-XR) 500 MG 24 hr tablet, Take 1 tablet by mouth 2 (Two) Times a Day. HOLD TONIGHT PM AND IN AM DOSE 11/1/22, Disp: , Rfl:     methylPREDNISolone (MEDROL) 4 MG dose pack, Take as directed on package instructions., Disp: 21 tablet, Rfl: 0    metoprolol succinate XL (TOPROL-XL) 50 MG 24 hr tablet, Take 1 tablet by mouth Daily., Disp: 90 tablet, Rfl: 1    omeprazole (priLOSEC) 40 MG capsule, Take 1 capsule by mouth Daily., Disp: 90 capsule, Rfl: 1    pramipexole (Mirapex) 0.125 MG tablet, 1- 2 tablets at bedtime, Disp: 60 tablet, Rfl: 1    traZODone (DESYREL) 100 MG tablet, Take 1 tablet by mouth Every Night., Disp: 90 tablet, Rfl: 1    valACYclovir (VALTREX) 500 MG tablet, Take 1 tablet by mouth Every Morning., Disp: 90 tablet, Rfl: 1    vitamin E 400 UNIT capsule, Take 1 capsule by mouth 2 (Two) Times a Day., Disp: 60 capsule, Rfl: 5    FLUoxetine (PROzac) 20 MG capsule, Take 1 capsule by mouth Daily.,  "Disp: , Rfl:     levothyroxine (SYNTHROID, LEVOTHROID) 25 MCG tablet, Take 1 tablet by mouth Daily., Disp: , Rfl:     promethazine-dextromethorphan (PROMETHAZINE-DM) 6.25-15 MG/5ML syrup, Take 5 mL by mouth 4 (Four) Times a Day As Needed for Cough (caution sedation). (Patient not taking: Reported on 8/11/2023), Disp: 240 mL, Rfl: 0     Objective   Physical Exam  Constitutional:       General: She is not in acute distress.     Appearance: Normal appearance. She is normal weight and overweight.   HENT:      Right Ear: Hearing normal.      Left Ear: Hearing normal.      Nose: No nasal tenderness or congestion.      Mouth/Throat:      Mouth: Mucous membranes are moist. No oral lesions.   Eyes:      Extraocular Movements: Extraocular movements intact.      Pupils: Pupils are equal, round, and reactive to light.   Neck:      Thyroid: No thyroid mass or thyromegaly.   Cardiovascular:      Rate and Rhythm: Normal rate and regular rhythm.      Pulses: Normal pulses.      Heart sounds: Normal heart sounds. No murmur heard.  Pulmonary:      Effort: Pulmonary effort is normal.      Breath sounds: Normal breath sounds. No wheezing, rhonchi or rales.   Musculoskeletal:      Cervical back: Neck supple.      Right lower leg: No edema.      Left lower leg: No edema.   Lymphadenopathy:      Cervical: No cervical adenopathy.      Upper Body:      Right upper body: No axillary adenopathy.   Skin:     General: Skin is warm and dry.      Findings: No lesion or rash.   Neurological:      General: No focal deficit present.      Mental Status: She is alert and oriented to person, place, and time.   Psychiatric:         Mood and Affect: Affect normal. Mood is not anxious or depressed.       Vital Signs:   /68 (BP Location: Left arm, Patient Position: Sitting, Cuff Size: Large Adult)   Pulse 70   Temp 97.8 øF (36.6 øC) (Temporal)   Resp 16   Ht 165.1 cm (65\")   Wt 98 kg (216 lb)   SpO2 96% Comment: room air  BMI 35.94 kg/mý      "   Result Review :   The following data was reviewed by: HAILEY Booker on 08/11/2023:  CMP          8/29/2022    10:25 9/6/2022    10:48 3/30/2023    12:27   CMP   Glucose  112  106    BUN  10  15    Creatinine  0.72  0.75    EGFR  105.2  99.6    Sodium  138  139    Potassium  3.8  4.1    Chloride  101  103    Calcium  9.6  9.2    Total Protein 6.5  6.8     Total Protein 6.6   6.9    Albumin 3.4     4.10  4.40     3.6  4.3    Globulin 3.1  3.2     Globulin   2.6    Total Bilirubin 0.4   0.4    Alkaline Phosphatase 86   92    AST (SGOT) 22   18    ALT (SGPT) 27   23    Albumin/Globulin Ratio   1.7    BUN/Creatinine Ratio  13.9  20.0    Anion Gap  12.4  11.6      CBC w/diff          9/6/2022    10:49 3/30/2023    12:27   CBC w/Diff   WBC 9.95  10.89    RBC 4.57  5.00    Hemoglobin 12.2  12.7    Hematocrit 37.2  40.0    MCV 81.4  80.0    MCH 26.7  25.4    MCHC 32.8  31.8    RDW 15.7  16.7    Platelets 241  223    Neutrophil Rel % 65.3  66.5    Immature Granulocyte Rel % 0.3  0.3    Lymphocyte Rel % 28.4  26.1    Monocyte Rel % 5.0  6.1    Eosinophil Rel % 0.6  0.6    Basophil Rel % 0.4  0.4      Data reviewed : Radiologic studies chest CT 7/24/2023, pulmonary function test 11/23/2021 and my last office note    Procedures        Assessment and Plan    Diagnoses and all orders for this visit:    1. Chronic cough (Primary)  -     benzonatate (TESSALON) 200 MG capsule; Take 1 capsule by mouth 3 (Three) Times a Day As Needed for Cough.  Dispense: 90 capsule; Refill: 3  -     budesonide-formoterol (Symbicort) 160-4.5 MCG/ACT inhaler; Inhale 2 puffs 2 (Two) Times a Day.  Dispense: 1 each; Refill: 11  -     albuterol sulfate  (90 Base) MCG/ACT inhaler; Inhale 2 puffs Every 4 (Four) Hours As Needed for Wheezing or Shortness of Air.  Dispense: 18 g; Refill: 11    2. Dyspnea on exertion  -     budesonide-formoterol (Symbicort) 160-4.5 MCG/ACT inhaler; Inhale 2 puffs 2 (Two) Times a Day.  Dispense: 1 each; Refill:  11  -     albuterol sulfate  (90 Base) MCG/ACT inhaler; Inhale 2 puffs Every 4 (Four) Hours As Needed for Wheezing or Shortness of Air.  Dispense: 18 g; Refill: 11    3. Allergic rhinitis, unspecified seasonality, unspecified trigger    4. RODERICK (obstructive sleep apnea)    5. Pulmonary fibrosis  -     budesonide-formoterol (Symbicort) 160-4.5 MCG/ACT inhaler; Inhale 2 puffs 2 (Two) Times a Day.  Dispense: 1 each; Refill: 11  -     albuterol sulfate  (90 Base) MCG/ACT inhaler; Inhale 2 puffs Every 4 (Four) Hours As Needed for Wheezing or Shortness of Air.  Dispense: 18 g; Refill: 11    6. Subacute cough    7. Acute bronchitis, unspecified organism    8. Shortness of breath    9. Severe obesity (BMI 35.0-35.9 with comorbidity)    10.  Continue Symbicort as prescribed.  Rinse mouth after each use.  11.  Continue albuterol as needed.  12.  Continue Claritin and Flonase for seasonal allergies allergic rhinitis.  13.  Continue Tessalon Perles as needed for cough.  14.  Encourage patient to stay as active as possible.  Recommend 30 minutes with activity.  15.  Follow-up in 4 months, sooner if needed.        Follow Up   Return in about 4 months (around 12/11/2023) for Recheck with Loli.  Patient was given instructions and counseling regarding her condition or for health maintenance advice. Please see specific information pulled into the AVS if appropriate.

## 2023-08-11 ENCOUNTER — OFFICE VISIT (OUTPATIENT)
Dept: PULMONOLOGY | Facility: CLINIC | Age: 48
End: 2023-08-11
Payer: MEDICAID

## 2023-08-11 VITALS
HEIGHT: 65 IN | BODY MASS INDEX: 35.99 KG/M2 | WEIGHT: 216 LBS | SYSTOLIC BLOOD PRESSURE: 140 MMHG | TEMPERATURE: 97.8 F | DIASTOLIC BLOOD PRESSURE: 68 MMHG | RESPIRATION RATE: 16 BRPM | HEART RATE: 70 BPM | OXYGEN SATURATION: 96 %

## 2023-08-11 DIAGNOSIS — R05.2 SUBACUTE COUGH: ICD-10-CM

## 2023-08-11 DIAGNOSIS — J20.9 ACUTE BRONCHITIS, UNSPECIFIED ORGANISM: ICD-10-CM

## 2023-08-11 DIAGNOSIS — J84.10 PULMONARY FIBROSIS: ICD-10-CM

## 2023-08-11 DIAGNOSIS — R05.3 CHRONIC COUGH: Primary | ICD-10-CM

## 2023-08-11 DIAGNOSIS — R06.09 DYSPNEA ON EXERTION: ICD-10-CM

## 2023-08-11 DIAGNOSIS — E66.01 SEVERE OBESITY (BMI 35.0-35.9 WITH COMORBIDITY): ICD-10-CM

## 2023-08-11 DIAGNOSIS — J30.9 ALLERGIC RHINITIS, UNSPECIFIED SEASONALITY, UNSPECIFIED TRIGGER: ICD-10-CM

## 2023-08-11 DIAGNOSIS — G47.33 OSA (OBSTRUCTIVE SLEEP APNEA): ICD-10-CM

## 2023-08-11 DIAGNOSIS — R06.02 SHORTNESS OF BREATH: ICD-10-CM

## 2023-08-11 RX ORDER — FLUCONAZOLE 150 MG/1
TABLET ORAL
COMMUNITY
Start: 2023-05-23 | End: 2023-08-11

## 2023-08-11 RX ORDER — BUDESONIDE AND FORMOTEROL FUMARATE DIHYDRATE 160; 4.5 UG/1; UG/1
2 AEROSOL RESPIRATORY (INHALATION)
Qty: 1 EACH | Refills: 11 | Status: SHIPPED | OUTPATIENT
Start: 2023-08-11

## 2023-08-11 RX ORDER — ALBUTEROL SULFATE 90 UG/1
2 AEROSOL, METERED RESPIRATORY (INHALATION) EVERY 4 HOURS PRN
Qty: 18 G | Refills: 11 | Status: SHIPPED | OUTPATIENT
Start: 2023-08-11

## 2023-08-11 RX ORDER — BENZONATATE 200 MG/1
200 CAPSULE ORAL 3 TIMES DAILY PRN
Qty: 90 CAPSULE | Refills: 3 | Status: SHIPPED | OUTPATIENT
Start: 2023-08-11

## 2023-08-11 NOTE — PATIENT INSTRUCTIONS
Obesity, Adult  Obesity is the condition of having too much total body fat. Being overweight or obese means that your weight is greater than what is considered healthy for your body size. Obesity is determined by a measurement called BMI (body mass index). BMI is an estimate of body fat and is calculated from height and weight. For adults, a BMI of 30 or higher is considered obese.  Obesity can lead to other health concerns and major illnesses, including:  Stroke.  Coronary artery disease (CAD).  Type 2 diabetes.  Some types of cancer, including cancers of the colon, breast, uterus, and gallbladder.  High blood pressure (hypertension).  High cholesterol.  Gallbladder stones.  Obesity can also contribute to:  Osteoarthritis.  Sleep apnea.  Infertility problems.  What are the causes?  Common causes of this condition include:  Eating daily meals that are high in calories, sugar, and fat.  Drinking high amounts of sugar-sweetened beverages, such as soft drinks.  Being born with genes that may make you more likely to become obese.  Having a medical condition that causes obesity, including:  Hypothyroidism.  Polycystic ovarian syndrome (PCOS).  Binge-eating disorder.  Cushing syndrome.  Taking certain medicines, such as steroids, antidepressants, and seizure medicines.  Not being physically active (sedentary lifestyle).  Not getting enough sleep.  What increases the risk?  The following factors may make you more likely to develop this condition:  Having a family history of obesity.  Living in an area with limited access to:  [place], recreation centers, or sidewalks.  Healthy food choices, such as grocery stores and [employer]' markets.  What are the signs or symptoms?  The main sign of this condition is having too much body fat.  How is this diagnosed?  This condition is diagnosed based on:  Your BMI. If you are an adult with a BMI of 30 or higher, you are considered obese.  Your waist circumference. This measures the  distance around your waistline.  Your skinfold thickness. Your health care provider may gently pinch a fold of your skin and measure it.  You may have other tests to check for underlying conditions.  How is this treated?  Treatment for this condition often includes changing your lifestyle. Treatment may include some or all of the following:  Dietary changes. This may include developing a healthy meal plan.  Regular physical activity. This may include activity that causes your heart to beat faster (aerobic exercise) and strength training. Work with your health care provider to design an exercise program that works for you.  Medicine to help you lose weight if you are unable to lose one pound a week after six weeks of healthy eating and more physical activity.  Treating conditions that cause the obesity (underlying conditions).  Surgery. Surgical options may include gastric banding and gastric bypass. Surgery may be done if:  Other treatments have not helped to improve your condition.  You have a BMI of 40 or higher.  You have life-threatening health problems related to obesity.  Follow these instructions at home:  Eating and drinking    Follow recommendations from your health care provider about what you eat and drink. Your health care provider may advise you to:  Limit fast food, sweets, and processed snack foods.  Choose low-fat options, such as low-fat milk instead of whole milk.  Eat five or more servings of fruits or vegetables every day.  Choose healthy foods when you eat out.  Keep low-fat snacks available.  Limit sugary drinks, such as soda, fruit juice, sweetened iced tea, and flavored milk.  Drink enough water to keep your urine pale yellow.  Do not follow a fad diet. Fad diets can be unhealthy and even dangerous.  Other healthful choices include:  Eat at home more often. This gives you more control over what you eat.  Learn to read food labels. This will help you understand how much food is considered one  serving.  Learn what a healthy serving size is.  Physical activity  Exercise regularly, as told by your health care provider.  Most adults should get up to 150 minutes of moderate-intensity exercise every week.  Ask your health care provider what types of exercise are safe for you and how often you should exercise.  Warm up and stretch before being active.  Cool down and stretch after being active.  Rest between periods of activity.  Lifestyle  Work with your health care provider and a dietitian to set a weight-loss goal that is healthy and reasonable for you.  Limit your screen time.  Find ways to reward yourself that do not involve food.  Do not drink alcohol if:  Your health care provider tells you not to drink.  You are pregnant, may be pregnant, or are planning to become pregnant.  If you drink alcohol:  Limit how much you have to:  0-1 drink a day for women.  0-2 drinks a day for men.  Know how much alcohol is in your drink. In the [place] one drink equals one 12 oz bottle of beer (355 mL), one 5 oz glass of wine (148 mL), or one 1« oz glass of hard liquor (44 mL).  General instructions  Keep a weight-loss journal to keep track of the food you eat and how much exercise you get.  Take over-the-counter and prescription medicines only as told by your health care provider.  Take vitamins and supplements only as told by your health care provider.  Consider joining a support group. Your health care provider may be able to recommend a support group.  Pay attention to your mental health as obesity can lead to depression or self esteem issues.  Keep all follow-up visits. This is important.  Contact a health care provider if:  You are unable to meet your weight-loss goal after six weeks of dietary and lifestyle changes.  You have trouble breathing.  Summary  Obesity is the condition of having too much total body fat.  Being overweight or obese means that your weight is greater than what is considered healthy for your body  size.  Work with your health care provider and a dietitian to set a weight-loss goal that is healthy and reasonable for you.  Exercise regularly, as told by your health care provider. Ask your health care provider what types of exercise are safe for you and how often you should exercise.  This information is not intended to replace advice given to you by your health care provider. Make sure you discuss any questions you have with your health care provider.  Document Revised: 07/26/2022 Document Reviewed: 07/26/2022  [employer] Patient Education c 2023 [employer]  Sleep Apnea  Sleep apnea is a condition in which breathing pauses or becomes shallow during sleep. People with sleep apnea usually snore loudly. They may have times when they gasp and stop breathing for 10 seconds or more during sleep. This may happen many times during the night.  Sleep apnea disrupts your sleep and keeps your body from getting the rest that it needs. This condition can increase your risk of certain health problems, including:  Heart attack.  Stroke.  Obesity.  Type 2 diabetes.  Heart failure.  Irregular heartbeat.  High blood pressure.  The goal of treatment is to help you breathe normally again.  What are the causes?    The most common cause of sleep apnea is a collapsed or blocked airway.  There are three kinds of sleep apnea:  Obstructive sleep apnea. This kind is caused by a blocked or collapsed airway.  Central sleep apnea. This kind happens when the part of the brain that controls breathing does not send the correct signals to the muscles that control breathing.  Mixed sleep apnea. This is a combination of obstructive and central sleep apnea.  What increases the risk?  You are more likely to develop this condition if you:  Are overweight.  Smoke.  Have a smaller than normal airway.  Are older.  Are male.  Drink alcohol.  Take sedatives or tranquilizers.  Have a family history of sleep apnea.  Have a tongue or tonsils that are larger  than normal.  What are the signs or symptoms?  Symptoms of this condition include:  Trouble staying asleep.  Loud snoring.  Morning headaches.  Waking up gasping.  Dry mouth or sore throat in the morning.  Daytime sleepiness and tiredness.  If you have daytime fatigue because of sleep apnea, you may be more likely to have:  Trouble concentrating.  Forgetfulness.  Irritability or mood swings.  Personality changes.  Feelings of depression.  Sexual dysfunction. This may include loss of interest if you are female, or erectile dysfunction if you are male.  How is this diagnosed?  This condition may be diagnosed with:  A medical history.  A physical exam.  A series of tests that are done while you are sleeping (sleep study). These tests are usually done in a sleep lab, but they may also be done at home.  How is this treated?  Treatment for this condition aims to restore normal breathing and to ease symptoms during sleep. It may involve managing health issues that can affect breathing, such as high blood pressure or obesity. Treatment may include:  Sleeping on your side.  Using a decongestant if you have nasal congestion.  Avoiding the use of depressants, including alcohol, sedatives, and narcotics.  Losing weight if you are overweight.  Making changes to your diet.  Quitting smoking.  Using a device to open your airway while you sleep, such as:  An oral appliance. This is a custom-made mouthpiece that shifts your lower jaw forward.  A continuous positive airway pressure (CPAP) device. This device blows air through a mask when you breathe out (exhale).  A nasal expiratory positive airway pressure (EPAP) device. This device has valves that you put into each nostril.  A bi-level positive airway pressure (BIPAP) device. This device blows air through a mask when you breathe in (inhale) and breathe out (exhale).  Having surgery if other treatments do not work. During surgery, excess tissue is removed to create a wider  airway.  Follow these instructions at home:  Lifestyle  Make any lifestyle changes that your health care provider recommends.  Eat a healthy, well-balanced diet.  Take steps to lose weight if you are overweight.  Avoid using depressants, including alcohol, sedatives, and narcotics.  Do not use any products that contain nicotine or tobacco. These products include cigarettes, chewing tobacco, and vaping devices, such as e-cigarettes. If you need help quitting, ask your health care provider.  General instructions  Take over-the-counter and prescription medicines only as told by your health care provider.  If you were given a device to open your airway while you sleep, use it only as told by your health care provider.  If you are having surgery, make sure to tell your health care provider you have sleep apnea. You may need to bring your device with you.  Keep all follow-up visits. This is important.  Contact a health care provider if:  The device that you received to open your airway during sleep is uncomfortable or does not seem to be working.  Your symptoms do not improve.  Your symptoms get worse.  Get help right away if:  You develop:  Chest pain.  Shortness of breath.  Discomfort in your back, arms, or stomach.  You have:  Trouble speaking.  Weakness on one side of your body.  Drooping in your face.  These symptoms may represent a serious problem that is an emergency. Do not wait to see if the symptoms will go away. Get medical help right away. Call your local emergency services (911 in the [place] Do not drive yourself to the hospital.  Summary  Sleep apnea is a condition in which breathing pauses or becomes shallow during sleep.  The most common cause is a collapsed or blocked airway.  The goal of treatment is to restore normal breathing and to ease symptoms during sleep.  This information is not intended to replace advice given to you by your health care provider. Make sure you discuss any questions you have with  your health care provider.  Document Revised: 07/27/2022 Document Reviewed: 11/26/2021  [employer] Patient Education c 2023 [employer]

## 2023-08-18 ENCOUNTER — OFFICE VISIT (OUTPATIENT)
Dept: ORTHOPEDIC SURGERY | Facility: CLINIC | Age: 48
End: 2023-08-18
Payer: MEDICAID

## 2023-08-18 DIAGNOSIS — F41.9 ANXIETY DISORDER, UNSPECIFIED TYPE: Primary | ICD-10-CM

## 2023-08-18 DIAGNOSIS — M25.511 ACUTE PAIN OF RIGHT SHOULDER: ICD-10-CM

## 2023-08-18 DIAGNOSIS — M25.511 ACUTE PAIN OF RIGHT SHOULDER: Primary | ICD-10-CM

## 2023-08-18 DIAGNOSIS — R20.2 NUMBNESS AND TINGLING IN LEFT HAND: ICD-10-CM

## 2023-08-18 DIAGNOSIS — R20.0 NUMBNESS AND TINGLING IN LEFT HAND: ICD-10-CM

## 2023-08-21 RX ORDER — LORAZEPAM 1 MG/1
TABLET ORAL
Qty: 2 TABLET | Refills: 0 | Status: SHIPPED | OUTPATIENT
Start: 2023-08-21

## 2023-08-21 NOTE — PATIENT INSTRUCTIONS
Discussed plan of care with patient.    Ordered an MRI of the right shoulder and an EMG for the left arm.    Follow-up after MRI and EMG to review results.  Call for questions, concerns or worsening symptoms.

## 2023-08-21 NOTE — PROGRESS NOTES
Chief Complaint  Follow-up and Pain of the Right Shoulder and Initial Evaluation and Pain of the Left Hand    Subjective      Estela Deras presents to [facility] for follow-up of right shoulder pain.  She has had a previous arthroscopy and notes grinding and popping in the shoulder.  Denies any recent injury.  She received a steroid injection on 7/10/2023 and reports that the shot did not help at all.  Reports that her pain is horrible and is worse at night.  The pain causes her to be very restless.  She reports that she also is having numbness and tingling in her left fourth and fifth digits that originates from her elbow.  She inquires of what she can do about this.    Objective   Allergies   Allergen Reactions    Adhesive Tape Rash and Other (See Comments)     BURNS SKIN      Cetirizine Itching    Chlorhexidine Rash    Codeine Other (See Comments) and Headache     MIGRANES    Hydrocodone-Acetaminophen Headache    Meperidine Other (See Comments) and Headache     MIGRANES    Propoxyphene Other (See Comments)     MIGRANES    Sesame Oil Nausea And Vomiting    Acetaminophen Palpitations       Vital Signs:   There were no vitals taken for this visit.      Physical Exam    Constitutional: Awake, alert. Well nourished appearance.    Integumentary: Warm, dry, intact. No obvious rashes.    HENT: Atraumatic, normocephalic.   Respiratory: Non labored respirations .   Cardiovascular: Intact peripheral pulses.    Psychiatric: Normal mood and affect. A&O X3    Ortho Exam  Right shoulder: Active range of motion forward shoulder flexion to 170, abduction 160, internal rotation T12, external rotation 80 degrees.  Sensation is intact throughout.  Positive impingement sign.  4/5 strength to rotator cuff.  Full range of motion of the elbow and the wrist.    Left arm: Positive nerve irritation test to the ulnar nerve at the cubital tunnel.  Negative Tinel's.  Altered sensation noted to the fourth and  fifth digits.  Full sensation noted to the first second and third.  She is able to make a tight fist.  Thumb to finger opposition is intact.    Imaging Results (Most Recent)       None                      Assessment and Plan   Problem List Items Addressed This Visit    None  Visit Diagnoses       Acute pain of right shoulder    -  Primary    Relevant Orders    MRI Shoulder Right Without Contrast    Numbness and tingling in left hand        Relevant Orders    EMG & Nerve Conduction Test            Follow Up   Return for Recheck.    Patient is a non-smoker, did not discuss options for smoking cessation.     Social History     Socioeconomic History    Marital status:    Tobacco Use    Smoking status: Never     Passive exposure: Never    Smokeless tobacco: Never   Vaping Use    Vaping Use: Never used   Substance and Sexual Activity    Alcohol use: Never    Drug use: Never    Sexual activity: Not Currently     Partners: Male     Birth control/protection: Surgical       Patient Instructions   Discussed plan of care with patient.    Ordered an MRI of the right shoulder and an EMG for the left arm.    Follow-up after MRI and EMG to review results.  Call for questions, concerns or worsening symptoms.  Patient was given instructions and counseling regarding her condition or for health maintenance advice. Please see specific information pulled into the AVS if appropriate.

## 2023-08-22 ENCOUNTER — TELEPHONE (OUTPATIENT)
Dept: FAMILY MEDICINE CLINIC | Facility: CLINIC | Age: 48
End: 2023-08-22
Payer: MEDICAID

## 2023-08-22 NOTE — TELEPHONE ENCOUNTER
LVM- Does mother still want to get Mammo       ----- Message from HAILEY Doan sent at 8/21/2023  7:38 PM EDT -----    Please see if her daughter is on her med release and if she is please call her to have her call our office  ----- Message -----    From: Madelin Craft MA  Sent: 8/21/2023   4:00 PM EDT  To: HAILEY Doan    Pt has not scheduled, or returned last call. How would you like to proceed?   ----- Message -----  From: Nannette Braun APRN  Sent: 4/21/2023   9:09 AM EDT  To: Madelin Craft MA    No she broke her hip--extend out 1 month  ----- Message -----  From: Madelin Craft MA  Sent: 4/20/2023   1:51 PM EDT  To: HAILEY Doan    Three points of contact have been made, ok to canx?   ----- Message -----  From: SYSTEM  Sent: 3/4/2023   1:25 AM EDT  To: [facility]

## 2023-08-28 PROBLEM — R53.82 CHRONIC FATIGUE: Status: ACTIVE | Noted: 2023-08-28

## 2023-08-28 PROBLEM — I10 ESSENTIAL HYPERTENSION: Status: ACTIVE | Noted: 2023-08-28

## 2023-08-29 ENCOUNTER — PROCEDURE VISIT (OUTPATIENT)
Dept: NEUROLOGY | Facility: CLINIC | Age: 48
End: 2023-08-29
Payer: MEDICAID

## 2023-08-29 VITALS
DIASTOLIC BLOOD PRESSURE: 80 MMHG | WEIGHT: 212 LBS | BODY MASS INDEX: 35.32 KG/M2 | HEIGHT: 65 IN | SYSTOLIC BLOOD PRESSURE: 120 MMHG | HEART RATE: 76 BPM

## 2023-08-29 DIAGNOSIS — G56.22 ULNAR NEUROPATHY AT ELBOW OF LEFT UPPER EXTREMITY: Primary | ICD-10-CM

## 2023-08-29 DIAGNOSIS — R20.0 NUMBNESS AND TINGLING IN LEFT HAND: ICD-10-CM

## 2023-08-29 DIAGNOSIS — R20.2 NUMBNESS AND TINGLING IN LEFT HAND: ICD-10-CM

## 2023-08-29 PROBLEM — J30.9 ALLERGIC RHINITIS: Status: RESOLVED | Noted: 2021-09-13 | Resolved: 2023-08-29

## 2023-08-29 PROBLEM — M79.10 MYALGIA: Status: ACTIVE | Noted: 2023-08-29

## 2023-08-29 PROBLEM — J84.10 PULMONARY FIBROSIS: Status: ACTIVE | Noted: 2023-08-29

## 2023-08-29 PROBLEM — K21.9 ESOPHAGEAL REFLUX: Status: RESOLVED | Noted: 2021-10-05 | Resolved: 2023-08-29

## 2023-08-29 PROBLEM — F32.A DEPRESSION: Status: RESOLVED | Noted: 2021-09-13 | Resolved: 2023-08-29

## 2023-08-29 PROBLEM — E11.9 DM2 (DIABETES MELLITUS, TYPE 2): Status: ACTIVE | Noted: 2023-08-29

## 2023-08-29 PROBLEM — R73.01 IMPAIRED FASTING GLUCOSE: Status: RESOLVED | Noted: 2021-09-13 | Resolved: 2023-08-29

## 2023-08-29 PROBLEM — I49.9 IRREGULAR HEART RATE: Status: ACTIVE | Noted: 2023-08-29

## 2023-08-29 RX ORDER — LEVOTHYROXINE SODIUM 0.07 MG/1
75 TABLET ORAL DAILY
COMMUNITY

## 2023-08-29 NOTE — ASSESSMENT & PLAN NOTE
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  She is to wear elbow splints nightly and during the daytime is much as possible and to keep her elbow straight and not lean it.  If there is no improvement of her symptoms she is going to have decompression of the ulnar nerve at the elbow.  She is to follow-up with orthopedic surgery.  Thankfully neither participate in her care.

## 2023-08-29 NOTE — PROGRESS NOTES
"Chief Complaint  Numbness (Numbness & tingling in LUE. )    Subjective          Estela Deras is a 48 y.o. female who presents to [facility]  History of Present Illness  48-year-old woman evaluated for left hand numbness in the distribution of the ulnar nerve.  She states that it is painful and bothers her especially at night.  She has to keep her arm straight.  It gets numb and tingly in the left pinky and half of the ring finger the distribution of the ulnar nerve.  It goes up to the palm of her hand.  Objective   Vital Signs:   /80   Pulse 76   Ht 165.1 cm (65\")   Wt 96.2 kg (212 lb)   BMI 35.28 kg/mý     Physical Exam   There is 4-5 strength of the left first dorsal interossei and abductor digit he minimi as well as 3/5 strength in the left flexor digitorum profundus 3 and 4.  There is no weakness of the left on individual muscle testing of the deltoids, biceps, triceps, pronators, supinators and flexor digitorum profundus 1 and 2 as well as abductor pollicis brevis muscle.  There is no weakness in the right arm including the intrinsic hand muscles.  Tinel's sign is significant positive in the left elbow.        Assessment and Plan  Diagnoses and all orders for this visit:    1. Ulnar neuropathy at elbow of left upper extremity (Primary)  Assessment & Plan:  EMG and nerve conduction study is abnormal and shows electrophysiologic evidence for moderate to severe left ulnar neuropathy at the elbow.  She is to wear elbow splints nightly and during the daytime is much as possible and to keep her elbow straight and not lean it.  If there is no improvement of her symptoms she is going to have decompression of the ulnar nerve at the elbow.  She is to follow-up with orthopedic surgery.  Thankfully neither participate in her care.      2. Numbness and tingling in left hand  -     EMG & Nerve Conduction Test         Nerve Conduction Study:  5 nerves "     EMG:  Limited    Total time spent with the patient and coordinating patient care was 20 minutes.    Follow Up  No follow-ups on file.  Patient was given instructions and counseling regarding her condition or for health maintenance advice. Please see specific information pulled into the AVS if appropriate.

## 2023-08-30 ENCOUNTER — HOSPITAL ENCOUNTER (OUTPATIENT)
Dept: CARDIOLOGY | Facility: HOSPITAL | Age: 48
Discharge: HOME | End: 2023-08-30
Admitting: NURSE PRACTITIONER
Payer: MEDICAID

## 2023-08-30 ENCOUNTER — CONSULT (OUTPATIENT)
Dept: BARIATRICS/WEIGHT MGMT | Facility: CLINIC | Age: 48
End: 2023-08-30
Payer: MEDICAID

## 2023-08-30 VITALS
TEMPERATURE: 98 F | HEIGHT: 65 IN | DIASTOLIC BLOOD PRESSURE: 88 MMHG | BODY MASS INDEX: 35.16 KG/M2 | WEIGHT: 211 LBS | SYSTOLIC BLOOD PRESSURE: 136 MMHG | HEART RATE: 76 BPM

## 2023-08-30 DIAGNOSIS — J84.10 PULMONARY FIBROSIS: ICD-10-CM

## 2023-08-30 DIAGNOSIS — M25.474 BILATERAL SWELLING OF FEET AND ANKLES: ICD-10-CM

## 2023-08-30 DIAGNOSIS — E66.9 OBESITY, CLASS II, BMI 35-39.9: Primary | ICD-10-CM

## 2023-08-30 DIAGNOSIS — G47.33 OSA (OBSTRUCTIVE SLEEP APNEA): ICD-10-CM

## 2023-08-30 DIAGNOSIS — E78.2 MIXED HYPERLIPIDEMIA: ICD-10-CM

## 2023-08-30 DIAGNOSIS — M19.90 ARTHRITIS: ICD-10-CM

## 2023-08-30 DIAGNOSIS — R53.82 CHRONIC FATIGUE: ICD-10-CM

## 2023-08-30 DIAGNOSIS — F41.1 GENERALIZED ANXIETY DISORDER: ICD-10-CM

## 2023-08-30 DIAGNOSIS — E66.9 OBESITY, CLASS II, BMI 35-39.9: ICD-10-CM

## 2023-08-30 DIAGNOSIS — J45.20 MILD INTERMITTENT ASTHMA WITHOUT COMPLICATION: ICD-10-CM

## 2023-08-30 DIAGNOSIS — M25.472 BILATERAL SWELLING OF FEET AND ANKLES: ICD-10-CM

## 2023-08-30 DIAGNOSIS — I10 ESSENTIAL HYPERTENSION: ICD-10-CM

## 2023-08-30 DIAGNOSIS — F33.0 MILD EPISODE OF RECURRENT MAJOR DEPRESSIVE DISORDER: ICD-10-CM

## 2023-08-30 DIAGNOSIS — K44.9 HIATAL HERNIA: ICD-10-CM

## 2023-08-30 DIAGNOSIS — E11.9 TYPE 2 DIABETES MELLITUS WITHOUT COMPLICATION, WITHOUT LONG-TERM CURRENT USE OF INSULIN: ICD-10-CM

## 2023-08-30 DIAGNOSIS — K76.0 FATTY LIVER: ICD-10-CM

## 2023-08-30 DIAGNOSIS — M25.471 BILATERAL SWELLING OF FEET AND ANKLES: ICD-10-CM

## 2023-08-30 DIAGNOSIS — K21.9 GASTROESOPHAGEAL REFLUX DISEASE WITHOUT ESOPHAGITIS: ICD-10-CM

## 2023-08-30 DIAGNOSIS — E03.9 HYPOTHYROIDISM, UNSPECIFIED TYPE: ICD-10-CM

## 2023-08-30 DIAGNOSIS — M25.475 BILATERAL SWELLING OF FEET AND ANKLES: ICD-10-CM

## 2023-08-30 PROBLEM — E66.09 CLASS 2 OBESITY DUE TO EXCESS CALORIES WITH BODY MASS INDEX (BMI) OF 38.0 TO 38.9 IN ADULT: Status: RESOLVED | Noted: 2021-11-11 | Resolved: 2023-08-30

## 2023-08-30 PROBLEM — R10.31 RIGHT LOWER QUADRANT ABDOMINAL PAIN: Status: RESOLVED | Noted: 2022-08-29 | Resolved: 2023-08-30

## 2023-08-30 PROBLEM — E66.812 CLASS 2 OBESITY DUE TO EXCESS CALORIES WITH BODY MASS INDEX (BMI) OF 38.0 TO 38.9 IN ADULT: Status: RESOLVED | Noted: 2021-11-11 | Resolved: 2023-08-30

## 2023-08-30 PROBLEM — E66.812 OBESITY, CLASS II, BMI 35-39.9: Status: ACTIVE | Noted: 2023-08-30

## 2023-08-30 PROBLEM — D53.9 DEFICIENCY ANEMIA: Status: RESOLVED | Noted: 2021-09-13 | Resolved: 2023-08-30

## 2023-08-30 PROBLEM — R12 HEARTBURN: Status: RESOLVED | Noted: 2022-08-29 | Resolved: 2023-08-30

## 2023-08-30 LAB
QT INTERVAL: 379 MS
QTC INTERVAL: 404 MS

## 2023-08-30 PROCEDURE — 93005 ELECTROCARDIOGRAM TRACING: CPT | Performed by: NURSE PRACTITIONER

## 2023-08-30 NOTE — PROGRESS NOTES
[facility] BARIATRIC [facility] BARIATRIC SURGERY  [address] [facility] [address]  [facility] [address]  [phone]  [address] [facility] [place] [address] [id number] [phone]  Dept: [phone]  8/30/2023      Estela Deras.  [id number]  [id number]  1975  female      Chief Complaint of weight gain; unable to maintain weight loss    History of Present Illness:   Estela is a 48 y.o. female who presents today for evaluation, education and consultation regarding weight loss surgery. The patient is interested in the sleeve gastrectomy.      Diet History:Estela has been overweight for at least 25 years, has been 35 pounds or more overweight for at least 20 years, has been 100 pounds or more overweight for n/a or more years and started dieting at age 22.  The most weight Estela lost was 20 pounds on medical weight loss program and maintained the weight loss for 3-4 months. Estela describes her eating habits as snacking between meals, drinking sweet tea and regular sprite, and emotional eating. Estela Deras has tried Atkins, Physician monitored, reduced calorie, exercising, prescription medications, and medical weight loss center among others with success of losing up to 20 pounds, but in each instance regained the weight.     See dietician documentation for complete history.    Bariatric Surgery Evaluation: The patient is being seen for an initial visit for bariatric surgery evaluation and education.     Bariatric Co-morbidities:  sleep disturbances with possible sleep apnea, diabetes, hypertension, dyslipidemia, GERD, edema, and depression    Patient Active Problem List   Diagnosis    Arthritis    Asthma    Hypothyroidism    Gastroesophageal reflux disease without esophagitis    Fatty liver    Generalized anxiety disorder    Hyperlipidemia    Insomnia, unspecified    Migraine    Mild episode of recurrent major depressive disorder    Mitral valve prolapse    Night sweats    Vitamin D  deficiency    Pneumonia due to COVID-19 virus    Multifocal pneumonia    Acute respiratory failure with hypoxia    Dyspnea    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    Cough    RODERICK (obstructive sleep apnea)    Allergies    Bilateral swelling of feet and ankles    Elevated LFTs    Epigastric pain    Chronic fatigue    Essential hypertension    Irregular heart rate    Myalgia    DM2 (diabetes mellitus, type 2)    Pulmonary fibrosis    Ulnar neuropathy at elbow of left upper extremity    Obesity, Class II, BMI 35-39.9    Hiatal hernia on EGD from 11/2022       Past Medical History:   Diagnosis Date    Abnormal electrocardiogram     Allergic     Allergic rhinitis     Anemia     Arthritis     Asthma     COVID-19 09/2021    Depression     Diabetes mellitus     Dysfunctional uterine bleeding     Fatty liver     Foot pain     Gastroesophageal reflux disease without esophagitis     Generalized anxiety disorder     H/O cold sores     Headache, tension-type     Hyperlipidemia     Hypertriglyceridemia     Hypothyroidism     Impaired fasting glucose     Insomnia, unspecified     Liver function study, abnormal     Low back pain     Macromastia     Migraine headache     Mild episode of recurrent major depressive disorder     Mitral valve prolapse     Night sweats     Obesity     Shingles     Sinus trouble     Sleep apnea     Thyroid disorder     Tremor     Ulnar neuropathy at elbow of left upper extremity 8/29/2023    Vitamin D deficiency        Past Surgical History:   Procedure Laterality Date    ADENOIDECTOMY      COLONOSCOPY N/A 11/01/2022    Procedure: COLONOSCOPY WITH POLYPECTOMY, CAUTERY, ORISE INJECTION;  Surgeon: Gómez Nicole MD;  Location: [facility] ENDOSCOPY;  Service: Gastroenterology;  Laterality: N/A;  COLON POLYP, HEMORRHOIDS    ENDOSCOPY N/A 11/01/2022    Procedure: ESOPHAGOGASTRODUODENOSCOPY WITH BIOPSIES;  Surgeon: Gómez Nicole MD;  Location: [facility] ENDOSCOPY;  Service: Gastroenterology;  Laterality: N/A;   HIATAL HERNIA, EROSIVE GASTRITIS    LAPAROSCOPIC HYSTERECTOMY  06/30/2014    DR JULIANE PARHAM;[facility]    REDUCTION MAMMAPLASTY  2002    SHOULDER SURGERY Right 2017    TONSILLECTOMY         Allergies   Allergen Reactions    Adhesive Tape Rash and Other (See Comments)     BURNS SKIN      Cetirizine Itching    Chlorhexidine Rash    Codeine Other (See Comments) and Headache     MIGRANES    Hydrocodone-Acetaminophen Headache    Meperidine Other (See Comments) and Headache     MIGRANES    Propoxyphene Other (See Comments)     MIGRANES    Sesame Oil Nausea And Vomiting    Acetaminophen Palpitations         Current Outpatient Medications:     Accu-Chek Guide test strip, USE 1 ONCE DAILY, Disp: , Rfl:     albuterol sulfate  (90 Base) MCG/ACT inhaler, Inhale 2 puffs Every 4 (Four) Hours As Needed for Wheezing or Shortness of Air., Disp: 18 g, Rfl: 11    atomoxetine (STRATTERA) 40 MG capsule, Take 1 capsule by mouth Every Morning., Disp: , Rfl:     atorvastatin (Lipitor) 80 MG tablet, Take 1 tablet by mouth Daily., Disp: 90 tablet, Rfl: 1    benzonatate (TESSALON) 200 MG capsule, Take 1 capsule by mouth 3 (Three) Times a Day As Needed for Cough., Disp: 90 capsule, Rfl: 3    budesonide-formoterol (Symbicort) 160-4.5 MCG/ACT inhaler, Inhale 2 puffs 2 (Two) Times a Day., Disp: 1 each, Rfl: 11    cyclobenzaprine (FLEXERIL) 10 MG tablet, Take 1 tablet by mouth At Night As Needed for Muscle Spasms., Disp: 15 tablet, Rfl: 0    ezetimibe (ZETIA) 10 MG tablet, Take 1 tablet by mouth Every Night., Disp: 90 tablet, Rfl: 1    FLUoxetine (PROzac) 20 MG capsule, Take 1 capsule by mouth Daily. Take with 60 mg, Disp: , Rfl:     FLUoxetine HCl (PROZAC PO), Take 60 mg by mouth Daily. Take with 20 mg, Disp: , Rfl:     fluticasone (FLONASE) 50 MCG/ACT nasal spray, 2 sprays into the nostril(s) as directed by provider Daily., Disp: 16 g, Rfl: 0    levothyroxine (SYNTHROID, LEVOTHROID) 75 MCG tablet, Take 1 tablet by mouth Daily.,  Disp: , Rfl:     loratadine (CLARITIN) 10 MG tablet, Take 1 tablet by mouth Daily., Disp: , Rfl:     LORazepam (ATIVAN) 1 MG tablet, 1 by mouth 1 hour prior to MRI, may repeat X 1 dose, Disp: 2 tablet, Rfl: 0    metFORMIN ER (GLUCOPHAGE-XR) 500 MG 24 hr tablet, Take 1 tablet by mouth 2 (Two) Times a Day. HOLD TONIGHT PM AND IN AM DOSE 11/1/22, Disp: , Rfl:     metoprolol succinate XL (TOPROL-XL) 50 MG 24 hr tablet, Take 1 tablet by mouth Daily., Disp: 90 tablet, Rfl: 1    omeprazole (priLOSEC) 40 MG capsule, Take 1 capsule by mouth Daily. (Patient taking differently: Take 20 mg by mouth Daily.), Disp: 90 capsule, Rfl: 1    pramipexole (Mirapex) 0.125 MG tablet, 1- 2 tablets at bedtime, Disp: 60 tablet, Rfl: 1    traZODone (DESYREL) 100 MG tablet, Take 1 tablet by mouth Every Night., Disp: 90 tablet, Rfl: 1    Turmeric (QC TUMERIC COMPLEX PO), Take 1 tablet by mouth 2 (Two) Times a Day., Disp: , Rfl:     valACYclovir (VALTREX) 500 MG tablet, Take 1 tablet by mouth Every Morning., Disp: 90 tablet, Rfl: 1    vitamin E 400 UNIT capsule, Take 1 capsule by mouth 2 (Two) Times a Day., Disp: 60 capsule, Rfl: 5    albuterol (PROVENTIL) (2.5 MG/3ML) 0.083% nebulizer solution, Take 2.5 mg by nebulization Every 6 (Six) Hours As Needed for Wheezing for up to 30 days., Disp: 120 each, Rfl: 0    Social History     Socioeconomic History    Marital status:    Tobacco Use    Smoking status: Never     Passive exposure: Never    Smokeless tobacco: Never   Vaping Use    Vaping Use: Never used   Substance and Sexual Activity    Alcohol use: Never    Drug use: Never    Sexual activity: Yes     Partners: Male     Birth control/protection: Surgical, Hysterectomy       Family History   Problem Relation Age of Onset    Hypertension Mother     Arthritis Mother     Osteoporosis Mother     Depression Mother     Hyperlipidemia Mother     Hypertension Father     Other Father         RENAL CALCULUS    Depression Father     Hyperlipidemia  Father     Diabetes Daughter     Asthma Daughter     Depression Daughter     Arthritis Paternal Uncle     Diabetes Paternal Uncle     COPD Maternal Grandfather     Lung cancer Maternal Grandfather     Birth defects Paternal Grandmother     Bleeding Disorder Paternal Grandmother     Thyroid disease Paternal Grandmother     Hypertension Paternal Grandfather     Diabetes Paternal Grandfather     Heart disease Paternal Grandfather     Heart attack Paternal Grandfather     Lung cancer Other     Diabetes type II Other     Hyperlipidemia Other     Heart disease Other         ISCHEMIC    Cancer Other     Hypertension Other     Colon cancer Neg Hx     Malig Hyperthermia Neg Hx          Review of Systems:  Review of Systems   Constitutional:  Positive for activity change, fatigue and unexpected weight change.   Respiratory:  Negative for chest tightness and shortness of breath.    Cardiovascular:  Negative for chest pain.   Musculoskeletal:  Positive for arthralgias and myalgias.   All other systems reviewed and are negative.    Physical Exam:  Vital Signs:  Weight: 95.7 kg (211 lb)   Body mass index is 35.58 kg/mý.  Temp: 98 øF (36.7 øC)   Heart Rate: 76   BP: 136/88     Physical Exam  Vitals reviewed.   Constitutional:       General: She is not in acute distress.     Appearance: Normal appearance. She is morbidly obese.   HENT:      Head: Normocephalic and atraumatic.      Mouth/Throat:      Mouth: Mucous membranes are moist.   Eyes:      General: No scleral icterus.     Extraocular Movements: Extraocular movements intact.      Conjunctiva/sclera: Conjunctivae normal.      Pupils: Pupils are equal, round, and reactive to light.   Cardiovascular:      Rate and Rhythm: Normal rate and regular rhythm.      Heart sounds: Normal heart sounds. No murmur heard.    No gallop.   Pulmonary:      Effort: Pulmonary effort is normal. No respiratory distress.      Breath sounds: Normal breath sounds.   Abdominal:      General: Bowel  sounds are normal. There is no distension.      Palpations: Abdomen is soft. There is no mass.      Tenderness: There is no abdominal tenderness. There is no guarding.   Musculoskeletal:         General: Normal range of motion.      Cervical back: Normal range of motion and neck supple.   Skin:     General: Skin is warm and dry.   Neurological:      General: No focal deficit present.      Mental Status: She is alert and oriented to person, place, and time.   Psychiatric:         Mood and Affect: Mood normal.         Behavior: Behavior normal.         Thought Content: Thought content normal.         Judgment: Judgment normal.          Assessment:         Estela Deras is a 48 y.o. year old female with medically complicated severe obesity. Weight: 95.7 kg (211 lb), Body mass index is 35.58 kg/mý. and weight related problems including sleep disturbances with possible sleep apnea, diabetes, hypertension, dyslipidemia, back pain, knee pain, GERD, and depression.    I explained in detail, potential surgical options of interest to the patient including the RNY gastric bypass, sleeve gastrectomy, and gastric band while considering the patient's medical history. At this time, the patient expressed interest in the Laparoscopic Sleeve  All of those procedures can be performed laparoscopically but there is a chance to convert to open if any technical challenges or complications do occur.  Bariatric surgery is not cosmetic surgery but rather a tool to help a patient make a life-long commitment lifestyle changes including diet, exercise, behavior changes, and taking supplemental vitamins and minerals.    Due to the patient's BMI, history, and co-morbidities related to potential surgical complications were evaluated. Due to Estela Deras's risk factors female will obtain the following prior to being scheduled for surgical intervention:    A letter of medical support as well as a history and physical must be obtained from her primary  care provider. The patient was given a copy of a sample form, that will suffice as their letter to take to their primary are provider.    A referral for pre-operative psychological evaluation was ordered for the patient to evaluate candidacy as well as provide mental health support, should it be warranted before or after surgery.    Radiologic studies CXR 5/11/2023, Consultant notes from gastroenterology and nephrology, GI studies EGD on 11/1/2022, and notes from primary care provider were reviewed  and  EKG and psychology clearancewere ordered at this time. These will be drawn and patient will be notified with results. Patient will complete new, pre-operative radiology prior to being scheduled for surgery. Estela Deras will obtain clearance from her pulmonologist prior to surgery.     Estela Deras was screened for sleep apnea in our office today and based on their results she is high risk.  She was diagnosed with RODERICK in the past but has not been compliant with wearing CPAP.  Home sleep study will be ordered. The risks, as they relate to chronic hypercapnia r/t untreated RODERICK were discussed with the patient and She verbalized understanding.     A pre-operative diagnostic esophagogastroduodenoscopy with biopsy for evaluation was reviewed from 11/1/2022.  She was found to have a small hiatal hernia and gastritis but biopsies were negative.    As this patient is a female who is post-menopausal or has undergone a surgical operation which precluded her from becoming pregnant she was not counseled related to conception and pregnancy.     The risks, benefits, alternatives, and potential complications of all of the sleeve gastrectomy explained in detail including, but not limited to death, anesthesia and medication adverse effect/DVT, pulmonary embolism, trocar site/incisional hernia, wound infection, abdominal infection, bleeding, failure to lose weight or gain weight and change in body image, metabolic complications with  calcium, thiamine, vitamin B12, folate, iron, and anemia.    The patient was advised to start a high protein, low fat and low carbohydrate diet  start routine exercise including but not limited to 150 minutes per week. The patient received a resistance band along with a handout of exercises. The patient was given individualized information by our dietician along with handouts.     The patient was given information regarding the CLARISSA educational video. CLARISSA is an internet based educational video which explains the sourgical procedure and answers basic questions regarding the procedure. The patient was provided with instructions and a password to watch the video.    The patient understands the surgical procedures and the different surgical options that are available.  She understands the lifestyle changes that would be required after surgery and has agreed to participate in a pre-operative and postoperative weight management program.  She also expressed understanding of possible risks, had several questions answered and desires to proceed.    I think she is a good candidate for this surgery, and is interested in a sleeve gastrectomy.    Encounter Diagnoses   Name Primary?    Obesity, Class II, BMI 35-39.9 Yes    Hiatal hernia on EGD from 11/2022     Gastroesophageal reflux disease without esophagitis     Fatty liver     Essential hypertension     Mixed hyperlipidemia     Mild intermittent asthma without complication     Pulmonary fibrosis     RODERICK (obstructive sleep apnea)     Hypothyroidism, unspecified type     Type 2 diabetes mellitus without complication, without long-term current use of insulin     Mild episode of recurrent major depressive disorder     Generalized anxiety disorder     Bilateral swelling of feet and ankles     Arthritis     Chronic fatigue        Plan:    Patient will be evaluated by a bariatric dietician psychologist before undergoing a multidisciplinary review of her candidacy. We discussed  weight loss requirements prior to surgery and rationale, as well as other program requirements to ensure the safest approach to surgery. We spent time discussing different surgical procedures and plan of care throughout their lifespan to ensure long term success in achieving and maintaining a healthier weight. Patient will proceed with preoperative lab work, radiology, and endoscopy after obtaining agreed upon specialty, clearances, sleep study, and letter of support from her primary care provider.    Total time spent during this encounter today was 60 minutes and includes preparing for the visit, reviewing tests, performing a medically appropriate examination and/or evaluations, counseling and educating the patient/family/caregiver, ordering medications, tests, or procedures, documenting information in the medical record, and independently interpreting results and communicating that information with the patient/family/caregiver.    Tran Solano, APRN  8/30/2023

## 2023-09-01 ENCOUNTER — TELEPHONE (OUTPATIENT)
Dept: PULMONOLOGY | Facility: CLINIC | Age: 48
End: 2023-09-01

## 2023-09-01 NOTE — TELEPHONE ENCOUNTER
Pt needing pulmonary clearance to undergo a gastric sleeve. Please advise.  Pt does know you are out until 9/

## 2023-09-05 ENCOUNTER — TELEPHONE (OUTPATIENT)
Dept: BARIATRICS/WEIGHT MGMT | Facility: CLINIC | Age: 48
End: 2023-09-05

## 2023-09-05 NOTE — TELEPHONE ENCOUNTER
----- Message from HAILEY Pastor sent at 9/5/2023  2:43 PM EDT -----  Need updated TSH as last one was abnormal. Patient was going to fax newest TSH to office.  Will need either updated vitamin d level or documentation that was treated as was low.   Others ok for surgery    ac  ----- Message -----  From: Hoa Styles RegSched Rep  Sent: 9/5/2023  10:38 AM EDT  To: HAILEY Pastor    LABS OK FOR SX

## 2023-09-05 NOTE — TELEPHONE ENCOUNTER
Returned patient call regarding remaining requirmets  have not receved labs or bsf form [facility] yet

## 2023-09-07 ENCOUNTER — OFFICE VISIT (OUTPATIENT)
Dept: FAMILY MEDICINE CLINIC | Facility: CLINIC | Age: 48
End: 2023-09-07
Payer: MEDICAID

## 2023-09-07 VITALS
OXYGEN SATURATION: 96 % | BODY MASS INDEX: 35.24 KG/M2 | SYSTOLIC BLOOD PRESSURE: 140 MMHG | HEART RATE: 83 BPM | RESPIRATION RATE: 16 BRPM | DIASTOLIC BLOOD PRESSURE: 90 MMHG | HEIGHT: 65 IN | TEMPERATURE: 97 F | WEIGHT: 211.5 LBS

## 2023-09-07 DIAGNOSIS — E55.9 VITAMIN D DEFICIENCY: ICD-10-CM

## 2023-09-07 DIAGNOSIS — E78.2 MIXED HYPERLIPIDEMIA: ICD-10-CM

## 2023-09-07 DIAGNOSIS — J45.21 MILD INTERMITTENT ASTHMATIC BRONCHITIS WITH ACUTE EXACERBATION: ICD-10-CM

## 2023-09-07 DIAGNOSIS — G25.81 RESTLESS LEGS: ICD-10-CM

## 2023-09-07 DIAGNOSIS — B00.9 HSV (HERPES SIMPLEX VIRUS) INFECTION: ICD-10-CM

## 2023-09-07 DIAGNOSIS — I34.1 MITRAL VALVE PROLAPSE: ICD-10-CM

## 2023-09-07 DIAGNOSIS — K76.0 FATTY LIVER: ICD-10-CM

## 2023-09-07 DIAGNOSIS — E11.9 TYPE 2 DIABETES MELLITUS WITHOUT COMPLICATION, WITHOUT LONG-TERM CURRENT USE OF INSULIN: ICD-10-CM

## 2023-09-07 DIAGNOSIS — E66.01 CLASS 2 SEVERE OBESITY WITH SERIOUS COMORBIDITY AND BODY MASS INDEX (BMI) OF 35.0 TO 35.9 IN ADULT, UNSPECIFIED OBESITY TYPE: Primary | ICD-10-CM

## 2023-09-07 DIAGNOSIS — K21.9 GASTROESOPHAGEAL REFLUX DISEASE WITHOUT ESOPHAGITIS: ICD-10-CM

## 2023-09-07 DIAGNOSIS — K44.9 HIATAL HERNIA: ICD-10-CM

## 2023-09-07 DIAGNOSIS — F51.01 PRIMARY INSOMNIA: ICD-10-CM

## 2023-09-07 DIAGNOSIS — F33.0 MILD EPISODE OF RECURRENT MAJOR DEPRESSIVE DISORDER: ICD-10-CM

## 2023-09-07 DIAGNOSIS — E03.9 ACQUIRED HYPOTHYROIDISM: ICD-10-CM

## 2023-09-07 DIAGNOSIS — F41.1 GENERALIZED ANXIETY DISORDER: ICD-10-CM

## 2023-09-07 DIAGNOSIS — M25.511 ACUTE PAIN OF RIGHT SHOULDER: ICD-10-CM

## 2023-09-07 PROBLEM — E66.812 CLASS 2 SEVERE OBESITY WITH SERIOUS COMORBIDITY AND BODY MASS INDEX (BMI) OF 35.0 TO 35.9 IN ADULT: Status: ACTIVE | Noted: 2023-09-07

## 2023-09-07 LAB
25(OH)D3 SERPL-MCNC: 26.8 NG/ML (ref 30–100)
ALBUMIN SERPL-MCNC: 4.3 G/DL (ref 3.5–5.2)
ALBUMIN UR-MCNC: <1.2 MG/DL
ALBUMIN/GLOB SERPL: 1.6 G/DL
ALP SERPL-CCNC: 85 U/L (ref 39–117)
ALT SERPL W P-5'-P-CCNC: 37 U/L (ref 1–33)
ANION GAP SERPL CALCULATED.3IONS-SCNC: 13.1 MMOL/L (ref 5–15)
AST SERPL-CCNC: 29 U/L (ref 1–32)
BASOPHILS # BLD AUTO: 0.03 10*3/MM3 (ref 0–0.2)
BASOPHILS NFR BLD AUTO: 0.4 % (ref 0–1.5)
BILIRUB SERPL-MCNC: 0.5 MG/DL (ref 0–1.2)
BUN SERPL-MCNC: 7 MG/DL (ref 6–20)
BUN/CREAT SERPL: 11.7 (ref 7–25)
CALCIUM SPEC-SCNC: 9.6 MG/DL (ref 8.6–10.5)
CHLORIDE SERPL-SCNC: 98 MMOL/L (ref 98–107)
CHOLEST SERPL-MCNC: 146 MG/DL (ref 0–200)
CO2 SERPL-SCNC: 24.9 MMOL/L (ref 22–29)
CREAT SERPL-MCNC: 0.6 MG/DL (ref 0.57–1)
DEPRECATED RDW RBC AUTO: 44.2 FL (ref 37–54)
EGFRCR SERPLBLD CKD-EPI 2021: 112.3 ML/MIN/1.73
EOSINOPHIL # BLD AUTO: 0.02 10*3/MM3 (ref 0–0.4)
EOSINOPHIL NFR BLD AUTO: 0.3 % (ref 0.3–6.2)
ERYTHROCYTE [DISTWIDTH] IN BLOOD BY AUTOMATED COUNT: 15.2 % (ref 12.3–15.4)
GLOBULIN UR ELPH-MCNC: 2.7 GM/DL
GLUCOSE SERPL-MCNC: 111 MG/DL (ref 65–99)
HBA1C MFR BLD: 7 % (ref 4.8–5.6)
HCT VFR BLD AUTO: 38.2 % (ref 34–46.6)
HDLC SERPL-MCNC: 36 MG/DL (ref 40–60)
HGB BLD-MCNC: 12.6 G/DL (ref 12–15.9)
IMM GRANULOCYTES # BLD AUTO: 0.02 10*3/MM3 (ref 0–0.05)
IMM GRANULOCYTES NFR BLD AUTO: 0.3 % (ref 0–0.5)
LDLC SERPL CALC-MCNC: 80 MG/DL (ref 0–100)
LDLC/HDLC SERPL: 2.08 {RATIO}
LYMPHOCYTES # BLD AUTO: 2.36 10*3/MM3 (ref 0.7–3.1)
LYMPHOCYTES NFR BLD AUTO: 31.3 % (ref 19.6–45.3)
MCH RBC QN AUTO: 26.7 PG (ref 26.6–33)
MCHC RBC AUTO-ENTMCNC: 33 G/DL (ref 31.5–35.7)
MCV RBC AUTO: 80.9 FL (ref 79–97)
MONOCYTES # BLD AUTO: 0.36 10*3/MM3 (ref 0.1–0.9)
MONOCYTES NFR BLD AUTO: 4.8 % (ref 5–12)
NEUTROPHILS NFR BLD AUTO: 4.74 10*3/MM3 (ref 1.7–7)
NEUTROPHILS NFR BLD AUTO: 62.9 % (ref 42.7–76)
NRBC BLD AUTO-RTO: 0 /100 WBC (ref 0–0.2)
PLATELET # BLD AUTO: 255 10*3/MM3 (ref 140–450)
PMV BLD AUTO: 12.2 FL (ref 6–12)
POTASSIUM SERPL-SCNC: 3.7 MMOL/L (ref 3.5–5.2)
PROT SERPL-MCNC: 7 G/DL (ref 6–8.5)
RBC # BLD AUTO: 4.72 10*6/MM3 (ref 3.77–5.28)
SODIUM SERPL-SCNC: 136 MMOL/L (ref 136–145)
TRIGL SERPL-MCNC: 176 MG/DL (ref 0–150)
TSH SERPL DL<=0.05 MIU/L-ACNC: 0.77 UIU/ML (ref 0.27–4.2)
VLDLC SERPL-MCNC: 30 MG/DL (ref 5–40)
WBC NRBC COR # BLD: 7.53 10*3/MM3 (ref 3.4–10.8)

## 2023-09-07 PROCEDURE — 85025 COMPLETE CBC W/AUTO DIFF WBC: CPT | Performed by: NURSE PRACTITIONER

## 2023-09-07 PROCEDURE — 80053 COMPREHEN METABOLIC PANEL: CPT | Performed by: NURSE PRACTITIONER

## 2023-09-07 PROCEDURE — 82043 UR ALBUMIN QUANTITATIVE: CPT | Performed by: NURSE PRACTITIONER

## 2023-09-07 PROCEDURE — 82306 VITAMIN D 25 HYDROXY: CPT | Performed by: NURSE PRACTITIONER

## 2023-09-07 PROCEDURE — 80061 LIPID PANEL: CPT | Performed by: NURSE PRACTITIONER

## 2023-09-07 PROCEDURE — 84443 ASSAY THYROID STIM HORMONE: CPT | Performed by: NURSE PRACTITIONER

## 2023-09-07 PROCEDURE — 83036 HEMOGLOBIN GLYCOSYLATED A1C: CPT | Performed by: NURSE PRACTITIONER

## 2023-09-07 RX ORDER — CYCLOBENZAPRINE HCL 10 MG
10 TABLET ORAL NIGHTLY PRN
Qty: 15 TABLET | Refills: 0 | Status: SHIPPED | OUTPATIENT
Start: 2023-09-07

## 2023-09-07 RX ORDER — OMEPRAZOLE 20 MG/1
20 CAPSULE, DELAYED RELEASE ORAL DAILY
Qty: 90 CAPSULE | Refills: 1 | Status: SHIPPED | OUTPATIENT
Start: 2023-09-07

## 2023-09-07 RX ORDER — PRAMIPEXOLE DIHYDROCHLORIDE 0.12 MG/1
TABLET ORAL
Qty: 60 TABLET | Refills: 1 | Status: SHIPPED | OUTPATIENT
Start: 2023-09-07

## 2023-09-07 RX ORDER — TRAZODONE HYDROCHLORIDE 100 MG/1
100 TABLET ORAL NIGHTLY
Qty: 90 TABLET | Refills: 1 | Status: SHIPPED | OUTPATIENT
Start: 2023-09-07

## 2023-09-07 RX ORDER — VALACYCLOVIR HYDROCHLORIDE 500 MG/1
500 TABLET, FILM COATED ORAL EVERY MORNING
Qty: 90 TABLET | Refills: 1 | Status: SHIPPED | OUTPATIENT
Start: 2023-09-07

## 2023-09-07 RX ORDER — EZETIMIBE 10 MG/1
10 TABLET ORAL NIGHTLY
Qty: 90 TABLET | Refills: 1 | Status: SHIPPED | OUTPATIENT
Start: 2023-09-07

## 2023-09-07 RX ORDER — METOPROLOL SUCCINATE 50 MG/1
50 TABLET, EXTENDED RELEASE ORAL DAILY
Qty: 90 TABLET | Refills: 1 | Status: SHIPPED | OUTPATIENT
Start: 2023-09-07

## 2023-09-07 RX ORDER — OMEPRAZOLE 40 MG/1
20 CAPSULE, DELAYED RELEASE ORAL DAILY
Qty: 90 CAPSULE | Refills: 1 | Status: CANCELLED | OUTPATIENT
Start: 2023-09-07

## 2023-09-07 RX ORDER — BLOOD SUGAR DIAGNOSTIC
1 STRIP MISCELLANEOUS AS NEEDED
Qty: 100 EACH | Refills: 12 | Status: SHIPPED | OUTPATIENT
Start: 2023-09-07

## 2023-09-07 RX ORDER — ATORVASTATIN CALCIUM 80 MG/1
80 TABLET, FILM COATED ORAL DAILY
Qty: 90 TABLET | Refills: 1 | Status: SHIPPED | OUTPATIENT
Start: 2023-09-07

## 2023-09-07 NOTE — PROGRESS NOTES
Answers submitted by the patient for this visit:  Other (Submitted on 9/6/2023)  Please describe your symptoms.: Need medical clearance for surgery and 6 month f/u and labs  Have you had these symptoms before?: Yes  How long have you been having these symptoms?: Greater than 2 weeks  Primary Reason for Visit (Submitted on 9/6/2023)  What is the primary reason for your visit?: Other  Chief Complaint  Obesity, Hypertension, Hyperlipidemia, and Hypothyroidism    Subjective          Crystal Page presents to [facility]  History of Present Illness  MVP:She takes her toprol daily. NO issues noted and cont to see cardio  HSV: She takes her Valtrex with no issues and no breakouts.  LIPID: She is taking her cholesterol medicine every day--it is high dose lipitor and zetia.  Needs labs soon.  She is currently seeing GI for her fatty liver.  GERD: She is doing well with her Prilosec.  Insomnia: Trazodone does a good job for her sleep overall.   She is going to do the gastric sleeve.    Depression: Not at risk    PHQ-2 Score: 0    and 5/31/2023    She does not have to follow a diet per her insurance.  She doesn't have to do a scope--she did have one done in Nov.  She doesn't have to go to cardio and pulm gave her pulm clearance.  She did a [facility] did a visit via video and she was cleared psych.      She did have the [facility] plan for insurance.         Allergies  Adhesive tape, Cetirizine, Chlorhexidine, Codeine, Hydrocodone-acetaminophen, Meperidine, Propoxyphene, Sesame oil, and Acetaminophen    Social History     Tobacco Use    Smoking status: Never     Passive exposure: Never    Smokeless tobacco: Never   Vaping Use    Vaping Use: Never used   Substance Use Topics    Alcohol use: Never    Drug use: Never       Family History   Problem Relation Age of Onset    Hypertension Mother     Arthritis Mother     Osteoporosis Mother     Depression Mother      "Hyperlipidemia Mother     Hypertension Father     Other Father         RENAL CALCULUS    Depression Father     Hyperlipidemia Father     Diabetes Daughter     Asthma Daughter     Depression Daughter     Arthritis Paternal Uncle     Diabetes Paternal Uncle     COPD Maternal Grandfather     Lung cancer Maternal Grandfather     Birth defects Paternal Grandmother     Bleeding Disorder Paternal Grandmother     Thyroid disease Paternal Grandmother     Hypertension Paternal Grandfather     Diabetes Paternal Grandfather     Heart disease Paternal Grandfather     Heart attack Paternal Grandfather     Lung cancer Other     Diabetes type II Other     Hyperlipidemia Other     Heart disease Other         ISCHEMIC    Cancer Other     Hypertension Other     Colon cancer Neg Hx     Malig Hyperthermia Neg Hx         Health Maintenance Due   Topic Date Due    ANNUAL PHYSICAL  Never done        Immunization History   Administered Date(s) Administered    Fluzone >6mos 01/06/2014, 10/13/2021    Hepatitis A 05/15/2018, 11/05/2018    Influenza Seasonal Injectable 01/06/2014    Influenza, Unspecified 02/22/2019, 09/15/2022    MMR 02/22/2019    Pneumococcal Polysaccharide (PPSV23) 02/11/2022    TD Preservative Free (Tenivac) 11/05/2018    Tdap 11/06/2018       Review of Systems   Constitutional:  Negative for fatigue.   Respiratory:  Negative for cough and shortness of breath.    Cardiovascular:  Negative for chest pain.   Gastrointestinal:  Negative for diarrhea, nausea and vomiting.   Musculoskeletal:  Positive for arthralgias and myalgias.   Neurological:  Positive for headache.      Objective       Vitals:    09/07/23 1525 09/07/23 1534   BP: (!) 132/105 140/90   Pulse: 83    Resp: 16    Temp: 97 °F (36.1 °C)    SpO2: 96%    Weight: 95.9 kg (211 lb 8 oz)    Height: 163.8 cm (64.5\")        Body mass index is 35.74 kg/m².         Physical Exam  Vitals reviewed.   Constitutional:       Appearance: Normal appearance. She is well-developed. "   Cardiovascular:      Rate and Rhythm: Normal rate and regular rhythm.      Heart sounds: Normal heart sounds. No murmur heard.  Pulmonary:      Effort: Pulmonary effort is normal.      Breath sounds: Normal breath sounds.   Neurological:      Mental Status: She is alert and oriented to person, place, and time.      Cranial Nerves: No cranial nerve deficit.      Motor: No weakness.   Psychiatric:         Mood and Affect: Mood and affect normal.           Result Review :     The following data was reviewed by: HAILEY Doan on 09/07/2023:    Common Labs   Common labs          3/30/2023    12:27 9/7/2023    16:10   Common Labs   Glucose 106  111    BUN 15  7    Creatinine 0.75  0.60    Sodium 139  136    Potassium 4.1  3.7    Chloride 103  98    Calcium 9.2  9.6    Albumin 4.3  4.3    Total Bilirubin 0.4  0.5    Alkaline Phosphatase 92  85    AST (SGOT) 18  29    ALT (SGPT) 23  37    WBC 10.89  7.53    Hemoglobin 12.7  12.6    Hematocrit 40.0  38.2    Platelets 223  255    Total Cholesterol 128  146    Triglycerides 152  176    HDL Cholesterol 45  36    LDL Cholesterol  57  80    Hemoglobin A1C 6.70  7.00    Microalbumin, Urine  <1.2      A1C   Most Recent A1C          9/7/2023    16:10   HGBA1C Most Recent   Hemoglobin A1C 7.00                     Assessment and Plan      Diagnoses and all orders for this visit:    1. Class 2 severe obesity with serious comorbidity and body mass index (BMI) of 35.0 to 35.9 in adult, unspecified obesity type (Primary)    2. Mild intermittent asthmatic bronchitis with acute exacerbation    3. Mixed hyperlipidemia  -     atorvastatin (Lipitor) 80 MG tablet; Take 1 tablet by mouth Daily.  Dispense: 90 tablet; Refill: 1  -     ezetimibe (ZETIA) 10 MG tablet; Take 1 tablet by mouth Every Night.  Dispense: 90 tablet; Refill: 1  -     CBC & Differential  -     Comprehensive Metabolic Panel  -     Lipid Panel    4. Acute pain of right shoulder  -     cyclobenzaprine (FLEXERIL)  10 MG tablet; Take 1 tablet by mouth At Night As Needed for Muscle Spasms.  Dispense: 15 tablet; Refill: 0    5. Mitral valve prolapse  -     metoprolol succinate XL (TOPROL-XL) 50 MG 24 hr tablet; Take 1 tablet by mouth Daily.  Dispense: 90 tablet; Refill: 1  -     CBC & Differential  -     Comprehensive Metabolic Panel  -     Lipid Panel    6. Gastroesophageal reflux disease without esophagitis  -     omeprazole (priLOSEC) 20 MG capsule; Take 1 capsule by mouth Daily.  Dispense: 90 capsule; Refill: 1    7. Restless legs  -     pramipexole (Mirapex) 0.125 MG tablet; 1- 2 tablets at bedtime  Dispense: 60 tablet; Refill: 1    8. Primary insomnia  -     traZODone (DESYREL) 100 MG tablet; Take 1 tablet by mouth Every Night.  Dispense: 90 tablet; Refill: 1    9. HSV (herpes simplex virus) infection  -     valACYclovir (VALTREX) 500 MG tablet; Take 1 tablet by mouth Every Morning.  Dispense: 90 tablet; Refill: 1    10. Hiatal hernia on EGD from 11/2022  -     omeprazole (priLOSEC) 20 MG capsule; Take 1 capsule by mouth Daily.  Dispense: 90 capsule; Refill: 1    11. Fatty liver    12. Generalized anxiety disorder    13. Mild episode of recurrent major depressive disorder    14. Vitamin D deficiency  -     Vitamin D,25-Hydroxy    15. Type 2 diabetes mellitus without complication, without long-term current use of insulin  -     Accu-Chek Guide test strip; 1 each by Other route As Needed (glucose). Use as instructed  Dispense: 100 each; Refill: 12  -     Hemoglobin A1c  -     MicroAlbumin, Urine, Random - Urine, Clean Catch    16. Acquired hypothyroidism  -     TSH            Follow Up     Return in about 6 months (around 3/7/2024).  Follow-up in 6 months for labs and appt. Call with any concerns or questions that you may have regarding your medications or history.  .  I have reviewed all medications and at this time no medications changes need to be adjusted for all chronic conditions.  She will still see her spec.  Call  with any concerns or questions.    I did fill out the form for wt loss surg.    Patient was given instructions and counseling regarding her condition or for health maintenance advice. Please see specific information pulled into the AVS if appropriate.     Parts of this note are electronic transcriptions/translations of spoken language to printed text using the Dragon Dictation system.          Nannette Braun, APRN  09/07/2023

## 2023-09-15 ENCOUNTER — CLINICAL SUPPORT (OUTPATIENT)
Dept: BARIATRICS/WEIGHT MGMT | Facility: CLINIC | Age: 48
End: 2023-09-15

## 2023-09-18 ENCOUNTER — OFFICE VISIT (OUTPATIENT)
Dept: ORTHOPEDIC SURGERY | Facility: CLINIC | Age: 48
End: 2023-09-18
Payer: MEDICAID

## 2023-09-18 ENCOUNTER — PREP FOR SURGERY (OUTPATIENT)
Dept: OTHER | Facility: HOSPITAL | Age: 48
End: 2023-09-18

## 2023-09-18 VITALS
OXYGEN SATURATION: 97 % | HEART RATE: 88 BPM | DIASTOLIC BLOOD PRESSURE: 67 MMHG | SYSTOLIC BLOOD PRESSURE: 117 MMHG | BODY MASS INDEX: 35.82 KG/M2 | HEIGHT: 65 IN | WEIGHT: 215 LBS

## 2023-09-18 DIAGNOSIS — G56.22 CUBITAL TUNNEL SYNDROME ON LEFT: Primary | ICD-10-CM

## 2023-09-18 DIAGNOSIS — G56.22 ULNAR NEUROPATHY AT ELBOW OF LEFT UPPER EXTREMITY: Primary | ICD-10-CM

## 2023-09-18 PROBLEM — G56.20 CUBITAL TUNNEL SYNDROME: Status: ACTIVE | Noted: 2023-09-18

## 2023-09-18 RX ORDER — CEFAZOLIN SODIUM 2 G/100ML
2 INJECTION, SOLUTION INTRAVENOUS ONCE
OUTPATIENT
Start: 2023-09-18 | End: 2023-09-18

## 2023-09-18 RX ORDER — CEFAZOLIN SODIUM IN 0.9 % NACL 3 G/100 ML
3 INTRAVENOUS SOLUTION, PIGGYBACK (ML) INTRAVENOUS ONCE
OUTPATIENT
Start: 2023-09-18 | End: 2023-09-18

## 2023-09-18 NOTE — PROGRESS NOTES
"Chief Complaint  Follow-up of the Left Hand     Pawel Deras presents to [facility] for follow up of the left hand.  She had a EMG.  She is on the phone and typing a lot.  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  She is getting muscle wasting in the web spaces.  She has only had numbness and tingling for a couple of months.,       Allergies   Allergen Reactions    Adhesive Tape Rash and Other (See Comments)     BURNS SKIN      Cetirizine Itching    Chlorhexidine Rash    Codeine Other (See Comments) and Headache     MIGRANES    Hydrocodone-Acetaminophen Headache    Meperidine Other (See Comments) and Headache     MIGRANES    Propoxyphene Other (See Comments)     MIGRANES    Sesame Oil Nausea And Vomiting    Acetaminophen Palpitations        Social History     Socioeconomic History    Marital status:    Tobacco Use    Smoking status: Never     Passive exposure: Never    Smokeless tobacco: Never   Vaping Use    Vaping Use: Never used   Substance and Sexual Activity    Alcohol use: Never    Drug use: Never    Sexual activity: Yes     Partners: Male     Birth control/protection: Surgical, Hysterectomy        I reviewed the patient's chief complaint, history of present illness, review of systems, past medical history, surgical history, family history, social history, medications, and allergy list.     Review of Systems     Constitutional: Denies fevers, chills, weight loss  Cardiovascular: Denies chest pain, shortness of breath  Skin: Denies rashes, acute skin changes  Neurologic: Denies headache, loss of consciousness        Vital Signs:   /67   Pulse 88   Ht 163.8 cm (64.5\")   Wt 97.5 kg (215 lb)   SpO2 97%   BMI 36.33 kg/m²          Physical Exam  General: Alert. No acute distress    Ortho Exam        LEFT HAND Negative Compression testing/ Positive Tinels. NegativeFinkelsteins. Negative Viveros's testing. Negative [facility] grind testing. Positive Phalens. Full ROM " of the hand, fingers, elbow and wrist. Negative Triggering of the digit. Sensation grossly intact to light touch, median, radial and ulnar nerve. Positive AIN, PIN and ulnar nerve motor function intact. Axillary nerve intact. Positive pulses.        Procedures        Imaging Results (Most Recent)       None             Result Review :     8/28/23  EMG and nerve conduction study is abnormal and shows electrophysiologic evidence for moderate to severe left ulnar neuropathy at the elbow.           Assessment and Plan     Diagnoses and all orders for this visit:    1. Cubital tunnel syndrome on left (Primary)        Discussed the treatment plan with the patient. I reviewed the EMG with the patient.      Discussed the treatment options with the patient, operative vs non-operative.     The patient expressed understanding and wished to proceed left ulnar nerve transposition         Discussed surgery., Risks/benefits discussed with patient including, but not limited to: infection, bleeding, neurovascular damage, malunion, nonunion, aesthetic deformity, need for further surgery, and death., Surgery pamphlet given., and Call or return if worsening symptoms.    Follow Up     2 weeks postoperatively       Patient was given instructions and counseling regarding her condition or for health maintenance advice. Please see specific information pulled into the AVS if appropriate.     Scribed for Kit Cuevas MD by Alfreda Salas MA.  09/18/23   15:53 EDT      I have personally performed the services described in this document as scribed by the above individual and it is both accurate and complete. Kit Cuevas MD 09/20/23

## 2023-09-19 ENCOUNTER — TELEPHONE (OUTPATIENT)
Dept: CARDIOLOGY | Facility: CLINIC | Age: 48
End: 2023-09-19

## 2023-09-19 ENCOUNTER — TELEPHONE (OUTPATIENT)
Dept: ORTHOPEDIC SURGERY | Facility: CLINIC | Age: 48
End: 2023-09-19

## 2023-09-19 NOTE — TELEPHONE ENCOUNTER
Caller: Estela Deras    Relationship: Self    Best call back number: [phone]    What is the best time to reach you: ANYTIME    Who are you requesting to speak with (clinical staff, provider,  specific staff member): ELOY    Do you know the name of the person who called: ELOY    What was the call regarding: PATIENT WANTED TO LET ELOY KNOW THAT SHE HAS AN APPT FOR THIS FRIDAY FOR CARDIAC CLEARANCE.    Is it okay if the provider responds through MyChart: NO CALL BACK NEEDED UNLESS YOU HAVE ADDITIONAL QUESTIONS    UNABLE TO WARM TRANSFER

## 2023-09-19 NOTE — TELEPHONE ENCOUNTER
Patient will need f/u with cardiology prior to clearance being granted. Left VM with FELIBERTO Theodore notifying Dr Cuevas's office.

## 2023-09-22 ENCOUNTER — OFFICE VISIT (OUTPATIENT)
Dept: CARDIOLOGY | Facility: CLINIC | Age: 48
End: 2023-09-22
Payer: MEDICAID

## 2023-09-22 VITALS
WEIGHT: 216.2 LBS | HEIGHT: 64 IN | BODY MASS INDEX: 36.91 KG/M2 | DIASTOLIC BLOOD PRESSURE: 88 MMHG | SYSTOLIC BLOOD PRESSURE: 137 MMHG | HEART RATE: 70 BPM

## 2023-09-22 DIAGNOSIS — J01.10 ACUTE NON-RECURRENT FRONTAL SINUSITIS: ICD-10-CM

## 2023-09-22 DIAGNOSIS — G47.33 OSA (OBSTRUCTIVE SLEEP APNEA): ICD-10-CM

## 2023-09-22 DIAGNOSIS — I10 ESSENTIAL HYPERTENSION: ICD-10-CM

## 2023-09-22 DIAGNOSIS — Z01.810 PRE-OPERATIVE CARDIOVASCULAR EXAMINATION: Primary | ICD-10-CM

## 2023-09-22 DIAGNOSIS — E78.2 MIXED HYPERLIPIDEMIA: ICD-10-CM

## 2023-09-22 DIAGNOSIS — R00.2 PALPITATIONS: ICD-10-CM

## 2023-09-22 PROCEDURE — 3079F DIAST BP 80-89 MM HG: CPT | Performed by: INTERNAL MEDICINE

## 2023-09-22 PROCEDURE — 3075F SYST BP GE 130 - 139MM HG: CPT | Performed by: INTERNAL MEDICINE

## 2023-09-22 PROCEDURE — 99214 OFFICE O/P EST MOD 30 MIN: CPT | Performed by: INTERNAL MEDICINE

## 2023-09-22 RX ORDER — AZITHROMYCIN 250 MG/1
TABLET, FILM COATED ORAL
Qty: 6 TABLET | Refills: 0 | Status: SHIPPED | OUTPATIENT
Start: 2023-09-22

## 2023-09-22 NOTE — PROGRESS NOTES
Chief Complaint  Palpitations and Shortness of Breath    Subjective      Patient returns clinic for preoperative cardiovascular examination.  She is planned to undergo left ulnar nerve surgery.  She has no cardiac complaints.  She has no chest discomfort or dyspnea.  She has intermittent palpitations similar to her last visit, they are more pronounced at night.  She has no sustained palpitations.  She has no dizziness, presyncope or syncope.  She has no symptoms with routine daily activities.  She has sinus infection which started about a week ago.    Past Medical History:   Diagnosis Date    Abnormal electrocardiogram     Allergic     Allergic rhinitis     Anemia     Arthritis     Asthma     COVID-19 09/2021    Depression     Diabetes mellitus     Dysfunctional uterine bleeding     Fatty liver     Foot pain     Gastroesophageal reflux disease without esophagitis     Generalized anxiety disorder     H/O cold sores     Headache, tension-type     Hyperlipidemia     Hypertriglyceridemia     Hypothyroidism     Impaired fasting glucose     Insomnia, unspecified     Liver function study, abnormal     Low back pain     Macromastia     Migraine headache     Mild episode of recurrent major depressive disorder     Mitral valve prolapse     Night sweats     Obesity     Shingles     Sinus trouble     Sleep apnea     Thyroid disorder     Tremor     Ulnar neuropathy at elbow of left upper extremity 8/29/2023    Vitamin D deficiency          Current Outpatient Medications:     Accu-Chek Guide test strip, 1 each by Other route As Needed (glucose). Use as instructed, Disp: 100 each, Rfl: 12    albuterol (PROVENTIL) (2.5 MG/3ML) 0.083% nebulizer solution, Take 2.5 mg by nebulization Every 6 (Six) Hours As Needed for Wheezing for up to 30 days., Disp: 120 each, Rfl: 0    albuterol sulfate  (90 Base) MCG/ACT inhaler, Inhale 2 puffs Every 4 (Four) Hours As Needed for Wheezing or Shortness of Air., Disp: 18 g, Rfl: 11     atomoxetine (STRATTERA) 40 MG capsule, Take 1 capsule by mouth Every Morning., Disp: , Rfl:     atorvastatin (Lipitor) 80 MG tablet, Take 1 tablet by mouth Daily., Disp: 90 tablet, Rfl: 1    budesonide-formoterol (Symbicort) 160-4.5 MCG/ACT inhaler, Inhale 2 puffs 2 (Two) Times a Day., Disp: 1 each, Rfl: 11    cyclobenzaprine (FLEXERIL) 10 MG tablet, Take 1 tablet by mouth At Night As Needed for Muscle Spasms., Disp: 15 tablet, Rfl: 0    ezetimibe (ZETIA) 10 MG tablet, Take 1 tablet by mouth Every Night., Disp: 90 tablet, Rfl: 1    FLUoxetine (PROzac) 20 MG capsule, Take 1 capsule by mouth Daily. Take with 60 mg, Disp: , Rfl:     FLUoxetine HCl (PROZAC PO), Take 60 mg by mouth Daily. Take with 20 mg, Disp: , Rfl:     fluticasone (FLONASE) 50 MCG/ACT nasal spray, 2 sprays into the nostril(s) as directed by provider Daily., Disp: 16 g, Rfl: 0    levothyroxine (SYNTHROID, LEVOTHROID) 75 MCG tablet, Take 1 tablet by mouth Daily., Disp: , Rfl:     loratadine (CLARITIN) 10 MG tablet, Take 1 tablet by mouth Daily., Disp: , Rfl:     LORazepam (ATIVAN) 1 MG tablet, 1 by mouth 1 hour prior to MRI, may repeat X 1 dose, Disp: 2 tablet, Rfl: 0    metFORMIN ER (GLUCOPHAGE-XR) 500 MG 24 hr tablet, Take 1 tablet by mouth 2 (Two) Times a Day. HOLD TONIGHT PM AND IN AM DOSE 11/1/22, Disp: , Rfl:     metoprolol succinate XL (TOPROL-XL) 50 MG 24 hr tablet, Take 1 tablet by mouth Daily., Disp: 90 tablet, Rfl: 1    omeprazole (priLOSEC) 20 MG capsule, Take 1 capsule by mouth Daily., Disp: 90 capsule, Rfl: 1    pramipexole (Mirapex) 0.125 MG tablet, 1- 2 tablets at bedtime, Disp: 60 tablet, Rfl: 1    traZODone (DESYREL) 100 MG tablet, Take 1 tablet by mouth Every Night., Disp: 90 tablet, Rfl: 1    Turmeric (QC TUMERIC COMPLEX PO), Take 1 tablet by mouth 2 (Two) Times a Day., Disp: , Rfl:     valACYclovir (VALTREX) 500 MG tablet, Take 1 tablet by mouth Every Morning., Disp: 90 tablet, Rfl: 1    vitamin E 400 UNIT capsule, Take 1 capsule by  "mouth 2 (Two) Times a Day., Disp: 60 capsule, Rfl: 5    azithromycin (Zithromax Z-Valdo) 250 MG tablet, Take 2 tablets by mouth on day 1, then 1 tablet daily on days 2-5, Disp: 6 tablet, Rfl: 0    There are no discontinued medications.  Allergies   Allergen Reactions    Adhesive Tape Rash and Other (See Comments)     BURNS SKIN      Cetirizine Itching    Chlorhexidine Rash    Codeine Other (See Comments) and Headache     MIGRANES    Hydrocodone-Acetaminophen Headache    Meperidine Other (See Comments) and Headache     MIGRANES    Propoxyphene Other (See Comments)     MIGRANES    Sesame Oil Nausea And Vomiting    Acetaminophen Palpitations        Social History     Tobacco Use    Smoking status: Never     Passive exposure: Never    Smokeless tobacco: Never   Vaping Use    Vaping Use: Never used   Substance Use Topics    Alcohol use: Never    Drug use: Never       Family History   Problem Relation Age of Onset    Hypertension Mother     Arthritis Mother     Osteoporosis Mother     Depression Mother     Hyperlipidemia Mother     Hypertension Father     Other Father         RENAL CALCULUS    Depression Father     Hyperlipidemia Father     Diabetes Daughter     Asthma Daughter     Depression Daughter     Arthritis Paternal Uncle     Diabetes Paternal Uncle     COPD Maternal Grandfather     Lung cancer Maternal Grandfather     Birth defects Paternal Grandmother     Bleeding Disorder Paternal Grandmother     Thyroid disease Paternal Grandmother     Hypertension Paternal Grandfather     Diabetes Paternal Grandfather     Heart disease Paternal Grandfather     Heart attack Paternal Grandfather     Lung cancer Other     Diabetes type II Other     Hyperlipidemia Other     Heart disease Other         ISCHEMIC    Cancer Other     Hypertension Other     Colon cancer Neg Hx     Malig Hyperthermia Neg Hx         Objective     /88   Pulse 70   Ht 162.6 cm (64\")   Wt 98.1 kg (216 lb 3.2 oz)   BMI 37.11 kg/m²       Physical " Exam    General Appearance:   no acute distress  Alert and oriented x3  HENT:   lips not cyanotic  Atraumatic  Neck:  No jvd   supple  Respiratory:  no respiratory distress  normal breath sounds  no rales  Cardiovascular:  no S3, no S4   no murmur  no rub  Extremities  No cyanosis  lower extremity edema: none    Skin:   warm, dry  No rashes      Result Review :     proBNP   Date Value Ref Range Status   07/17/2022 116.0 0.0 - 450.0 pg/mL Final     CMP          3/30/2023    12:27 9/7/2023    16:10   CMP   Glucose 106  111    BUN 15  7    Creatinine 0.75  0.60    EGFR 99.6  112.3    Sodium 139  136    Potassium 4.1  3.7    Chloride 103  98    Calcium 9.2  9.6    Total Protein 6.9  7.0    Albumin 4.3  4.3    Globulin 2.6  2.7    Total Bilirubin 0.4  0.5    Alkaline Phosphatase 92  85    AST (SGOT) 18  29    ALT (SGPT) 23  37    Albumin/Globulin Ratio 1.7  1.6    BUN/Creatinine Ratio 20.0  11.7    Anion Gap 11.6  13.1      CBC w/diff          3/30/2023    12:27 9/7/2023    16:10   CBC w/Diff   WBC 10.89  7.53    RBC 5.00  4.72    Hemoglobin 12.7  12.6    Hematocrit 40.0  38.2    MCV 80.0  80.9    MCH 25.4  26.7    MCHC 31.8  33.0    RDW 16.7  15.2    Platelets 223  255    Neutrophil Rel % 66.5  62.9    Immature Granulocyte Rel % 0.3  0.3    Lymphocyte Rel % 26.1  31.3    Monocyte Rel % 6.1  4.8    Eosinophil Rel % 0.6  0.3    Basophil Rel % 0.4  0.4       Lab Results   Component Value Date    TSH 0.766 09/07/2023      Lab Results   Component Value Date    FREET4 0.93 06/28/2022      No results found for: DDIMERQUANT  Magnesium   Date Value Ref Range Status   12/05/2021 1.8 1.6 - 2.6 mg/dL Final      No results found for: DIGOXIN   Lab Results   Component Value Date    TROPONINT <0.010 07/17/2022           Lipid Panel          3/30/2023    12:27 9/7/2023    16:10   Lipid Panel   Total Cholesterol 128  146    Triglycerides 152  176    HDL Cholesterol 45  36    VLDL Cholesterol 26  30    LDL Cholesterol  57  80    LDL/HDL  Ratio 1.17  2.08      No results found for: POCTROP    Results for orders placed during the hospital encounter of 02/08/22    Adult Transthoracic Echo Complete W/ Cont if Necessary Per Protocol    Interpretation Summary  · Calculated left ventricular EF = 65% Estimated left ventricular EF was in agreement with the calculated left ventricular EF.  · Left ventricular diastolic function was normal.  · No hemodynamically significant valvular pathology.                 Diagnoses and all orders for this visit:    1. Pre-operative cardiovascular examination (Primary)    2. Acute non-recurrent frontal sinusitis    3. Essential hypertension    4. Mixed hyperlipidemia    5. RODERICK (obstructive sleep apnea)    6. Palpitations    Other orders  -     azithromycin (Zithromax Z-Valdo) 250 MG tablet; Take 2 tablets by mouth on day 1, then 1 tablet daily on days 2-5  Dispense: 6 tablet; Refill: 0        Assessment:    Preoperative cardiovascular examination: She is planned to undergo left ulnar nerve surgery.  She has no active cardiac conditions.  Her exam is benign.  ECG shows normal sinus rhythm.  Functional capacity is adequate and estimated to be more than 4 METS.  She is at low risk for perioperative cardiovascular complications of from a low risk surgery which may proceed as planned.    Acute sinus infection: She was prescribed azithromycin pack.  Follow-up with PCP for further care if needed.    Essential hypertension: Stable on current regimen.  Continue the same.  Mixed dyslipidemia: Recent lipid profile was noted and was unremarkable.  Continue atorvastatin.  Recent hepatic panel was noted.  ALT was slightly elevated.  Continue routine monitoring.    Palpitations: More likely related to PACs/PAT as documented by previous Holter monitor.  Her symptoms improved on metoprolol which will be continued.    Obstructive sleep apnea: Undergoing work-up by sleep medicine.        Follow Up     Return in about 1 year (around 9/22/2024)  for with Dr Loya.        Patient was given instructions and counseling regarding her condition or for health maintenance advice. Please see specific information pulled into the AVS if appropriate.

## 2023-09-25 ENCOUNTER — TELEPHONE (OUTPATIENT)
Dept: CARDIOLOGY | Facility: CLINIC | Age: 48
End: 2023-09-25

## 2023-09-25 NOTE — PROGRESS NOTES
"Metabolic and Bariatric Surgery Nutrition Assessment    Patient Name: Estela Deras    YOB: 1975   Age: 48 y.o.  Sex: female  MRN: [id number]     Assessment Date: 09/15/2023    Procedure considering: sleeve    Anthropometric Measurements  Height: 64.57\"          Current weight: 211 lbs   BMI: 35.58 kg/m²    Patient Goals and Expectations                        Patient's stated weight goal: 150 lbs  Reasons for desiring weight loss: improved health and ability to run and play with grandchildren     Medical History  Pertinent diagnosis/problems: hypothyroidism, hypertension, dyslipidemia, diabetes, GERD, arthritis, sleep apnea, depression, anxiety, back pain     Weight History  Highest adult weight:  230 lbs                              Lowest adult weight: 200 lbs  Onset of obesity: always been overweight   Possible triggers or life events that may have led to weight gain: lifestyle, genetics     Previous Weight Loss Efforts  Patient has tried to lose weight in the past including Atkins, high protein, low carbohydrate, [facility] weight loss program, prescription medications and exercise.   Patient has lost up to 20 lbs on diets, but was unable to maintain this long term.     Diet History  Patient is eating 2-3 meals and 1-2 snacks daily.     24 Hour Recall  Breakfast: cereal or Belvita biscuit   Lunch: catered meal at work  Dinner: meat, pasta   Snacks: ice cream, chocolate    Beverages: water, sweet tea    Eating out: lunches and 0-1 more time per week   Vitamins/supplements: MVI  Diet restrictions or food allergies: allergic to sesame     Patient identifies problem areas to be physical hunger, over consumption, eating in response to anxiety and inactivity.      Physical Activity  Work-related activity: mostly sedentary with some walking/standing   Planned exercise: walking   Barriers to activity: none      Nutrition Intervention  Pre and post op nutrition education and coaching for behavior " change completed. Program materials provided. Emphasized the importance of taking in at least 70 g protein and 64 oz fluid daily. Discussed personal habits and lifestyle behaviors that may influence weight loss efforts. Self monitoring strategies such as keeping a food journal were encouraged. Patient verbalized understanding and questions were answered.  Short term goals: prioritize lean protein with all meals, decrease/eliminate high calorie beverages     Recommendations/Plan  Patient will be evaluated by multidisciplinary team before undergoing a review of their candidacy.  Additional nutrition counseling available and encouraged both pre and post op.   Patient demonstrated a good comprehension of diet requirements and commitment to make healthy changes.   Estela Deras appears to be a suitable candidate for bariatric surgery from a nutritional standpoint.      Renae Dacosta RD  09/25/23  11:11 EDT

## 2023-09-25 NOTE — TELEPHONE ENCOUNTER
Procedure: Left Cubital Tunnel Release    Medication Directive:    PMH:     Last Seen: 09/22    Per Dr Loya's office note on 9/22/23- She is at low risk for perioperative cardiovascular complications of from a low risk surgery which may proceed as planned.

## 2023-09-26 NOTE — PRE-PROCEDURE INSTRUCTIONS
IMPORTANT INSTRUCTIONS - PRE-ADMISSION TESTING  DO NOT EAT OR CHEW anything after midnight the night before your procedure.    You may have CLEAR liquids up to __2__ hours prior to ARRIVAL time.   Take the following medications the morning of your procedure with JUST A SIP OF WATER:  STRATTERA, LIPITOR, SYMBICORT, PROZAC,FLONASE, LEVOTHYROXINE, CLARITIN, METOPROLOL, OMEPRAZOLE, VALTREX, ___________  DO NOT BRING your medications to the hospital with you, UNLESS something has changed since your PRE-Admission Testing appointment.  Hold all vitamins, supplements, and NSAIDS (Non- steroidal anti-inflammatory meds) for one week prior to surgery (you MAY take Tylenol or Acetaminophen).  If you are diabetic, check your blood sugar the morning of your procedure. If it is less than 70 or if you are feeling symptomatic, call the following number for further instructions: [phone]__.  Use your inhalers/nebulizers as usual, the morning of your procedure. BRING YOUR INHALERS with you.   Bring your CPAP or BIPAP to hospital, ONLY IF YOU WILL BE SPENDING THE NIGHT.   Make sure you have a ride home and have someone who will stay with you the day of your procedure after you go home.  If you have any questions, please call your Pre-Admission Testing Nurse, _GAGAN____ at [phone]____________.   Per anesthesia request, do not smoke for 24 hours before your procedure or as instructed by your surgeon.

## 2023-09-28 ENCOUNTER — HOSPITAL ENCOUNTER (OUTPATIENT)
Facility: HOSPITAL | Age: 48
Discharge: HOME | End: 2023-09-28
Attending: ORTHOPAEDIC SURGERY | Admitting: ORTHOPAEDIC SURGERY

## 2023-09-28 ENCOUNTER — ANESTHESIA EVENT (OUTPATIENT)
Dept: PERIOP | Facility: HOSPITAL | Age: 48
End: 2023-09-28

## 2023-09-28 ENCOUNTER — ANESTHESIA (OUTPATIENT)
Dept: PERIOP | Facility: HOSPITAL | Age: 48
End: 2023-09-28

## 2023-09-28 VITALS
OXYGEN SATURATION: 96 % | TEMPERATURE: 97 F | HEIGHT: 60 IN | RESPIRATION RATE: 20 BRPM | DIASTOLIC BLOOD PRESSURE: 52 MMHG | HEART RATE: 58 BPM | BODY MASS INDEX: 41.55 KG/M2 | SYSTOLIC BLOOD PRESSURE: 103 MMHG | WEIGHT: 211.64 LBS

## 2023-09-28 DIAGNOSIS — G56.22 CUBITAL TUNNEL SYNDROME ON LEFT: Primary | ICD-10-CM

## 2023-09-28 DIAGNOSIS — G56.22 ULNAR NEUROPATHY AT ELBOW OF LEFT UPPER EXTREMITY: ICD-10-CM

## 2023-09-28 LAB — GLUCOSE BLDC GLUCOMTR-MCNC: 154 MG/DL (ref 70–99)

## 2023-09-28 PROCEDURE — 25010000002 PROPOFOL 10 MG/ML EMULSION: Performed by: NURSE ANESTHETIST, CERTIFIED REGISTERED

## 2023-09-28 PROCEDURE — 25010000002 FENTANYL CITRATE (PF) 50 MCG/ML SOLUTION: Performed by: NURSE ANESTHETIST, CERTIFIED REGISTERED

## 2023-09-28 PROCEDURE — 25010000002 HYDROMORPHONE 1 MG/ML SOLUTION: Performed by: NURSE ANESTHETIST, CERTIFIED REGISTERED

## 2023-09-28 PROCEDURE — 82948 REAGENT STRIP/BLOOD GLUCOSE: CPT

## 2023-09-28 PROCEDURE — 25010000002 MIDAZOLAM PER 1MG: Performed by: ANESTHESIOLOGY

## 2023-09-28 PROCEDURE — S0260 H&P FOR SURGERY: HCPCS | Performed by: ORTHOPAEDIC SURGERY

## 2023-09-28 PROCEDURE — 25010000002 CEFAZOLIN IN DEXTROSE 2000 MG/ 100 ML SOLUTION: Performed by: ORTHOPAEDIC SURGERY

## 2023-09-28 PROCEDURE — 63710000001 PROMETHAZINE PER 12.5 MG: Performed by: NURSE ANESTHETIST, CERTIFIED REGISTERED

## 2023-09-28 RX ORDER — OXYCODONE HYDROCHLORIDE 5 MG/1
5 TABLET ORAL EVERY 4 HOURS PRN
Qty: 20 TABLET | Refills: 0 | Status: SHIPPED | OUTPATIENT
Start: 2023-09-28

## 2023-09-28 RX ORDER — SCOLOPAMINE TRANSDERMAL SYSTEM 1 MG/1
1 PATCH, EXTENDED RELEASE TRANSDERMAL ONCE
Status: DISCONTINUED | OUTPATIENT
Start: 2023-09-28 | End: 2023-09-28 | Stop reason: HOSPADM

## 2023-09-28 RX ORDER — ONDANSETRON 2 MG/ML
4 INJECTION INTRAMUSCULAR; INTRAVENOUS ONCE AS NEEDED
Status: DISCONTINUED | OUTPATIENT
Start: 2023-09-28 | End: 2023-09-28 | Stop reason: HOSPADM

## 2023-09-28 RX ORDER — MAGNESIUM HYDROXIDE 1200 MG/15ML
LIQUID ORAL AS NEEDED
Status: DISCONTINUED | OUTPATIENT
Start: 2023-09-28 | End: 2023-09-28 | Stop reason: HOSPADM

## 2023-09-28 RX ORDER — CEFAZOLIN SODIUM IN 0.9 % NACL 3 G/100 ML
3 INTRAVENOUS SOLUTION, PIGGYBACK (ML) INTRAVENOUS ONCE
Status: DISCONTINUED | OUTPATIENT
Start: 2023-09-28 | End: 2023-09-28 | Stop reason: DRUGHIGH

## 2023-09-28 RX ORDER — PROMETHAZINE HYDROCHLORIDE 12.5 MG/1
25 TABLET ORAL ONCE AS NEEDED
Status: COMPLETED | OUTPATIENT
Start: 2023-09-28 | End: 2023-09-28

## 2023-09-28 RX ORDER — PROMETHAZINE HYDROCHLORIDE 25 MG/1
25 SUPPOSITORY RECTAL ONCE AS NEEDED
Status: COMPLETED | OUTPATIENT
Start: 2023-09-28 | End: 2023-09-28

## 2023-09-28 RX ORDER — OXYCODONE HYDROCHLORIDE 5 MG/1
5 TABLET ORAL
Status: DISCONTINUED | OUTPATIENT
Start: 2023-09-28 | End: 2023-09-28 | Stop reason: HOSPADM

## 2023-09-28 RX ORDER — SODIUM CHLORIDE, SODIUM LACTATE, POTASSIUM CHLORIDE, CALCIUM CHLORIDE 600; 310; 30; 20 MG/100ML; MG/100ML; MG/100ML; MG/100ML
9 INJECTION, SOLUTION INTRAVENOUS CONTINUOUS PRN
Status: DISCONTINUED | OUTPATIENT
Start: 2023-09-28 | End: 2023-09-28 | Stop reason: HOSPADM

## 2023-09-28 RX ORDER — DEXMEDETOMIDINE HYDROCHLORIDE 100 UG/ML
INJECTION, SOLUTION INTRAVENOUS AS NEEDED
Status: DISCONTINUED | OUTPATIENT
Start: 2023-09-28 | End: 2023-09-28 | Stop reason: SURG

## 2023-09-28 RX ORDER — LIDOCAINE HYDROCHLORIDE 20 MG/ML
INJECTION, SOLUTION EPIDURAL; INFILTRATION; INTRACAUDAL; PERINEURAL AS NEEDED
Status: DISCONTINUED | OUTPATIENT
Start: 2023-09-28 | End: 2023-09-28 | Stop reason: SURG

## 2023-09-28 RX ORDER — PHENYLEPHRINE HCL IN 0.9% NACL 1 MG/10 ML
SYRINGE (ML) INTRAVENOUS AS NEEDED
Status: DISCONTINUED | OUTPATIENT
Start: 2023-09-28 | End: 2023-09-28 | Stop reason: SURG

## 2023-09-28 RX ORDER — PROPOFOL 10 MG/ML
VIAL (ML) INTRAVENOUS AS NEEDED
Status: DISCONTINUED | OUTPATIENT
Start: 2023-09-28 | End: 2023-09-28 | Stop reason: SURG

## 2023-09-28 RX ORDER — OXYCODONE HYDROCHLORIDE AND ACETAMINOPHEN 5; 325 MG/1; MG/1
1 TABLET ORAL EVERY 6 HOURS PRN
Qty: 25 TABLET | Refills: 0 | Status: SHIPPED | OUTPATIENT
Start: 2023-09-28

## 2023-09-28 RX ORDER — FENTANYL CITRATE 50 UG/ML
INJECTION, SOLUTION INTRAMUSCULAR; INTRAVENOUS AS NEEDED
Status: DISCONTINUED | OUTPATIENT
Start: 2023-09-28 | End: 2023-09-28 | Stop reason: SURG

## 2023-09-28 RX ORDER — MIDAZOLAM HYDROCHLORIDE 2 MG/2ML
2 INJECTION, SOLUTION INTRAMUSCULAR; INTRAVENOUS ONCE
Status: COMPLETED | OUTPATIENT
Start: 2023-09-28 | End: 2023-09-28

## 2023-09-28 RX ORDER — CEFAZOLIN SODIUM 2 G/100ML
2 INJECTION, SOLUTION INTRAVENOUS ONCE
Status: COMPLETED | OUTPATIENT
Start: 2023-09-28 | End: 2023-09-28

## 2023-09-28 RX ORDER — ACETAMINOPHEN 500 MG
1000 TABLET ORAL ONCE
Status: DISCONTINUED | OUTPATIENT
Start: 2023-09-28 | End: 2023-09-28 | Stop reason: HOSPADM

## 2023-09-28 RX ADMIN — HYDROMORPHONE HYDROCHLORIDE 0.5 MG: 1 INJECTION, SOLUTION INTRAMUSCULAR; INTRAVENOUS; SUBCUTANEOUS at 09:37

## 2023-09-28 RX ADMIN — MIDAZOLAM HYDROCHLORIDE 2 MG: 1 INJECTION, SOLUTION INTRAMUSCULAR; INTRAVENOUS at 07:40

## 2023-09-28 RX ADMIN — HYDROMORPHONE HYDROCHLORIDE 0.5 MG: 1 INJECTION, SOLUTION INTRAMUSCULAR; INTRAVENOUS; SUBCUTANEOUS at 09:30

## 2023-09-28 RX ADMIN — PROMETHAZINE HYDROCHLORIDE 25 MG: 12.5 TABLET ORAL at 09:40

## 2023-09-28 RX ADMIN — OXYCODONE HYDROCHLORIDE 5 MG: 5 TABLET ORAL at 09:30

## 2023-09-28 RX ADMIN — PROPOFOL 50 MG: 10 INJECTION, EMULSION INTRAVENOUS at 08:29

## 2023-09-28 RX ADMIN — DEXMEDETOMIDINE 10 MCG: 100 INJECTION, SOLUTION INTRAVENOUS at 08:01

## 2023-09-28 RX ADMIN — Medication 200 MCG: at 08:26

## 2023-09-28 RX ADMIN — LIDOCAINE HYDROCHLORIDE 50 MG: 20 INJECTION, SOLUTION EPIDURAL; INFILTRATION; INTRACAUDAL; PERINEURAL at 08:01

## 2023-09-28 RX ADMIN — PROPOFOL 200 MG: 10 INJECTION, EMULSION INTRAVENOUS at 08:01

## 2023-09-28 RX ADMIN — FENTANYL CITRATE 50 MCG: 50 INJECTION, SOLUTION INTRAMUSCULAR; INTRAVENOUS at 08:01

## 2023-09-28 RX ADMIN — Medication 200 MCG: at 08:31

## 2023-09-28 RX ADMIN — PROPOFOL 50 MG: 10 INJECTION, EMULSION INTRAVENOUS at 08:47

## 2023-09-28 RX ADMIN — SCOPOLAMINE 1 PATCH: 1.5 PATCH, EXTENDED RELEASE TRANSDERMAL at 07:34

## 2023-09-28 RX ADMIN — DEXMEDETOMIDINE 10 MCG: 100 INJECTION, SOLUTION INTRAVENOUS at 08:05

## 2023-09-28 RX ADMIN — DEXMEDETOMIDINE 20 MCG: 100 INJECTION, SOLUTION INTRAVENOUS at 07:59

## 2023-09-28 RX ADMIN — FENTANYL CITRATE 50 MCG: 50 INJECTION, SOLUTION INTRAMUSCULAR; INTRAVENOUS at 08:26

## 2023-09-28 RX ADMIN — SODIUM CHLORIDE, POTASSIUM CHLORIDE, SODIUM LACTATE AND CALCIUM CHLORIDE 9 ML/HR: 600; 310; 30; 20 INJECTION, SOLUTION INTRAVENOUS at 07:23

## 2023-09-28 RX ADMIN — PROPOFOL 175 MCG/KG/MIN: 10 INJECTION, EMULSION INTRAVENOUS at 08:03

## 2023-09-28 RX ADMIN — CEFAZOLIN SODIUM 2 G: 2 INJECTION, SOLUTION INTRAVENOUS at 07:58

## 2023-09-28 RX ADMIN — Medication 200 MCG: at 08:15

## 2023-09-28 NOTE — DISCHARGE INSTRUCTIONS
DISCHARGE INSTRUCTIONS  ORTHOPEDICS      For your surgery you had:  General anesthesia (you may have a sore throat for the first 24 hours)  You may experience dizziness, drowsiness, or light-headedness for several hours following surgery  Do not stay alone today or tonight.  Limit your activity for 24 hours.  Resume your diet slowly.  Follow whatever special dietary instructions you may have been given by the doctor.  You should not drive or operate machinery or drink alcohol for 24 hours or while you are taking pain medication.  You should not sign any legally binding documents.    You may shower or bathe: keep dressing dry and intact   Sleep with the injured part elevated on a pillow.  Medications per physician's instructions as indicated on Discharge Medication Information Sheet.  Follow verbal instructions of your doctor.  Carry the upper arm in a sling so that the hand and wrist are above the level of the heart.  Exercise fingers  for 10 minutes every hour while awake. Ice bag to injured area for 72 hours.  Apply 20 minutes on - 20 minutes off.  Never place ice directly on skin or cast.    Avoid getting cast or dressing wet.  In addition to these instructions, follow the discharge instructions on postoperative arthroscopic surgery.  SPECIAL INSTRUCTIONS:  May take an over the counter stool softener as needed         Last dose of pain medication was given at:    NOTIFY THE PHYSICIAN IF YOU EXPERIENCE:  Numbness of fingers  Inability to move fingers   Extreme coldness, paleness or blue dis-coloration of fingers  Excessive swelling of affected surgical site or swelling that causes the cast to rub or cut into skin.  Pain unrelieved by pain medication  Nausea/vomiting not relieved by prescribed medication  Unable to urinate in 6 hours after surgery  Temperature greater than 101 degree Fahrenheit or chills  If unable to reach your doctor, please go to the closest emergency room

## 2023-09-28 NOTE — ANESTHESIA PREPROCEDURE EVALUATION
Anesthesia Evaluation     Patient summary reviewed and Nursing notes reviewed   history of anesthetic complications:  PONV  NPO Solid Status: > 8 hours  NPO Liquid Status: > 2 hours           Airway   Mallampati: II  TM distance: >3 FB  Neck ROM: full  No difficulty expected and Large neck circumference  Dental      Pulmonary - normal exam    breath sounds clear to auscultation  (+) asthma,shortness of breath, sleep apnea    ROS comment: Stable resp status, chronic dry cough after covid > 1 year ago  Cardiovascular - normal exam  Exercise tolerance: good (4-7 METS)    Rhythm: regular  Rate: normal    (+) hypertension, valvular problems/murmurs, hyperlipidemia      Neuro/Psych  (+) headaches, tremors, numbness, psychiatric history Depression  GI/Hepatic/Renal/Endo    (+) obesity, GERD well controlled, diabetes mellitus type 2, thyroid problem hypothyroidism    Musculoskeletal     Abdominal    Substance History - negative use     OB/GYN negative ob/gyn ROS         Other   arthritis,     ROS/Med Hx Other: PAT Nursing Notes unavailable.                 Anesthesia Plan    ASA 3     general     (Patient understands anesthesia not responsible for dental damage.)  intravenous induction     Anesthetic plan, risks, benefits, and alternatives have been provided, discussed and informed consent has been obtained with: patient.  Pre-procedure education provided  Plan discussed with CRNA.    CODE STATUS:

## 2023-09-28 NOTE — H&P
"H and p      Chief Complaint  Follow-up of the Left Hand        Pawel Deras presents to [facility] for follow up of the left hand.  She had a EMG.  She is on the phone and typing a lot.  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  She is getting muscle wasting in the web spaces.  She has only had numbness and tingling for a couple of months.,              Allergies   Allergen Reactions    Adhesive Tape Rash and Other (See Comments)       BURNS SKIN       Cetirizine Itching    Chlorhexidine Rash    Codeine Other (See Comments) and Headache       MIGRANES    Hydrocodone-Acetaminophen Headache    Meperidine Other (See Comments) and Headache       MIGRANES    Propoxyphene Other (See Comments)       MIGRANES    Sesame Oil Nausea And Vomiting    Acetaminophen Palpitations         Social History   Social History            Socioeconomic History    Marital status:    Tobacco Use    Smoking status: Never       Passive exposure: Never    Smokeless tobacco: Never   Vaping Use    Vaping Use: Never used   Substance and Sexual Activity    Alcohol use: Never    Drug use: Never    Sexual activity: Yes       Partners: Male       Birth control/protection: Surgical, Hysterectomy            I reviewed the patient's chief complaint, history of present illness, review of systems, past medical history, surgical history, family history, social history, medications, and allergy list.      Review of Systems      Constitutional: Denies fevers, chills, weight loss  Cardiovascular: Denies chest pain, shortness of breath  Skin: Denies rashes, acute skin changes  Neurologic: Denies headache, loss of consciousness           Vital Signs:   /67   Pulse 88   Ht 163.8 cm (64.5\")   Wt 97.5 kg (215 lb)   SpO2 97%   BMI 36.33 kg/m²           Physical Exam  General: Alert. No acute distress     Ortho Exam          LEFT HAND Negative Compression testing/ Positive Tinels. NegativeFinkelsteins. " Negative Viveros's testing. Negative CMC grind testing. Positive Phalens. Full ROM of the hand, fingers, elbow and wrist. Negative Triggering of the digit. Sensation grossly intact to light touch, median, radial and ulnar nerve. Positive AIN, PIN and ulnar nerve motor function intact. Axillary nerve intact. Positive pulses.          Procedures           Imaging Results (Most Recent)         None                      Result Review    :      8/28/23  EMG and nerve conduction study is abnormal and shows electrophysiologic evidence for moderate to severe left ulnar neuropathy at the elbow.               Assessment      Assessment and Plan      Diagnoses and all orders for this visit:     1. Cubital tunnel syndrome on left (Primary)           Discussed the treatment plan with the patient. I reviewed the EMG with the patient.       Discussed the treatment options with the patient, operative vs non-operative.      The patient expressed understanding and wished to proceed left ulnar nerve transposition            Discussed surgery., Risks/benefits discussed with patient including, but not limited to: infection, bleeding, neurovascular damage, malunion, nonunion, aesthetic deformity, need for further surgery, and death., Surgery pamphlet given., and Call or return if worsening symptoms.     Follow Up      2 weeks postoperatively    Kit Cuevas MD  09/28/23

## 2023-09-28 NOTE — ANESTHESIA POSTPROCEDURE EVALUATION
Patient: Estela Deras    Procedure Summary       Date: 09/28/23 Room / Location: [facility] OR  / [facility]    Anesthesia Start: 0758 Anesthesia Stop: 0913    Procedure: ULNAR NERVE TRANSPOSITION (Left: Elbow) Diagnosis:       Ulnar neuropathy at elbow of left upper extremity      (Ulnar neuropathy at elbow of left upper extremity [G56.22])    Surgeons: Kit Cuevas MD Provider: Quique Benavides MD    Anesthesia Type: general ASA Status: 3            Anesthesia Type: general    Vitals  Vitals Value Taken Time   /55 09/28/23 0941   Temp 36.1 °C (97 °F) 09/28/23 0925   Pulse 56 09/28/23 0941   Resp 20 09/28/23 0941   SpO2 96 % 09/28/23 0941           Post Anesthesia Care and Evaluation    Patient location during evaluation: bedside  Patient participation: complete - patient participated  Level of consciousness: awake and alert  Pain management: adequate    Airway patency: patent  Anesthetic complications: No anesthetic complications  PONV Status: none  Cardiovascular status: acceptable  Respiratory status: acceptable  Hydration status: acceptable    Comments: An Anesthesiologist personally participated in the most demanding procedures (including induction and emergence if applicable) in the anesthesia plan, monitored the course of anesthesia administration at frequent intervals and remained physically present and available for immediate diagnosis and treatment of emergencies.

## 2023-10-02 NOTE — OP NOTE
ULNAR NERVE TRANSPOSITION  Procedure Report    Patient Name:  Estela Deras  YOB: 1975    Date of Surgery:  9/28/2023     Indications: See H&P    Pre-op Diagnosis:   Ulnar neuropathy at elbow of left upper extremity [G56.22]       Post-Op Diagnosis Codes:     * Ulnar neuropathy at elbow of left upper extremity [G56.22]    Procedure/CPT® Codes:      Procedure(s):  Left ULNAR NERVE TRANSPOSITION          Staff:  Surgeon(s):  Kit Cuevas MD    Assistant: Janice Weinstein RN    Anesthesia: Choice    Estimated Blood Loss: 0 mL    Implants:    Nothing was implanted during the procedure    Specimen:          None        Findings: See description    Complications: None    Description of Procedure: Patient was taken operating room placed supine on the operating room table after general endotracheal anesthesia was established the left upper extremity were prepped and draped in a standard surgical fashion using alcohol ChloraPrep.  700 curvilinear incision was made over the medial epicondyle approximately 5 cm in length dissection was carried down to the ulnar nerve proximal to the ulnar groove it was in mobilized proximally and distally releasing all potential areas of constriction as it was mobilized anterior to the epicondyle.  Fascial sling was made in a standard fashion and the ulnar nerve was protected as the deep fat was sewn to the fascial sling and the new tunnel was created anterior to the epicondyle.  The ulnar nerve was freed proximally and distally the wound was copiously irrigated and closed with Vicryl and running subcuticular Prolene incision was washed dried and sterile dressings were applied and patient was placed in a well-padded posterior Orthoplast splint and Ace wrap and patient tolerated procedure well was extubated taken recovery room.                  Kit Cuevas MD     Date: 10/2/2023  Time: 08:48 EDT

## 2023-10-07 ENCOUNTER — HOSPITAL ENCOUNTER (OUTPATIENT)
Dept: ULTRASOUND IMAGING | Facility: HOSPITAL | Age: 48
Discharge: HOME | End: 2023-10-07
Admitting: NURSE PRACTITIONER

## 2023-10-07 DIAGNOSIS — K76.0 FATTY LIVER: ICD-10-CM

## 2023-10-07 PROCEDURE — 76705 ECHO EXAM OF ABDOMEN: CPT

## 2023-10-09 ENCOUNTER — OFFICE VISIT (OUTPATIENT)
Dept: ORTHOPEDIC SURGERY | Facility: CLINIC | Age: 48
End: 2023-10-09

## 2023-10-09 VITALS
BODY MASS INDEX: 41.43 KG/M2 | HEIGHT: 60 IN | HEART RATE: 73 BPM | DIASTOLIC BLOOD PRESSURE: 90 MMHG | WEIGHT: 211 LBS | OXYGEN SATURATION: 95 % | SYSTOLIC BLOOD PRESSURE: 117 MMHG

## 2023-10-09 DIAGNOSIS — Z47.89 AFTERCARE FOLLOWING SURGERY OF THE MUSCULOSKELETAL SYSTEM: Primary | ICD-10-CM

## 2023-10-09 DIAGNOSIS — G56.22 CUBITAL TUNNEL SYNDROME ON LEFT: Primary | ICD-10-CM

## 2023-10-09 PROCEDURE — 99024 POSTOP FOLLOW-UP VISIT: CPT

## 2023-10-09 RX ORDER — TRAMADOL HYDROCHLORIDE 50 MG/1
50 TABLET ORAL EVERY 6 HOURS PRN
Qty: 28 TABLET | Refills: 0 | Status: SHIPPED | OUTPATIENT
Start: 2023-10-09

## 2023-10-10 DIAGNOSIS — F40.240 CLAUSTROPHOBIA: Primary | ICD-10-CM

## 2023-10-10 RX ORDER — DIAZEPAM 5 MG/1
TABLET ORAL
Qty: 1 TABLET | Refills: 0 | Status: SHIPPED | OUTPATIENT
Start: 2023-10-10

## 2023-10-10 NOTE — PROGRESS NOTES
"Chief Complaint  Follow-up of the Left Hand and Suture / Staple Removal    Subjective      Estela Deras presents to [facility] for 2-week follow-up of left ulnar nerve transposition with Dr. Cuevas on 9/28/2023.  Patient is doing very well.  Reports that her pain from her elbow is mild and tolerable.  She presented today in a splint which was removed in office.  She is here today for stitch/staple removal.    Objective   Allergies   Allergen Reactions    Adhesive Tape Rash and Other (See Comments)     BURNS SKIN, MEDICAL TAPE.         Cetirizine Itching    Chlorhexidine Rash    Codeine Other (See Comments) and Headache     MIGRANES    Hydrocodone-Acetaminophen Headache    Meperidine Other (See Comments) and Headache     MIGRANES    Propoxyphene Other (See Comments)     MIGRANES    Sesame Oil Nausea And Vomiting    Acetaminophen Palpitations       Vital Signs:   /90   Pulse 73   Ht 152.4 cm (60\")   Wt 95.7 kg (211 lb)   SpO2 95%   BMI 41.21 kg/mý     Please follow-up with PCP regarding blood pressure.  Physical Exam    Constitutional: Awake, alert. Well nourished appearance.    Integumentary: Warm, dry, intact. No obvious rashes.    HENT: Atraumatic, normocephalic.   Respiratory: Non labored respirations .   Cardiovascular: Intact peripheral pulses.    Psychiatric: Normal mood and affect. A&O X3    Ortho Exam  Left elbow: Incisions are well approximated and healing appropriately.  There is no redness, drainage or tenderness to palpation.  No edema noted.  Near full range of motion of the elbow with flexion and extension.  Full range of motion of the wrist with flexion, extension, supination and pronation.  She is able to make a tight fist.  Thumb to finger opposition is intact.  Capillary refill time is brisk.  Neurovascular intact.  Sensation intact.    Imaging Results (Most Recent)       None                      Assessment and Plan   Problem List Items Addressed This Visit  "   None  Visit Diagnoses       Aftercare following left ulnar nerve transposition    -  Primary            Follow Up   Return if symptoms worsen or fail to improve.    Patient is a non-smoker, did not discuss options for smoking cessation.     Social History     Socioeconomic History    Marital status:    Tobacco Use    Smoking status: Never     Passive exposure: Never    Smokeless tobacco: Never   Vaping Use    Vaping Use: Never used   Substance and Sexual Activity    Alcohol use: Never    Drug use: Never    Sexual activity: Yes     Partners: Male     Birth control/protection: Surgical, Hysterectomy       Patient Instructions   Sutures/staples removed in office.  Patient educated on incision care.  Please keep incision clean and dry.  Do not soak or submerge in water until incision is fully healed.  Do not apply creams or lotions over the incision. Continue icing and elevation as needed to help with pain and swelling.  Ice up to 3 or 4 times daily for no longer than 15 to 20 minutes at a time.    Avoid repetitive ROM.  No heavy lifting, pushing, or pulling until incision is fully healed.  Home exercises provided today.    Follow-up as needed. No imaging.  Please call with questions or concerns.   Patient was given instructions and counseling regarding her condition or for health maintenance advice. Please see specific information pulled into the AVS if appropriate.

## 2023-10-10 NOTE — PATIENT INSTRUCTIONS
Sutures/staples removed in office.  Patient educated on incision care.  Please keep incision clean and dry.  Do not soak or submerge in water until incision is fully healed.  Do not apply creams or lotions over the incision. Continue icing and elevation as needed to help with pain and swelling.  Ice up to 3 or 4 times daily for no longer than 15 to 20 minutes at a time.    Avoid repetitive ROM.  No heavy lifting, pushing, or pulling until incision is fully healed.  Home exercises provided today.    Follow-up as needed. No imaging.  Please call with questions or concerns.

## 2023-10-12 ENCOUNTER — TELEPHONE (OUTPATIENT)
Dept: BARIATRICS/WEIGHT MGMT | Facility: CLINIC | Age: 48
End: 2023-10-12

## 2023-10-12 ENCOUNTER — HOSPITAL ENCOUNTER (OUTPATIENT)
Dept: MRI IMAGING | Facility: HOSPITAL | Age: 48
Discharge: HOME | End: 2023-10-12

## 2023-10-12 DIAGNOSIS — M25.511 ACUTE PAIN OF RIGHT SHOULDER: ICD-10-CM

## 2023-10-12 PROCEDURE — 73221 MRI JOINT UPR EXTREM W/O DYE: CPT

## 2023-10-16 ENCOUNTER — OFFICE VISIT (OUTPATIENT)
Dept: ORTHOPEDIC SURGERY | Facility: CLINIC | Age: 48
End: 2023-10-16

## 2023-10-16 VITALS — HEIGHT: 60 IN | BODY MASS INDEX: 41.43 KG/M2 | WEIGHT: 211 LBS

## 2023-10-16 DIAGNOSIS — G89.29 CHRONIC RIGHT SHOULDER PAIN: Primary | ICD-10-CM

## 2023-10-16 DIAGNOSIS — M25.511 CHRONIC RIGHT SHOULDER PAIN: Primary | ICD-10-CM

## 2023-10-16 RX ORDER — MELOXICAM 15 MG/1
15 TABLET ORAL DAILY
Qty: 30 TABLET | Refills: 2 | Status: SHIPPED | OUTPATIENT
Start: 2023-10-16

## 2023-10-16 RX ADMIN — LIDOCAINE HYDROCHLORIDE 5 ML: 10 INJECTION, SOLUTION INFILTRATION; PERINEURAL at 16:16

## 2023-10-16 RX ADMIN — TRIAMCINOLONE ACETONIDE 40 MG: 40 INJECTION, SUSPENSION INTRA-ARTICULAR; INTRAMUSCULAR at 16:16

## 2023-10-16 NOTE — PROGRESS NOTES
"Chief Complaint  Follow-up and Pain of the Right Shoulder    Subjective      Estela Deras presents to [facility] for follow-up of right shoulder pain and MRI results.  MRI showed previous surgical changes and mild supraspinatus tendon adenopathy.  She has a type II acromion.  Patient reports that she has had a steroid injection in the past that worsen the pain for approximately 1 month.  Reports that she has had physical therapy in the past due to her surgeries.  She takes Advil and Aleve on a daily basis.  She cannot recall any of the exercises on the handout.  She inquires about surgery.    Objective   Allergies   Allergen Reactions    Adhesive Tape Rash and Other (See Comments)     BURNS SKIN, MEDICAL TAPE.         Cetirizine Itching    Chlorhexidine Rash    Codeine Other (See Comments) and Headache     MIGRANES    Hydrocodone-Acetaminophen Headache    Meperidine Other (See Comments) and Headache     MIGRANES    Propoxyphene Other (See Comments)     MIGRANES    Sesame Oil Nausea And Vomiting    Acetaminophen Palpitations       Vital Signs:   Ht 152.4 cm (60\")   Wt 95.7 kg (211 lb)   BMI 41.21 kg/m²       Physical Exam    Constitutional: Awake, alert. Well nourished appearance.    Integumentary: Warm, dry, intact. No obvious rashes.    HENT: Atraumatic, normocephalic.   Respiratory: Non labored respirations .   Cardiovascular: Intact peripheral pulses.    Psychiatric: Normal mood and affect. A&O X3    Ortho Exam  Right shoulder: Active range of motion forward shoulder flexion to 170, abduction 170, internal rotation T12, external rotation 90 degrees.  Full range of motion of the elbow and the wrist.  Sensation is intact.  Neurovascular intact.  Positive impingement sign.    Imaging Results (Most Recent)       None             Large Joint right shoulder  Date/Time: 10/16/2023 4:16 PM  Consent given by: patient  Site marked: site marked  Timeout: Immediately prior to procedure a time " out was called to verify the correct patient, procedure, equipment, support staff and site/side marked as required   Supporting Documentation  Indications: pain   Procedure Details  Location: shoulder -   Preparation: Patient was prepped and draped in the usual sterile fashion  Needle gauge: 21g.  Medications administered: 5 mL lidocaine 1 %; 40 mg triamcinolone acetonide 40 MG/ML  Patient tolerance: patient tolerated the procedure well with no immediate complications              Assessment and Plan   Problem List Items Addressed This Visit    None  Visit Diagnoses       Chronic right shoulder pain    -  Primary    Relevant Orders    Large Joint right shoulder            Follow Up   Return in about 6 weeks (around 11/27/2023).    Patient is a non-smoker, did not discuss options for smoking cessation.     Social History     Socioeconomic History    Marital status:    Tobacco Use    Smoking status: Never     Passive exposure: Never    Smokeless tobacco: Never   Vaping Use    Vaping Use: Never used   Substance and Sexual Activity    Alcohol use: Never    Drug use: Never    Sexual activity: Yes     Partners: Male     Birth control/protection: Surgical, Hysterectomy       Patient Instructions   Discussed MRI results with patient.    We reviewed benefits of conservative measures.  Patient prescribed a different anti-inflammatory.  Advised not to take any other anti-inflammatories with this medication.    Printout of home exercises given once again to patient.    We discussed physical therapy, however she declines at this time.    Discussed a shoulder steroid injection which was issued in office.  Patient tolerated procedure and educated on 3 months duration between injections.    Follow-up in 6 weeks to evaluate pain.  Call for questions, concerns or worsening symptoms.  Patient was given instructions and counseling regarding her condition or for health maintenance advice. Please see specific information pulled  into the AVS if appropriate.

## 2023-10-16 NOTE — PATIENT INSTRUCTIONS
Discussed MRI results with patient.    We reviewed benefits of conservative measures.  Patient prescribed a different anti-inflammatory.  Advised not to take any other anti-inflammatories with this medication.    Printout of home exercises given once again to patient.    We discussed physical therapy, however she declines at this time.    Discussed a shoulder steroid injection which was issued in office.  Patient tolerated procedure and educated on 3 months duration between injections.    Follow-up in 6 weeks to evaluate pain.  Call for questions, concerns or worsening symptoms.

## 2023-10-17 DIAGNOSIS — R93.5 ABNORMAL ABDOMINAL ULTRASOUND: ICD-10-CM

## 2023-10-17 DIAGNOSIS — K86.9 PANCREATIC LESION: Primary | ICD-10-CM

## 2023-10-17 RX ORDER — LIDOCAINE HYDROCHLORIDE 10 MG/ML
5 INJECTION, SOLUTION INFILTRATION; PERINEURAL
Status: COMPLETED | OUTPATIENT
Start: 2023-10-16 | End: 2023-10-16

## 2023-10-17 RX ORDER — TRIAMCINOLONE ACETONIDE 40 MG/ML
40 INJECTION, SUSPENSION INTRA-ARTICULAR; INTRAMUSCULAR
Status: COMPLETED | OUTPATIENT
Start: 2023-10-16 | End: 2023-10-16

## 2023-11-01 ENCOUNTER — TELEPHONE (OUTPATIENT)
Dept: GASTROENTEROLOGY | Facility: CLINIC | Age: 48
End: 2023-11-01

## 2023-11-10 ENCOUNTER — LAB (OUTPATIENT)
Dept: LAB | Facility: HOSPITAL | Age: 48
End: 2023-11-10
Payer: COMMERCIAL

## 2023-11-10 DIAGNOSIS — K76.0 FATTY LIVER: ICD-10-CM

## 2023-11-10 LAB
ALBUMIN SERPL-MCNC: 4.1 G/DL (ref 3.5–5.2)
ALP SERPL-CCNC: 88 U/L (ref 39–117)
ALT SERPL W P-5'-P-CCNC: 29 U/L (ref 1–33)
AST SERPL-CCNC: 19 U/L (ref 1–32)
BILIRUB CONJ SERPL-MCNC: <0.2 MG/DL (ref 0–0.3)
BILIRUB INDIRECT SERPL-MCNC: NORMAL MG/DL
BILIRUB SERPL-MCNC: 0.5 MG/DL (ref 0–1.2)
DEPRECATED RDW RBC AUTO: 39.9 FL (ref 37–54)
ERYTHROCYTE [DISTWIDTH] IN BLOOD BY AUTOMATED COUNT: 13.7 % (ref 12.3–15.4)
HCT VFR BLD AUTO: 38.1 % (ref 34–46.6)
HGB BLD-MCNC: 12.3 G/DL (ref 12–15.9)
INR PPP: 0.94 (ref 0.86–1.15)
MCH RBC QN AUTO: 25.8 PG (ref 26.6–33)
MCHC RBC AUTO-ENTMCNC: 32.3 G/DL (ref 31.5–35.7)
MCV RBC AUTO: 80 FL (ref 79–97)
PLATELET # BLD AUTO: 228 10*3/MM3 (ref 140–450)
PMV BLD AUTO: 11.5 FL (ref 6–12)
PROT SERPL-MCNC: 7.1 G/DL (ref 6–8.5)
PROTHROMBIN TIME: 12.7 SECONDS (ref 11.8–14.9)
RBC # BLD AUTO: 4.76 10*6/MM3 (ref 3.77–5.28)
WBC NRBC COR # BLD: 9.37 10*3/MM3 (ref 3.4–10.8)

## 2023-11-10 PROCEDURE — 36415 COLL VENOUS BLD VENIPUNCTURE: CPT

## 2023-11-10 PROCEDURE — 85027 COMPLETE CBC AUTOMATED: CPT

## 2023-11-10 PROCEDURE — 80076 HEPATIC FUNCTION PANEL: CPT

## 2023-11-10 PROCEDURE — 85610 PROTHROMBIN TIME: CPT

## 2023-11-13 ENCOUNTER — HOSPITAL ENCOUNTER (OUTPATIENT)
Dept: MRI IMAGING | Facility: HOSPITAL | Age: 48
Discharge: HOME | End: 2023-11-13
Admitting: NURSE PRACTITIONER
Payer: COMMERCIAL

## 2023-11-13 ENCOUNTER — TELEMEDICINE (OUTPATIENT)
Dept: GASTROENTEROLOGY | Facility: CLINIC | Age: 48
End: 2023-11-13
Payer: COMMERCIAL

## 2023-11-13 ENCOUNTER — TELEPHONE (OUTPATIENT)
Dept: GASTROENTEROLOGY | Facility: CLINIC | Age: 48
End: 2023-11-13

## 2023-11-13 DIAGNOSIS — R12 HEARTBURN: ICD-10-CM

## 2023-11-13 DIAGNOSIS — R93.5 ABNORMAL ABDOMINAL ULTRASOUND: ICD-10-CM

## 2023-11-13 DIAGNOSIS — K86.9 PANCREATIC LESION: ICD-10-CM

## 2023-11-13 DIAGNOSIS — E66.01 CLASS 3 SEVERE OBESITY WITH SERIOUS COMORBIDITY AND BODY MASS INDEX (BMI) OF 40.0 TO 44.9 IN ADULT, UNSPECIFIED OBESITY TYPE: ICD-10-CM

## 2023-11-13 DIAGNOSIS — K76.0 FATTY LIVER: Primary | ICD-10-CM

## 2023-11-13 DIAGNOSIS — F40.240 CLAUSTROPHOBIA: ICD-10-CM

## 2023-11-13 LAB
CREAT BLDA-MCNC: 0.7 MG/DL
EGFRCR SERPLBLD CKD-EPI 2021: 108.2 ML/MIN/1.73

## 2023-11-13 PROCEDURE — A9577 INJ MULTIHANCE: HCPCS | Performed by: NURSE PRACTITIONER

## 2023-11-13 PROCEDURE — 82565 ASSAY OF CREATININE: CPT

## 2023-11-13 PROCEDURE — 0 GADOBENATE DIMEGLUMINE 529 MG/ML SOLUTION: Performed by: NURSE PRACTITIONER

## 2023-11-13 PROCEDURE — 74183 MRI ABD W/O CNTR FLWD CNTR: CPT

## 2023-11-13 RX ORDER — DIAZEPAM 5 MG/1
TABLET ORAL
Qty: 1 TABLET | Refills: 0 | Status: SHIPPED | OUTPATIENT
Start: 2023-11-13

## 2023-11-13 RX ADMIN — GADOBENATE DIMEGLUMINE 20 ML: 529 INJECTION, SOLUTION INTRAVENOUS at 18:05

## 2023-11-13 NOTE — TELEPHONE ENCOUNTER
Pt returned call, left . She states today at 5pm will work for her MRI. Pt is requesting one dose of Ativan for MRI. Pt would like this sent to [facility] ([facility]

## 2023-11-13 NOTE — TELEPHONE ENCOUNTER
Attempted to reach pt to verify if she has picked up the Valium sent on 10.10.23. No answer but left message for pt to return call.

## 2023-11-13 NOTE — PROGRESS NOTES
Patient Name: Estela Deras   Visit Date: 11/13/2023   Patient ID: [id number]  Provider: HAILEY Ruano    Sex: female  Location:  Location Address:  Location Phone: [phone]  [place] [address] [phone]    YOB: 1975  Age: 48 y.o.   Primary Care Provider Nannette Braun APRN      Referring Provider: No ref. provider found        This was an audio and video enabled telemedicine encounter.  Brittani Shaw MA assisted w/ the video visit today.    Chief Complaint  Fatty Liver  (Follow up )    History of Present Illness  Patient initially presented 8/29/2022 with elevated liver enzymes and fatty liver . Reported persistent heartburn but controlled with Prilosec.  She had some right upper quadrant pain that was improved with higher dose of omeprazole 40 mg/day in 11/2022, this has since been reduced to 20 mg    FibroScan 9/16/2022: S3, F3  Liver w/u negative 8/29/22  EGD colonoscopy 11/1/2022: Small hiatal hernia, gastritis noted-stomach biopsy with mild chronic inactive gastritis otherwise negative; 13 mm sessile serrated polyp found in the cecum, completely removed, internal hemorrhoids, repeat colonoscopy 3 years. Recommended no NSAIDs     Patient was last seen 5/8/2023 reported losing 5 pounds since previous visit was off of Ozempic at the time but planning to restart.  Right upper quadrant pain improved vitamin D was prescribed twice per day    Ultrasound of the liver 10/7/2023 showed fatty liver moderate to marked severity, area in the pancreas of uncertain significance-1.6 cm rounded echogenicity, not seen on previous exam could be artifact but MRI was recommended-MRI MRCP is scheduled for 12/1/2023    Patient is doing well today she has no complaints of abdominal pain, bloating, no heartburn with Prilosec 20 mg/day.  Reports bowels are moving normal.  She has remained off Ozempic due to GI side effects.  She has been unable to lose any more weight, declines gaining  weight.  Patient is taking vitamin E 400 twice per day  She has recently been prescribed Mobic but she has not started this yet.  Past Medical History:   Diagnosis Date    Abnormal electrocardiogram     Allergic     Allergic rhinitis     Anemia     Arthritis     Asthma     COVID-19 09/2021    COVID-19 long hauler manifesting chronic cough     Depression     Diabetes mellitus     Dysfunctional uterine bleeding     Essential hypertension 08/28/2023    Fatty liver     Foot pain     Gastroesophageal reflux disease without esophagitis     Generalized anxiety disorder     H/O cold sores     Headache, tension-type     Hyperlipidemia     Hypertriglyceridemia     Hypothyroidism     Impaired fasting glucose     Insomnia, unspecified     Liver function study, abnormal     Low back pain     Macromastia     Migraine headache     Mild episode of recurrent major depressive disorder     Mitral valve prolapse     Night sweats     Obesity     PONV (postoperative nausea and vomiting)     Shingles     Sinus trouble     Sleep apnea     Thyroid disorder     Tremor     Ulnar neuropathy at elbow of left upper extremity 08/29/2023    Vitamin D deficiency        Past Surgical History:   Procedure Laterality Date    ADENOIDECTOMY      COLONOSCOPY N/A 11/01/2022    Procedure: COLONOSCOPY WITH POLYPECTOMY, CAUTERY, ORISE INJECTION;  Surgeon: Gómez Nicole MD;  Location: [facility] ENDOSCOPY;  Service: Gastroenterology;  Laterality: N/A;  COLON POLYP, HEMORRHOIDS    ENDOSCOPY N/A 11/01/2022    Procedure: ESOPHAGOGASTRODUODENOSCOPY WITH BIOPSIES;  Surgeon: Gómez Nicole MD;  Location: [facility] ENDOSCOPY;  Service: Gastroenterology;  Laterality: N/A;  HIATAL HERNIA, EROSIVE GASTRITIS    LAPAROSCOPIC HYSTERECTOMY  06/30/2014    DR JULIANE PARHAM;[facility]    REDUCTION MAMMAPLASTY  2002    SHOULDER SURGERY Right 2017    TONSILLECTOMY      ULNAR NERVE TRANSPOSITION Left 9/28/2023    Procedure: ULNAR NERVE TRANSPOSITION;  Surgeon:  Mason, Kit TERRELL MD;  [place] [facility]  Service: Orthopedics;  Laterality: Left;       Allergies   Allergen Reactions    Adhesive Tape Rash and Other (See Comments)     BURNS SKIN, MEDICAL TAPE.         Cetirizine Itching    Chlorhexidine Rash    Codeine Other (See Comments) and Headache     MIGRANES    Hydrocodone-Acetaminophen Headache    Meperidine Other (See Comments) and Headache     MIGRANES    Propoxyphene Other (See Comments)     MIGRANES    Sesame Oil Nausea And Vomiting    Acetaminophen Palpitations       Family History   Problem Relation Age of Onset    Hypertension Mother     Arthritis Mother     Osteoporosis Mother     Depression Mother     Hyperlipidemia Mother     Hypertension Father     Other Father         RENAL CALCULUS    Depression Father     Hyperlipidemia Father     Diabetes Daughter     Asthma Daughter     Depression Daughter     Arthritis Paternal Uncle     Diabetes Paternal Uncle     COPD Maternal Grandfather     Lung cancer Maternal Grandfather     Birth defects Paternal Grandmother     Bleeding Disorder Paternal Grandmother     Thyroid disease Paternal Grandmother     Hypertension Paternal Grandfather     Diabetes Paternal Grandfather     Heart disease Paternal Grandfather     Heart attack Paternal Grandfather     Lung cancer Other     Diabetes type II Other     Hyperlipidemia Other     Heart disease Other         ISCHEMIC    Cancer Other     Hypertension Other     Colon cancer Neg Hx     Malig Hyperthermia Neg Hx         Social History     Tobacco Use    Smoking status: Never     Passive exposure: Never    Smokeless tobacco: Never   Vaping Use    Vaping Use: Never used   Substance Use Topics    Alcohol use: Never    Drug use: Never       Objective     Vital Signs:   There were no vitals taken for this visit.      Physical Exam  Constitutional:       General: The patient is not in acute distress.     Appearance: Normal appearance.   HENT:      Head: Normocephalic and atraumatic.       Nose: Nose normal.   Pulmonary:      Effort: Pulmonary effort is normal. No respiratory distress.   Skin:     General: Skin appears warm and dry.   Neurological:      General: No focal deficit present.      Mental Status: The patient is alert and oriented to person, place, and time.   Psychiatric:         Mood and Affect: Mood normal.         Speech: Speech normal.         Behavior: Behavior normal.         Thought Content: Thought content normal.     Result Review :   The following data was reviewed by: HAILEY Ruano on 11/13/2023:    CBC w/diff          3/30/2023    12:27 9/7/2023    16:10 11/10/2023    13:57   CBC w/Diff   WBC 10.89  7.53  9.37    RBC 5.00  4.72  4.76    Hemoglobin 12.7  12.6  12.3    Hematocrit 40.0  38.2  38.1    MCV 80.0  80.9  80.0    MCH 25.4  26.7  25.8    MCHC 31.8  33.0  32.3    RDW 16.7  15.2  13.7    Platelets 223  255  228    Neutrophil Rel % 66.5  62.9     Immature Granulocyte Rel % 0.3  0.3     Lymphocyte Rel % 26.1  31.3     Monocyte Rel % 6.1  4.8     Eosinophil Rel % 0.6  0.3     Basophil Rel % 0.4  0.4       CMP          3/30/2023    12:27 9/7/2023    16:10 11/10/2023    13:57   CMP   Glucose 106  111     BUN 15  7     Creatinine 0.75  0.60     EGFR 99.6  112.3     Sodium 139  136     Potassium 4.1  3.7     Chloride 103  98     Calcium 9.2  9.6     Total Protein 6.9  7.0  7.1    Albumin 4.3  4.3  4.1    Globulin 2.6  2.7     Total Bilirubin 0.4  0.5  0.5    Alkaline Phosphatase 92  85  88    AST (SGOT) 18  29  19    ALT (SGPT) 23  37  29    Albumin/Globulin Ratio 1.7  1.6     BUN/Creatinine Ratio 20.0  11.7     Anion Gap 11.6  13.1         Liver Workup   A-1 Antitrypsin   Date Value Ref Range Status   08/29/2022 153 101 - 187 mg/dL Final     dsDNA   Date Value Ref Range Status   08/29/2022 Negative Negative Final     Expanded ENEDINA Screen   Date Value Ref Range Status   08/29/2022 Negative Negative Final     Smooth Muscle Ab   Date Value Ref Range Status    08/29/2022 3 0 - 19 Units Final     Comment:                      Negative                     0 - 19                   Weak positive               20 - 30                   Moderate to strong positive     >30   Actin Antibodies are found in 52-85% of patients with   autoimmune hepatitis or chronic active hepatitis and   in 22% of patients with primary biliary cirrhosis.     Ceruloplasmin   Date Value Ref Range Status   08/29/2022 28 19 - 39 mg/dL Final     Ferritin   Date Value Ref Range Status   08/29/2022 31.40 13.00 - 150.00 ng/mL Final     IgG   Date Value Ref Range Status   09/06/2022 643 586 - 1602 mg/dL Final     IgA   Date Value Ref Range Status   09/06/2022 372 (H) 87 - 352 mg/dL Final     IgM   Date Value Ref Range Status   09/06/2022 29 26 - 217 mg/dL Final     Iron   Date Value Ref Range Status   08/29/2022 42 37 - 145 mcg/dL Final     TIBC   Date Value Ref Range Status   08/29/2022 460 298 - 536 mcg/dL Final     Iron Saturation (TSAT)   Date Value Ref Range Status   08/29/2022 9 (L) 20 - 50 % Final     Transferrin   Date Value Ref Range Status   08/29/2022 309 200 - 360 mg/dL Final     Mitochondrial Ab   Date Value Ref Range Status   08/29/2022 <20.0 0.0 - 20.0 Units Final     Comment:                                     Negative    0.0 - 20.0                                  Equivocal  20.1 - 24.9                                  Positive         >24.9  Mitochondrial (M2) Antibodies are found in 90-96% of  patients with primary biliary cirrhosis.     Protime   Date Value Ref Range Status   11/10/2023 12.7 11.8 - 14.9 Seconds Final     INR   Date Value Ref Range Status   11/10/2023 0.94 0.86 - 1.15 Final     ALPHA-FETOPROTEIN   Date Value Ref Range Status   08/29/2022 <2 0 - 8.3 ng/mL Final     Acute HEP Panel   Hepatitis B Surface Ag   Date Value Ref Range Status   08/29/2022 Non-Reactive Non-Reactive Final     Hep A IgM   Date Value Ref Range Status   08/29/2022 Non-Reactive Non-Reactive Final      Hep B C IgM   Date Value Ref Range Status   08/29/2022 Non-Reactive Non-Reactive Final     Hepatitis C Ab   Date Value Ref Range Status   08/29/2022 Non-Reactive Non-Reactive Final               Assessment and Plan    Diagnoses and all orders for this visit:    1. Fatty liver (Primary)  -     CBC (No Diff); Future  -     Hepatic Function Panel; Future  -     Protime-INR; Future    2. Pancreatic lesion    3. Heartburn  Comments:  stable    4. Class 3 severe obesity with serious comorbidity and body mass index (BMI) of 40.0 to 44.9 in adult, unspecified obesity type            Follow Up   Return in about 6 months (around 5/13/2024).  Encouraged weight loss, low carb diet.  Encouraged reducing use of Omeprazole as tolerated, take daily while taking Mobic, risks of NSAIDs also discussed.   Will try to get MRI moved up.  Labs in May     Patient was given instructions and counseling regarding her condition or for health maintenance advice. Please see specific information pulled into the AVS if appropriate.

## 2023-11-13 NOTE — TELEPHONE ENCOUNTER
Pt returned call, states she did not fill valium sent to Pharmacy in [place]. Pt would like it sent to [place] [facility] as she works in [place]. I have called [facility] pharmacy and cx'd original Valium sent in

## 2023-11-14 NOTE — PROGRESS NOTES
There is mild hepatomegaly and fatty liver noted there is some gallbladder sludge noted, there is no suspicious pancreatic mass and the previously noted abnormality by ultrasound may have been artifact. English

## 2023-11-15 ENCOUNTER — TELEPHONE (OUTPATIENT)
Dept: GASTROENTEROLOGY | Facility: CLINIC | Age: 48
End: 2023-11-15
Payer: COMMERCIAL

## 2023-11-15 NOTE — TELEPHONE ENCOUNTER
Called pt, no answer, left message for pt to call back.    Postponing result until tomorrow 11.16.23

## 2023-11-15 NOTE — TELEPHONE ENCOUNTER
----- Message from HAILEY Ruano sent at 11/14/2023  4:46 PM EST -----  There is mild hepatomegaly and fatty liver noted there is some gallbladder sludge noted, there is no suspicious pancreatic mass and the previously noted abnormality by ultrasound may have been artifact.

## 2023-11-20 ENCOUNTER — TELEMEDICINE (OUTPATIENT)
Dept: FAMILY MEDICINE CLINIC | Facility: TELEHEALTH | Age: 48
End: 2023-11-20
Payer: COMMERCIAL

## 2023-11-20 VITALS — TEMPERATURE: 97.3 F | HEART RATE: 92 BPM

## 2023-11-20 DIAGNOSIS — B34.9 VIRAL ILLNESS: Primary | ICD-10-CM

## 2023-11-20 RX ORDER — BROMPHENIRAMINE MALEATE, PSEUDOEPHEDRINE HYDROCHLORIDE, AND DEXTROMETHORPHAN HYDROBROMIDE 2; 30; 10 MG/5ML; MG/5ML; MG/5ML
5 SYRUP ORAL 4 TIMES DAILY PRN
Qty: 118 ML | Refills: 0 | Status: SHIPPED | OUTPATIENT
Start: 2023-11-20

## 2023-11-20 RX ORDER — ONDANSETRON 4 MG/1
4 TABLET, ORALLY DISINTEGRATING ORAL EVERY 8 HOURS PRN
Qty: 12 TABLET | Refills: 0 | Status: SHIPPED | OUTPATIENT
Start: 2023-11-20

## 2023-11-20 NOTE — PROGRESS NOTES
Subjective   Estela Deras is a 48 y.o. female.     History of Present Illness  Diarrhea, vomiting started 4 nights ago, aches, and pain, lightheaded, aching, burning up, cough. Blood pressure 160/105. She had covid in 2021 and has chronic lung problems from that. She has been exposed to her grandaughter with stomach bug. She has not gotten her flu shot. She took advil 2 hours ago.       The following portions of the patient's history were reviewed and updated as appropriate: allergies, current medications, past family history, past medical history, past social history, past surgical history, and problem list.    Review of Systems   Constitutional:  Positive for fatigue and fever.   HENT:  Positive for congestion and rhinorrhea.    Respiratory:  Positive for cough and wheezing (not outside of her normal).    Gastrointestinal:  Positive for diarrhea, nausea and vomiting.   Musculoskeletal:  Positive for myalgias.       Objective   Physical Exam  Constitutional:       General: She is not in acute distress.     Appearance: She is well-developed. She is not diaphoretic.   HENT:      Right Ear: Tympanic membrane normal.      Left Ear: Tympanic membrane normal.      Nose:      Right Sinus: Maxillary sinus tenderness present. No frontal sinus tenderness.      Left Sinus: Maxillary sinus tenderness present. No frontal sinus tenderness.      Mouth/Throat:      Pharynx: Posterior oropharyngeal erythema present.   Pulmonary:      Effort: Pulmonary effort is normal.   Neurological:      Mental Status: She is alert and oriented to person, place, and time.   Psychiatric:         Behavior: Behavior normal.           Assessment & Plan   Diagnoses and all orders for this visit:    1. Viral illness (Primary)  -     RACHEL FLU + SARS PCR; Future  -     brompheniramine-pseudoephedrine-DM 30-2-10 MG/5ML syrup; Take 5 mL by mouth 4 (Four) Times a Day As Needed for Congestion or Cough.  Dispense: 118 mL; Refill: 0  -     ondansetron ODT  (ZOFRAN-ODT) 4 MG disintegrating tablet; Place 1 tablet on the tongue Every 8 (Eight) Hours As Needed for Nausea or Vomiting.  Dispense: 12 tablet; Refill: 0             Flu and covid negative    The use of a video visit has been reviewed with the patient and verbal informed consent has been obtained. Myself and Estela Deras participated in this visit. The patient is located in [facility]. I am located in [place], [place]. [employer] and [employer] were utilized. I spent 20 minutes in the patient's chart for this visit.

## 2023-11-30 ENCOUNTER — TELEMEDICINE (OUTPATIENT)
Dept: FAMILY MEDICINE CLINIC | Facility: TELEHEALTH | Age: 48
End: 2023-11-30
Payer: COMMERCIAL

## 2023-11-30 DIAGNOSIS — J01.40 ACUTE PANSINUSITIS, RECURRENCE NOT SPECIFIED: Primary | ICD-10-CM

## 2023-11-30 RX ORDER — AMOXICILLIN AND CLAVULANATE POTASSIUM 875; 125 MG/1; MG/1
1 TABLET, FILM COATED ORAL 2 TIMES DAILY
Qty: 20 TABLET | Refills: 0 | Status: SHIPPED | OUTPATIENT
Start: 2023-11-30

## 2023-11-30 NOTE — PROGRESS NOTES
You have chosen to receive care through a telehealth visit.  Do you consent to use a video/audio connection for your medical care today? Yes     HPI  Estela Deras is a 48 y.o. female  presents with complaint of 9 days of sinus pressure, teeth pain, ear pressure, green nasal discharge and nasal congestion. She is taking Bromfed DM with some relief. She has not tested for covid 19.     Review of Systems   Constitutional:  Negative for fatigue and fever.   HENT:  Positive for congestion, ear pain (ear pressure bilaterally), sinus pressure and sinus pain. Negative for sore throat.    Cardiovascular: Negative.    Gastrointestinal: Negative.        Past Medical History:   Diagnosis Date    Abnormal electrocardiogram     Allergic     Allergic rhinitis     Anemia     Arthritis     Asthma     COVID-19 09/2021    COVID-19 long hauler manifesting chronic cough     Depression     Diabetes mellitus     Dysfunctional uterine bleeding     Essential hypertension 08/28/2023    Fatty liver     Foot pain     Gastroesophageal reflux disease without esophagitis     Generalized anxiety disorder     H/O cold sores     Headache, tension-type     Hyperlipidemia     Hypertriglyceridemia     Hypothyroidism     Impaired fasting glucose     Insomnia, unspecified     Liver function study, abnormal     Low back pain     Macromastia     Migraine headache     Mild episode of recurrent major depressive disorder     Mitral valve prolapse     Night sweats     Obesity     PONV (postoperative nausea and vomiting)     Shingles     Sinus trouble     Sleep apnea     Thyroid disorder     Tremor     Ulnar neuropathy at elbow of left upper extremity 08/29/2023    Vitamin D deficiency        Family History   Problem Relation Age of Onset    Hypertension Mother     Arthritis Mother     Osteoporosis Mother     Depression Mother     Hyperlipidemia Mother     Hypertension Father     Other Father         RENAL CALCULUS    Depression Father     Hyperlipidemia Father      Diabetes Daughter     Asthma Daughter     Depression Daughter     Arthritis Paternal Uncle     Diabetes Paternal Uncle     COPD Maternal Grandfather     Lung cancer Maternal Grandfather     Birth defects Paternal Grandmother     Bleeding Disorder Paternal Grandmother     Thyroid disease Paternal Grandmother     Hypertension Paternal Grandfather     Diabetes Paternal Grandfather     Heart disease Paternal Grandfather     Heart attack Paternal Grandfather     Lung cancer Other     Diabetes type II Other     Hyperlipidemia Other     Heart disease Other         ISCHEMIC    Cancer Other     Hypertension Other     Colon cancer Neg Hx     Malig Hyperthermia Neg Hx        Social History     Socioeconomic History    Marital status:    Tobacco Use    Smoking status: Never     Passive exposure: Never    Smokeless tobacco: Never   Vaping Use    Vaping Use: Never used   Substance and Sexual Activity    Alcohol use: Never    Drug use: Never    Sexual activity: Yes     Partners: Male     Birth control/protection: Surgical, Hysterectomy         There were no vitals taken for this visit.    PHYSICAL EXAM  Physical Exam   Constitutional: She appears well-developed and well-nourished. She does not have a sickly appearance. She does not appear ill. No distress.   HENT:   Head: Normocephalic and atraumatic.   Right Ear: Hearing normal.   Left Ear: Hearing normal.   Nose: Mucosal edema, rhinorrhea and congestion present. Right sinus exhibits maxillary sinus tenderness and frontal sinus tenderness. Left sinus exhibits maxillary sinus tenderness and frontal sinus tenderness.   Mouth/Throat: Mouth/Lips are normal.  Pulmonary/Chest: Effort normal.   Neurological: She is alert.   Vitals reviewed.      Diagnoses and all orders for this visit:    1. Acute pansinusitis, recurrence not specified (Primary)  -     amoxicillin-clavulanate (AUGMENTIN) 875-125 MG per tablet; Take 1 tablet by mouth 2 (Two) Times a Day.  Dispense: 20 tablet;  Refill: 0    Rest and fluids  Advised to test for covid today with home test.   Coricidin HBP as directed prn nasal congestion.       FOLLOW-UP  As discussed during visit with [facility], if symptoms worsen or fail to improve, follow-up with PCP/Urgent Care/Emergency Department.    Patient verbalizes understanding of medications, instructions for treatment and follow-up.    Kelsey Trevino, APRN  11/30/2023  18:52 EST    The use of a video visit has been reviewed with the patient and verbal informed consent has been obtained. Myself and Estela Deras participated in this visit. The patient is located in [facility], and I am located in [place], [place]. [employer] and [employer]  were utilized.

## 2023-12-05 ENCOUNTER — PATIENT MESSAGE (OUTPATIENT)
Dept: FAMILY MEDICINE CLINIC | Facility: CLINIC | Age: 48
End: 2023-12-05
Payer: COMMERCIAL

## 2023-12-06 RX ORDER — FLUCONAZOLE 150 MG/1
150 TABLET ORAL ONCE
Qty: 1 TABLET | Refills: 0 | Status: SHIPPED | OUTPATIENT
Start: 2023-12-06 | End: 2023-12-06

## 2023-12-08 ENCOUNTER — TELEMEDICINE (OUTPATIENT)
Dept: PULMONOLOGY | Facility: CLINIC | Age: 48
End: 2023-12-08
Payer: COMMERCIAL

## 2023-12-08 DIAGNOSIS — G47.33 OSA (OBSTRUCTIVE SLEEP APNEA): Primary | ICD-10-CM

## 2023-12-08 DIAGNOSIS — B34.9 VIRAL ILLNESS: ICD-10-CM

## 2023-12-08 DIAGNOSIS — R06.09 DYSPNEA ON EXERTION: ICD-10-CM

## 2023-12-08 DIAGNOSIS — R09.81 SINUS CONGESTION: ICD-10-CM

## 2023-12-08 DIAGNOSIS — R09.89 CHEST CONGESTION: ICD-10-CM

## 2023-12-08 DIAGNOSIS — J32.9 CHRONIC SINUSITIS, UNSPECIFIED LOCATION: ICD-10-CM

## 2023-12-08 RX ORDER — BROMPHENIRAMINE MALEATE, PSEUDOEPHEDRINE HYDROCHLORIDE, AND DEXTROMETHORPHAN HYDROBROMIDE 2; 30; 10 MG/5ML; MG/5ML; MG/5ML
5 SYRUP ORAL 4 TIMES DAILY PRN
Qty: 473 ML | Refills: 0 | Status: SHIPPED | OUTPATIENT
Start: 2023-12-08

## 2023-12-08 RX ORDER — FLUCONAZOLE 150 MG/1
TABLET ORAL
COMMUNITY
Start: 2023-12-06 | End: 2023-12-08

## 2023-12-08 RX ORDER — PREDNISONE 10 MG/1
TABLET ORAL
Qty: 45 TABLET | Refills: 0 | Status: SHIPPED | OUTPATIENT
Start: 2023-12-08

## 2023-12-08 RX ORDER — FLUCONAZOLE 150 MG/1
150 TABLET ORAL DAILY
Qty: 2 TABLET | Refills: 0 | Status: SHIPPED | OUTPATIENT
Start: 2023-12-08

## 2023-12-08 RX ORDER — DOXYCYCLINE HYCLATE 100 MG/1
100 CAPSULE ORAL 2 TIMES DAILY
Qty: 20 CAPSULE | Refills: 0 | Status: SHIPPED | OUTPATIENT
Start: 2023-12-08

## 2023-12-08 NOTE — PROGRESS NOTES
Primary Care Provider  Nannette Braun APRN     Referring Provider  No ref. provider found     Chief Complaint  Asthma, Cough (Intermittent, yellow/green thick mucus with a bad taste ), and Follow-up (4 month f/up )    You have chosen to receive care through a telehealth visit.  Do you consent to use a video/audio connection for your medical care today? Yes     Subjective          Estela Deras presents to [facility]  History of Present Illness  Estela Deras is a 48 y.o. female patient here for a video visit for management of sleep apnea, asthma and shortness of breath.     Patient states she is doing okay since her last office visit.  She is currently taking a course of Augmentin for sinusitis and chest congestion.  Patient states that she is still having discolored sputum that is foul tasting.  She continues to take cough syrup and Mucinex as well.  Her shortness of breath is mild in severity, worse with exertion and improved with rest.  She continues to use Symbicort as prescribed.  She states that she has not needed to use her albuterol very often.  She has tried Tessalon Perles in the past with little success in relieving her cough symptoms.  She is not taking Flonase and Claritin for allergies.  She would like to have a referral placed for ENT due to chronic sinusitis.  She also questions if she should have a referral to an allergist, but would like to see the ENT first.  Otherwise, she has no additional concerns at this time.  She is able to perform her ADLs without difficulty.  She declines COVID and flu vaccine.  She did have a Pneumovax 23 in 2022.     Her history of smoking is   Tobacco Use: Low Risk  (12/8/2023)    Patient History     Smoking Tobacco Use: Never     Smokeless Tobacco Use: Never     Passive Exposure: Never   .    Review of Systems   Constitutional:  Negative for chills, fatigue, fever, unexpected weight gain and unexpected weight  loss.   HENT:  Positive for congestion (Nasal).    Respiratory:  Positive for cough and shortness of breath. Negative for apnea and wheezing.         Negative for Hemoptysis     Cardiovascular:  Negative for chest pain, palpitations and leg swelling.   Skin:         Negative for cyanosis      Sleep: Negative for Excessive daytime sleepiness  Negative for morning headaches  Negative for Snoring    Family History   Problem Relation Age of Onset    Hypertension Mother     Arthritis Mother     Osteoporosis Mother     Depression Mother     Hyperlipidemia Mother     Hypertension Father     Other Father         RENAL CALCULUS    Depression Father     Hyperlipidemia Father     Diabetes Daughter     Asthma Daughter     Depression Daughter     Arthritis Paternal Uncle     Diabetes Paternal Uncle     COPD Maternal Grandfather     Lung cancer Maternal Grandfather     Birth defects Paternal Grandmother     Bleeding Disorder Paternal Grandmother     Thyroid disease Paternal Grandmother     Hypertension Paternal Grandfather     Diabetes Paternal Grandfather     Heart disease Paternal Grandfather     Heart attack Paternal Grandfather     Lung cancer Other     Diabetes type II Other     Hyperlipidemia Other     Heart disease Other         ISCHEMIC    Cancer Other     Hypertension Other     Colon cancer Neg Hx     Malig Hyperthermia Neg Hx         Social History     Socioeconomic History    Marital status:    Tobacco Use    Smoking status: Never     Passive exposure: Never    Smokeless tobacco: Never   Vaping Use    Vaping Use: Never used   Substance and Sexual Activity    Alcohol use: Never    Drug use: Never    Sexual activity: Yes     Partners: Male     Birth control/protection: Surgical, Hysterectomy        Past Medical History:   Diagnosis Date    Abnormal electrocardiogram     Allergic     Allergic rhinitis     Anemia     Arthritis     Asthma     COVID-19 09/2021    COVID-19 long hauler manifesting chronic cough      Depression     Diabetes mellitus     Dysfunctional uterine bleeding     Essential hypertension 08/28/2023    Fatty liver     Foot pain     Gastroesophageal reflux disease without esophagitis     Generalized anxiety disorder     H/O cold sores     Headache, tension-type     Hyperlipidemia     Hypertriglyceridemia     Hypothyroidism     Impaired fasting glucose     Insomnia, unspecified     Liver function study, abnormal     Low back pain     Macromastia     Migraine headache     Mild episode of recurrent major depressive disorder     Mitral valve prolapse     Night sweats     Obesity     PONV (postoperative nausea and vomiting)     Shingles     Sinus trouble     Sleep apnea     Thyroid disorder     Tremor     Ulnar neuropathy at elbow of left upper extremity 08/29/2023    Vitamin D deficiency         Immunization History   Administered Date(s) Administered    Fluzone (or Fluarix & Flulaval for VFC) >6mos 01/06/2014, 10/13/2021    Hepatitis A 05/15/2018, 11/05/2018    Influenza Seasonal Injectable 01/06/2014    Influenza, Unspecified 02/22/2019, 09/15/2022    MMR 02/22/2019    Pneumococcal Polysaccharide (PPSV23) 02/11/2022    TD Preservative Free (Tenivac) 11/05/2018    Tdap 11/06/2018         Allergies   Allergen Reactions    Adhesive Tape Rash and Other (See Comments)     BURNS SKIN, MEDICAL TAPE.         Cetirizine Itching    Chlorhexidine Rash    Codeine Other (See Comments) and Headache     MIGRANES    Hydrocodone-Acetaminophen Headache    Meperidine Other (See Comments) and Headache     MIGRANES    Propoxyphene Other (See Comments)     MIGRANES    Sesame Oil Nausea And Vomiting    Acetaminophen Palpitations          Current Outpatient Medications:     Accu-Chek Guide test strip, 1 each by Other route As Needed (glucose). Use as instructed, Disp: 100 each, Rfl: 12    albuterol sulfate  (90 Base) MCG/ACT inhaler, Inhale 2 puffs Every 4 (Four) Hours As Needed for Wheezing or Shortness of Air., Disp: 18 g,  Rfl: 11    amoxicillin-clavulanate (AUGMENTIN) 875-125 MG per tablet, Take 1 tablet by mouth 2 (Two) Times a Day., Disp: 20 tablet, Rfl: 0    atomoxetine (STRATTERA) 40 MG capsule, Take 1 capsule by mouth Every Morning., Disp: , Rfl:     atorvastatin (Lipitor) 80 MG tablet, Take 1 tablet by mouth Daily., Disp: 90 tablet, Rfl: 1    brompheniramine-pseudoephedrine-DM 30-2-10 MG/5ML syrup, Take 5 mL by mouth 4 (Four) Times a Day As Needed for Congestion or Cough., Disp: 473 mL, Rfl: 0    budesonide-formoterol (Symbicort) 160-4.5 MCG/ACT inhaler, Inhale 2 puffs 2 (Two) Times a Day., Disp: 1 each, Rfl: 11    cyclobenzaprine (FLEXERIL) 10 MG tablet, Take 1 tablet by mouth At Night As Needed for Muscle Spasms., Disp: 15 tablet, Rfl: 0    Dextromethorphan-guaiFENesin (MUCINEX DM PO), Take  by mouth., Disp: , Rfl:     diazePAM (Valium) 5 MG tablet, Take 1 tab po 30 to 45 minutes before MRI, Disp: 1 tablet, Rfl: 0    ezetimibe (ZETIA) 10 MG tablet, Take 1 tablet by mouth Every Night., Disp: 90 tablet, Rfl: 1    FLUoxetine (PROzac) 20 MG capsule, Take 1 capsule by mouth Daily. Take with 60 mg, Disp: , Rfl:     FLUoxetine HCl (PROZAC PO), Take 60 mg by mouth Daily. Take with 20 mg, Disp: , Rfl:     fluticasone (FLONASE) 50 MCG/ACT nasal spray, 2 sprays into the nostril(s) as directed by provider Daily., Disp: 16 g, Rfl: 0    levothyroxine (SYNTHROID, LEVOTHROID) 75 MCG tablet, Take 1 tablet by mouth Daily., Disp: , Rfl:     loratadine (CLARITIN) 10 MG tablet, Take 1 tablet by mouth Daily., Disp: , Rfl:     LORazepam (ATIVAN) 1 MG tablet, 1 by mouth 1 hour prior to MRI, may repeat X 1 dose, Disp: 2 tablet, Rfl: 0    meloxicam (Mobic) 15 MG tablet, Take 1 tablet by mouth Daily., Disp: 30 tablet, Rfl: 2    metFORMIN ER (GLUCOPHAGE-XR) 500 MG 24 hr tablet, Take 1 tablet by mouth 2 (Two) Times a Day., Disp: , Rfl:     metoprolol succinate XL (TOPROL-XL) 50 MG 24 hr tablet, Take 1 tablet by mouth Daily., Disp: 90 tablet, Rfl: 1     omeprazole (priLOSEC) 20 MG capsule, Take 1 capsule by mouth Daily., Disp: 90 capsule, Rfl: 1    ondansetron ODT (ZOFRAN-ODT) 4 MG disintegrating tablet, Place 1 tablet on the tongue Every 8 (Eight) Hours As Needed for Nausea or Vomiting., Disp: 12 tablet, Rfl: 0    oxyCODONE (ROXICODONE) 5 MG immediate release tablet, Take 1 tablet by mouth Every 4 (Four) Hours As Needed for Moderate Pain., Disp: 20 tablet, Rfl: 0    oxyCODONE-acetaminophen (Percocet) 5-325 MG per tablet, Take 1 tablet by mouth Every 6 (Six) Hours As Needed for Moderate Pain., Disp: 25 tablet, Rfl: 0    pramipexole (Mirapex) 0.125 MG tablet, 1- 2 tablets at bedtime, Disp: 60 tablet, Rfl: 1    traMADol (ULTRAM) 50 MG tablet, Take 1 tablet by mouth Every 6 (Six) Hours As Needed for Moderate Pain., Disp: 28 tablet, Rfl: 0    traZODone (DESYREL) 100 MG tablet, Take 1 tablet by mouth Every Night., Disp: 90 tablet, Rfl: 1    Turmeric (QC TUMERIC COMPLEX PO), Take 1 tablet by mouth 2 (Two) Times a Day., Disp: , Rfl:     valACYclovir (VALTREX) 500 MG tablet, Take 1 tablet by mouth Every Morning., Disp: 90 tablet, Rfl: 1    vitamin E 400 UNIT capsule, Take 1 capsule by mouth 2 (Two) Times a Day., Disp: 60 capsule, Rfl: 5    albuterol (PROVENTIL) (2.5 MG/3ML) 0.083% nebulizer solution, Take 2.5 mg by nebulization Every 6 (Six) Hours As Needed for Wheezing for up to 30 days., Disp: 120 each, Rfl: 0    doxycycline (VIBRAMYCIN) 100 MG capsule, Take 1 capsule by mouth 2 (Two) Times a Day., Disp: 20 capsule, Rfl: 0    fluconazole (Diflucan) 150 MG tablet, Take 1 tablet by mouth Daily., Disp: 2 tablet, Rfl: 0    predniSONE (DELTASONE) 10 MG tablet, 50mg qday x 3 days, 40mg qday x 3 days, 30mg qday x 3 days, 20mg qday x 3 days, 10mg qday x 3 days, then stop, Disp: 45 tablet, Rfl: 0     Objective   Physical Exam  Constitutional:       General: She is not in acute distress.     Appearance: Normal appearance. She is not ill-appearing.   Pulmonary:      Effort: No  respiratory distress.   Skin:     Coloration: Skin is not jaundiced.      Findings: No bruising.   Neurological:      Mental Status: She is alert and oriented to person, place, and time.   Psychiatric:         Mood and Affect: Mood normal.         Vital Signs:   There were no vitals taken for this visit.       Result Review :   The following data was reviewed by: HAILEY Booker on 12/08/2023:  CMP          9/7/2023    16:10 11/10/2023    13:57 11/13/2023    17:08   CMP   Glucose 111      BUN 7      Creatinine 0.60   0.70    EGFR 112.3   108.2    Sodium 136      Potassium 3.7      Chloride 98      Calcium 9.6      Total Protein 7.0  7.1     Albumin 4.3  4.1     Globulin 2.7      Total Bilirubin 0.5  0.5     Alkaline Phosphatase 85  88     AST (SGOT) 29  19     ALT (SGPT) 37  29     Albumin/Globulin Ratio 1.6      BUN/Creatinine Ratio 11.7      Anion Gap 13.1        CBC w/diff          3/30/2023    12:27 9/7/2023    16:10 11/10/2023    13:57   CBC w/Diff   WBC 10.89  7.53  9.37    RBC 5.00  4.72  4.76    Hemoglobin 12.7  12.6  12.3    Hematocrit 40.0  38.2  38.1    MCV 80.0  80.9  80.0    MCH 25.4  26.7  25.8    MCHC 31.8  33.0  32.3    RDW 16.7  15.2  13.7    Platelets 223  255  228    Neutrophil Rel % 66.5  62.9     Immature Granulocyte Rel % 0.3  0.3     Lymphocyte Rel % 26.1  31.3     Monocyte Rel % 6.1  4.8     Eosinophil Rel % 0.6  0.3     Basophil Rel % 0.4  0.4       Covid Tests          11/20/2023    17:24   Common Labsle   COVID19 Not Detected      Data reviewed : Radiologic studies chest CT 7/24/2023 and my last office note    Procedures        Assessment and Plan    Diagnoses and all orders for this visit:    1. RODERICK (obstructive sleep apnea) (Primary)    2. Dyspnea on exertion    3. Viral illness    4. Chronic sinusitis, unspecified location  -     brompheniramine-pseudoephedrine-DM 30-2-10 MG/5ML syrup; Take 5 mL by mouth 4 (Four) Times a Day As Needed for Congestion or Cough.  Dispense: 473 mL;  Refill: 0  -     Ambulatory Referral to ENT (Otolaryngology)  -     predniSONE (DELTASONE) 10 MG tablet; 50mg qday x 3 days, 40mg qday x 3 days, 30mg qday x 3 days, 20mg qday x 3 days, 10mg qday x 3 days, then stop  Dispense: 45 tablet; Refill: 0  -     doxycycline (VIBRAMYCIN) 100 MG capsule; Take 1 capsule by mouth 2 (Two) Times a Day.  Dispense: 20 capsule; Refill: 0    5. Chest congestion  -     brompheniramine-pseudoephedrine-DM 30-2-10 MG/5ML syrup; Take 5 mL by mouth 4 (Four) Times a Day As Needed for Congestion or Cough.  Dispense: 473 mL; Refill: 0  -     predniSONE (DELTASONE) 10 MG tablet; 50mg qday x 3 days, 40mg qday x 3 days, 30mg qday x 3 days, 20mg qday x 3 days, 10mg qday x 3 days, then stop  Dispense: 45 tablet; Refill: 0  -     doxycycline (VIBRAMYCIN) 100 MG capsule; Take 1 capsule by mouth 2 (Two) Times a Day.  Dispense: 20 capsule; Refill: 0    6. Sinus congestion  -     brompheniramine-pseudoephedrine-DM 30-2-10 MG/5ML syrup; Take 5 mL by mouth 4 (Four) Times a Day As Needed for Congestion or Cough.  Dispense: 473 mL; Refill: 0  -     predniSONE (DELTASONE) 10 MG tablet; 50mg qday x 3 days, 40mg qday x 3 days, 30mg qday x 3 days, 20mg qday x 3 days, 10mg qday x 3 days, then stop  Dispense: 45 tablet; Refill: 0  -     doxycycline (VIBRAMYCIN) 100 MG capsule; Take 1 capsule by mouth 2 (Two) Times a Day.  Dispense: 20 capsule; Refill: 0    Other orders  -     fluconazole (Diflucan) 150 MG tablet; Take 1 tablet by mouth Daily.  Dispense: 2 tablet; Refill: 0    7.  Continue Symbicort as prescribed.  Rinse mouth after each use.  8.  Continue albuterol as needed.  9.  Continue Claritin and Flonase for seasonal allergies allergic rhinitis.  10.  Continue Tessalon Perles as needed for cough.  11.  Encourage patient to stay as active as possible.  Recommend 30 minutes with activity.  12.  Follow-up in 4 months, sooner if needed.        Follow Up   Return in about 4 months (around 4/8/2024) for Recheck  with Loli.  Please call patient to schedule..  Patient was given instructions and counseling regarding her condition or for health maintenance advice. Please see specific information pulled into the AVS if appropriate.

## 2024-01-19 DIAGNOSIS — B00.9 HSV (HERPES SIMPLEX VIRUS) INFECTION: ICD-10-CM

## 2024-01-19 DIAGNOSIS — K21.9 GASTROESOPHAGEAL REFLUX DISEASE WITHOUT ESOPHAGITIS: ICD-10-CM

## 2024-01-19 DIAGNOSIS — K44.9 HIATAL HERNIA: ICD-10-CM

## 2024-01-19 DIAGNOSIS — I34.1 MITRAL VALVE PROLAPSE: ICD-10-CM

## 2024-01-19 DIAGNOSIS — F51.01 PRIMARY INSOMNIA: ICD-10-CM

## 2024-01-19 DIAGNOSIS — E78.2 MIXED HYPERLIPIDEMIA: ICD-10-CM

## 2024-01-19 DIAGNOSIS — M25.511 ACUTE PAIN OF RIGHT SHOULDER: ICD-10-CM

## 2024-01-22 RX ORDER — METOPROLOL SUCCINATE 50 MG/1
50 TABLET, EXTENDED RELEASE ORAL DAILY
Qty: 90 TABLET | Refills: 0 | Status: SHIPPED | OUTPATIENT
Start: 2024-01-22

## 2024-01-22 RX ORDER — CYCLOBENZAPRINE HCL 10 MG
TABLET ORAL
Qty: 15 TABLET | Refills: 0 | Status: SHIPPED | OUTPATIENT
Start: 2024-01-22

## 2024-01-22 RX ORDER — VALACYCLOVIR HYDROCHLORIDE 500 MG/1
500 TABLET, FILM COATED ORAL EVERY MORNING
Qty: 90 TABLET | Refills: 0 | Status: SHIPPED | OUTPATIENT
Start: 2024-01-22

## 2024-01-22 RX ORDER — ATORVASTATIN CALCIUM 80 MG/1
80 TABLET, FILM COATED ORAL DAILY
Qty: 90 TABLET | Refills: 0 | Status: SHIPPED | OUTPATIENT
Start: 2024-01-22

## 2024-01-22 RX ORDER — OMEPRAZOLE 20 MG/1
20 CAPSULE, DELAYED RELEASE ORAL DAILY
Qty: 90 CAPSULE | Refills: 0 | Status: SHIPPED | OUTPATIENT
Start: 2024-01-22

## 2024-01-22 RX ORDER — EZETIMIBE 10 MG/1
10 TABLET ORAL
Qty: 90 TABLET | Refills: 0 | Status: SHIPPED | OUTPATIENT
Start: 2024-01-22

## 2024-01-22 RX ORDER — TRAZODONE HYDROCHLORIDE 100 MG/1
100 TABLET ORAL
Qty: 90 TABLET | Refills: 0 | Status: SHIPPED | OUTPATIENT
Start: 2024-01-22

## 2024-01-25 DIAGNOSIS — G25.81 RESTLESS LEGS: ICD-10-CM

## 2024-01-25 RX ORDER — PRAMIPEXOLE DIHYDROCHLORIDE 0.12 MG/1
TABLET ORAL
Qty: 60 TABLET | Refills: 1 | Status: SHIPPED | OUTPATIENT
Start: 2024-01-25

## 2024-01-31 ENCOUNTER — OFFICE VISIT (OUTPATIENT)
Dept: FAMILY MEDICINE CLINIC | Facility: CLINIC | Age: 49
End: 2024-01-31
Payer: COMMERCIAL

## 2024-01-31 VITALS
TEMPERATURE: 98 F | WEIGHT: 215 LBS | SYSTOLIC BLOOD PRESSURE: 129 MMHG | OXYGEN SATURATION: 97 % | DIASTOLIC BLOOD PRESSURE: 74 MMHG | HEART RATE: 86 BPM | BODY MASS INDEX: 41.99 KG/M2 | RESPIRATION RATE: 16 BRPM
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
## 2024-01-31 DIAGNOSIS — L98.9 SKIN LESION OF CHEST WALL: Primary | ICD-10-CM

## 2024-01-31 DIAGNOSIS — R60.9 SWELLING: ICD-10-CM

## 2024-01-31 RX ORDER — HYDROCHLOROTHIAZIDE 12.5 MG/1
12.5 TABLET ORAL DAILY
Qty: 10 TABLET | Refills: 0 | Status: SHIPPED | OUTPATIENT
Start: 2024-01-31 | End: 2024-02-10

## 2024-01-31 NOTE — PROGRESS NOTES
Chief Complaint  Skin Cancer (Chest ) and buttocks pain - states she fell 2 weeks ago on ice    Subjective          Estela Deras presents to [facility]  History of Present Illness  She is here with her daughter and grandkids.    She fell on the ice about 2 weeks ago and she is having pain in the buttocks. She can sit but when she stands it is hurting her.  She has not had any xrays.  She is also having some swelling in the legs. S he said that she has been trying to drink around 70 oz of water but is only peeing 1 time a day at times.  She is taking her reg meds.  She can't have the wt loss surg as her insurance now will not pay for any wt loss med or surg.    She is mainly here for a skin lesion on her chest.  She said that her provider she works for thinks that it is a poss of skin cancer and she would like to see derm      Depression: At risk (1/31/2024)    PHQ-2     PHQ-2 Score: 12    and 1/31/2024               Allergies  Adhesive tape, Cetirizine, Chlorhexidine, Codeine, Hydrocodone-acetaminophen, Meperidine, Propoxyphene, Sesame oil, and Acetaminophen    Social History     Tobacco Use    Smoking status: Never     Passive exposure: Never    Smokeless tobacco: Never   Vaping Use    Vaping Use: Never used   Substance Use Topics    Alcohol use: Never    Drug use: Never       Family History   Problem Relation Age of Onset    Hypertension Mother     Arthritis Mother     Osteoporosis Mother     Depression Mother     Hyperlipidemia Mother     Hypertension Father     Other Father         RENAL CALCULUS    Depression Father     Hyperlipidemia Father     Diabetes Daughter     Asthma Daughter     Depression Daughter     Arthritis Paternal Uncle     Diabetes Paternal Uncle     COPD Maternal Grandfather     Lung cancer Maternal Grandfather     Birth defects Paternal Grandmother     Bleeding Disorder Paternal Grandmother     Thyroid disease Paternal Grandmother     Hypertension Paternal  Grandfather     Diabetes Paternal Grandfather     Heart disease Paternal Grandfather     Heart attack Paternal Grandfather     Lung cancer Other     Diabetes type II Other     Hyperlipidemia Other     Heart disease Other         ISCHEMIC    Cancer Other     Hypertension Other     Colon cancer Neg Hx     Malig Hyperthermia Neg Hx         Health Maintenance Due   Topic Date Due    ANNUAL PHYSICAL  Never done    DIABETIC FOOT EXAM  10/15/2023        Immunization History   Administered Date(s) Administered    Fluzone (or Fluarix & Flulaval for VFC) >6mos 01/06/2014, 10/13/2021    Hepatitis A 05/15/2018, 11/05/2018    Influenza Seasonal Injectable 01/06/2014    Influenza, Unspecified 02/22/2019, 09/15/2022    MMR 02/22/2019    Pneumococcal Polysaccharide (PPSV23) 02/11/2022    TD Preservative Free (Tenivac) 11/05/2018    Tdap 11/06/2018       Review of Systems   Constitutional:  Negative for fatigue.   Respiratory:  Negative for cough and shortness of breath.    Cardiovascular:  Positive for leg swelling. Negative for chest pain.   Gastrointestinal:  Negative for diarrhea, nausea and vomiting.   Musculoskeletal:  Positive for arthralgias and gait problem.   Skin:  Positive for skin lesions.        Objective       Vitals:    01/31/24 1558   BP: 129/74   Pulse: 86   Resp: 16   Temp: 98 °F (36.7 °C)   SpO2: 97%   Weight: 97.5 kg (215 lb)       Body mass index is 41.99 kg/m².         Physical Exam  Constitutional:       Appearance: Normal appearance.   HENT:      Head: Normocephalic and atraumatic.   Eyes:      General: No scleral icterus.        Right eye: No discharge.         Left eye: No discharge.      Conjunctiva/sclera: Conjunctivae normal.   Pulmonary:      Effort: Pulmonary effort is normal.   Musculoskeletal:      Right lower leg: Edema present.      Left lower leg: Edema present.      Comments: Trace sybil lower legs   Skin:     Findings: Lesion present. No rash.      Comments: See pic--it is between the breast  tissue.   Neurological:      Mental Status: She is alert and oriented to person, place, and time. Mental status is at baseline.   Psychiatric:         Mood and Affect: Mood normal.         Behavior: Behavior normal.         Thought Content: Thought content normal.         Judgment: Judgment normal.             Result Review :     The following data was reviewed by: HAILEY Doan on 01/31/2024:                     Assessment and Plan      Diagnoses and all orders for this visit:    1. Skin lesion of chest wall (Primary)  -     Ambulatory Referral to Dermatology    2. Fall, initial encounter  -     XR Sacrum & Coccyx; Future  -     XR Pelvis 1 or 2 View; Future    3. Swelling  -     hydroCHLOROthiazide (HYDRODIURIL) 12.5 MG tablet; Take 1 tablet by mouth Daily for 10 days.  Dispense: 10 tablet; Refill: 0            Follow Up     Return if symptoms worsen or fail to improve.  We will do the above and she will keep me posted. Drink plenty of water.  Watch salt intake.  Call with any concerns or questions.    Patient was given instructions and counseling regarding her condition or for health maintenance advice. Please see specific information pulled into the AVS if appropriate.     Parts of this note are electronic transcriptions/translations of spoken language to printed text using the Dragon Dictation system.          HAILEY Doan  01/31/2024  Answers submitted by the patient for this visit:  Other (Submitted on 1/31/2024)  Please describe your symptoms.: I have a place on my chest that needs to be checked. Also having a lot of pain around my tail bone  Have you had these symptoms before?: No  How long have you been having these symptoms?: Greater than 2 weeks  Primary Reason for Visit (Submitted on 1/31/2024)  What is the primary reason for your visit?: Other

## 2024-02-15 DIAGNOSIS — R60.9 SWELLING: ICD-10-CM

## 2024-02-16 RX ORDER — HYDROCHLOROTHIAZIDE 12.5 MG/1
12.5 TABLET ORAL DAILY
Qty: 30 TABLET | Refills: 5 | Status: SHIPPED | OUTPATIENT
Start: 2024-02-16

## 2024-02-29 ENCOUNTER — TELEPHONE (OUTPATIENT)
Dept: FAMILY MEDICINE CLINIC | Facility: CLINIC | Age: 49
End: 2024-02-29
Payer: COMMERCIAL

## 2024-02-29 DIAGNOSIS — E11.9 TYPE 2 DIABETES MELLITUS WITHOUT COMPLICATION, WITHOUT LONG-TERM CURRENT USE OF INSULIN: Primary | ICD-10-CM

## 2024-02-29 NOTE — TELEPHONE ENCOUNTER
Patient said she would like to see someone else. Dr. BROWN office has not answered calls trouble making appointment.

## 2024-03-18 DIAGNOSIS — G25.81 RESTLESS LEGS: ICD-10-CM

## 2024-03-18 DIAGNOSIS — M25.511 ACUTE PAIN OF RIGHT SHOULDER: ICD-10-CM

## 2024-03-18 RX ORDER — PRAMIPEXOLE DIHYDROCHLORIDE 0.12 MG/1
TABLET ORAL
Qty: 60 TABLET | Refills: 1 | Status: SHIPPED | OUTPATIENT
Start: 2024-03-18

## 2024-03-18 RX ORDER — CYCLOBENZAPRINE HCL 10 MG
TABLET ORAL
Qty: 15 TABLET | Refills: 0 | OUTPATIENT
Start: 2024-03-18

## 2024-03-21 ENCOUNTER — OFFICE VISIT (OUTPATIENT)
Dept: FAMILY MEDICINE CLINIC | Facility: CLINIC | Age: 49
End: 2024-03-21
Payer: COMMERCIAL

## 2024-03-21 VITALS
OXYGEN SATURATION: 98 % | SYSTOLIC BLOOD PRESSURE: 127 MMHG | HEART RATE: 81 BPM | RESPIRATION RATE: 18 BRPM | HEIGHT: 65 IN | TEMPERATURE: 97 F | WEIGHT: 213.1 LBS | BODY MASS INDEX: 35.5 KG/M2 | DIASTOLIC BLOOD PRESSURE: 71 MMHG

## 2024-03-21 DIAGNOSIS — G43.809 OTHER MIGRAINE WITHOUT STATUS MIGRAINOSUS, NOT INTRACTABLE: Primary | ICD-10-CM

## 2024-03-21 DIAGNOSIS — E11.65 TYPE 2 DIABETES MELLITUS WITH HYPERGLYCEMIA, WITH LONG-TERM CURRENT USE OF INSULIN: ICD-10-CM

## 2024-03-21 DIAGNOSIS — Z79.4 TYPE 2 DIABETES MELLITUS WITH HYPERGLYCEMIA, WITH LONG-TERM CURRENT USE OF INSULIN: ICD-10-CM

## 2024-03-21 RX ORDER — INSULIN LISPRO 100 [IU]/ML
INJECTION, SOLUTION INTRAVENOUS; SUBCUTANEOUS
COMMUNITY

## 2024-03-21 RX ORDER — TOPIRAMATE 25 MG/1
25 TABLET ORAL 2 TIMES DAILY
Qty: 60 TABLET | Refills: 1 | Status: SHIPPED | OUTPATIENT
Start: 2024-03-21

## 2024-03-21 RX ORDER — SUMATRIPTAN 25 MG/1
TABLET, FILM COATED ORAL
Qty: 9 TABLET | Refills: 0 | Status: SHIPPED | OUTPATIENT
Start: 2024-03-21

## 2024-03-21 NOTE — ASSESSMENT & PLAN NOTE
Headaches are worsening.    Plan:  Begin taking the following medication/s; Topamax and acute imitrex.     Discussed medication dosage, use, side effects, and goals of treatment in detail.    Discussed monitoring symptoms and use of quick-relief medications and maintenance medication.    General Treatment Goals:   symptom prevention  minimize work absence  minimizing limitation in activity  prevention of exacerbations  decrease use of ER/inpatient care  minimization of adverse effects of treatment    Followup 2 mths and we will do fasting labs at that time also

## 2024-03-21 NOTE — PROGRESS NOTES
Chief Complaint  Migraine    Subjective          Estela Deras presents to [facility]  History of Present Illness  She is here for migraines.  She has had the migraines in the past and they have been well-controlled.  She has been on metoprolol and that has been doing good until recently.  She said she has had mild headaches it is midmorning the head but then all of a sudden midday she will get a headache and then sick to her stomach.  She did start insulin about a month ago.  The headaches those started before she started the insulin.  She is willing to take medication other than Toprol if needed for the headaches.  These are not the worst headaches that they are just becoming more and more frequent.  She does have some light sensitivity at times and definitely the nausea.  Dr. BROWN did start her on insulin.  She is following up with him but I did place a referral in for our endocrinology but she has not scheduled yet  Depression: At risk (1/31/2024)    PHQ-2     PHQ-2 Score: 12    and 1/31/2024               Allergies  Adhesive tape, Cetirizine, Chlorhexidine, Codeine, Hydrocodone, Hydrocodone-acetaminophen, Meperidine, Propoxyphene, Sesame oil, and Acetaminophen    Social History     Tobacco Use    Smoking status: Never     Passive exposure: Never    Smokeless tobacco: Never   Vaping Use    Vaping status: Never Used   Substance Use Topics    Alcohol use: Never    Drug use: Never       Family History   Problem Relation Age of Onset    Hypertension Mother     Arthritis Mother     Osteoporosis Mother     Depression Mother     Hyperlipidemia Mother     Hypertension Father     Other Father         RENAL CALCULUS    Depression Father     Hyperlipidemia Father     Diabetes Daughter     Asthma Daughter     Depression Daughter     Arthritis Paternal Uncle     Diabetes Paternal Uncle     COPD Maternal Grandfather     Lung cancer Maternal Grandfather     Cancer Maternal Grandfather     Birth  "defects Paternal Grandmother     Bleeding Disorder Paternal Grandmother     Thyroid disease Paternal Grandmother     Hypertension Paternal Grandfather     Diabetes Paternal Grandfather     Heart disease Paternal Grandfather     Heart attack Paternal Grandfather     Lung cancer Other     Diabetes type II Other     Hyperlipidemia Other     Heart disease Other         ISCHEMIC    Cancer Other     Hypertension Other     Colon cancer Neg Hx     Malig Hyperthermia Neg Hx         Health Maintenance Due   Topic Date Due    ANNUAL PHYSICAL  Never done    DIABETIC FOOT EXAM  10/15/2023    HEMOGLOBIN A1C  03/07/2024        Immunization History   Administered Date(s) Administered    Fluzone (or Fluarix & Flulaval for VFC) >6mos 01/06/2014, 10/13/2021    Hepatitis A 05/15/2018, 11/05/2018    Influenza Seasonal Injectable 01/06/2014    Influenza, Unspecified 02/22/2019, 09/15/2022    MMR 02/22/2019    Pneumococcal Polysaccharide (PPSV23) 02/11/2022    TD Preservative Free (Tenivac) 11/05/2018    Tdap 11/06/2018       Review of Systems   Constitutional:  Negative for fatigue.   Respiratory:  Negative for cough and shortness of breath.    Cardiovascular:  Negative for chest pain.   Gastrointestinal:  Positive for nausea. Negative for diarrhea and vomiting.   Neurological:  Positive for headache.        Objective       Vitals:    03/21/24 0803   BP: 127/71   Pulse: 81   Resp: 18   Temp: 97 °F (36.1 °C)   SpO2: 98%   Weight: 96.7 kg (213 lb 1.6 oz)   Height: 165.1 cm (65\")       Body mass index is 35.46 kg/m².         Physical Exam  Constitutional:       Appearance: Normal appearance.   HENT:      Head: Normocephalic.   Pulmonary:      Effort: Pulmonary effort is normal.   Skin:     Findings: No bruising.   Neurological:      General: No focal deficit present.      Mental Status: She is alert and oriented to person, place, and time.   Psychiatric:         Mood and Affect: Mood normal.         Behavior: Behavior normal.         " Thought Content: Thought content normal.         Judgment: Judgment normal.               Result Review :     The following data was reviewed by: HAILEY Doan on 03/21/2024:    Common Labs   Common labs          9/7/2023    16:10 11/10/2023    13:57 11/13/2023    17:08   Common Labs   Glucose 111      BUN 7      Creatinine 0.60   0.70    Sodium 136      Potassium 3.7      Chloride 98      Calcium 9.6      Albumin 4.3  4.1     Total Bilirubin 0.5  0.5     Alkaline Phosphatase 85  88     AST (SGOT) 29  19     ALT (SGPT) 37  29     WBC 7.53  9.37     Hemoglobin 12.6  12.3     Hematocrit 38.2  38.1     Platelets 255  228     Total Cholesterol 146      Triglycerides 176      HDL Cholesterol 36      LDL Cholesterol  80      Hemoglobin A1C 7.00      Microalbumin, Urine <1.2              No Images in the past 120 days found..              Assessment and Plan      Assessment & Plan  Other migraine without status migrainosus, not intractable  Headaches are worsening.    Plan:  Begin taking the following medication/s; Topamax and acute imitrex.     Discussed medication dosage, use, side effects, and goals of treatment in detail.    Discussed monitoring symptoms and use of quick-relief medications and maintenance medication.    General Treatment Goals:   symptom prevention  minimize work absence  minimizing limitation in activity  prevention of exacerbations  decrease use of ER/inpatient care  minimization of adverse effects of treatment    Followup  2 mths and we will do fasting labs at that time also              Type 2 diabetes mellitus with hyperglycemia, with long-term current use of insulin       New Medications Ordered This Visit   Medications    topiramate (Topamax) 25 MG tablet     Sig: Take 1 tablet by mouth 2 (Two) Times a Day.     Dispense:  60 tablet     Refill:  1    SUMAtriptan (Imitrex) 25 MG tablet     Sig: Take one tablet at onset of headache. May repeat dose one time in 2 hours if headache not  relieved.     Dispense:  9 tablet     Refill:  0          Diagnosis Plan   1. Other migraine without status migrainosus, not intractable  topiramate (Topamax) 25 MG tablet    SUMAtriptan (Imitrex) 25 MG tablet      2. Type 2 diabetes mellitus with hyperglycemia, with long-term current use of insulin                  Follow Up     Return in about 2 months (around 5/21/2024), or if symptoms worsen or fail to improve.    Patient was given instructions and counseling regarding her condition or for health maintenance advice. Please see specific information pulled into the AVS if appropriate.     Parts of this note are electronic transcriptions/translations of spoken language to printed text using the Dragon Dictation system.          Nannette Braun, HAILEY  03/21/2024  Answers submitted by the patient for this visit:  Other (Submitted on 3/20/2024)  Please describe your symptoms.: Migraines  Have you had these symptoms before?: Yes  How long have you been having these symptoms?: Greater than 2 weeks  Please list any medications you are currently taking for this condition.: Ibuprofen  Primary Reason for Visit (Submitted on 3/20/2024)  What is the primary reason for your visit?: Other

## 2024-03-25 ENCOUNTER — PATIENT ROUNDING (BHMG ONLY) (OUTPATIENT)
Dept: FAMILY MEDICINE CLINIC | Facility: CLINIC | Age: 49
End: 2024-03-25
Payer: COMMERCIAL

## 2024-03-25 NOTE — PROGRESS NOTES
A My-Chart message has been sent to the patient for PATIENT ROUNDING with [facility].

## 2024-04-16 DIAGNOSIS — E78.2 MIXED HYPERLIPIDEMIA: ICD-10-CM

## 2024-04-16 DIAGNOSIS — I34.1 MITRAL VALVE PROLAPSE: ICD-10-CM

## 2024-04-16 DIAGNOSIS — K44.9 HIATAL HERNIA: ICD-10-CM

## 2024-04-16 DIAGNOSIS — K21.9 GASTROESOPHAGEAL REFLUX DISEASE WITHOUT ESOPHAGITIS: ICD-10-CM

## 2024-04-16 DIAGNOSIS — B00.9 HSV (HERPES SIMPLEX VIRUS) INFECTION: ICD-10-CM

## 2024-04-16 DIAGNOSIS — F51.01 PRIMARY INSOMNIA: ICD-10-CM

## 2024-04-16 RX ORDER — METOPROLOL SUCCINATE 50 MG/1
50 TABLET, EXTENDED RELEASE ORAL DAILY
Qty: 90 TABLET | Refills: 0 | Status: SHIPPED | OUTPATIENT
Start: 2024-04-16

## 2024-04-16 RX ORDER — OMEPRAZOLE 20 MG/1
20 CAPSULE, DELAYED RELEASE ORAL DAILY
Qty: 90 CAPSULE | Refills: 0 | Status: SHIPPED | OUTPATIENT
Start: 2024-04-16

## 2024-04-16 RX ORDER — EZETIMIBE 10 MG/1
10 TABLET ORAL
Qty: 90 TABLET | Refills: 0 | Status: SHIPPED | OUTPATIENT
Start: 2024-04-16

## 2024-04-16 RX ORDER — TRAZODONE HYDROCHLORIDE 100 MG/1
100 TABLET ORAL
Qty: 90 TABLET | Refills: 0 | Status: SHIPPED | OUTPATIENT
Start: 2024-04-16

## 2024-04-16 RX ORDER — VALACYCLOVIR HYDROCHLORIDE 500 MG/1
500 TABLET, FILM COATED ORAL EVERY MORNING
Qty: 90 TABLET | Refills: 0 | Status: SHIPPED | OUTPATIENT
Start: 2024-04-16

## 2024-04-17 DIAGNOSIS — E78.2 MIXED HYPERLIPIDEMIA: ICD-10-CM

## 2024-04-17 RX ORDER — ATORVASTATIN CALCIUM 80 MG/1
80 TABLET, FILM COATED ORAL DAILY
Qty: 90 TABLET | Refills: 0 | Status: SHIPPED | OUTPATIENT
Start: 2024-04-17

## 2024-05-13 ENCOUNTER — TELEPHONE (OUTPATIENT)
Dept: GASTROENTEROLOGY | Facility: CLINIC | Age: 49
End: 2024-05-13

## 2024-05-13 DIAGNOSIS — G43.909 MIGRAINE WITHOUT STATUS MIGRAINOSUS, NOT INTRACTABLE, UNSPECIFIED MIGRAINE TYPE: ICD-10-CM

## 2024-05-13 DIAGNOSIS — G25.81 RESTLESS LEGS: ICD-10-CM

## 2024-05-13 RX ORDER — TOPIRAMATE 25 MG/1
25 TABLET ORAL 2 TIMES DAILY
Qty: 60 TABLET | Refills: 1 | OUTPATIENT
Start: 2024-05-13

## 2024-05-13 RX ORDER — PRAMIPEXOLE DIHYDROCHLORIDE 0.12 MG/1
TABLET ORAL
Qty: 60 TABLET | Refills: 1 | OUTPATIENT
Start: 2024-05-13

## 2024-05-13 NOTE — TELEPHONE ENCOUNTER
Attempted to contact/Contacted Crystal Page 1975 regarding the appointment no show with HAILEY Garsia on 05.13.24 @ 9:00. Patient is aware that there is a 24-hour cancellation policy and understands that a no-show letter will be mailed to them at the address on file. [facility]

## 2024-05-13 NOTE — TELEPHONE ENCOUNTER
Called pt 2x for mychart visit at 9am this morning. Pt did not answer but I left a VM for pt to return the call.

## 2024-06-05 ENCOUNTER — TELEPHONE (OUTPATIENT)
Dept: GASTROENTEROLOGY | Facility: CLINIC | Age: 49
End: 2024-06-05
Payer: COMMERCIAL

## 2024-06-19 ENCOUNTER — OFFICE VISIT (OUTPATIENT)
Dept: FAMILY MEDICINE CLINIC | Facility: CLINIC | Age: 49
End: 2024-06-19
Payer: COMMERCIAL

## 2024-06-19 VITALS
DIASTOLIC BLOOD PRESSURE: 50 MMHG | SYSTOLIC BLOOD PRESSURE: 131 MMHG | RESPIRATION RATE: 16 BRPM | HEIGHT: 65 IN | OXYGEN SATURATION: 98 % | BODY MASS INDEX: 34.09 KG/M2 | WEIGHT: 204.6 LBS | TEMPERATURE: 97.6 F | HEART RATE: 76 BPM

## 2024-06-19 DIAGNOSIS — Z00.00 ANNUAL PHYSICAL EXAM: Primary | ICD-10-CM

## 2024-06-19 DIAGNOSIS — F51.01 PRIMARY INSOMNIA: ICD-10-CM

## 2024-06-19 DIAGNOSIS — B00.9 HSV (HERPES SIMPLEX VIRUS) INFECTION: ICD-10-CM

## 2024-06-19 DIAGNOSIS — K21.9 GASTROESOPHAGEAL REFLUX DISEASE WITHOUT ESOPHAGITIS: ICD-10-CM

## 2024-06-19 DIAGNOSIS — K44.9 HIATAL HERNIA: ICD-10-CM

## 2024-06-19 DIAGNOSIS — Z79.4 TYPE 2 DIABETES MELLITUS WITH HYPERGLYCEMIA, WITH LONG-TERM CURRENT USE OF INSULIN: ICD-10-CM

## 2024-06-19 DIAGNOSIS — M25.511 CHRONIC RIGHT SHOULDER PAIN: ICD-10-CM

## 2024-06-19 DIAGNOSIS — I34.1 MITRAL VALVE PROLAPSE: ICD-10-CM

## 2024-06-19 DIAGNOSIS — E78.2 MIXED HYPERLIPIDEMIA: ICD-10-CM

## 2024-06-19 DIAGNOSIS — E55.9 VITAMIN D DEFICIENCY: ICD-10-CM

## 2024-06-19 DIAGNOSIS — G43.909 MIGRAINE WITHOUT STATUS MIGRAINOSUS, NOT INTRACTABLE, UNSPECIFIED MIGRAINE TYPE: ICD-10-CM

## 2024-06-19 DIAGNOSIS — Z12.31 SCREENING MAMMOGRAM FOR BREAST CANCER: ICD-10-CM

## 2024-06-19 DIAGNOSIS — G25.81 RESTLESS LEGS: ICD-10-CM

## 2024-06-19 DIAGNOSIS — G89.29 CHRONIC RIGHT SHOULDER PAIN: ICD-10-CM

## 2024-06-19 DIAGNOSIS — E03.9 HYPOTHYROIDISM, UNSPECIFIED TYPE: ICD-10-CM

## 2024-06-19 DIAGNOSIS — E11.65 TYPE 2 DIABETES MELLITUS WITH HYPERGLYCEMIA, WITH LONG-TERM CURRENT USE OF INSULIN: ICD-10-CM

## 2024-06-19 LAB
25(OH)D3 SERPL-MCNC: 20.9 NG/ML (ref 30–100)
ALBUMIN SERPL-MCNC: 4.1 G/DL (ref 3.5–5.2)
ALBUMIN UR-MCNC: <1.2 MG/DL
ALBUMIN/GLOB SERPL: 1.3 G/DL
ALP SERPL-CCNC: 87 U/L (ref 39–117)
ALT SERPL W P-5'-P-CCNC: 33 U/L (ref 1–33)
ANION GAP SERPL CALCULATED.3IONS-SCNC: 10 MMOL/L (ref 5–15)
AST SERPL-CCNC: 18 U/L (ref 1–32)
BASOPHILS # BLD AUTO: 0.04 10*3/MM3 (ref 0–0.2)
BASOPHILS NFR BLD AUTO: 0.3 % (ref 0–1.5)
BILIRUB SERPL-MCNC: 0.4 MG/DL (ref 0–1.2)
BUN SERPL-MCNC: 11 MG/DL (ref 6–20)
BUN/CREAT SERPL: 14.1 (ref 7–25)
CALCIUM SPEC-SCNC: 9.8 MG/DL (ref 8.6–10.5)
CHLORIDE SERPL-SCNC: 101 MMOL/L (ref 98–107)
CHOLEST SERPL-MCNC: 155 MG/DL (ref 0–200)
CO2 SERPL-SCNC: 26 MMOL/L (ref 22–29)
CREAT SERPL-MCNC: 0.78 MG/DL (ref 0.57–1)
DEPRECATED RDW RBC AUTO: 42.8 FL (ref 37–54)
EGFRCR SERPLBLD CKD-EPI 2021: 94.4 ML/MIN/1.73
EOSINOPHIL # BLD AUTO: 0.03 10*3/MM3 (ref 0–0.4)
EOSINOPHIL NFR BLD AUTO: 0.2 % (ref 0.3–6.2)
ERYTHROCYTE [DISTWIDTH] IN BLOOD BY AUTOMATED COUNT: 14.1 % (ref 12.3–15.4)
FOLATE SERPL-MCNC: >20 NG/ML (ref 4.78–24.2)
GLOBULIN UR ELPH-MCNC: 3.1 GM/DL
GLUCOSE SERPL-MCNC: 114 MG/DL (ref 65–99)
HBA1C MFR BLD: 7 % (ref 4.8–5.6)
HCT VFR BLD AUTO: 42.4 % (ref 34–46.6)
HDLC SERPL-MCNC: 47 MG/DL (ref 40–60)
HGB BLD-MCNC: 13.7 G/DL (ref 12–15.9)
IMM GRANULOCYTES # BLD AUTO: 0.05 10*3/MM3 (ref 0–0.05)
IMM GRANULOCYTES NFR BLD AUTO: 0.4 % (ref 0–0.5)
IRON 24H UR-MRATE: 56 MCG/DL (ref 37–145)
IRON SATN MFR SERPL: 12 % (ref 20–50)
LDLC SERPL CALC-MCNC: 84 MG/DL (ref 0–100)
LDLC/HDLC SERPL: 1.71 {RATIO}
LYMPHOCYTES # BLD AUTO: 2.96 10*3/MM3 (ref 0.7–3.1)
LYMPHOCYTES NFR BLD AUTO: 23 % (ref 19.6–45.3)
MAGNESIUM SERPL-MCNC: 2.2 MG/DL (ref 1.6–2.6)
MCH RBC QN AUTO: 27.2 PG (ref 26.6–33)
MCHC RBC AUTO-ENTMCNC: 32.3 G/DL (ref 31.5–35.7)
MCV RBC AUTO: 84.3 FL (ref 79–97)
MONOCYTES # BLD AUTO: 0.82 10*3/MM3 (ref 0.1–0.9)
MONOCYTES NFR BLD AUTO: 6.4 % (ref 5–12)
NEUTROPHILS NFR BLD AUTO: 69.7 % (ref 42.7–76)
NEUTROPHILS NFR BLD AUTO: 8.99 10*3/MM3 (ref 1.7–7)
NRBC BLD AUTO-RTO: 0 /100 WBC (ref 0–0.2)
PLATELET # BLD AUTO: 278 10*3/MM3 (ref 140–450)
PMV BLD AUTO: 12.2 FL (ref 6–12)
POTASSIUM SERPL-SCNC: 4.1 MMOL/L (ref 3.5–5.2)
PROT SERPL-MCNC: 7.2 G/DL (ref 6–8.5)
RBC # BLD AUTO: 5.03 10*6/MM3 (ref 3.77–5.28)
SODIUM SERPL-SCNC: 137 MMOL/L (ref 136–145)
T3FREE SERPL-MCNC: 2.48 PG/ML (ref 2–4.4)
T4 FREE SERPL-MCNC: 1.32 NG/DL (ref 0.92–1.68)
TIBC SERPL-MCNC: 454 MCG/DL (ref 298–536)
TRANSFERRIN SERPL-MCNC: 305 MG/DL (ref 200–360)
TRIGL SERPL-MCNC: 138 MG/DL (ref 0–150)
TSH SERPL DL<=0.05 MIU/L-ACNC: 2.42 UIU/ML (ref 0.27–4.2)
VIT B12 BLD-MCNC: 412 PG/ML (ref 211–946)
VLDLC SERPL-MCNC: 24 MG/DL (ref 5–40)
WBC NRBC COR # BLD AUTO: 12.89 10*3/MM3 (ref 3.4–10.8)

## 2024-06-19 PROCEDURE — 82306 VITAMIN D 25 HYDROXY: CPT | Performed by: NURSE PRACTITIONER

## 2024-06-19 PROCEDURE — 80050 GENERAL HEALTH PANEL: CPT | Performed by: NURSE PRACTITIONER

## 2024-06-19 PROCEDURE — 82746 ASSAY OF FOLIC ACID SERUM: CPT | Performed by: NURSE PRACTITIONER

## 2024-06-19 PROCEDURE — 83036 HEMOGLOBIN GLYCOSYLATED A1C: CPT | Performed by: NURSE PRACTITIONER

## 2024-06-19 PROCEDURE — 82043 UR ALBUMIN QUANTITATIVE: CPT | Performed by: NURSE PRACTITIONER

## 2024-06-19 PROCEDURE — 86376 MICROSOMAL ANTIBODY EACH: CPT | Performed by: NURSE PRACTITIONER

## 2024-06-19 PROCEDURE — 82607 VITAMIN B-12: CPT | Performed by: NURSE PRACTITIONER

## 2024-06-19 PROCEDURE — 84481 FREE ASSAY (FT-3): CPT | Performed by: NURSE PRACTITIONER

## 2024-06-19 PROCEDURE — 86800 THYROGLOBULIN ANTIBODY: CPT | Performed by: NURSE PRACTITIONER

## 2024-06-19 PROCEDURE — 83735 ASSAY OF MAGNESIUM: CPT | Performed by: NURSE PRACTITIONER

## 2024-06-19 PROCEDURE — 84466 ASSAY OF TRANSFERRIN: CPT | Performed by: NURSE PRACTITIONER

## 2024-06-19 PROCEDURE — 83540 ASSAY OF IRON: CPT | Performed by: NURSE PRACTITIONER

## 2024-06-19 PROCEDURE — 99396 PREV VISIT EST AGE 40-64: CPT | Performed by: NURSE PRACTITIONER

## 2024-06-19 PROCEDURE — 84439 ASSAY OF FREE THYROXINE: CPT | Performed by: NURSE PRACTITIONER

## 2024-06-19 PROCEDURE — 99214 OFFICE O/P EST MOD 30 MIN: CPT | Performed by: NURSE PRACTITIONER

## 2024-06-19 PROCEDURE — 80061 LIPID PANEL: CPT | Performed by: NURSE PRACTITIONER

## 2024-06-19 RX ORDER — SUMATRIPTAN 25 MG/1
TABLET, FILM COATED ORAL
Qty: 9 TABLET | Refills: 0 | Status: CANCELLED | OUTPATIENT
Start: 2024-06-19

## 2024-06-19 RX ORDER — VALACYCLOVIR HYDROCHLORIDE 500 MG/1
500 TABLET, FILM COATED ORAL EVERY MORNING
Qty: 90 TABLET | Refills: 1 | Status: SHIPPED | OUTPATIENT
Start: 2024-06-19

## 2024-06-19 RX ORDER — CYCLOBENZAPRINE HCL 10 MG
10 TABLET ORAL NIGHTLY PRN
Qty: 15 TABLET | Refills: 0 | Status: SHIPPED | OUTPATIENT
Start: 2024-06-19

## 2024-06-19 RX ORDER — BOLUS INSULIN PUMP, 200 UNIT 2 UNIT
EACH MISCELLANEOUS
COMMUNITY

## 2024-06-19 RX ORDER — PRAMIPEXOLE DIHYDROCHLORIDE 0.12 MG/1
TABLET ORAL
Qty: 60 TABLET | Refills: 1 | Status: SHIPPED | OUTPATIENT
Start: 2024-06-19

## 2024-06-19 RX ORDER — ATORVASTATIN CALCIUM 80 MG/1
80 TABLET, FILM COATED ORAL DAILY
Qty: 90 TABLET | Refills: 0 | Status: SHIPPED | OUTPATIENT
Start: 2024-06-19

## 2024-06-19 RX ORDER — RIMEGEPANT SULFATE 75 MG/75MG
75 TABLET, ORALLY DISINTEGRATING ORAL DAILY PRN
Qty: 8 TABLET | Refills: 2 | Status: SHIPPED | OUTPATIENT
Start: 2024-06-19

## 2024-06-19 RX ORDER — EZETIMIBE 10 MG/1
10 TABLET ORAL
Qty: 90 TABLET | Refills: 0 | Status: SHIPPED | OUTPATIENT
Start: 2024-06-19

## 2024-06-19 RX ORDER — TRAZODONE HYDROCHLORIDE 100 MG/1
100 TABLET ORAL
Qty: 90 TABLET | Refills: 0 | Status: SHIPPED | OUTPATIENT
Start: 2024-06-19

## 2024-06-19 RX ORDER — OMEPRAZOLE 20 MG/1
20 CAPSULE, DELAYED RELEASE ORAL DAILY
Qty: 90 CAPSULE | Refills: 0 | Status: SHIPPED | OUTPATIENT
Start: 2024-06-19

## 2024-06-19 RX ORDER — METOPROLOL SUCCINATE 50 MG/1
50 TABLET, EXTENDED RELEASE ORAL DAILY
Qty: 90 TABLET | Refills: 0 | Status: SHIPPED | OUTPATIENT
Start: 2024-06-19

## 2024-06-19 NOTE — ASSESSMENT & PLAN NOTE
Headaches are she had a headache at work (she is now at [facility] and she took a nurtec a provider gave her and that took the headache away.  She would like ot see about get the nurtec for the headaches.  .    Plan:  Begin taking the following medication/s; nurtec.     Discussed medication dosage, use, side effects, and goals of treatment in detail.    Discussed monitoring symptoms and use of quick-relief medications and maintenance medication.    General Treatment Goals:   symptom prevention  minimize work absence  minimizing limitation in activity  prevention of exacerbations  decrease use of ER/inpatient care  minimization of adverse effects of treatment    Followup in 6 months

## 2024-06-19 NOTE — PROGRESS NOTES
Chief Complaint  Hyperlipidemia, Hypertension, Hypothyroidism, and Diabetes    Subjective          Crystal Page presents to [facility]  Hyperlipidemia  Exacerbating diseases include hypothyroidism. Pertinent negatives include no chest pain or shortness of breath.   Hypertension  Pertinent negatives include no chest pain or shortness of breath.   Hypothyroidism  Pertinent negatives include no chest pain, coughing, fatigue, nausea or vomiting.   Diabetes  Pertinent negatives for diabetes include no chest pain and no fatigue.     Here for check up:  Dentist: needs to find one  Eye: goes to Dr Tenorio  Mammo: needs now  Pap: had a hysterectomy   Family hx: no changes  Colonoscopy: due 2032  MVP:She takes her toprol daily. NO issues noted and cont to see cardio and just saw them on   HSV: She takes her Valtrex with no issues and no breakouts.  LIPID: She is taking her cholesterol medicine every day--it is high dose lipitor and zetia.  Needs labs today  She is due to see GI  She is seeing endo next week--she has her pump and her dexcom.    GERD: She is doing well with her Prilosec.  Insomnia: Trazodone does a good job for her sleep overall.  She is still seeing [facility] office  She did get  1 mth ago.  Depression: At risk (1/31/2024)    PHQ-2     PHQ-2 Score: 12    and 1/31/2024               Allergies  Adhesive tape, Cetirizine, Chlorhexidine, Codeine, Hydrocodone, Hydrocodone-acetaminophen, Meperidine, Propoxyphene, Sesame oil, and Acetaminophen    Social History     Tobacco Use    Smoking status: Never     Passive exposure: Never    Smokeless tobacco: Never   Vaping Use    Vaping status: Never Used   Substance Use Topics    Alcohol use: Never    Drug use: Never       Family History   Problem Relation Age of Onset    Hypertension Mother     Arthritis Mother     Osteoporosis Mother     Depression Mother     Hyperlipidemia Mother     Hypertension Father     Other Father          "RENAL CALCULUS    Depression Father     Hyperlipidemia Father     Diabetes Daughter     Asthma Daughter     Depression Daughter     Arthritis Paternal Uncle     Diabetes Paternal Uncle     COPD Maternal Grandfather     Lung cancer Maternal Grandfather     Cancer Maternal Grandfather     Birth defects Paternal Grandmother     Bleeding Disorder Paternal Grandmother     Thyroid disease Paternal Grandmother     Hypertension Paternal Grandfather     Diabetes Paternal Grandfather     Heart disease Paternal Grandfather     Heart attack Paternal Grandfather     Lung cancer Other     Diabetes type II Other     Hyperlipidemia Other     Heart disease Other         ISCHEMIC    Cancer Other     Hypertension Other     Colon cancer Neg Hx     Malig Hyperthermia Neg Hx         Health Maintenance Due   Topic Date Due    Hepatitis B (1 of 3 - 19+ 3-dose series) Never done    ANNUAL PHYSICAL  Never done    DIABETIC FOOT EXAM  10/15/2023    HEMOGLOBIN A1C  03/07/2024    DIABETIC EYE EXAM  07/11/2024    MAMMOGRAM  09/13/2024        Immunization History   Administered Date(s) Administered    Fluzone (or Fluarix & Flulaval for VFC) >6mos 01/06/2014, 10/13/2021    Hepatitis A 05/15/2018, 11/05/2018    Influenza Seasonal Injectable 01/06/2014    Influenza, Unspecified 02/22/2019, 09/15/2022    MMR 02/22/2019    Pneumococcal Polysaccharide (PPSV23) 02/11/2022    TD Preservative Free (Tenivac) 11/05/2018    Tdap 11/06/2018       Review of Systems   Constitutional:  Negative for fatigue.   Respiratory:  Negative for cough and shortness of breath.    Cardiovascular:  Negative for chest pain.   Gastrointestinal:  Negative for diarrhea, nausea and vomiting.        Objective       Vitals:    06/19/24 1009   BP: 131/50   Pulse: 76   Resp: 16   Temp: 97.6 °F (36.4 °C)   SpO2: 98%   Weight: 92.8 kg (204 lb 9.6 oz)   Height: 165.1 cm (65\")       Body mass index is 34.05 kg/m².         Physical Exam  Vitals and nursing note reviewed.   Constitutional:  "      General: She is not in acute distress.     Appearance: Normal appearance. She is well-developed. She is not ill-appearing.   HENT:      Right Ear: Tympanic membrane and ear canal normal.      Left Ear: Tympanic membrane and ear canal normal.      Nose: No congestion or rhinorrhea.      Mouth/Throat:      Pharynx: No oropharyngeal exudate or posterior oropharyngeal erythema.   Eyes:      Conjunctiva/sclera: Conjunctivae normal.   Neck:      Vascular: No carotid bruit.   Cardiovascular:      Rate and Rhythm: Normal rate and regular rhythm.      Heart sounds: Normal heart sounds.   Pulmonary:      Effort: Pulmonary effort is normal.      Breath sounds: Normal breath sounds. No wheezing or rhonchi.   Musculoskeletal:      Cervical back: No tenderness.      Right lower leg: No edema.      Left lower leg: No edema.   Skin:     Findings: No bruising or rash.   Neurological:      General: No focal deficit present.      Mental Status: She is alert and oriented to person, place, and time. Mental status is at baseline.      Cranial Nerves: No cranial nerve deficit.      Motor: No weakness.   Psychiatric:         Mood and Affect: Mood and affect normal.         Behavior: Behavior normal.         Thought Content: Thought content normal.         Judgment: Judgment normal.               Result Review :     The following data was reviewed by: HAILEY Doan on 06/19/2024:    Common Labs   Common labs          9/7/2023    16:10 11/10/2023    13:57 11/13/2023    17:08   Common Labs   Glucose 111      BUN 7      Creatinine 0.60   0.70    Sodium 136      Potassium 3.7      Chloride 98      Calcium 9.6      Albumin 4.3  4.1     Total Bilirubin 0.5  0.5     Alkaline Phosphatase 85  88     AST (SGOT) 29  19     ALT (SGPT) 37  29     WBC 7.53  9.37     Hemoglobin 12.6  12.3     Hematocrit 38.2  38.1     Platelets 255  228     Total Cholesterol 146      Triglycerides 176      HDL Cholesterol 36      LDL Cholesterol  80       Hemoglobin A1C 7.00      Microalbumin, Urine <1.2              XR Chest 2 View  Answers submitted by the patient for this visit:  Other (Submitted on 6/18/2024)  Please describe your symptoms.: Med refills, labs,  Have you had these symptoms before?: Yes  How long have you been having these symptoms?: 1-4 days  Primary Reason for Visit (Submitted on 6/18/2024)  What is the primary reason for your visit?: Other    Result Date: 5/14/2024  No acute cardiopulmonary process.   Electronically Signed By-Brad Estrada MD On:5/14/2024 8:37 PM                  Assessment and Plan      Assessment & Plan  Annual physical exam  ADVICE WAS GIVEN RE:  ALCOHOL USE, SEATBELT USE, REGULAR EXERCISE, HEALTHY DIET, ROUTINE EYE AND DENTAL EXAMS    HSV (herpes simplex virus) infection    Primary insomnia    Migraine without status migrainosus, not intractable, unspecified migraine type  Headaches are  she had a headache at work (she is now at [employer] and she took a nurtec a provider gave her and that took the headache away.  She would like ot see about get the nurtec for the headaches.   .    Plan:  Begin taking the following medication/s; nurtec.     Discussed medication dosage, use, side effects, and goals of treatment in detail.    Discussed monitoring symptoms and use of quick-relief medications and maintenance medication.    General Treatment Goals:   symptom prevention  minimize work absence  minimizing limitation in activity  prevention of exacerbations  decrease use of ER/inpatient care  minimization of adverse effects of treatment    Followup in 6 months              Restless legs    Gastroesophageal reflux disease without esophagitis    Hiatal hernia on EGD from 11/2022    Mitral valve prolapse    Mixed hyperlipidemia   Lipid abnormalities are stable    Plan:  Continue same medication/s without change.      Discussed medication dosage, use, side effects, and goals of treatment in detail.    Counseled patient on  lifestyle modifications to help control hyperlipidemia.     Patient Treatment Goals:   LDL goal is less than 70    Followup in 6 months.  Chronic right shoulder pain  Caution sendation with muscle relaxer  Screening mammogram for breast cancer    Hypothyroidism, unspecified type    Vitamin D deficiency    Type 2 diabetes mellitus with hyperglycemia, with long-term current use of insulin  Cont to see endo    Orders Placed This Encounter   Procedures    Mammo Screening Digital Tomosynthesis Bilateral With CAD    Comprehensive Metabolic Panel    TSH    Lipid Panel    Hemoglobin A1c    MicroAlbumin, Urine, Random - Urine, Clean Catch    T4, Free    T3, Free    Thyroid Antibodies    Magnesium    Vitamin D,25-Hydroxy    Vitamin B12 & Folate    Iron Profile    CBC Auto Differential    CBC & Differential     New Medications Ordered This Visit   Medications    valACYclovir (VALTREX) 500 MG tablet     Sig: Take 1 tablet by mouth Every Morning.     Dispense:  90 tablet     Refill:  1    traZODone (DESYREL) 100 MG tablet     Sig: Take 1 tablet by mouth every night at bedtime.     Dispense:  90 tablet     Refill:  0    pramipexole (MIRAPEX) 0.125 MG tablet     Sig: TAKE 1 OR 2 TABLETS BY MOUTH AT BEDTIME     Dispense:  60 tablet     Refill:  1    omeprazole (priLOSEC) 20 MG capsule     Sig: Take 1 capsule by mouth Daily.     Dispense:  90 capsule     Refill:  0    metoprolol succinate XL (TOPROL-XL) 50 MG 24 hr tablet     Sig: Take 1 tablet by mouth Daily.     Dispense:  90 tablet     Refill:  0    ezetimibe (ZETIA) 10 MG tablet     Sig: Take 1 tablet by mouth every night at bedtime.     Dispense:  90 tablet     Refill:  0    cyclobenzaprine (FLEXERIL) 10 MG tablet     Sig: Take 1 tablet by mouth At Night As Needed for Muscle Spasms.     Dispense:  15 tablet     Refill:  0    atorvastatin (LIPITOR) 80 MG tablet     Sig: Take 1 tablet by mouth Daily.     Dispense:  90 tablet     Refill:  0    Rimegepant Sulfate (Nurtec) 75 MG  tablet dispersible tablet     Sig: Take 1 tablet by mouth Daily As Needed (migraine).     Dispense:  8 tablet     Refill:  2          Diagnosis Plan   1. Annual physical exam  CBC & Differential    Comprehensive Metabolic Panel    TSH    Lipid Panel      2. HSV (herpes simplex virus) infection  valACYclovir (VALTREX) 500 MG tablet      3. Primary insomnia  traZODone (DESYREL) 100 MG tablet      4. Migraine without status migrainosus, not intractable, unspecified migraine type  Rimegepant Sulfate (Nurtec) 75 MG tablet dispersible tablet      5. Restless legs  pramipexole (MIRAPEX) 0.125 MG tablet      6. Gastroesophageal reflux disease without esophagitis  omeprazole (priLOSEC) 20 MG capsule      7. Hiatal hernia on EGD from 11/2022  omeprazole (priLOSEC) 20 MG capsule      8. Mitral valve prolapse  metoprolol succinate XL (TOPROL-XL) 50 MG 24 hr tablet      9. Mixed hyperlipidemia  ezetimibe (ZETIA) 10 MG tablet    atorvastatin (LIPITOR) 80 MG tablet      10. Chronic right shoulder pain  cyclobenzaprine (FLEXERIL) 10 MG tablet      11. Screening mammogram for breast cancer  Mammo Screening Digital Tomosynthesis Bilateral With CAD      12. Hypothyroidism, unspecified type  T4, Free    T3, Free    Thyroid Antibodies      13. Vitamin D deficiency  Magnesium    Vitamin D,25-Hydroxy    Vitamin B12 & Folate    Iron Profile      14. Type 2 diabetes mellitus with hyperglycemia, with long-term current use of insulin  Hemoglobin A1c    MicroAlbumin, Urine, Random - Urine, Clean Catch                Follow Up     Return in about 6 months (around 12/19/2024).  Follow-up in 6 months for labs and appt. Call with any concerns or questions that you may have regarding your medications or history.    I have reviewed all medications and at this time no medications changes need to be adjusted for all chronic conditions.    Patient was given instructions and counseling regarding her condition or for health maintenance advice. Please see  specific information pulled into the AVS if appropriate.     Parts of this note are electronic transcriptions/translations of spoken language to printed text using the Dragon Dictation system.          Nannette Braun, HAILEY  06/19/2024

## 2024-06-20 LAB
THYROGLOB AB SERPL-ACNC: <1 IU/ML (ref 0–0.9)
THYROPEROXIDASE AB SERPL-ACNC: 20 IU/ML (ref 0–34)

## 2024-06-25 ENCOUNTER — TELEPHONE (OUTPATIENT)
Dept: FAMILY MEDICINE CLINIC | Facility: CLINIC | Age: 49
End: 2024-06-25
Payer: COMMERCIAL

## 2024-06-25 PROCEDURE — 82948 REAGENT STRIP/BLOOD GLUCOSE: CPT | Performed by: PHYSICIAN ASSISTANT

## 2024-06-25 NOTE — TELEPHONE ENCOUNTER
YOLANDA FLORES JUST WENT TO URGENT CARE AND THEY TOLD HER TO COME HERE OR GO TO THE ER - SHE SAID SHE FEELS REALLY WEIRD - HER BODY FEELS HEAVY - SO SHE WANTS TO SEE IF YOU WILL SEE HER WORK HER IN - SHE KNOWS THAT YOU ARE BOOKED    PATIENT WAS TOLD TO GO TO THE ER - SHE DIDN'T SEEM TO WANT TO BUT I TOLD HER THAT YOU SAID IT A SECOND TIME SO SHE GOT UP AND LEFT

## 2024-06-26 ENCOUNTER — TELEPHONE (OUTPATIENT)
Dept: FAMILY MEDICINE CLINIC | Facility: CLINIC | Age: 49
End: 2024-06-26
Payer: COMMERCIAL

## 2024-06-26 RX ORDER — RIZATRIPTAN BENZOATE 10 MG/1
10 TABLET, ORALLY DISINTEGRATING ORAL ONCE AS NEEDED
Qty: 9 TABLET | Refills: 0 | Status: SHIPPED | OUTPATIENT
Start: 2024-06-26

## 2024-06-26 NOTE — TELEPHONE ENCOUNTER
[facility]. No answer. Asked pt to return call to office.

## 2024-08-01 ENCOUNTER — HOSPITAL ENCOUNTER (OUTPATIENT)
Dept: GENERAL RADIOLOGY | Facility: HOSPITAL | Age: 49
Discharge: HOME | End: 2024-08-01
Admitting: NURSE PRACTITIONER
Payer: COMMERCIAL
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
## 2024-08-01 PROCEDURE — 72170 X-RAY EXAM OF PELVIS: CPT

## 2024-08-01 PROCEDURE — 72220 X-RAY EXAM SACRUM TAILBONE: CPT

## 2024-08-16 DIAGNOSIS — R60.9 SWELLING: ICD-10-CM

## 2024-08-16 DIAGNOSIS — G25.81 RESTLESS LEGS: ICD-10-CM

## 2024-08-16 RX ORDER — HYDROCHLOROTHIAZIDE 12.5 MG/1
12.5 TABLET ORAL DAILY
Qty: 30 TABLET | Refills: 1 | Status: SHIPPED | OUTPATIENT
Start: 2024-08-16

## 2024-08-16 RX ORDER — PRAMIPEXOLE DIHYDROCHLORIDE 0.12 MG/1
TABLET ORAL
Qty: 60 TABLET | Refills: 1 | Status: SHIPPED | OUTPATIENT
Start: 2024-08-16

## 2024-09-03 DIAGNOSIS — G89.29 CHRONIC RIGHT SHOULDER PAIN: ICD-10-CM

## 2024-09-03 DIAGNOSIS — M25.511 CHRONIC RIGHT SHOULDER PAIN: ICD-10-CM

## 2024-09-03 RX ORDER — CYCLOBENZAPRINE HCL 10 MG
10 TABLET ORAL NIGHTLY PRN
Qty: 15 TABLET | Refills: 0 | OUTPATIENT
Start: 2024-09-03

## 2024-10-16 DIAGNOSIS — K21.9 GASTROESOPHAGEAL REFLUX DISEASE WITHOUT ESOPHAGITIS: ICD-10-CM

## 2024-10-16 DIAGNOSIS — G25.81 RESTLESS LEGS: ICD-10-CM

## 2024-10-16 DIAGNOSIS — I34.1 MITRAL VALVE PROLAPSE: ICD-10-CM

## 2024-10-16 DIAGNOSIS — K44.9 HIATAL HERNIA: ICD-10-CM

## 2024-10-16 DIAGNOSIS — F51.01 PRIMARY INSOMNIA: ICD-10-CM

## 2024-10-16 DIAGNOSIS — R60.9 SWELLING: ICD-10-CM

## 2024-10-16 DIAGNOSIS — E78.2 MIXED HYPERLIPIDEMIA: ICD-10-CM

## 2024-10-16 RX ORDER — METOPROLOL SUCCINATE 50 MG/1
50 TABLET, EXTENDED RELEASE ORAL DAILY
Qty: 30 TABLET | Refills: 0 | Status: SHIPPED | OUTPATIENT
Start: 2024-10-16

## 2024-10-16 RX ORDER — TRAZODONE HYDROCHLORIDE 100 MG/1
100 TABLET ORAL
Qty: 30 TABLET | Refills: 0 | Status: SHIPPED | OUTPATIENT
Start: 2024-10-16

## 2024-10-16 RX ORDER — ATORVASTATIN CALCIUM 80 MG/1
80 TABLET, FILM COATED ORAL DAILY
Qty: 30 TABLET | Refills: 0 | Status: SHIPPED | OUTPATIENT
Start: 2024-10-16

## 2024-10-16 RX ORDER — EZETIMIBE 10 MG/1
10 TABLET ORAL
Qty: 30 TABLET | Refills: 0 | Status: SHIPPED | OUTPATIENT
Start: 2024-10-16

## 2024-10-16 RX ORDER — HYDROCHLOROTHIAZIDE 12.5 MG/1
12.5 TABLET ORAL DAILY
Qty: 30 TABLET | Refills: 0 | Status: SHIPPED | OUTPATIENT
Start: 2024-10-16

## 2024-10-16 RX ORDER — PRAMIPEXOLE DIHYDROCHLORIDE 0.12 MG/1
TABLET ORAL
Qty: 60 TABLET | Refills: 0 | Status: SHIPPED | OUTPATIENT
Start: 2024-10-16

## (undated) DEVICE — GLV SURG SENSICARE SLT PF LF 7 STRL

## (undated) DEVICE — Device: Brand: DEFENDO AIR/WATER/SUCTION AND BIOPSY VALVE

## (undated) DEVICE — SUT PROLN 3/0 RB1 D/A 36IN 8558H

## (undated) DEVICE — APPL CHLORAPREP HI/LITE 26ML ORNG

## (undated) DEVICE — INTENDED FOR TISSUE SEPARATION, AND OTHER PROCEDURES THAT REQUIRE A SHARP SURGICAL BLADE TO PUNCTURE OR CUT.: Brand: BARD-PARKER ® CARBON RIB-BACK BLADES

## (undated) DEVICE — DRSNG GZ PETROLTM XEROFORM CURAD 1X8IN STRL

## (undated) DEVICE — LINER SURG CANSTR SXN S/RIGD 1500CC

## (undated) DEVICE — SUREFIT, DUAL DISPERSIVE ELECTRODE, CONTACT QUALITY MONITOR: Brand: SUREFIT

## (undated) DEVICE — SOLIDIFIER LIQLOC PLS 1500CC BT

## (undated) DEVICE — GAUZE,SPONGE,4"X4",16PLY,STRL,LF,10/TRAY: Brand: MEDLINE

## (undated) DEVICE — GLV SURG SENSICARE W/ALOE PF LF 7 STRL

## (undated) DEVICE — SOL IRRG H2O PL/BG 1000ML STRL

## (undated) DEVICE — SINGLE-USE BIOPSY FORCEPS: Brand: RADIAL JAW 4

## (undated) DEVICE — UNDERCAST PADDING: Brand: DEROYAL

## (undated) DEVICE — SNAR E/S POLYP SNAREMASTER OVL/10MM 2.8X2300MM YEL

## (undated) DEVICE — COMFORT ARM SLING: Brand: DEROYAL

## (undated) DEVICE — PAD GRND REM POLYHESIVE A/ DISP

## (undated) DEVICE — THE SINGLE USE ETRAP – POLYP TRAP IS USED FOR SUCTION RETRIEVAL OF ENDOSCOPICALLY REMOVED POLYPS.: Brand: ETRAP

## (undated) DEVICE — GLV SURG ULTRAFREE MAX LTX PF 8

## (undated) DEVICE — BNDG ESMARK 4IN 12FT LF STRL BLU

## (undated) DEVICE — LP VESL MINI RED 2PK

## (undated) DEVICE — Device

## (undated) DEVICE — CONN JET HYDRA H20 AUXILIARY DISP

## (undated) DEVICE — BLCK/BITE BLOX WO/DENTL/RIM W/STRAP 54F

## (undated) DEVICE — PENCL E/S SMOKEEVAC W/TELESCP CANN

## (undated) DEVICE — STRIP,CLOSURE,WOUND,MEDI-STRIP,1/2X4: Brand: MEDLINE

## (undated) DEVICE — SUT VIC UD BR COAT 0 CP2 27IN

## (undated) DEVICE — Device: Brand: SINGLE USE INJECTOR NM600/610

## (undated) DEVICE — EXTREMITY-LF: Brand: MEDLINE INDUSTRIES, INC.

## (undated) DEVICE — BNDG ELAS ECON W/CLIP 4IN 5YD LF STRL

## (undated) DEVICE — STERILE POLYISOPRENE POWDER-FREE SURGICAL GLOVES: Brand: PROTEXIS

## (undated) DEVICE — UNDYED BRAIDED (POLYGLACTIN 910), SYNTHETIC ABSORBABLE SUTURE: Brand: COATED VICRYL

## (undated) DEVICE — SUT ETHIB 2/0 SH SH 36IN X523H

## (undated) DEVICE — KT SYR GEL ORISE SNGL PK 10ML